# Patient Record
Sex: MALE | Race: WHITE | NOT HISPANIC OR LATINO | URBAN - METROPOLITAN AREA
[De-identification: names, ages, dates, MRNs, and addresses within clinical notes are randomized per-mention and may not be internally consistent; named-entity substitution may affect disease eponyms.]

---

## 2019-09-29 ENCOUNTER — EMERGENCY (EMERGENCY)
Facility: HOSPITAL | Age: 74
LOS: 1 days | Discharge: HOME | End: 2019-09-29
Attending: EMERGENCY MEDICINE | Admitting: EMERGENCY MEDICINE
Payer: MEDICARE

## 2019-09-29 VITALS
OXYGEN SATURATION: 95 % | SYSTOLIC BLOOD PRESSURE: 147 MMHG | DIASTOLIC BLOOD PRESSURE: 76 MMHG | HEIGHT: 65 IN | HEART RATE: 67 BPM | WEIGHT: 175.05 LBS | RESPIRATION RATE: 16 BRPM | TEMPERATURE: 98 F

## 2019-09-29 PROCEDURE — 99284 EMERGENCY DEPT VISIT MOD MDM: CPT

## 2019-09-29 PROCEDURE — 12001 RPR S/N/AX/GEN/TRNK 2.5CM/<: CPT

## 2019-09-29 NOTE — ED ADULT NURSE NOTE - OBJECTIVE STATEMENT
Patient presents to ED stated he fell while walking the dog outside the house complaining of right shoulder pain, left side of head abrasion and left fingers abrasion was seen and evaluated by MD with orders made and carried out.

## 2019-09-29 NOTE — ED PROVIDER NOTE - PATIENT PORTAL LINK FT
You can access the FollowMyHealth Patient Portal offered by [facility] by registering at the following website: [email or web address]. By joining [employer]’s FollowMyHealth portal, you will also be able to view your health information using other applications (apps) compatible with our system.

## 2019-09-29 NOTE — ED ADULT NURSE NOTE - NSIMPLEMENTINTERV_GEN_ALL_ED
Implemented All Universal Safety Interventions:  [place] to call system. Call bell, personal items and telephone within reach. Instruct patient to call for assistance. Room bathroom lighting operational. Non-slip footwear when patient is off stretcher. Physically safe environment: no spills, clutter or unnecessary equipment. Stretcher in lowest position, wheels locked, appropriate side rails in place.

## 2019-09-29 NOTE — ED ADULT TRIAGE NOTE - CHIEF COMPLAINT QUOTE
s/p trip and fall. c/o right shoulder pain, hit head, wound to the left side of the head, abrasions to the left fingers.- patient denies any LOC

## 2019-09-29 NOTE — ED PROVIDER NOTE - OBJECTIVE STATEMENT
74yo male who presents with right shoulder pain and head injury. pt was walking his dogs and they pulled him and he fell broke the fall with his right arm +head injury, no LOC< pt c/o laceration to scalp that continued to bleed with right shoulder pain, +abrasions to hand, no dizziness, no headache, no weakness no other complaints

## 2019-09-30 VITALS
SYSTOLIC BLOOD PRESSURE: 144 MMHG | DIASTOLIC BLOOD PRESSURE: 80 MMHG | HEART RATE: 71 BPM | RESPIRATION RATE: 16 BRPM | OXYGEN SATURATION: 98 % | TEMPERATURE: 97 F

## 2019-09-30 PROCEDURE — 73030 X-RAY EXAM OF SHOULDER: CPT | Mod: 26,RT

## 2019-09-30 PROCEDURE — 73200 CT UPPER EXTREMITY W/O DYE: CPT | Mod: 26,RT

## 2019-09-30 PROCEDURE — 73030 X-RAY EXAM OF SHOULDER: CPT

## 2019-09-30 PROCEDURE — 12001 RPR S/N/AX/GEN/TRNK 2.5CM/<: CPT

## 2019-09-30 PROCEDURE — 99284 EMERGENCY DEPT VISIT MOD MDM: CPT | Mod: 25

## 2019-09-30 PROCEDURE — 73200 CT UPPER EXTREMITY W/O DYE: CPT

## 2019-09-30 PROCEDURE — 70450 CT HEAD/BRAIN W/O DYE: CPT | Mod: 26

## 2019-09-30 PROCEDURE — 70450 CT HEAD/BRAIN W/O DYE: CPT

## 2019-09-30 RX ORDER — ACETAMINOPHEN 500 MG
650 TABLET ORAL ONCE
Refills: 0 | Status: COMPLETED | OUTPATIENT
Start: 2019-09-30 | End: 2019-09-30

## 2019-09-30 RX ORDER — OXYCODONE AND ACETAMINOPHEN 5; 325 MG/1; MG/1
1 TABLET ORAL ONCE
Refills: 0 | Status: DISCONTINUED | OUTPATIENT
Start: 2019-09-30 | End: 2019-09-30

## 2019-09-30 RX ADMIN — Medication 650 MILLIGRAM(S): at 00:56

## 2019-09-30 NOTE — ED PROCEDURE NOTE - CPROC ED POST PROC CARE GUIDE1
Elevate the injured extremity as instructed./Instructed patient/caregiver to follow-up with primary care physician.
Verbal/written post procedure instructions were given to patient/caregiver.

## 2019-09-30 NOTE — ED PROCEDURE NOTE - CPROC ED TIME OUT STATEMENT1
“Patient's name, , procedure and correct site were confirmed during the [place] Timeout.”
“Patient's name, , procedure and correct site were confirmed during the [place] Timeout.”

## 2020-11-03 ENCOUNTER — EMERGENCY (EMERGENCY)
Facility: HOSPITAL | Age: 75
LOS: 1 days | Discharge: ANOTHER HOSPITAL | End: 2020-11-03
Attending: EMERGENCY MEDICINE | Admitting: EMERGENCY MEDICINE
Payer: MEDICARE

## 2020-11-03 VITALS
OXYGEN SATURATION: 96 % | HEART RATE: 109 BPM | TEMPERATURE: 103 F | DIASTOLIC BLOOD PRESSURE: 91 MMHG | RESPIRATION RATE: 30 BRPM | SYSTOLIC BLOOD PRESSURE: 133 MMHG | HEIGHT: 65 IN | WEIGHT: 160.06 LBS

## 2020-11-03 LAB
ACETONE SERPL-MCNC: NEGATIVE — SIGNIFICANT CHANGE UP
ALBUMIN SERPL ELPH-MCNC: 4.1 G/DL — SIGNIFICANT CHANGE UP (ref 3.3–5)
ALP SERPL-CCNC: 94 U/L — SIGNIFICANT CHANGE UP (ref 30–120)
ALT FLD-CCNC: 36 U/L DA — SIGNIFICANT CHANGE UP (ref 10–60)
ANION GAP SERPL CALC-SCNC: 13 MMOL/L — SIGNIFICANT CHANGE UP (ref 5–17)
APTT BLD: 27.2 SEC — LOW (ref 27.5–35.5)
AST SERPL-CCNC: 21 U/L — SIGNIFICANT CHANGE UP (ref 10–40)
BASOPHILS # BLD AUTO: 0.04 K/UL — SIGNIFICANT CHANGE UP (ref 0–0.2)
BASOPHILS NFR BLD AUTO: 0.3 % — SIGNIFICANT CHANGE UP (ref 0–2)
BILIRUB SERPL-MCNC: 0.5 MG/DL — SIGNIFICANT CHANGE UP (ref 0.2–1.2)
BUN SERPL-MCNC: 31 MG/DL — HIGH (ref 7–23)
CALCIUM SERPL-MCNC: 9.5 MG/DL — SIGNIFICANT CHANGE UP (ref 8.4–10.5)
CHLORIDE SERPL-SCNC: 101 MMOL/L — SIGNIFICANT CHANGE UP (ref 96–108)
CO2 SERPL-SCNC: 24 MMOL/L — SIGNIFICANT CHANGE UP (ref 22–31)
CREAT SERPL-MCNC: 2.14 MG/DL — HIGH (ref 0.5–1.3)
EOSINOPHIL # BLD AUTO: 0.01 K/UL — SIGNIFICANT CHANGE UP (ref 0–0.5)
EOSINOPHIL NFR BLD AUTO: 0.1 % — SIGNIFICANT CHANGE UP (ref 0–6)
GLUCOSE SERPL-MCNC: 440 MG/DL — HIGH (ref 70–99)
HCT VFR BLD CALC: 36.2 % — LOW (ref 39–50)
HGB BLD-MCNC: 12.6 G/DL — LOW (ref 13–17)
IMM GRANULOCYTES NFR BLD AUTO: 0.5 % — SIGNIFICANT CHANGE UP (ref 0–1.5)
INR BLD: 1.05 RATIO — SIGNIFICANT CHANGE UP (ref 0.88–1.16)
LYMPHOCYTES # BLD AUTO: 0.76 K/UL — LOW (ref 1–3.3)
LYMPHOCYTES # BLD AUTO: 4.9 % — LOW (ref 13–44)
MCHC RBC-ENTMCNC: 32 PG — SIGNIFICANT CHANGE UP (ref 27–34)
MCHC RBC-ENTMCNC: 34.8 GM/DL — SIGNIFICANT CHANGE UP (ref 32–36)
MCV RBC AUTO: 91.9 FL — SIGNIFICANT CHANGE UP (ref 80–100)
MONOCYTES # BLD AUTO: 0.82 K/UL — SIGNIFICANT CHANGE UP (ref 0–0.9)
MONOCYTES NFR BLD AUTO: 5.3 % — SIGNIFICANT CHANGE UP (ref 2–14)
NEUTROPHILS # BLD AUTO: 13.77 K/UL — HIGH (ref 1.8–7.4)
NEUTROPHILS NFR BLD AUTO: 88.9 % — HIGH (ref 43–77)
NRBC # BLD: 0 /100 WBCS — SIGNIFICANT CHANGE UP (ref 0–0)
PLATELET # BLD AUTO: 218 K/UL — SIGNIFICANT CHANGE UP (ref 150–400)
POTASSIUM SERPL-MCNC: 4.4 MMOL/L — SIGNIFICANT CHANGE UP (ref 3.5–5.3)
POTASSIUM SERPL-SCNC: 4.4 MMOL/L — SIGNIFICANT CHANGE UP (ref 3.5–5.3)
PROT SERPL-MCNC: 7.4 G/DL — SIGNIFICANT CHANGE UP (ref 6–8.3)
PROTHROM AB SERPL-ACNC: 12.7 SEC — SIGNIFICANT CHANGE UP (ref 10.6–13.6)
RBC # BLD: 3.94 M/UL — LOW (ref 4.2–5.8)
RBC # FLD: 12.2 % — SIGNIFICANT CHANGE UP (ref 10.3–14.5)
SODIUM SERPL-SCNC: 138 MMOL/L — SIGNIFICANT CHANGE UP (ref 135–145)
WBC # BLD: 15.47 K/UL — HIGH (ref 3.8–10.5)
WBC # FLD AUTO: 15.47 K/UL — HIGH (ref 3.8–10.5)

## 2020-11-03 PROCEDURE — 70450 CT HEAD/BRAIN W/O DYE: CPT | Mod: 26

## 2020-11-03 RX ORDER — SODIUM CHLORIDE 9 MG/ML
1000 INJECTION INTRAMUSCULAR; INTRAVENOUS; SUBCUTANEOUS ONCE
Refills: 0 | Status: COMPLETED | OUTPATIENT
Start: 2020-11-03 | End: 2020-11-03

## 2020-11-03 RX ORDER — ACETAMINOPHEN 500 MG
650 TABLET ORAL ONCE
Refills: 0 | Status: COMPLETED | OUTPATIENT
Start: 2020-11-03 | End: 2020-11-03

## 2020-11-03 RX ADMIN — Medication 650 MILLIGRAM(S): at 23:30

## 2020-11-03 RX ADMIN — SODIUM CHLORIDE 1000 MILLILITER(S): 9 INJECTION INTRAMUSCULAR; INTRAVENOUS; SUBCUTANEOUS at 23:30

## 2020-11-03 RX ADMIN — SODIUM CHLORIDE 1000 MILLILITER(S): 9 INJECTION INTRAMUSCULAR; INTRAVENOUS; SUBCUTANEOUS at 23:40

## 2020-11-03 NOTE — ED PROVIDER NOTE - OBJECTIVE STATEMENT
75yo male bib ems with confusion and hyperglycemia tonite. son states pt came out of the shower and had urinated on himself, pt was speaking gibberish, no vomiting or diarrhea, pt is confused so hx is limited, pt denies any complaints, FS at home was 424

## 2020-11-03 NOTE — ED PROVIDER NOTE - NOTES
spoke with cardiology fellow at [facility], dr paulson, not convinced ekg shows st elevations, agrees this is primary sepsis, will follow up cardiac enzymes, speak with the attending dr porter, and follow up

## 2020-11-03 NOTE — ED PROVIDER NOTE - CARE PLAN
Principal Discharge DX:	Sepsis due to pneumonia  Secondary Diagnosis:	Hyperglycemia  Secondary Diagnosis:	Acute renal insufficiency

## 2020-11-04 ENCOUNTER — INPATIENT (INPATIENT)
Facility: HOSPITAL | Age: 75
LOS: 9 days | Discharge: HOME | DRG: 853 | End: 2020-11-14
Attending: INTERNAL MEDICINE | Admitting: INTERNAL MEDICINE
Payer: MEDICARE

## 2020-11-04 VITALS — RESPIRATION RATE: 22 BRPM | OXYGEN SATURATION: 94 % | TEMPERATURE: 101 F

## 2020-11-04 VITALS
RESPIRATION RATE: 33 BRPM | OXYGEN SATURATION: 95 % | HEART RATE: 92 BPM | TEMPERATURE: 98 F | DIASTOLIC BLOOD PRESSURE: 54 MMHG | SYSTOLIC BLOOD PRESSURE: 102 MMHG

## 2020-11-04 DIAGNOSIS — I10 ESSENTIAL (PRIMARY) HYPERTENSION: ICD-10-CM

## 2020-11-04 DIAGNOSIS — A41.9 SEPSIS, UNSPECIFIED ORGANISM: ICD-10-CM

## 2020-11-04 DIAGNOSIS — E11.69 TYPE 2 DIABETES MELLITUS WITH OTHER SPECIFIED COMPLICATION: ICD-10-CM

## 2020-11-04 DIAGNOSIS — N17.9 ACUTE KIDNEY FAILURE, UNSPECIFIED: ICD-10-CM

## 2020-11-04 DIAGNOSIS — R41.82 ALTERED MENTAL STATUS, UNSPECIFIED: ICD-10-CM

## 2020-11-04 DIAGNOSIS — R94.31 ABNORMAL ELECTROCARDIOGRAM [ECG] [EKG]: ICD-10-CM

## 2020-11-04 DIAGNOSIS — G93.41 METABOLIC ENCEPHALOPATHY: ICD-10-CM

## 2020-11-04 PROBLEM — Z00.00 ENCOUNTER FOR PREVENTIVE HEALTH EXAMINATION: Status: ACTIVE | Noted: 2020-11-04

## 2020-11-04 LAB
-  STREPTOCOCCUS SP. (NOT GRP A, B OR S PNEUMONIAE): SIGNIFICANT CHANGE UP
ACETONE SERPL-MCNC: NEGATIVE — SIGNIFICANT CHANGE UP
ALBUMIN SERPL ELPH-MCNC: 2.9 G/DL — LOW (ref 3.3–5)
ALP SERPL-CCNC: 73 U/L — SIGNIFICANT CHANGE UP (ref 40–120)
ALT FLD-CCNC: 27 U/L — SIGNIFICANT CHANGE UP (ref 12–78)
ANION GAP SERPL CALC-SCNC: 7 MMOL/L — SIGNIFICANT CHANGE UP (ref 5–17)
APPEARANCE UR: CLEAR — SIGNIFICANT CHANGE UP
AST SERPL-CCNC: 19 U/L — SIGNIFICANT CHANGE UP (ref 15–37)
BASE EXCESS BLDV CALC-SCNC: -2.7 MMOL/L — LOW (ref -2–2)
BASOPHILS # BLD AUTO: 0.03 K/UL — SIGNIFICANT CHANGE UP (ref 0–0.2)
BASOPHILS NFR BLD AUTO: 0.2 % — SIGNIFICANT CHANGE UP (ref 0–2)
BILIRUB DIRECT SERPL-MCNC: 0.1 MG/DL — SIGNIFICANT CHANGE UP (ref 0.05–0.2)
BILIRUB INDIRECT FLD-MCNC: 0.3 MG/DL — SIGNIFICANT CHANGE UP (ref 0.2–1)
BILIRUB SERPL-MCNC: 0.4 MG/DL — SIGNIFICANT CHANGE UP (ref 0.2–1.2)
BILIRUB UR-MCNC: NEGATIVE — SIGNIFICANT CHANGE UP
BLOOD GAS COMMENTS, VENOUS: SIGNIFICANT CHANGE UP
BUN SERPL-MCNC: 29 MG/DL — HIGH (ref 7–23)
CALCIUM SERPL-MCNC: 7.9 MG/DL — LOW (ref 8.5–10.1)
CHLORIDE SERPL-SCNC: 110 MMOL/L — HIGH (ref 96–108)
CHOLEST SERPL-MCNC: 96 MG/DL — SIGNIFICANT CHANGE UP
CK MB BLD-MCNC: 1.3 % — SIGNIFICANT CHANGE UP (ref 0–3.5)
CK MB BLD-MCNC: 3.5 % — SIGNIFICANT CHANGE UP (ref 0–3.5)
CK MB BLD-MCNC: 5 % — HIGH (ref 0–3.5)
CK MB BLD-MCNC: 5.7 % — HIGH (ref 0–3.5)
CK MB CFR SERPL CALC: 1.7 NG/ML — SIGNIFICANT CHANGE UP (ref 0–3.6)
CK MB CFR SERPL CALC: 10.6 NG/ML — HIGH (ref 0–3.6)
CK MB CFR SERPL CALC: 14.4 NG/ML — HIGH (ref 0–3.6)
CK MB CFR SERPL CALC: 8 NG/ML — HIGH (ref 0–3.6)
CK SERPL-CCNC: 134 U/L — SIGNIFICANT CHANGE UP (ref 39–308)
CK SERPL-CCNC: 159 U/L — SIGNIFICANT CHANGE UP (ref 26–308)
CK SERPL-CCNC: 251 U/L — SIGNIFICANT CHANGE UP (ref 26–308)
CK SERPL-CCNC: 305 U/L — SIGNIFICANT CHANGE UP (ref 26–308)
CO2 SERPL-SCNC: 23 MMOL/L — SIGNIFICANT CHANGE UP (ref 22–31)
COLOR SPEC: YELLOW — SIGNIFICANT CHANGE UP
CREAT SERPL-MCNC: 1.9 MG/DL — HIGH (ref 0.5–1.3)
CRP SERPL-MCNC: 1.3 MG/DL — HIGH (ref 0–0.4)
CRP SERPL-MCNC: 6.41 MG/DL — HIGH (ref 0–0.4)
DIFF PNL FLD: ABNORMAL
EOSINOPHIL # BLD AUTO: 0 K/UL — SIGNIFICANT CHANGE UP (ref 0–0.5)
EOSINOPHIL NFR BLD AUTO: 0 % — SIGNIFICANT CHANGE UP (ref 0–6)
FERRITIN SERPL-MCNC: 164 NG/ML — SIGNIFICANT CHANGE UP (ref 30–400)
GLUCOSE BLDC GLUCOMTR-MCNC: 417 MG/DL — HIGH (ref 70–99)
GLUCOSE SERPL-MCNC: 320 MG/DL — HIGH (ref 70–99)
GLUCOSE UR QL: 1000 MG/DL
GRAM STN FLD: SIGNIFICANT CHANGE UP
HCO3 BLDV-SCNC: 22 MMOL/L — SIGNIFICANT CHANGE UP (ref 21–29)
HCT VFR BLD CALC: 30.5 % — LOW (ref 39–50)
HDLC SERPL-MCNC: 32 MG/DL — LOW
HGB BLD-MCNC: 10.6 G/DL — LOW (ref 13–17)
HOROWITZ INDEX BLDV+IHG-RTO: 21 — SIGNIFICANT CHANGE UP
IMM GRANULOCYTES NFR BLD AUTO: 0.9 % — SIGNIFICANT CHANGE UP (ref 0–1.5)
KETONES UR-MCNC: NEGATIVE — SIGNIFICANT CHANGE UP
LACTATE SERPL-SCNC: 2.1 MMOL/L — HIGH (ref 0.7–2)
LACTATE SERPL-SCNC: 3.6 MMOL/L — HIGH (ref 0.7–2)
LACTATE SERPL-SCNC: 3.7 MMOL/L — HIGH (ref 0.7–2)
LEUKOCYTE ESTERASE UR-ACNC: NEGATIVE — SIGNIFICANT CHANGE UP
LIPID PNL WITH DIRECT LDL SERPL: 55 MG/DL — SIGNIFICANT CHANGE UP
LYMPHOCYTES # BLD AUTO: 0.88 K/UL — LOW (ref 1–3.3)
LYMPHOCYTES # BLD AUTO: 4.5 % — LOW (ref 13–44)
MAGNESIUM SERPL-MCNC: 1.8 MG/DL — SIGNIFICANT CHANGE UP (ref 1.6–2.6)
MCHC RBC-ENTMCNC: 31.5 PG — SIGNIFICANT CHANGE UP (ref 27–34)
MCHC RBC-ENTMCNC: 34.8 GM/DL — SIGNIFICANT CHANGE UP (ref 32–36)
MCV RBC AUTO: 90.5 FL — SIGNIFICANT CHANGE UP (ref 80–100)
METHOD TYPE: SIGNIFICANT CHANGE UP
MONOCYTES # BLD AUTO: 0.83 K/UL — SIGNIFICANT CHANGE UP (ref 0–0.9)
MONOCYTES NFR BLD AUTO: 4.3 % — SIGNIFICANT CHANGE UP (ref 2–14)
NEUTROPHILS # BLD AUTO: 17.48 K/UL — HIGH (ref 1.8–7.4)
NEUTROPHILS NFR BLD AUTO: 90.1 % — HIGH (ref 43–77)
NITRITE UR-MCNC: NEGATIVE — SIGNIFICANT CHANGE UP
NON HDL CHOLESTEROL: 64 MG/DL — SIGNIFICANT CHANGE UP
NRBC # BLD: 0 /100 WBCS — SIGNIFICANT CHANGE UP (ref 0–0)
NT-PROBNP SERPL-SCNC: 1500 PG/ML — HIGH (ref 0–450)
PCO2 BLDV: 43 MMHG — SIGNIFICANT CHANGE UP (ref 35–50)
PH BLDV: 7.34 — LOW (ref 7.35–7.45)
PH UR: 5 — SIGNIFICANT CHANGE UP (ref 5–8)
PLATELET # BLD AUTO: 187 K/UL — SIGNIFICANT CHANGE UP (ref 150–400)
PO2 BLDV: 103 MMHG — HIGH (ref 25–45)
POTASSIUM SERPL-MCNC: 4.4 MMOL/L — SIGNIFICANT CHANGE UP (ref 3.5–5.3)
POTASSIUM SERPL-SCNC: 4.4 MMOL/L — SIGNIFICANT CHANGE UP (ref 3.5–5.3)
PROCALCITONIN SERPL-MCNC: 0.16 NG/ML — HIGH (ref 0.02–0.1)
PROCALCITONIN SERPL-MCNC: 5.7 NG/ML — HIGH (ref 0–0.04)
PROT SERPL-MCNC: 5.9 G/DL — LOW (ref 6–8.3)
PROT UR-MCNC: 15 MG/DL
RAPID RVP RESULT: SIGNIFICANT CHANGE UP
RBC # BLD: 3.37 M/UL — LOW (ref 4.2–5.8)
RBC # FLD: 12.4 % — SIGNIFICANT CHANGE UP (ref 10.3–14.5)
SAO2 % BLDV: 98 % — HIGH (ref 67–88)
SARS-COV-2 RNA SPEC QL NAA+PROBE: SIGNIFICANT CHANGE UP
SARS-COV-2 RNA SPEC QL NAA+PROBE: SIGNIFICANT CHANGE UP
SODIUM SERPL-SCNC: 140 MMOL/L — SIGNIFICANT CHANGE UP (ref 135–145)
SP GR SPEC: 1.01 — SIGNIFICANT CHANGE UP (ref 1.01–1.02)
SPECIMEN SOURCE: SIGNIFICANT CHANGE UP
SPECIMEN SOURCE: SIGNIFICANT CHANGE UP
TRIGL SERPL-MCNC: 45 MG/DL — SIGNIFICANT CHANGE UP
TROPONIN I SERPL-MCNC: 0.01 NG/ML — LOW (ref 0.02–0.06)
TROPONIN I SERPL-MCNC: 0.8 NG/ML — HIGH (ref 0.01–0.04)
TROPONIN I SERPL-MCNC: 3.2 NG/ML — HIGH (ref 0.01–0.04)
TROPONIN I SERPL-MCNC: 5.08 NG/ML — HIGH (ref 0.01–0.04)
TSH SERPL-MCNC: 0.64 UIU/ML — SIGNIFICANT CHANGE UP (ref 0.36–3.74)
UROBILINOGEN FLD QL: NEGATIVE MG/DL — SIGNIFICANT CHANGE UP
WBC # BLD: 19.39 K/UL — HIGH (ref 3.8–10.5)
WBC # FLD AUTO: 19.39 K/UL — HIGH (ref 3.8–10.5)

## 2020-11-04 PROCEDURE — 82553 CREATINE MB FRACTION: CPT

## 2020-11-04 PROCEDURE — 76770 US EXAM ABDO BACK WALL COMP: CPT | Mod: 26

## 2020-11-04 PROCEDURE — 99285 EMERGENCY DEPT VISIT HI MDM: CPT

## 2020-11-04 PROCEDURE — 96368 THER/DIAG CONCURRENT INF: CPT

## 2020-11-04 PROCEDURE — 71250 CT THORAX DX C-: CPT

## 2020-11-04 PROCEDURE — 71250 CT THORAX DX C-: CPT | Mod: 26

## 2020-11-04 PROCEDURE — 99285 EMERGENCY DEPT VISIT HI MDM: CPT | Mod: 25

## 2020-11-04 PROCEDURE — 36415 COLL VENOUS BLD VENIPUNCTURE: CPT

## 2020-11-04 PROCEDURE — 85730 THROMBOPLASTIN TIME PARTIAL: CPT

## 2020-11-04 PROCEDURE — 82009 KETONE BODYS QUAL: CPT

## 2020-11-04 PROCEDURE — 71045 X-RAY EXAM CHEST 1 VIEW: CPT | Mod: 26

## 2020-11-04 PROCEDURE — 99283 EMERGENCY DEPT VISIT LOW MDM: CPT

## 2020-11-04 PROCEDURE — 87184 SC STD DISK METHOD PER PLATE: CPT

## 2020-11-04 PROCEDURE — 87150 DNA/RNA AMPLIFIED PROBE: CPT

## 2020-11-04 PROCEDURE — 96367 TX/PROPH/DG ADDL SEQ IV INF: CPT

## 2020-11-04 PROCEDURE — 85610 PROTHROMBIN TIME: CPT

## 2020-11-04 PROCEDURE — 93005 ELECTROCARDIOGRAM TRACING: CPT

## 2020-11-04 PROCEDURE — 87040 BLOOD CULTURE FOR BACTERIA: CPT

## 2020-11-04 PROCEDURE — 82728 ASSAY OF FERRITIN: CPT

## 2020-11-04 PROCEDURE — 84484 ASSAY OF TROPONIN QUANT: CPT

## 2020-11-04 PROCEDURE — 96375 TX/PRO/DX INJ NEW DRUG ADDON: CPT

## 2020-11-04 PROCEDURE — 96361 HYDRATE IV INFUSION ADD-ON: CPT

## 2020-11-04 PROCEDURE — 93010 ELECTROCARDIOGRAM REPORT: CPT

## 2020-11-04 PROCEDURE — 96365 THER/PROPH/DIAG IV INF INIT: CPT

## 2020-11-04 PROCEDURE — 87181 SC STD AGAR DILUTION PER AGT: CPT

## 2020-11-04 PROCEDURE — 87086 URINE CULTURE/COLONY COUNT: CPT

## 2020-11-04 PROCEDURE — 82962 GLUCOSE BLOOD TEST: CPT

## 2020-11-04 PROCEDURE — 83605 ASSAY OF LACTIC ACID: CPT

## 2020-11-04 PROCEDURE — 93010 ELECTROCARDIOGRAM REPORT: CPT | Mod: 77

## 2020-11-04 PROCEDURE — 99223 1ST HOSP IP/OBS HIGH 75: CPT

## 2020-11-04 PROCEDURE — 70450 CT HEAD/BRAIN W/O DYE: CPT

## 2020-11-04 PROCEDURE — 86140 C-REACTIVE PROTEIN: CPT

## 2020-11-04 PROCEDURE — 71045 X-RAY EXAM CHEST 1 VIEW: CPT

## 2020-11-04 PROCEDURE — 80053 COMPREHEN METABOLIC PANEL: CPT

## 2020-11-04 PROCEDURE — 81001 URINALYSIS AUTO W/SCOPE: CPT

## 2020-11-04 PROCEDURE — 82550 ASSAY OF CK (CPK): CPT

## 2020-11-04 PROCEDURE — 85025 COMPLETE CBC W/AUTO DIFF WBC: CPT

## 2020-11-04 PROCEDURE — 93306 TTE W/DOPPLER COMPLETE: CPT | Mod: 26

## 2020-11-04 PROCEDURE — 84145 PROCALCITONIN (PCT): CPT

## 2020-11-04 RX ORDER — INSULIN LISPRO 100/ML
VIAL (ML) SUBCUTANEOUS
Refills: 0 | Status: DISCONTINUED | OUTPATIENT
Start: 2020-11-04 | End: 2020-11-04

## 2020-11-04 RX ORDER — DEXTROSE 50 % IN WATER 50 %
12.5 SYRINGE (ML) INTRAVENOUS ONCE
Refills: 0 | Status: DISCONTINUED | OUTPATIENT
Start: 2020-11-04 | End: 2020-11-14

## 2020-11-04 RX ORDER — HEPARIN SODIUM 5000 [USP'U]/ML
INJECTION INTRAVENOUS; SUBCUTANEOUS
Qty: 25000 | Refills: 0 | Status: DISCONTINUED | OUTPATIENT
Start: 2020-11-04 | End: 2020-11-07

## 2020-11-04 RX ORDER — ACETAMINOPHEN 500 MG
650 TABLET ORAL EVERY 6 HOURS
Refills: 0 | Status: DISCONTINUED | OUTPATIENT
Start: 2020-11-04 | End: 2020-11-14

## 2020-11-04 RX ORDER — INSULIN LISPRO 100/ML
6 VIAL (ML) SUBCUTANEOUS ONCE
Refills: 0 | Status: COMPLETED | OUTPATIENT
Start: 2020-11-04 | End: 2020-11-04

## 2020-11-04 RX ORDER — HEPARIN SODIUM 5000 [USP'U]/ML
4400 INJECTION INTRAVENOUS; SUBCUTANEOUS ONCE
Refills: 0 | Status: COMPLETED | OUTPATIENT
Start: 2020-11-04 | End: 2020-11-04

## 2020-11-04 RX ORDER — SODIUM CHLORIDE 9 MG/ML
1000 INJECTION, SOLUTION INTRAVENOUS
Refills: 0 | Status: DISCONTINUED | OUTPATIENT
Start: 2020-11-04 | End: 2020-11-14

## 2020-11-04 RX ORDER — HEPARIN SODIUM 5000 [USP'U]/ML
4400 INJECTION INTRAVENOUS; SUBCUTANEOUS EVERY 6 HOURS
Refills: 0 | Status: DISCONTINUED | OUTPATIENT
Start: 2020-11-04 | End: 2020-11-07

## 2020-11-04 RX ORDER — CEFTRIAXONE 500 MG/1
2000 INJECTION, POWDER, FOR SOLUTION INTRAMUSCULAR; INTRAVENOUS EVERY 24 HOURS
Refills: 0 | Status: COMPLETED | OUTPATIENT
Start: 2020-11-05 | End: 2020-11-14

## 2020-11-04 RX ORDER — SODIUM CHLORIDE 9 MG/ML
1000 INJECTION INTRAMUSCULAR; INTRAVENOUS; SUBCUTANEOUS ONCE
Refills: 0 | Status: COMPLETED | OUTPATIENT
Start: 2020-11-04 | End: 2020-11-04

## 2020-11-04 RX ORDER — GLUCAGON INJECTION, SOLUTION 0.5 MG/.1ML
1 INJECTION, SOLUTION SUBCUTANEOUS ONCE
Refills: 0 | Status: DISCONTINUED | OUTPATIENT
Start: 2020-11-04 | End: 2020-11-14

## 2020-11-04 RX ORDER — METFORMIN HYDROCHLORIDE 850 MG/1
1 TABLET ORAL
Qty: 0 | Refills: 0 | DISCHARGE

## 2020-11-04 RX ORDER — METOPROLOL TARTRATE 50 MG
12.5 TABLET ORAL EVERY 12 HOURS
Refills: 0 | Status: DISCONTINUED | OUTPATIENT
Start: 2020-11-04 | End: 2020-11-05

## 2020-11-04 RX ORDER — INSULIN LISPRO 100/ML
VIAL (ML) SUBCUTANEOUS
Refills: 0 | Status: DISCONTINUED | OUTPATIENT
Start: 2020-11-04 | End: 2020-11-14

## 2020-11-04 RX ORDER — ASPIRIN/CALCIUM CARB/MAGNESIUM 324 MG
81 TABLET ORAL DAILY
Refills: 0 | Status: DISCONTINUED | OUTPATIENT
Start: 2020-11-04 | End: 2020-11-14

## 2020-11-04 RX ORDER — DEXTROSE 50 % IN WATER 50 %
25 SYRINGE (ML) INTRAVENOUS ONCE
Refills: 0 | Status: DISCONTINUED | OUTPATIENT
Start: 2020-11-04 | End: 2020-11-14

## 2020-11-04 RX ORDER — CEFTRIAXONE 500 MG/1
1000 INJECTION, POWDER, FOR SOLUTION INTRAMUSCULAR; INTRAVENOUS ONCE
Refills: 0 | Status: COMPLETED | OUTPATIENT
Start: 2020-11-04 | End: 2020-11-04

## 2020-11-04 RX ORDER — ATORVASTATIN CALCIUM 80 MG/1
40 TABLET, FILM COATED ORAL AT BEDTIME
Refills: 0 | Status: DISCONTINUED | OUTPATIENT
Start: 2020-11-04 | End: 2020-11-14

## 2020-11-04 RX ORDER — AZITHROMYCIN 500 MG/1
500 TABLET, FILM COATED ORAL DAILY
Refills: 0 | Status: COMPLETED | OUTPATIENT
Start: 2020-11-04 | End: 2020-11-04

## 2020-11-04 RX ORDER — INFLUENZA VIRUS VACCINE 15; 15; 15; 15 UG/.5ML; UG/.5ML; UG/.5ML; UG/.5ML
0.5 SUSPENSION INTRAMUSCULAR ONCE
Refills: 0 | Status: DISCONTINUED | OUTPATIENT
Start: 2020-11-04 | End: 2020-11-14

## 2020-11-04 RX ORDER — ACETAMINOPHEN 500 MG
650 TABLET ORAL ONCE
Refills: 0 | Status: COMPLETED | OUTPATIENT
Start: 2020-11-04 | End: 2020-11-04

## 2020-11-04 RX ORDER — AZITHROMYCIN 500 MG/1
500 TABLET, FILM COATED ORAL ONCE
Refills: 0 | Status: COMPLETED | OUTPATIENT
Start: 2020-11-04 | End: 2020-11-04

## 2020-11-04 RX ORDER — INSULIN LISPRO 100/ML
VIAL (ML) SUBCUTANEOUS AT BEDTIME
Refills: 0 | Status: DISCONTINUED | OUTPATIENT
Start: 2020-11-04 | End: 2020-11-14

## 2020-11-04 RX ORDER — ENOXAPARIN SODIUM 100 MG/ML
30 INJECTION SUBCUTANEOUS DAILY
Refills: 0 | Status: DISCONTINUED | OUTPATIENT
Start: 2020-11-04 | End: 2020-11-04

## 2020-11-04 RX ORDER — CEFTRIAXONE 500 MG/1
1000 INJECTION, POWDER, FOR SOLUTION INTRAMUSCULAR; INTRAVENOUS EVERY 24 HOURS
Refills: 0 | Status: COMPLETED | OUTPATIENT
Start: 2020-11-04 | End: 2020-11-04

## 2020-11-04 RX ORDER — ASPIRIN/CALCIUM CARB/MAGNESIUM 324 MG
325 TABLET ORAL ONCE
Refills: 0 | Status: COMPLETED | OUTPATIENT
Start: 2020-11-04 | End: 2020-11-04

## 2020-11-04 RX ORDER — DEXTROSE 50 % IN WATER 50 %
15 SYRINGE (ML) INTRAVENOUS ONCE
Refills: 0 | Status: DISCONTINUED | OUTPATIENT
Start: 2020-11-04 | End: 2020-11-14

## 2020-11-04 RX ORDER — INSULIN LISPRO 100/ML
VIAL (ML) SUBCUTANEOUS AT BEDTIME
Refills: 0 | Status: DISCONTINUED | OUTPATIENT
Start: 2020-11-04 | End: 2020-11-04

## 2020-11-04 RX ORDER — SODIUM CHLORIDE 9 MG/ML
1000 INJECTION INTRAMUSCULAR; INTRAVENOUS; SUBCUTANEOUS
Refills: 0 | Status: DISCONTINUED | OUTPATIENT
Start: 2020-11-04 | End: 2020-11-04

## 2020-11-04 RX ORDER — ENOXAPARIN SODIUM 100 MG/ML
73 INJECTION SUBCUTANEOUS DAILY
Refills: 0 | Status: COMPLETED | OUTPATIENT
Start: 2020-11-04 | End: 2020-11-06

## 2020-11-04 RX ORDER — INSULIN HUMAN 100 [IU]/ML
6 INJECTION, SOLUTION SUBCUTANEOUS ONCE
Refills: 0 | Status: COMPLETED | OUTPATIENT
Start: 2020-11-04 | End: 2020-11-04

## 2020-11-04 RX ORDER — CLOPIDOGREL BISULFATE 75 MG/1
75 TABLET, FILM COATED ORAL DAILY
Refills: 0 | Status: DISCONTINUED | OUTPATIENT
Start: 2020-11-04 | End: 2020-11-14

## 2020-11-04 RX ADMIN — ENOXAPARIN SODIUM 73 MILLIGRAM(S): 100 INJECTION SUBCUTANEOUS at 17:05

## 2020-11-04 RX ADMIN — Medication 650 MILLIGRAM(S): at 00:01

## 2020-11-04 RX ADMIN — SODIUM CHLORIDE 1000 MILLILITER(S): 9 INJECTION INTRAMUSCULAR; INTRAVENOUS; SUBCUTANEOUS at 00:30

## 2020-11-04 RX ADMIN — CEFTRIAXONE 1000 MILLIGRAM(S): 500 INJECTION, POWDER, FOR SOLUTION INTRAMUSCULAR; INTRAVENOUS at 02:05

## 2020-11-04 RX ADMIN — Medication 650 MILLIGRAM(S): at 16:30

## 2020-11-04 RX ADMIN — ATORVASTATIN CALCIUM 40 MILLIGRAM(S): 80 TABLET, FILM COATED ORAL at 21:14

## 2020-11-04 RX ADMIN — HEPARIN SODIUM 4400 UNIT(S): 5000 INJECTION INTRAVENOUS; SUBCUTANEOUS at 03:24

## 2020-11-04 RX ADMIN — Medication 325 MILLIGRAM(S): at 01:47

## 2020-11-04 RX ADMIN — Medication 650 MILLIGRAM(S): at 05:30

## 2020-11-04 RX ADMIN — SODIUM CHLORIDE 1000 MILLILITER(S): 9 INJECTION INTRAMUSCULAR; INTRAVENOUS; SUBCUTANEOUS at 01:36

## 2020-11-04 RX ADMIN — CEFTRIAXONE 100 MILLIGRAM(S): 500 INJECTION, POWDER, FOR SOLUTION INTRAMUSCULAR; INTRAVENOUS at 01:36

## 2020-11-04 RX ADMIN — CLOPIDOGREL BISULFATE 75 MILLIGRAM(S): 75 TABLET, FILM COATED ORAL at 17:05

## 2020-11-04 RX ADMIN — Medication 81 MILLIGRAM(S): at 13:10

## 2020-11-04 RX ADMIN — INSULIN HUMAN 6 UNIT(S): 100 INJECTION, SOLUTION SUBCUTANEOUS at 03:25

## 2020-11-04 RX ADMIN — AZITHROMYCIN 255 MILLIGRAM(S): 500 TABLET, FILM COATED ORAL at 01:52

## 2020-11-04 RX ADMIN — Medication 12.5 MILLIGRAM(S): at 18:36

## 2020-11-04 RX ADMIN — Medication 650 MILLIGRAM(S): at 04:30

## 2020-11-04 RX ADMIN — Medication 2: at 13:09

## 2020-11-04 RX ADMIN — CEFTRIAXONE 100 MILLIGRAM(S): 500 INJECTION, POWDER, FOR SOLUTION INTRAMUSCULAR; INTRAVENOUS at 08:07

## 2020-11-04 RX ADMIN — Medication 6 UNIT(S): at 09:44

## 2020-11-04 RX ADMIN — AZITHROMYCIN 255 MILLIGRAM(S): 500 TABLET, FILM COATED ORAL at 12:55

## 2020-11-04 RX ADMIN — SODIUM CHLORIDE 1000 MILLILITER(S): 9 INJECTION INTRAMUSCULAR; INTRAVENOUS; SUBCUTANEOUS at 00:36

## 2020-11-04 RX ADMIN — SODIUM CHLORIDE 1000 MILLILITER(S): 9 INJECTION INTRAMUSCULAR; INTRAVENOUS; SUBCUTANEOUS at 00:40

## 2020-11-04 RX ADMIN — HEPARIN SODIUM 900 UNIT(S)/HR: 5000 INJECTION INTRAVENOUS; SUBCUTANEOUS at 03:24

## 2020-11-04 RX ADMIN — AZITHROMYCIN 500 MILLIGRAM(S): 500 TABLET, FILM COATED ORAL at 02:52

## 2020-11-04 NOTE — ED ADULT NURSE NOTE - OBJECTIVE STATEMENT
pt BIBA for confusion and hyperglycemia, upon arrival pt AAOx3, skin warm to touch, placed on CM, sepsis work up initiated, blood sent to lab. Rectal temp 103.

## 2020-11-04 NOTE — CONSULT NOTE ADULT - SUBJECTIVE AND OBJECTIVE BOX
HPI:  This is a 74 M with PMH of HTN DM HLD CAD presented to the hospital with CC of mental status and rigors. In ER patient had a temp of 103. WBC 19K and MARINA. Noted to have Left LE erythema. CT chest showed juliet infiltrates with pulm edema. No n/v/d CP SOB abd pain urinary symptoms.    Infectious Disease consult was called to evaluate pt and for antibiotic management      Past Medical & Surgical Hx:  PAST MEDICAL & SURGICAL HISTORY:  Diabetes mellitus  Hypertension    Social History--  EtOH: denies   Tobacco: denies   Drug Use: denies     FAMILY HISTORY:  Noncontributory    Allergies  No Known Allergies    Intolerances  NONE    Home Medications:  amLODIPine 10 mg oral tablet: 1 tab(s) orally once a day (04 Nov 2020 10:41)  aspirin 81 mg oral tablet: 1 tab(s) orally once a day (04 Nov 2020 10:41)  atorvastatin 40 mg oral tablet: 1 tab(s) orally once a day (04 Nov 2020 10:41)  Centrum Men&#x27;s oral tablet: 1 tab(s) orally once a day (04 Nov 2020 10:41)  Feosol 325 mg (65 mg elemental iron) oral tablet: 1 tab(s) orally once a day (04 Nov 2020 10:41)  hydroCHLOROthiazide 12.5 mg oral capsule: 1 cap(s) orally once a day (04 Nov 2020 10:41)  losartan 100 mg oral tablet: 1 tab(s) orally once a day (04 Nov 2020 10:41)  metFORMIN 500 mg oral tablet: 2 tab(s) orally once a day (at bedtime)   as per patient    *Script written as 1T BID* (04 Nov 2020 10:41)  Vitamin B-12 1000 mcg oral tablet: 1 tab(s) orally once a day (04 Nov 2020 10:41)  Vitamin D3 5000 intl units (125 mcg) oral tablet: 1 tab(s) orally once a day (04 Nov 2020 10:41)      Current Inpatient Medications :    ANTIBIOTICS:   azithromycin  IVPB 500 milliGRAM(s) IV Intermittent daily  cefTRIAXone   IVPB 1000 milliGRAM(s) IV Intermittent every 24 hours      OTHER RELEVANT MEDICATIONS :  acetaminophen   Tablet .. 650 milliGRAM(s) Oral every 6 hours PRN  aspirin enteric coated 81 milliGRAM(s) Oral daily  atorvastatin 40 milliGRAM(s) Oral at bedtime  clopidogrel Tablet 75 milliGRAM(s) Oral daily  dextrose 40% Gel 15 Gram(s) Oral once PRN  dextrose 5%. 1000 milliLiter(s) IV Continuous <Continuous>  dextrose 50% Injectable 12.5 Gram(s) IV Push once  dextrose 50% Injectable 25 Gram(s) IV Push once  dextrose 50% Injectable 25 Gram(s) IV Push once  enoxaparin Injectable 73 milliGRAM(s) SubCutaneous daily  glucagon  Injectable 1 milliGRAM(s) IntraMuscular once PRN  insulin lispro (ADMELOG) corrective regimen sliding scale   SubCutaneous three times a day before meals  insulin lispro (ADMELOG) corrective regimen sliding scale   SubCutaneous at bedtime      ROS:  Unable to obtain due to :     ROS:  CONSTITUTIONAL:  Negative fever or chills, feels well, good appetite  EYES:  Negative  blurry vision or double vision  CARDIOVASCULAR:  Negative for chest pain or palpitations  RESPIRATORY:  Negative for cough, wheezing, or SOB   GASTROINTESTINAL:  Negative for nausea, vomiting, diarrhea, constipation, or abdominal pain  GENITOURINARY:  Negative frequency, urgency , dysuria or hematuria   NEUROLOGIC:  No headache, confusion, dizziness, lightheadedness  All other systems were reviewed and are negative  I&O's Detail    Physical Exam:  Vital Signs Last 24 Hrs  T(C): 36.6 (04 Nov 2020 08:59), Max: 39.4 (03 Nov 2020 23:21)  T(F): 97.9 (04 Nov 2020 08:59), Max: 103 (03 Nov 2020 23:21)  HR: 79 (04 Nov 2020 13:00) (75 - 109)  BP: 126/72 (04 Nov 2020 13:00) (101/53 - 154/69)  RR: 18 (04 Nov 2020 13:00) (18 - 40)  SpO2: 100% (04 Nov 2020 13:00) (93% - 100%)  Height (cm): 165.1 (11-04 @ 04:33), 165.1 (11-03 @ 23:21)  Weight (kg): 72.6 (11-04 @ 04:33), 72.575 (11-03 @ 23:21)  BMI (kg/m2): 26.6 (11-04 @ 04:33), 26.6 (11-03 @ 23:21)  BSA (m2): 1.8 (11-04 @ 04:33), 1.8 (11-03 @ 23:21)    General:  no acute distress  Eyes: sclera anicteric, pupils equal and reactive to light  ENMT: buccal mucosa moist, pharynx not injected  Neck: supple, trachea midline  Lungs: Decreased, no wheeze/rhonchi  Cardiovascular: regular rate and rhythm, S1 S2  Abdomen: soft, nontender, no organomegaly present, bowel sounds normal  Neurological:  alert and oriented x3, Cranial Nerves II-XII grossly intact  Skin: no increased ecchymosis/petechiae/purpura  Extremities: Left LE erythema swelling    Labs:                         10.6   19.39 )-----------( 187      ( 04 Nov 2020 08:25 )             30.5   11-04    140  |  110<H>  |  29<H>  ----------------------------<  320<H>  4.4   |  23  |  1.90<H>    Ca    7.9<L>      04 Nov 2020 08:25  Mg     1.8     11-04    TPro  5.9<L>  /  Alb  2.9<L>  /  TBili  0.4  /  DBili  .10  /  AST  19  /  ALT  27  /  AlkPhos  73  11-04      RECENT CULTURES:  pending      RADIOLOGY & ADDITIONAL STUDIES:    EXAM:  CT CHEST                                  PROCEDURE DATE:  11/04/2020          INTERPRETATION:  CLINICAL INFORMATION: Cough    COMPARISON: There are no prior studies available for comparison.      TECHNIQUE: A volumetric CT acquisition of the chest was obtained from the thoracic inlet to the upper abdomen, without administration of intravenous contrast. MIP images were also obtained.    FINDINGS:    CHEST:    LUNGS AND LARGE AIRWAYS: There is interlobular septal thickening and extensive scattered groundglass opacity in both lungs. Patent central airways.  No pulmonary nodules.  PLEURA: No pleural effusion.  VESSELS: Within normal limits.  HEART: Heart size is mildly enlarged. No pericardial effusion. Coronary artery vascular calcifications.  MEDIASTINUM AND CARRIE: No lymphadenopathy.  CHEST WALL AND LOWER NECK: Within normal limits.  VISUALIZED UPPER ABDOMEN: Within normal limits.  BONES: There is a right humeral prosthesis with streak artifact limiting evaluation. There are degenerative changes of the spine.    IMPRESSION: Moderate pulmonary edema. Please note that superimposed infection cannot be excluded.    Assessment :   74 M with PMH of HTN DM HLD CAD presented to the hospital with CC of mental status and rigors admitted with sepsis syndrome. Fever Tmax 103 WBC 19K   - Suspect Left LE cellulitis is the source Fever Tmax 103 WBC 19K. Possibly bacteremic.  - Doubt pneumonia but cannot rule out  - CHF   - + troponins  - MARINA    Plan :   Cont Rocephin Zithromax  Fu cultures  Trend temps and cbc  Legionella and Pneumococcal antigen      Continue with present regime .  Approptiate use of antibiotics and adverse effects reviewed.      I have discussed the above plan of care with patient and son in detail. They expressed understanding of the treatment plan . Risks, benefits and alternatives discussed in detail. I have asked if they have any questions or concerns and appropriately addressed them to the best of my ability .      > 45 minutes spent in direct patient care reviewing  the notes, lab data/ imaging , discussion with multidisciplinary team. All questions were addressed and answered to the best of my capacity .    Thank you for allowing me to participate in the care of your patient .      Stacey Dorado MD  Infectious Disease  [phone]   HPI:  This is a 74 M with PMH of HTN DM HLD CAD presented to the hospital with CC of mental status and rigors. In ER patient had a temp of 103. WBC 19K and MARINA. Noted to have Left LE erythema. CT chest showed juliet infiltrates with pulm edema. COVID 19 pcr neg. No n/v/d CP SOB abd pain urinary symptoms.    Infectious Disease consult was called to evaluate pt and for antibiotic management      Past Medical & Surgical Hx:  PAST MEDICAL & SURGICAL HISTORY:  Diabetes mellitus  Hypertension    Social History--  EtOH: denies   Tobacco: denies   Drug Use: denies     FAMILY HISTORY:  Noncontributory    Allergies  No Known Allergies    Intolerances  NONE    Home Medications:  amLODIPine 10 mg oral tablet: 1 tab(s) orally once a day (04 Nov 2020 10:41)  aspirin 81 mg oral tablet: 1 tab(s) orally once a day (04 Nov 2020 10:41)  atorvastatin 40 mg oral tablet: 1 tab(s) orally once a day (04 Nov 2020 10:41)  Centrum Men&#x27;s oral tablet: 1 tab(s) orally once a day (04 Nov 2020 10:41)  Feosol 325 mg (65 mg elemental iron) oral tablet: 1 tab(s) orally once a day (04 Nov 2020 10:41)  hydroCHLOROthiazide 12.5 mg oral capsule: 1 cap(s) orally once a day (04 Nov 2020 10:41)  losartan 100 mg oral tablet: 1 tab(s) orally once a day (04 Nov 2020 10:41)  metFORMIN 500 mg oral tablet: 2 tab(s) orally once a day (at bedtime)   as per patient    *Script written as 1T BID* (04 Nov 2020 10:41)  Vitamin B-12 1000 mcg oral tablet: 1 tab(s) orally once a day (04 Nov 2020 10:41)  Vitamin D3 5000 intl units (125 mcg) oral tablet: 1 tab(s) orally once a day (04 Nov 2020 10:41)      Current Inpatient Medications :    ANTIBIOTICS:   azithromycin  IVPB 500 milliGRAM(s) IV Intermittent daily  cefTRIAXone   IVPB 1000 milliGRAM(s) IV Intermittent every 24 hours      OTHER RELEVANT MEDICATIONS :  acetaminophen   Tablet .. 650 milliGRAM(s) Oral every 6 hours PRN  aspirin enteric coated 81 milliGRAM(s) Oral daily  atorvastatin 40 milliGRAM(s) Oral at bedtime  clopidogrel Tablet 75 milliGRAM(s) Oral daily  dextrose 40% Gel 15 Gram(s) Oral once PRN  dextrose 5%. 1000 milliLiter(s) IV Continuous <Continuous>  dextrose 50% Injectable 12.5 Gram(s) IV Push once  dextrose 50% Injectable 25 Gram(s) IV Push once  dextrose 50% Injectable 25 Gram(s) IV Push once  enoxaparin Injectable 73 milliGRAM(s) SubCutaneous daily  glucagon  Injectable 1 milliGRAM(s) IntraMuscular once PRN  insulin lispro (ADMELOG) corrective regimen sliding scale   SubCutaneous three times a day before meals  insulin lispro (ADMELOG) corrective regimen sliding scale   SubCutaneous at bedtime      ROS:  Unable to obtain due to :     ROS:  CONSTITUTIONAL:  Negative fever or chills, feels well, good appetite  EYES:  Negative  blurry vision or double vision  CARDIOVASCULAR:  Negative for chest pain or palpitations  RESPIRATORY:  Negative for cough, wheezing, or SOB   GASTROINTESTINAL:  Negative for nausea, vomiting, diarrhea, constipation, or abdominal pain  GENITOURINARY:  Negative frequency, urgency , dysuria or hematuria   NEUROLOGIC:  No headache, confusion, dizziness, lightheadedness  All other systems were reviewed and are negative  I&O's Detail    Physical Exam:  Vital Signs Last 24 Hrs  T(C): 36.6 (04 Nov 2020 08:59), Max: 39.4 (03 Nov 2020 23:21)  T(F): 97.9 (04 Nov 2020 08:59), Max: 103 (03 Nov 2020 23:21)  HR: 79 (04 Nov 2020 13:00) (75 - 109)  BP: 126/72 (04 Nov 2020 13:00) (101/53 - 154/69)  RR: 18 (04 Nov 2020 13:00) (18 - 40)  SpO2: 100% (04 Nov 2020 13:00) (93% - 100%)  Height (cm): 165.1 (11-04 @ 04:33), 165.1 (11-03 @ 23:21)  Weight (kg): 72.6 (11-04 @ 04:33), 72.575 (11-03 @ 23:21)  BMI (kg/m2): 26.6 (11-04 @ 04:33), 26.6 (11-03 @ 23:21)  BSA (m2): 1.8 (11-04 @ 04:33), 1.8 (11-03 @ 23:21)    General:  no acute distress  Eyes: sclera anicteric, pupils equal and reactive to light  ENMT: buccal mucosa moist, pharynx not injected  Neck: supple, trachea midline  Lungs: Decreased, no wheeze/rhonchi  Cardiovascular: regular rate and rhythm, S1 S2  Abdomen: soft, nontender, no organomegaly present, bowel sounds normal  Neurological:  alert and oriented x3, Cranial Nerves II-XII grossly intact  Skin: no increased ecchymosis/petechiae/purpura  Extremities: Left LE erythema swelling    Labs:                         10.6   19.39 )-----------( 187      ( 04 Nov 2020 08:25 )             30.5   11-04    140  |  110<H>  |  29<H>  ----------------------------<  320<H>  4.4   |  23  |  1.90<H>    Ca    7.9<L>      04 Nov 2020 08:25  Mg     1.8     11-04    TPro  5.9<L>  /  Alb  2.9<L>  /  TBili  0.4  /  DBili  .10  /  AST  19  /  ALT  27  /  AlkPhos  73  11-04      RECENT CULTURES:  pending    Respiratory Viral Panel with COVID-19 by TIFF (11.04.20 @ 05:23)    Rapid RVP Result: [facility]    SARS-CoV-2: [facility]: This Respiratory Panel uses polymerase chain reaction (PCR) to detect for  adenovirus; coronavirus (HKU1, NL63, 229E, OC43); human metapneumovirus  (hMPV); human enterovirus/rhinovirus (Entero/RV); influenza A; influenza  A/H1; influenza A/H3; influenza A/H1-2009; influenza B; parainfluenza  viruses 1, 2, 3, 4; respiratory syncytial virus; Mycoplasma pneumoniae;  Chlamydophila pneumoniae; and SARS-CoV-2.      RADIOLOGY & ADDITIONAL STUDIES:    EXAM:  CT CHEST                                  PROCEDURE DATE:  11/04/2020          INTERPRETATION:  CLINICAL INFORMATION: Cough    COMPARISON: There are no prior studies available for comparison.      TECHNIQUE: A volumetric CT acquisition of the chest was obtained from the thoracic inlet to the upper abdomen, without administration of intravenous contrast. MIP images were also obtained.    FINDINGS:    CHEST:    LUNGS AND LARGE AIRWAYS: There is interlobular septal thickening and extensive scattered groundglass opacity in both lungs. Patent central airways.  No pulmonary nodules.  PLEURA: No pleural effusion.  VESSELS: Within normal limits.  HEART: Heart size is mildly enlarged. No pericardial effusion. Coronary artery vascular calcifications.  MEDIASTINUM AND CARRIE: No lymphadenopathy.  CHEST WALL AND LOWER NECK: Within normal limits.  VISUALIZED UPPER ABDOMEN: Within normal limits.  BONES: There is a right humeral prosthesis with streak artifact limiting evaluation. There are degenerative changes of the spine.    IMPRESSION: Moderate pulmonary edema. Please note that superimposed infection cannot be excluded.    Assessment :   74 M with PMH of HTN DM HLD CAD presented to the hospital with CC of mental status and rigors admitted with sepsis syndrome. Fever Tmax 103 WBC 19K   - Suspect Left LE cellulitis is the source Fever Tmax 103 WBC 19K. Possibly bacteremic.  - Doubt pneumonia but cannot rule out  - CHF   - + troponins  - MARINA    Plan :   Cont Rocephin Zithromax  Fu cultures  Trend temps and cbc  Legionella and Pneumococcal antigen      Continue with present regime .  Approptiate use of antibiotics and adverse effects reviewed.      I have discussed the above plan of care with patient and son in detail. They expressed understanding of the treatment plan . Risks, benefits and alternatives discussed in detail. I have asked if they have any questions or concerns and appropriately addressed them to the best of my ability .      > 45 minutes spent in direct patient care reviewing  the notes, lab data/ imaging , discussion with multidisciplinary team. All questions were addressed and answered to the best of my capacity .    Thank you for allowing me to participate in the care of your patient .      Stacey Dorado MD  Infectious Disease  [phone]

## 2020-11-04 NOTE — ED PROVIDER NOTE - PHYSICAL EXAMINATION
Gen: Alert, NAD  Head/eyes: NC/AT, PERRL  ENT: airway patent  Neck: supple, no tenderness/meningismus/JVD, Trachea midline  Pulm/lung: coarse breath sounds b/l bases, normal resp effort, no wheeze/stridor/retractions  CV/heart: RRR, no M/R/G, +2 dist pulses (radial, pedal DP/PT, popliteal)  GI/Abd: soft, NT/ND, +BS, no guarding/rebound tenderness  Musculoskeletal: no edema/erythema/cyanosis, FROM in all extremities, no C/T/L spine ttp  Skin: no rash, no vesicles, no petechaie, no ecchymosis, no swelling  Neuro: AAOx3, CN 2-12 intact, normal sensation, 5/5 motor strength in all extremities

## 2020-11-04 NOTE — H&P ADULT - PROBLEM SELECTOR PLAN 1
Admit  Possible CAP  Pan-culture  IV antibiotics  ID/pulmonary consults  Trend CBC and lactate  Check procalcitonin  Further work-up/management pending clinical course.

## 2020-11-04 NOTE — CONSULT NOTE ADULT - PROBLEM SELECTOR RECOMMENDATION 9
cont humalog scale coverage alone  cont cons cho diet  goal bg 100-180 in hosp setting  metformin on hold

## 2020-11-04 NOTE — ED ADULT NURSE NOTE - NSIMPLEMENTINTERV_GEN_ALL_ED
Implemented All Fall Risk Interventions:  [place] to call system. Call bell, personal items and telephone within reach. Instruct patient to call for assistance. Room bathroom lighting operational. Non-slip footwear when patient is off stretcher. Physically safe environment: no spills, clutter or unnecessary equipment. Stretcher in lowest position, wheels locked, appropriate side rails in place. Provide visual cue, wrist band, yellow gown, etc. Monitor gait and stability. Monitor for mental status changes and reorient to person, place, and time. Review medications for side effects contributing to fall risk. Reinforce activity limits and safety measures with patient and family.

## 2020-11-04 NOTE — ED PROVIDER NOTE - CLINICAL SUMMARY MEDICAL DECISION MAKING FREE TEXT BOX
covid-19 swab, 75yo male hx of DM, HTN, transferred from [place] for fever suspected covid-19 on CT chest, given 3 L NS, antipyretics, abx. Lactate 3.7 --> 3.6  Dr. CLAU Beauchamp admitting.

## 2020-11-04 NOTE — CONSULT NOTE ADULT - SUBJECTIVE AND OBJECTIVE BOX
Date/Time Patient Seen:  		  Referring MD:   Data Reviewed	       Patient is a 74y old  Male who presents with a chief complaint of     Subjective/HPI  poor historian  seen and examined  [place] ed and [place] ed notes reviewed  lives with son  non smoker  retired    ct shows pulm edema    · Chief Complaint: The patient is a 74y Male complaining of  · HPI Objective Statement: 73yo male hx of DM, HTN, transferred from [place] for fever suspected covid-19 on CT chest, given 3 L NS, antipyretics, abx. Lactate 3.7 --> 3.6  Dr. CLAU Beauchamp admitting.    · Chief Complaint: The patient is a 74y Male complaining of altered mental status.  · Unable to Obtain: Urgent need for Intervention  · Details: pt is confused, hx as per ems  · HPI Objective Statement: 73yo male bib ems with confusion and hyperglycemia tonite. son states pt came out of the shower and had urinated on himself, pt was speaking gibberish, no vomiting or diarrhea, pt is confused so hx is limited, pt denies any complaints, FS at home was 424       PAST MEDICAL & SURGICAL HISTORY:  Diabetes mellitus    Hypertension          Medication list         MEDICATIONS  (STANDING):  aspirin enteric coated 81 milliGRAM(s) Oral daily  atorvastatin 40 milliGRAM(s) Oral at bedtime  azithromycin  IVPB 500 milliGRAM(s) IV Intermittent daily  cefTRIAXone   IVPB 1000 milliGRAM(s) IV Intermittent every 24 hours  dextrose 5%. 1000 milliLiter(s) (50 mL/Hr) IV Continuous <Continuous>  dextrose 50% Injectable 12.5 Gram(s) IV Push once  dextrose 50% Injectable 25 Gram(s) IV Push once  dextrose 50% Injectable 25 Gram(s) IV Push once  enoxaparin Injectable 30 milliGRAM(s) SubCutaneous daily  insulin lispro (ADMELOG) corrective regimen sliding scale   SubCutaneous three times a day before meals  insulin lispro (ADMELOG) corrective regimen sliding scale   SubCutaneous at bedtime    MEDICATIONS  (PRN):  acetaminophen   Tablet .. 650 milliGRAM(s) Oral every 6 hours PRN Temp greater or equal to 38C (100.4F), Mild Pain (1 - 3)  dextrose 40% Gel 15 Gram(s) Oral once PRN Blood Glucose LESS THAN 70 milliGRAM(s)/deciliter  glucagon  Injectable 1 milliGRAM(s) IntraMuscular once PRN Glucose LESS THAN 70 milligrams/deciliter         Vitals log        ICU Vital Signs Last 24 Hrs  T(C): 36.8 (04 Nov 2020 06:00), Max: 39.4 (03 Nov 2020 23:21)  T(F): 98.3 (04 Nov 2020 06:00), Max: 103 (03 Nov 2020 23:21)  HR: 75 (04 Nov 2020 06:00) (75 - 109)  BP: 113/56 (04 Nov 2020 06:00) (101/53 - 154/69)  BP(mean): --  ABP: --  ABP(mean): --  RR: 20 (04 Nov 2020 06:00) (20 - 40)  SpO2: 98% (04 Nov 2020 06:00) (93% - 98%)           Input and Output:  I&O's Detail      Lab Data                        10.6   19.39 )-----------( 187      ( 04 Nov 2020 08:25 )             30.5     11-04    140  |  110<H>  |  29<H>  ----------------------------<  320<H>  4.4   |  23  |  1.90<H>    Ca    7.9<L>      04 Nov 2020 08:25    TPro  7.4  /  Alb  4.1  /  TBili  0.5  /  DBili  x   /  AST  21  /  ALT  36  /  AlkPhos  94  11-03      CARDIAC MARKERS ( 04 Nov 2020 01:07 )  .013 ng/mL / x     / 134 U/L / x     / 1.7 ng/mL        Review of Systems	  confusion  sob    Objective     Physical Examination  head nc  verbal  on room air  heart s1s2  lung dec BS  abd soft        Pertinent Lab findings & Imaging      Contreras:  NO   Adequate UO     I&O's Detail           Discussed with:     Cultures:	        Radiology      EXAM:  CT CHEST                                  PROCEDURE DATE:  11/04/2020          INTERPRETATION:  CLINICAL INFORMATION: Cough    COMPARISON: There are no prior studies available for comparison.      TECHNIQUE: A volumetric CT acquisition of the chest was obtained from the thoracic inlet to the upper abdomen, without administration of intravenous contrast. MIP images were also obtained.    FINDINGS:    CHEST:    LUNGS AND LARGE AIRWAYS: There is interlobular septal thickening and extensive scattered groundglass opacity in both lungs. Patent central airways.  No pulmonary nodules.  PLEURA: No pleural effusion.  VESSELS: Within normal limits.  HEART: Heart size is mildly enlarged. No pericardial effusion. Coronary artery vascular calcifications.  MEDIASTINUM AND CARRIE: No lymphadenopathy.  CHEST WALL AND LOWER NECK: Within normal limits.  VISUALIZED UPPER ABDOMEN: Within normal limits.  BONES: There is a right humeral prosthesis with streak artifact limiting evaluation. There are degenerative changes of the spine.    IMPRESSION: Moderate pulmonary edema. Please note that superimposed infection cannot be excluded.                  DANITA ARZATE MD; Attending Radiologist

## 2020-11-04 NOTE — ED ADULT NURSE REASSESSMENT NOTE - NS ED NURSE REASSESS COMMENT FT1
Pt attempting to get out of bed appearing confused. Rn had to reorient & redirect & get assistance pt to sit back into bed. Dr perrin aware & states pt has been like this since his arrival.

## 2020-11-04 NOTE — PATIENT PROFILE ADULT - NSPROSPHOSPCHAPLAINYN_GEN_A_NUR
no

## 2020-11-04 NOTE — ED ADULT NURSE NOTE - CHPI ED NUR SYMPTOMS NEG
no weakness/no dizziness/no change in level of consciousness/no loss of consciousness/no vomiting/no numbness/no blurred vision/no confusion

## 2020-11-04 NOTE — ED ADULT NURSE REASSESSMENT NOTE - NS ED NURSE REASSESS COMMENT FT1
EKG done, changes noted, evaluated by MD at bedside, pt c/o mild epigastric pain when asked, CE added to blood in the lab.
straight cath done per verbal order from MD, urine sent to lab, 500 ml urine  drained, MD aware of findings. no new orders.
pt denies chest pain at this time. pt started on Heparin infusion at 0324, next ptt @ 0924.  see flow sheet, see MAR.   EMS at bedside, report given, pt transferred to [facility].

## 2020-11-04 NOTE — H&P ADULT - PROBLEM SELECTOR PLAN 2
Serial enzymes/EKG  2DECHO  Continue ASA  Continue statin  Check ECHO  Cardio consult  Further work-up/management pending clinical course.

## 2020-11-04 NOTE — ED PROVIDER NOTE - CARE PLAN
Principal Discharge DX:	Sepsis, due to unspecified organism, unspecified whether acute organ dysfunction present

## 2020-11-04 NOTE — H&P ADULT - HISTORY OF PRESENT ILLNESS
This is a 74 M with PMH of HTN DM HLD CAD who was BIBEMS to [facility] with complaints of change in mental status and rigors. The son found the patient confused and had urinated on himself. In the ER patient had a temp of 103. CT showed ground glass opacities. Patient was transferred to [place] for admission. In [place], patient is more lucid. He reports feeling weak for about a week. He status he was eating junk food and sweets. He denies fevers, chills, n/v/d/cough/CP/SOB/dysuria. He has been helping his brother move into an assisted living over the past 2 weeks.

## 2020-11-04 NOTE — ED ADULT NURSE NOTE - OBJECTIVE STATEMENT
74 yr old male transferred from [place] for AMS. A+O x 3. Febrile. NSR on cardiac monitor. O 2 sat low and tachypneic on room air. O2 supplied to patient via NC. 2 LPM. Rectal temp 100.6. Lung sounds diminished bilaterally. Abdomen soft, non distended, and non tender. Stage I redness noted to juliet gluteals. Dry non productive cough noted. Pt to be admitted. Pt placed and educated on isolation precautions. Pt recently visited his brother in assisted living and both previously tested covid neg. will continue to monitor.

## 2020-11-04 NOTE — ED PROVIDER NOTE - OBJECTIVE STATEMENT
75yo male hx of DM, HTN, transferred from [place] for fever suspected covid-19 on CT chest, given 3 L NS, antipyretics, abx. Lactate 3.7 --> 3.6  Dr. CLAU Beauchamp admitting.

## 2020-11-04 NOTE — H&P ADULT - PROBLEM SELECTOR PLAN 5
Hold losartan, HCTZ and metformin  Caution with hydration due to possible CHF  Avoid nephrotoxic medications  Nephrology consult

## 2020-11-04 NOTE — CONSULT NOTE ADULT - SUBJECTIVE AND OBJECTIVE BOX
Patient is a 74y old  Male who presents with a chief complaint of change in mental status (2020 10:38)    HPI:  This is a 74 M with PMH of HTN DM HLD CAD who was BIBEMS to [facility] with complaints of change in mental status and rigors. The son found the patient confused and had urinated on himself. In the ER patient had a temp of 103. CT showed ground glass opacities. Patient was transferred to [place] for admission. In [place], patient is more lucid. He reports feeling weak for about a week. He status he was eating junk food and sweets. He denies fevers, chills, n/v/d/cough/CP/SOB/dysuria. He has been helping his brother move into an assisted living over the past 2 weeks.  (2020 10:38)    Renal consult called for MARINA. History obtained from chart.       PAST MEDICAL HISTORY:  Diabetes mellitus    Hypertension        PAST SURGICAL HISTORY:      FAMILY HISTORY:      SOCIAL HISTORY:    Allergies    No Known Allergies    Intolerances      Home Medications:  amLODIPine 10 mg oral tablet: 1 tab(s) orally once a day (2020 10:41)  aspirin 81 mg oral tablet: 1 tab(s) orally once a day (2020 10:)  atorvastatin 40 mg oral tablet: 1 tab(s) orally once a day (2020 10:)  Centrum Men&#x27;s oral tablet: 1 tab(s) orally once a day (2020 10:)  Feosol 325 mg (65 mg elemental iron) oral tablet: 1 tab(s) orally once a day (2020 10:41)  hydroCHLOROthiazide 12.5 mg oral capsule: 1 cap(s) orally once a day (2020 10:)  losartan 100 mg oral tablet: 1 tab(s) orally once a day (2020 10:)  metFORMIN 500 mg oral tablet: 2 tab(s) orally once a day (at bedtime)   as per patient    *Script written as 1T BID* (2020 10:)  Vitamin B-12 1000 mcg oral tablet: 1 tab(s) orally once a day (2020 10:)  Vitamin D3 5000 intl units (125 mcg) oral tablet: 1 tab(s) orally once a day (2020 10:41)    MEDICATIONS  (STANDING):  aspirin enteric coated 81 milliGRAM(s) Oral daily  atorvastatin 40 milliGRAM(s) Oral at bedtime  azithromycin  IVPB 500 milliGRAM(s) IV Intermittent daily  cefTRIAXone   IVPB 1000 milliGRAM(s) IV Intermittent every 24 hours  clopidogrel Tablet 75 milliGRAM(s) Oral daily  dextrose 5%. 1000 milliLiter(s) (50 mL/Hr) IV Continuous <Continuous>  dextrose 50% Injectable 12.5 Gram(s) IV Push once  dextrose 50% Injectable 25 Gram(s) IV Push once  dextrose 50% Injectable 25 Gram(s) IV Push once  enoxaparin Injectable 73 milliGRAM(s) SubCutaneous daily  insulin lispro (ADMELOG) corrective regimen sliding scale   SubCutaneous three times a day before meals  insulin lispro (ADMELOG) corrective regimen sliding scale   SubCutaneous at bedtime    MEDICATIONS  (PRN):  acetaminophen   Tablet .. 650 milliGRAM(s) Oral every 6 hours PRN Temp greater or equal to 38C (100.4F), Mild Pain (1 - 3)  dextrose 40% Gel 15 Gram(s) Oral once PRN Blood Glucose LESS THAN 70 milliGRAM(s)/deciliter  glucagon  Injectable 1 milliGRAM(s) IntraMuscular once PRN Glucose LESS THAN 70 milligrams/deciliter      REVIEW OF SYSTEMS:  General: no distress  Respiratory: No cough, SOB  Cardiovascular: No CP or Palpitations	  Gastrointestinal: No nausea, Vomiting. No diarrhea  Genitourinary: No urinary complaints	  Musculoskeletal: No new rash or lesions	  all other systems negative    T(F): 97.9 (20 @ 08:59), Max: 103 (20 @ 23:21)  HR: 79 (20 @ 13:00) (75 - 109)  BP: 126/72 (20 @ 13:00) (101/53 - 154/69)  RR: 18 (20 @ 13:00) (18 - 40)  SpO2: 100% (20 @ 13:00) (93% - 100%)  Wt(kg): --    PHYSICAL EXAM:  General: NAD  Respiratory: b/l air entry  Cardiovascular: S1 S2  Gastrointestinal: soft  Extremities: no edema            140  |  110<H>  |  29<H>  ----------------------------<  320<H>  4.4   |  23  |  1.90<H>    Ca    7.9<L>      2020 08:25  Mg     1.8         TPro  5.9<L>  /  Alb  2.9<L>  /  TBili  0.4  /  DBili  .10  /  AST  19  /  ALT  27  /  AlkPhos  73                            10.6   19.39 )-----------( 187      ( 2020 08:25 )             30.5       Potassium, Serum: 4.4 mmol/L ( @ 08:25)  Blood Urea Nitrogen, Serum: 29 mg/dL ( @ 08:25)  Calcium, Total Serum: 7.9 mg/dL ( @ 08:25)  Hemoglobin: 10.6 g/dL ( @ 08:25)      Creatinine, Serum: 1.90 ( @ 08:25)  Creatinine, Serum: 2.14 ( @ 23:37)      Urinalysis Basic - ( 2020 00:52 )    Color: Yellow / Appearance: Clear / S.015 / pH: x  Gluc: x / Ketone: Negative  / Bili: Negative / Urobili: Negative mg/dL   Blood: x / Protein: 15 mg/dL / Nitrite: Negative   Leuk Esterase: Negative / RBC: 0-2 /HPF / WBC 0-2   Sq Epi: x / Non Sq Epi: Occasional / Bacteria: Occasional      LIVER FUNCTIONS - ( 2020 08:25 )  Alb: 2.9 g/dL / Pro: 5.9 g/dL / ALK PHOS: 73 U/L / ALT: 27 U/L / AST: 19 U/L / GGT: x           CARDIAC MARKERS ( 2020 08:25 )  .804 ng/mL / x     / 159 U/L / x     / 8.0 ng/mL  CARDIAC MARKERS ( 2020 01:07 )  .013 ng/mL / x     / 134 U/L / x     / 1.7 ng/mL      Creatine Kinase, Serum: 159 U/L (20 @ 08:25)  Creatine Kinase, Serum: 134 U/L (20 @ 01:07)      < from: CT Chest No Cont (20 @ 01:33) >    EXAM:  CT CHEST                                  PROCEDURE DATE:  2020          INTERPRETATION:  CLINICAL INFORMATION: Cough    COMPARISON: There are no prior studies available for comparison.      TECHNIQUE: A volumetric CT acquisition of the chest was obtained from the thoracic inlet to the upper abdomen, without administration of intravenous contrast. MIP images were also obtained.    FINDINGS:    CHEST:    LUNGS AND LARGE AIRWAYS: There is interlobular septal thickening and extensive scattered groundglass opacity in both lungs. Patent central airways.  No pulmonary nodules.  PLEURA: No pleural effusion.  VESSELS: Within normal limits.  HEART: Heart size is mildly enlarged. No pericardial effusion. Coronary artery vascular calcifications.  MEDIASTINUM AND CARRIE: No lymphadenopathy.  CHEST WALL AND LOWER NECK: Within normal limits.  VISUALIZED UPPER ABDOMEN: Within normal limits.  BONES: There is a right humeral prosthesis with streak artifact limiting evaluation. There are degenerative changes of the spine.    IMPRESSION: Moderate pulmonary edema. Please note that superimposed infection cannot be excluded.        DANITA ARZATE MD; Attending Radiologist  This document has been electronically signed. 2020  2:37AM    < end of copied text >    < from: Xray Chest 1 View AP/PA (20 @ 00:51) >    EXAM:  XR CHEST AP OR PA 1V                                  PROCEDURE DATE:  2020          INTERPRETATION:  AP chest on November 3, 2020 at 11:58 PM. Patient is short of breath with cough and fever.    COMPARISON: None available.    Reversedright shoulder replacement is noted.    Shallow inspiration crowds the chest.    Heart is magnified by technique.    There are some old left rib fractures.    Lungs are clear.    IMPRESSION: Clear lungs. Incidental bony findings.          JOSE JARRETT M.D., ATTENDING RADIOLOGIST  This document has been electronically signed. 2020 12:16PM    < end of copied text >

## 2020-11-04 NOTE — ED ADULT TRIAGE NOTE - ARRIVAL FROM
[facility]

## 2020-11-04 NOTE — CONSULT NOTE ADULT - SUBJECTIVE AND OBJECTIVE BOX
CHARTING IN PROGRESS     [facility]         Jas Plaza, Jeff, Rachel, Tommy Love Savella        [phone] (office)    CHIEF COMPLAINT: Patient is a 74y old  Male who presents with a chief complaint of sepsis     HPI:  Patient is a 74 year old male with PMH HTN, DM, HLD transferred from [place] for admission for AMS due to underlying sepsis due to possible PNA,       PAST MEDICAL & SURGICAL HISTORY:  Diabetes mellitus    Hypertension        SOCIAL HISTORY: No active tobacco, alcohol or illicit drug use    FAMILY HISTORY:   No pertinent family history of CAD    Outpatient medications:    MEDICATIONS  (STANDING):  aspirin enteric coated 81 milliGRAM(s) Oral daily  atorvastatin 40 milliGRAM(s) Oral at bedtime  azithromycin  IVPB 500 milliGRAM(s) IV Intermittent daily  cefTRIAXone   IVPB 1000 milliGRAM(s) IV Intermittent every 24 hours  dextrose 5%. 1000 milliLiter(s) (50 mL/Hr) IV Continuous <Continuous>  dextrose 50% Injectable 12.5 Gram(s) IV Push once  dextrose 50% Injectable 25 Gram(s) IV Push once  dextrose 50% Injectable 25 Gram(s) IV Push once  enoxaparin Injectable 30 milliGRAM(s) SubCutaneous daily  insulin lispro (ADMELOG) corrective regimen sliding scale   SubCutaneous three times a day before meals  insulin lispro (ADMELOG) corrective regimen sliding scale   SubCutaneous at bedtime    MEDICATIONS  (PRN):  acetaminophen   Tablet .. 650 milliGRAM(s) Oral every 6 hours PRN Temp greater or equal to 38C (100.4F), Mild Pain (1 - 3)  dextrose 40% Gel 15 Gram(s) Oral once PRN Blood Glucose LESS THAN 70 milliGRAM(s)/deciliter  glucagon  Injectable 1 milliGRAM(s) IntraMuscular once PRN Glucose LESS THAN 70 milligrams/deciliter      Allergies    No Known Allergies    Intolerances        REVIEW OF SYSTEMS: Is negative for eye, ENT, GI, , allergic, dermatologic, musculoskeletal and neurologic, except as described above.    VITAL SIGNS:   Vital Signs Last 24 Hrs  T(C): 36.6 (04 Nov 2020 08:59), Max: 39.4 (03 Nov 2020 23:21)  T(F): 97.9 (04 Nov 2020 08:59), Max: 103 (03 Nov 2020 23:21)  HR: 77 (04 Nov 2020 08:59) (75 - 109)  BP: 113/56 (04 Nov 2020 06:00) (101/53 - 154/69)  RR: 20 (04 Nov 2020 08:59) (20 - 40)  SpO2: 99% (04 Nov 2020 08:59) (93% - 99%)    I&O's Summary      PHYSICAL EXAM:    Constitutional: NAD, awake and alert, well-developed  Eyes:  EOMI, no oral cyanosis, conjunctivae clear, anicteric.  Pulmonary: Non-labored, breath sounds are clear bilaterally, no wheezing, rales or rhonchi  Cardiovascular:  regular S1 and S2. No murmur.  No rubs, gallops or clicks  Gastrointestinal: Bowel Sounds present, soft, nontender.   Lymph: No peripheral edema.   Neurological: Alert, strength and sensitivity are grossly intact  Skin: No obvious lesions/rashes.   Psych:  Mood & affect appropriate .    LABS: All Labs Reviewed:                        10.6   19.39 )-----------( 187      ( 04 Nov 2020 08:25 )             30.5                         12.6   15.47 )-----------( 218      ( 03 Nov 2020 23:37 )             36.2     04 Nov 2020 08:25    140    |  110    |  29     ----------------------------<  320    4.4     |  23     |  1.90   03 Nov 2020 23:37    138    |  101    |  31     ----------------------------<  440    4.4     |  24     |  2.14     Ca    7.9        04 Nov 2020 08:25  Ca    9.5        03 Nov 2020 23:37    TPro  7.4    /  Alb  4.1    /  TBili  0.5    /  DBili  x      /  AST  21     /  ALT  36     /  AlkPhos  94     03 Nov 2020 23:37    PT/INR - ( 03 Nov 2020 23:37 )   PT: 12.7 sec;   INR: 1.05 ratio         PTT - ( 03 Nov 2020 23:37 )  PTT:27.2 sec  CARDIAC MARKERS ( 04 Nov 2020 01:07 )  .013 ng/mL / x     / 134 U/L / x     / 1.7 ng/mL      Blood Culture:      CHARTING IN PROGRESS     [facility]         Jas Plaza, Rachel Aguilar, Tommy Love Savella        [phone] (office)    CHIEF COMPLAINT: Patient is a 74y old  Male who presents with a chief complaint of sepsis     HPI:  Patient is a 74 year old male with PMH HTN, DM, CAD s/p angioplasty (1980)  transferred from [place] for admission for AMS due to underlying sepsis due to possible PNA. Patient states he started feeling nonspecific fatigue and weakness over the past week. He states that last night, he felt extremely tired and weak. He had gotten up to go to the bathroom and started to feel lighheaded. He had to lean against the bathroom wall to support himself and called for help. He never lost consciousness. Patient states that [place] [facility], he was found to be febrile, although he denies any fevers or chills at home. He also denies any chest pain, palpitations, SOB, MARCH, or PND. He has needed to sleep with two pillows at night for years. He denies any new LE edema.     Patient used to see a cardiologist (Dr Nieto) 20 years ago. He had an angioplasty in 1980 after he had some chest pain after raking leaves. He has since denied any anginal symptoms. No known diagnosis of CHF. He states he had a stress echo nearly 20 years ago that was normal. He has been told at his annual physicals that his ECGs have been normal.       PAST MEDICAL & SURGICAL HISTORY:  Diabetes mellitus    Hypertension        SOCIAL HISTORY: No active tobacco, alcohol or illicit drug use    FAMILY HISTORY:   No pertinent family history of CAD    Outpatient medications:    MEDICATIONS  (STANDING):  aspirin enteric coated 81 milliGRAM(s) Oral daily  atorvastatin 40 milliGRAM(s) Oral at bedtime  azithromycin  IVPB 500 milliGRAM(s) IV Intermittent daily  cefTRIAXone   IVPB 1000 milliGRAM(s) IV Intermittent every 24 hours  dextrose 5%. 1000 milliLiter(s) (50 mL/Hr) IV Continuous <Continuous>  dextrose 50% Injectable 12.5 Gram(s) IV Push once  dextrose 50% Injectable 25 Gram(s) IV Push once  dextrose 50% Injectable 25 Gram(s) IV Push once  enoxaparin Injectable 30 milliGRAM(s) SubCutaneous daily  insulin lispro (ADMELOG) corrective regimen sliding scale   SubCutaneous three times a day before meals  insulin lispro (ADMELOG) corrective regimen sliding scale   SubCutaneous at bedtime    MEDICATIONS  (PRN):  acetaminophen   Tablet .. 650 milliGRAM(s) Oral every 6 hours PRN Temp greater or equal to 38C (100.4F), Mild Pain (1 - 3)  dextrose 40% Gel 15 Gram(s) Oral once PRN Blood Glucose LESS THAN 70 milliGRAM(s)/deciliter  glucagon  Injectable 1 milliGRAM(s) IntraMuscular once PRN Glucose LESS THAN 70 milligrams/deciliter      Allergies    No Known Allergies    Intolerances        REVIEW OF SYSTEMS: Is negative for eye, ENT, GI, , allergic, dermatologic, musculoskeletal and neurologic, except as described above.    VITAL SIGNS:   Vital Signs Last 24 Hrs  T(C): 36.6 (04 Nov 2020 08:59), Max: 39.4 (03 Nov 2020 23:21)  T(F): 97.9 (04 Nov 2020 08:59), Max: 103 (03 Nov 2020 23:21)  HR: 77 (04 Nov 2020 08:59) (75 - 109)  BP: 113/56 (04 Nov 2020 06:00) (101/53 - 154/69)  RR: 20 (04 Nov 2020 08:59) (20 - 40)  SpO2: 99% (04 Nov 2020 08:59) (93% - 99%)    I&O's Summary      PHYSICAL EXAM:    Constitutional: NAD, awake and alert, well-developed  Eyes:  EOMI, no oral cyanosis, conjunctivae clear, anicteric.  Pulmonary: Non-labored, breath sounds are clear bilaterally, no wheezing, rales or rhonchi  Cardiovascular:  regular S1 and S2. No murmur.  No rubs, gallops or clicks  Gastrointestinal: Bowel Sounds present, soft, nontender.   Lymph: No peripheral edema.   Neurological: Alert, strength and sensitivity are grossly intact  Skin: No obvious lesions/rashes.   Psych:  Mood & affect appropriate .    LABS: All Labs Reviewed:                        10.6   19.39 )-----------( 187      ( 04 Nov 2020 08:25 )             30.5                         12.6   15.47 )-----------( 218      ( 03 Nov 2020 23:37 )             36.2     04 Nov 2020 08:25    140    |  110    |  29     ----------------------------<  320    4.4     |  23     |  1.90   03 Nov 2020 23:37    138    |  101    |  31     ----------------------------<  440    4.4     |  24     |  2.14     Ca    7.9        04 Nov 2020 08:25  Ca    9.5        03 Nov 2020 23:37    TPro  7.4    /  Alb  4.1    /  TBili  0.5    /  DBili  x      /  AST  21     /  ALT  36     /  AlkPhos  94     03 Nov 2020 23:37    PT/INR - ( 03 Nov 2020 23:37 )   PT: 12.7 sec;   INR: 1.05 ratio         PTT - ( 03 Nov 2020 23:37 )  PTT:27.2 sec  CARDIAC MARKERS ( 04 Nov 2020 01:07 )  .013 ng/mL / x     / 134 U/L / x     / 1.7 ng/mL      Blood Culture:      [facility]         Jas Plaza, Jeff, Rachel, Kate, Elissa Sanders        [phone] (office)    CHIEF COMPLAINT: Patient is a 74y old  Male who presents with a chief complaint of sepsis     HPI:  Patient is a 74 year old male with PMH HTN, DM, CAD s/p angioplasty (1980)  transferred from [place] for admission for AMS due to underlying sepsis due to possible PNA. Patient states he started feeling nonspecific fatigue and weakness over the past week. He states that last night, he felt extremely tired and weak. He had gotten up to go to the bathroom and started to feel lighheaded. He had to lean against the bathroom wall to support himself and called for help. He never lost consciousness. Patient states that when he came to [facility] ER, he was found to be febrile, although he denies any fevers or chills at home. He also denies any chest pain, palpitations, SOB, MARCH, or PND. He has needed to sleep with two pillows at night for years. He denies any new LE edema.     Patient used to see a cardiologist (Dr Nieto) 20 years ago. He had an angioplasty in 1980 after he had some chest pain after raking leaves. He has since denied any anginal symptoms. No known diagnosis of CHF. He states he had a stress echo nearly 20 years ago that was normal. He has been told at his annual physicals that his ECGs have been normal.       PAST MEDICAL & SURGICAL HISTORY:  Diabetes mellitus    Hypertension        SOCIAL HISTORY: No active tobacco, alcohol or illicit drug use    FAMILY HISTORY:   No pertinent family history of CAD    Outpatient medications:    MEDICATIONS  (STANDING):  aspirin enteric coated 81 milliGRAM(s) Oral daily  atorvastatin 40 milliGRAM(s) Oral at bedtime  azithromycin  IVPB 500 milliGRAM(s) IV Intermittent daily  cefTRIAXone   IVPB 1000 milliGRAM(s) IV Intermittent every 24 hours  dextrose 5%. 1000 milliLiter(s) (50 mL/Hr) IV Continuous <Continuous>  dextrose 50% Injectable 12.5 Gram(s) IV Push once  dextrose 50% Injectable 25 Gram(s) IV Push once  dextrose 50% Injectable 25 Gram(s) IV Push once  enoxaparin Injectable 30 milliGRAM(s) SubCutaneous daily  insulin lispro (ADMELOG) corrective regimen sliding scale   SubCutaneous three times a day before meals  insulin lispro (ADMELOG) corrective regimen sliding scale   SubCutaneous at bedtime    MEDICATIONS  (PRN):  acetaminophen   Tablet .. 650 milliGRAM(s) Oral every 6 hours PRN Temp greater or equal to 38C (100.4F), Mild Pain (1 - 3)  dextrose 40% Gel 15 Gram(s) Oral once PRN Blood Glucose LESS THAN 70 milliGRAM(s)/deciliter  glucagon  Injectable 1 milliGRAM(s) IntraMuscular once PRN Glucose LESS THAN 70 milligrams/deciliter      Allergies    No Known Allergies    Intolerances        REVIEW OF SYSTEMS: Is negative for eye, ENT, GI, , allergic, dermatologic, musculoskeletal and neurologic, except as described above.    VITAL SIGNS:   Vital Signs Last 24 Hrs  T(C): 36.6 (04 Nov 2020 08:59), Max: 39.4 (03 Nov 2020 23:21)  T(F): 97.9 (04 Nov 2020 08:59), Max: 103 (03 Nov 2020 23:21)  HR: 77 (04 Nov 2020 08:59) (75 - 109)  BP: 113/56 (04 Nov 2020 06:00) (101/53 - 154/69)  RR: 20 (04 Nov 2020 08:59) (20 - 40)  SpO2: 99% (04 Nov 2020 08:59) (93% - 99%)    I&O's Summary      PHYSICAL EXAM:    Constitutional: NAD, awake and alert, well-developed  Eyes:  EOMI, no oral cyanosis, conjunctivae clear, anicteric.  Pulmonary: Non-labored, breath sounds are clear bilaterally, no wheezing, rales or rhonchi  Cardiovascular:  regular S1 and S2. No murmur.  No rubs, gallops or clicks  Gastrointestinal: Bowel Sounds present, soft, nontender.   Lymph: No peripheral edema.   Neurological: Alert, strength and sensitivity are grossly intact  Skin: No obvious lesions/rashes.   Psych:  Mood & affect appropriate .    LABS: All Labs Reviewed:                        10.6   19.39 )-----------( 187      ( 04 Nov 2020 08:25 )             30.5                         12.6   15.47 )-----------( 218      ( 03 Nov 2020 23:37 )             36.2     04 Nov 2020 08:25    140    |  110    |  29     ----------------------------<  320    4.4     |  23     |  1.90   03 Nov 2020 23:37    138    |  101    |  31     ----------------------------<  440    4.4     |  24     |  2.14     Ca    7.9        04 Nov 2020 08:25  Ca    9.5        03 Nov 2020 23:37    TPro  7.4    /  Alb  4.1    /  TBili  0.5    /  DBili  x      /  AST  21     /  ALT  36     /  AlkPhos  94     03 Nov 2020 23:37    PT/INR - ( 03 Nov 2020 23:37 )   PT: 12.7 sec;   INR: 1.05 ratio         PTT - ( 03 Nov 2020 23:37 )  PTT:27.2 sec  CARDIAC MARKERS ( 04 Nov 2020 01:07 )  .013 ng/mL / x     / 134 U/L / x     / 1.7 ng/mL      Blood Culture:

## 2020-11-04 NOTE — CONSULT NOTE ADULT - ASSESSMENT
MARINA: On ARB, ATN  Sepsis  Diabetes  Hypertension  ? Fluid Overload    Hold further IV hydration. Hold losartan and metformin. Check cultures, IV abx. Cardiology and Pulmonary evaluation in progress. Monitor blood sugar levels. Insulin coverage as needed. Dietary restriction.   Avoid nephrotoxic meds as possible. Will follow electrolytes and renal function trend. Monitor fluid status closely. Further recommendations pending clinical course. Thank you for the courtesy of this referral.

## 2020-11-04 NOTE — CONSULT NOTE ADULT - PROBLEM SELECTOR RECOMMENDATION 9
pt with ams  eval HF - ct chest shows Pulm edema -   eval poorly controlled DM  Lactic acid elev - diff dx - hypoperfusion - sepsis - repeat levels - would caution against IVF at present  cont curr ABX regimen - biomarkers - legionella and strep ag - will de - escalate once more clinical and lab data becomes available -   CKD MARINA - eval - I and O - serial renal indices  DM care - hyperglycemia  hgba1c -tsh - vbg - biomarkers -

## 2020-11-04 NOTE — PROVIDER CONTACT NOTE (MEDICATION) - ACTION/TREATMENT ORDERED:
case discussed with Dr. Beauchamp  Admelog 6units SQ now  corrective scale changed to Moderate AC and moderate HS  Goal 100-180 mg/dL

## 2020-11-04 NOTE — CONSULT NOTE ADULT - ASSESSMENT
****INCOMPLETE****      Patient is a 74 year old male with PMH HTN, DM, HLD transferred from [place] for admission for AMS due to underlying sepsis due to possible PNA,     Elevated troponins and EKG with     - No clear evidence of acute ischemia, trops negative x 2. will f/u third set, pt asymptomatic  - No evidence of volume overload  - No acute changes on EKG compared to previous  - Previous ECHO in 02/17 shows normal LV function with EF: 65%, no significant valvular disease noted  - BP well controlled, continue home BP meds, monitor routine hemodynamics  - monitor and replete lytes, keep K>4, Mg>2  - Pt has no history of MI, active CAD, ADHF or severe valvular disease and in the setting of low risk --- procedure, patient is optimized from cardiovascular standpoint to proceed with planned procedure with routine hemodynamic monitoring.   - Other cardiovascular workup will depend on clinical course.  - All other workup per primary team  - Will follow Patient is a 74 year old male with PMH HTN, DM, HLD transferred from [place] for admission for AMS due to underlying sepsis due to possible PNA,     Elevated troponin and EKG with ST depressions in leads I, avL, and V5/V6, consistent with possible lateral ischemia. Patient likely has a secondary NSTEMI due to underlying sepsis due to PNA   -Would recommend to continue trending cardiac enzymes with serial EKGs  -Continue ASA 81 mg daily, Plavix 75 mg daily  -Continue atorvastatin 40 mg daily   -Would hold off on BB given pulm edema and possible underlying CHF   -Evaluate for structural heart disease and CHF with TTE   -Would recommend full dose Lovenox for 48 hours   -As this is likely secondary NSTEMI, will treat medically for now.     Patient noted to have notable pulmonary edema on CT chest. He has received 3 L NS, this is likely due to volume overload. He also has likely underlying aortic stenosis (audible murmur on exam)   -Hold off on fluids for now   -Obtain TTE     BP low normal in ER,hold losartan and HCTZ in setting of MARINA, monitor routine hemodynamics. Resume meds when appropriate     - monitor and replete lytes, keep K>4, Mg>2  - Other cardiovascular workup will depend on clinical course.  - All other workup per primary team  - Will follow

## 2020-11-04 NOTE — ED ADULT NURSE NOTE - CAS EDN DISCHARGE INTERVENTIONS
Patient given Depo Medrol 80 mg IM in right deltoid per verbal order by Dr Chris Reyes. Patient tolerated without incident.   IV intact

## 2020-11-04 NOTE — PROVIDER CONTACT NOTE (CRITICAL VALUE NOTIFICATION) - ACTION/TREATMENT ORDERED:
Awaiting further orders
Awaiting further results
Continue IV abx. Another set of blood cultures ordered for 11/5 AM labs

## 2020-11-05 LAB
ANION GAP SERPL CALC-SCNC: 7 MMOL/L — SIGNIFICANT CHANGE UP (ref 5–17)
BUN SERPL-MCNC: 26 MG/DL — HIGH (ref 7–23)
CALCIUM SERPL-MCNC: 8.7 MG/DL — SIGNIFICANT CHANGE UP (ref 8.5–10.1)
CHLORIDE SERPL-SCNC: 106 MMOL/L — SIGNIFICANT CHANGE UP (ref 96–108)
CK MB CFR SERPL CALC: 4.1 NG/ML — HIGH (ref 0–3.6)
CK SERPL-CCNC: 202 U/L — SIGNIFICANT CHANGE UP (ref 26–308)
CK SERPL-CCNC: 286 U/L — SIGNIFICANT CHANGE UP (ref 26–308)
CO2 SERPL-SCNC: 26 MMOL/L — SIGNIFICANT CHANGE UP (ref 22–31)
CREAT SERPL-MCNC: 1.8 MG/DL — HIGH (ref 0.5–1.3)
CULTURE RESULTS: NO GROWTH — SIGNIFICANT CHANGE UP
GLUCOSE SERPL-MCNC: 178 MG/DL — HIGH (ref 70–99)
GRAM STN FLD: SIGNIFICANT CHANGE UP
HCT VFR BLD CALC: 32.3 % — LOW (ref 39–50)
HGB BLD-MCNC: 11.4 G/DL — LOW (ref 13–17)
LEGIONELLA AG UR QL: NEGATIVE — SIGNIFICANT CHANGE UP
MAGNESIUM SERPL-MCNC: 1.8 MG/DL — SIGNIFICANT CHANGE UP (ref 1.6–2.6)
MCHC RBC-ENTMCNC: 31.9 PG — SIGNIFICANT CHANGE UP (ref 27–34)
MCHC RBC-ENTMCNC: 35.3 GM/DL — SIGNIFICANT CHANGE UP (ref 32–36)
MCV RBC AUTO: 90.5 FL — SIGNIFICANT CHANGE UP (ref 80–100)
NRBC # BLD: 0 /100 WBCS — SIGNIFICANT CHANGE UP (ref 0–0)
NT-PROBNP SERPL-SCNC: 2007 PG/ML — HIGH (ref 0–450)
PHOSPHATE SERPL-MCNC: 2.7 MG/DL — SIGNIFICANT CHANGE UP (ref 2.5–4.5)
PLATELET # BLD AUTO: 202 K/UL — SIGNIFICANT CHANGE UP (ref 150–400)
POTASSIUM SERPL-MCNC: 4 MMOL/L — SIGNIFICANT CHANGE UP (ref 3.5–5.3)
POTASSIUM SERPL-SCNC: 4 MMOL/L — SIGNIFICANT CHANGE UP (ref 3.5–5.3)
RBC # BLD: 3.57 M/UL — LOW (ref 4.2–5.8)
RBC # FLD: 12.4 % — SIGNIFICANT CHANGE UP (ref 10.3–14.5)
SARS-COV-2 RNA SPEC QL NAA+PROBE: SIGNIFICANT CHANGE UP
SODIUM SERPL-SCNC: 139 MMOL/L — SIGNIFICANT CHANGE UP (ref 135–145)
SPECIMEN SOURCE: SIGNIFICANT CHANGE UP
TROPONIN I SERPL-MCNC: 2.61 NG/ML — HIGH (ref 0.01–0.04)
TROPONIN I SERPL-MCNC: 3.2 NG/ML — HIGH (ref 0.01–0.04)
WBC # BLD: 14.51 K/UL — HIGH (ref 3.8–10.5)
WBC # FLD AUTO: 14.51 K/UL — HIGH (ref 3.8–10.5)

## 2020-11-05 PROCEDURE — 93971 EXTREMITY STUDY: CPT | Mod: 26,LT

## 2020-11-05 PROCEDURE — 99233 SBSQ HOSP IP/OBS HIGH 50: CPT

## 2020-11-05 RX ORDER — METOPROLOL TARTRATE 50 MG
25 TABLET ORAL EVERY 12 HOURS
Refills: 0 | Status: DISCONTINUED | OUTPATIENT
Start: 2020-11-05 | End: 2020-11-14

## 2020-11-05 RX ORDER — ACETAMINOPHEN 500 MG
650 TABLET ORAL ONCE
Refills: 0 | Status: COMPLETED | OUTPATIENT
Start: 2020-11-05 | End: 2020-11-05

## 2020-11-05 RX ORDER — INSULIN GLARGINE 100 [IU]/ML
10 INJECTION, SOLUTION SUBCUTANEOUS AT BEDTIME
Refills: 0 | Status: DISCONTINUED | OUTPATIENT
Start: 2020-11-05 | End: 2020-11-14

## 2020-11-05 RX ADMIN — Medication 12.5 MILLIGRAM(S): at 05:14

## 2020-11-05 RX ADMIN — CEFTRIAXONE 100 MILLIGRAM(S): 500 INJECTION, POWDER, FOR SOLUTION INTRAMUSCULAR; INTRAVENOUS at 09:10

## 2020-11-05 RX ADMIN — Medication 25 MILLIGRAM(S): at 18:35

## 2020-11-05 RX ADMIN — ENOXAPARIN SODIUM 73 MILLIGRAM(S): 100 INJECTION SUBCUTANEOUS at 12:27

## 2020-11-05 RX ADMIN — Medication 4: at 09:10

## 2020-11-05 RX ADMIN — Medication 650 MILLIGRAM(S): at 21:29

## 2020-11-05 RX ADMIN — Medication 2: at 17:43

## 2020-11-05 RX ADMIN — Medication 81 MILLIGRAM(S): at 12:27

## 2020-11-05 RX ADMIN — Medication 650 MILLIGRAM(S): at 20:47

## 2020-11-05 RX ADMIN — ATORVASTATIN CALCIUM 40 MILLIGRAM(S): 80 TABLET, FILM COATED ORAL at 21:33

## 2020-11-05 RX ADMIN — INSULIN GLARGINE 10 UNIT(S): 100 INJECTION, SOLUTION SUBCUTANEOUS at 21:29

## 2020-11-05 RX ADMIN — Medication 650 MILLIGRAM(S): at 22:03

## 2020-11-05 RX ADMIN — CLOPIDOGREL BISULFATE 75 MILLIGRAM(S): 75 TABLET, FILM COATED ORAL at 12:27

## 2020-11-05 RX ADMIN — Medication 2: at 12:27

## 2020-11-05 NOTE — PROGRESS NOTE ADULT - SUBJECTIVE AND OBJECTIVE BOX
[facility] -- Jas Plaza Grossman, Wachsman, Tommy Love Savella, Goodger: Office # [phone]    Follow Up:  EKG changes     Subjective/Observations: Patient seen and examined. Patient awake, alert, resting in bed. No complaints of chest pain, dyspnea, palpitations or dizziness. No signs of orthopnea or PND. Lt LLE warm, swollen and red    REVIEW OF SYSTEMS: All review of systems is negative for eye, ENT, GI, , allergic, dermatologic, musculoskeletal and neurologic except as described above    PAST MEDICAL & SURGICAL HISTORY:  Diabetes mellitus  Hypertension    MEDICATIONS  (STANDING):  aspirin enteric coated 81 milliGRAM(s) Oral daily  atorvastatin 40 milliGRAM(s) Oral at bedtime  cefTRIAXone   IVPB 2000 milliGRAM(s) IV Intermittent every 24 hours  clopidogrel Tablet 75 milliGRAM(s) Oral daily  dextrose 5%. 1000 milliLiter(s) (50 mL/Hr) IV Continuous <Continuous>  dextrose 50% Injectable 12.5 Gram(s) IV Push once  dextrose 50% Injectable 25 Gram(s) IV Push once  dextrose 50% Injectable 25 Gram(s) IV Push once  enoxaparin Injectable 73 milliGRAM(s) SubCutaneous daily  influenza   Vaccine 0.5 milliLiter(s) IntraMuscular once  insulin glargine Injectable (LANTUS) 10 Unit(s) SubCutaneous at bedtime  insulin lispro (ADMELOG) corrective regimen sliding scale   SubCutaneous three times a day before meals  insulin lispro (ADMELOG) corrective regimen sliding scale   SubCutaneous at bedtime  metoprolol tartrate 12.5 milliGRAM(s) Oral every 12 hours    MEDICATIONS  (PRN):  acetaminophen   Tablet .. 650 milliGRAM(s) Oral every 6 hours PRN Temp greater or equal to 38C (100.4F), Mild Pain (1 - 3)  dextrose 40% Gel 15 Gram(s) Oral once PRN Blood Glucose LESS THAN 70 milliGRAM(s)/deciliter  glucagon  Injectable 1 milliGRAM(s) IntraMuscular once PRN Glucose LESS THAN 70 milligrams/deciliter    Allergies  No Known Allergies    Vital Signs Last 24 Hrs  T(C): 37.1 (05 Nov 2020 12:14), Max: 38.3 (04 Nov 2020 16:20)  T(F): 98.8 (05 Nov 2020 12:14), Max: 101 (04 Nov 2020 16:20)  HR: 76 (05 Nov 2020 12:14) (66 - 89)  BP: 144/71 (05 Nov 2020 12:14) (111/70 - 147/75)  BP(mean): --  RR: 16 (05 Nov 2020 12:14) (16 - 18)  SpO2: 93% (05 Nov 2020 12:14) (91% - 100%)  I&O's Summary      TELE:   PHYSICAL EXAM:  Appearance: NAD, no distress, alert, Well developed   HEENT: Moist Mucous Membranes, Anicteric  Cardiovascular: Regular rate and rhythm, Normal S1 S2, No JVD, + murmurs, No rubs, gallops or clicks  Respiratory: Non-labored, Clear to auscultation, No rales, No rhonchi, No wheezing.   Gastrointestinal:  Soft, Non-tender, + BS  Neurologic: Non-focal  Skin: Warm and dry, No visible rashes or ulcers, No ecchymosis, No cyanosis  Musculoskeletal: No clubbing, No cyanosis, No joint swelling/tenderness  Psychiatry: Mood & affect appropriate  Lymph: + LLE edema, warm and redness.     LABS: All Labs Reviewed:                        11.4   14.51 )-----------( 202      ( 05 Nov 2020 06:51 )             32.3                         10.6   19.39 )-----------( 187      ( 04 Nov 2020 08:25 )             30.5                         12.6   15.47 )-----------( 218      ( 03 Nov 2020 23:37 )             36.2     05 Nov 2020 06:51    139    |  106    |  26     ----------------------------<  178    4.0     |  26     |  1.80   04 Nov 2020 08:25    140    |  110    |  29     ----------------------------<  320    4.4     |  23     |  1.90   03 Nov 2020 23:37    138    |  101    |  31     ----------------------------<  440    4.4     |  24     |  2.14     Ca    8.7        05 Nov 2020 06:51  Ca    7.9        04 Nov 2020 08:25  Ca    9.5        03 Nov 2020 23:37  Phos  2.7       05 Nov 2020 06:51  Mg     1.8       05 Nov 2020 06:51  Mg     1.8       04 Nov 2020 08:25    TPro  5.9    /  Alb  2.9    /  TBili  0.4    /  DBili  .10    /  AST  19     /  ALT  27     /  AlkPhos  73     04 Nov 2020 08:25  TPro  7.4    /  Alb  4.1    /  TBili  0.5    /  DBili  x      /  AST  21     /  ALT  36     /  AlkPhos  94     03 Nov 2020 23:37  PT/INR - ( 03 Nov 2020 23:37 )   PT: 12.7 sec;   INR: 1.05 ratio    PTT - ( 03 Nov 2020 23:37 )  PTT:27.2 sec  Creatine Kinase, Serum: 286 U/L (11-05-20 @ 06:51)  Troponin I, Serum: 3.200 ng/mL (11-05-20 @ 06:51)  Creatine Kinase, Serum: 305 U/L (11-04-20 @ 21:12)  Cholesterol, Serum: 96 mg/dL (11-04-20 @ 10:43)  HDL Cholesterol, Serum: 32 mg/dL (11-04-20 @ 10:43)  Triglycerides, Serum: 45 mg/dL (11-04-20 @ 10:43)  Lactate, Blood: 2.1 mmol/L (11-04-20 @ 08:25)  Lactate, Blood: 3.6 mmol/L (11-04-20 @ 01:54)  Lactate, Blood: 3.7 mmol/L (11-03-20 @ 23:37)    12 Lead ECG:   Ventricular Rate 77 BPM  Atrial Rate 77 BPM  P-R Interval 154 ms  QRS Duration 88 ms  Q-T Interval 374 ms  QTC Calculation(Bazett) 423 ms  P Axis 9 degrees  R Axis 3 degrees  T Axis 107 degrees  Diagnosis Line Normal sinus rhythm  Minimal voltage criteria for LVH, may be normal variant  Inferior infarct , age undetermined  Abnormal ECG  When compared with ECG of 10-JUL-2010 13:13,  No significant change was found  Confirmed by Roe Ramos MD ([id number]) on 11/5/2020 12:45:13 PM (11-04-20 @ 13:00)    < from: TTE Echo Complete w/o Contrast w/ Doppler (11.04.20 @ 11:23) >  Dimensions:  LA 3.7  Normal Values: 2.0 - 4.0 cm  Ao 3.6        Normal Values: 2.0 - 3.8 cm  SEPTUM 1.3       Normal Values: 0.6 - 1.2 cm  PWT 1.0       Normal Values: 0.6 - 1.1 cm  LVIDd 4.9         Normal Values: 3.0 - 5.6 cm  LVIDs 3.3         Normal Values: 1.8 - 4.0 cm    OBSERVATIONS:  Actually difficult and limited study, unable to obtain subcostal views  Mitral Valve: Thickened leaflets, mild MR. Mitral annular calcification  Aortic Valve/Aorta: Calcified trileaflet aortic valve with decreased opening. Peak transaortic valve gradient is 69 mmHg with a mean transaortic valve gradient of 49 mmHg. The aortic valve area is calculated to be 0.7 sq cm by continuity equation. This is consistent with severe aortic stenosis  Tricuspid Valve: normal with trace TR.  Pulmonic Valve: Not well-visualized  Left Atrium: normal  Right Atrium: Not well-visualized  Left Ventricle: normal LV size and systolic function, estimated LVEF of 55-60%.  Right Ventricle: Grossly normal size and systolic function.  Pericardium/Pleura: normal, no significant pericardial effusion.    Conclusion:  Technically difficult and limited study  Normal left ventricular internal dimensions and systolic function, estimated LVEF of 55-60%.  Grossly normal RV size and systolic function.  Calcified trileaflet aortic valve with severe aortic stenosis  Mild MR  Trace TR.  No significant pericardial effusion.    < end of copied text >    < from: CT Chest No Cont (11.04.20 @ 01:33) >  IMPRESSION: Moderate pulmonary edema. Please note that superimposed infection cannot be excluded.  < end of copied text >

## 2020-11-05 NOTE — PROGRESS NOTE ADULT - PROBLEM SELECTOR PLAN 1
pt with sepsis - billy - ckd - dm - obesity - s/p AMS - NSTEMI  cardio follow up - on DAPT and AC - lovenox - TTE - further w/u and tele monitor  ckd billy - I and O - monitor renal indices - serial labs - I and O  sepsis eval - cx noted - ID eval noted - on emp ABX - source poss LE wound  DM care - serial FS - dietary discretion  obesity - likely at risk for MARCUS  monitor VS and HD and Sat  out of bed as tolerated  on RA  labs and imaging reviewed  ct chest more consistent with CHF - doubt PNA

## 2020-11-05 NOTE — PHYSICAL THERAPY INITIAL EVALUATION ADULT - GENERAL OBSERVATIONS, REHAB EVAL
Patient received walking in room with NA, cooperative with physical therapy evaluation, no apparent distress

## 2020-11-05 NOTE — PHYSICAL THERAPY INITIAL EVALUATION ADULT - PERTINENT HX OF CURRENT PROBLEM, REHAB EVAL
This is a 74 M with PMH of HTN DM HLD CAD who was BIBEMS to [facility] with complaints of change in mental status and rigors. The son found the patient confused and had urinated on himself.

## 2020-11-05 NOTE — PROGRESS NOTE ADULT - PROBLEM SELECTOR PLAN 5
Slowly improving  Hold losartan, HCTZ and metformin  Caution with hydration due to possible CHF  Avoid nephrotoxic medications  Nephrology f/u

## 2020-11-05 NOTE — PROGRESS NOTE ADULT - ASSESSMENT
MARINA: On ARB, ATN  Sepsis  Diabetes  Hypertension  ? Fluid Overload    Improving renal indices. To continue current meds. Hold losartan. On IV abx. Monitor blood sugar levels. Insulin coverage as needed. Dietary restriction.   Avoid nephrotoxic meds as possible. Will follow electrolytes and renal function trend. Cardiology and Pulmonary follow up.

## 2020-11-05 NOTE — PROGRESS NOTE ADULT - PROBLEM SELECTOR PLAN 1
cont mod dose humalog scale coverage qac/qhs  add lantus 10 units qhs  cont cons cho diet  goal bg 100-180 in hosp setting

## 2020-11-05 NOTE — PROGRESS NOTE ADULT - SUBJECTIVE AND OBJECTIVE BOX
Date/Time Patient Seen:  		  Referring MD:   Data Reviewed	       Patient is a 74y old  Male who presents with a chief complaint of change in mental status (04 Nov 2020 15:13)      Subjective/HPI     PAST MEDICAL & SURGICAL HISTORY:  Diabetes mellitus    Hypertension          Medication list         MEDICATIONS  (STANDING):  aspirin enteric coated 81 milliGRAM(s) Oral daily  atorvastatin 40 milliGRAM(s) Oral at bedtime  cefTRIAXone   IVPB 2000 milliGRAM(s) IV Intermittent every 24 hours  clopidogrel Tablet 75 milliGRAM(s) Oral daily  dextrose 5%. 1000 milliLiter(s) (50 mL/Hr) IV Continuous <Continuous>  dextrose 50% Injectable 12.5 Gram(s) IV Push once  dextrose 50% Injectable 25 Gram(s) IV Push once  dextrose 50% Injectable 25 Gram(s) IV Push once  enoxaparin Injectable 73 milliGRAM(s) SubCutaneous daily  influenza   Vaccine 0.5 milliLiter(s) IntraMuscular once  insulin lispro (ADMELOG) corrective regimen sliding scale   SubCutaneous three times a day before meals  insulin lispro (ADMELOG) corrective regimen sliding scale   SubCutaneous at bedtime  metoprolol tartrate 12.5 milliGRAM(s) Oral every 12 hours    MEDICATIONS  (PRN):  acetaminophen   Tablet .. 650 milliGRAM(s) Oral every 6 hours PRN Temp greater or equal to 38C (100.4F), Mild Pain (1 - 3)  dextrose 40% Gel 15 Gram(s) Oral once PRN Blood Glucose LESS THAN 70 milliGRAM(s)/deciliter  glucagon  Injectable 1 milliGRAM(s) IntraMuscular once PRN Glucose LESS THAN 70 milligrams/deciliter         Vitals log        ICU Vital Signs Last 24 Hrs  T(C): 37.7 (05 Nov 2020 04:24), Max: 38.3 (04 Nov 2020 16:20)  T(F): 99.9 (05 Nov 2020 04:24), Max: 101 (04 Nov 2020 16:20)  HR: 86 (05 Nov 2020 04:24) (66 - 89)  BP: 146/72 (05 Nov 2020 04:24) (111/70 - 147/75)  BP(mean): --  ABP: --  ABP(mean): --  RR: 18 (05 Nov 2020 04:24) (16 - 20)  SpO2: 95% (05 Nov 2020 04:24) (92% - 100%)           Input and Output:  I&O's Detail      Lab Data                        11.4   14.51 )-----------( 202      ( 05 Nov 2020 06:51 )             32.3     11-05    139  |  106  |  26<H>  ----------------------------<  178<H>  4.0   |  26  |  1.80<H>    Ca    8.7      05 Nov 2020 06:51  Mg     1.8     11-04    TPro  5.9<L>  /  Alb  2.9<L>  /  TBili  0.4  /  DBili  .10  /  AST  19  /  ALT  27  /  AlkPhos  73  11-04      CARDIAC MARKERS ( 04 Nov 2020 21:12 )  5.080 ng/mL / x     / 305 U/L / x     / 10.6 ng/mL  CARDIAC MARKERS ( 04 Nov 2020 13:38 )  3.200 ng/mL / x     / 251 U/L / x     / 14.4 ng/mL  CARDIAC MARKERS ( 04 Nov 2020 08:25 )  .804 ng/mL / x     / 159 U/L / x     / 8.0 ng/mL  CARDIAC MARKERS ( 04 Nov 2020 01:07 )  .013 ng/mL / x     / 134 U/L / x     / 1.7 ng/mL        Review of Systems	      Objective     Physical Examination    on room air  head nc  head at  heart s1s2  lung dec BS  abd soft      Pertinent Lab findings & Imaging      Contreras:  NO   Adequate UO     I&O's Detail           Discussed with:     Cultures:	  Culture Results:   Growth in aerobic bottle: Gram Positive Cocci in Pairs and Chains (11-04 @ 14:18)  Culture Results:   Growth in aerobic bottle: Gram Positive Cocci in Pairs and Chains  Growth in anaerobic bottle: Gram Positive Cocci in Pairs and Chains  "Due to technical problems, Proteus sp. will Not be reported as part of  the BCID panel until further notice"  ***Blood Panel PCR results on this specimen are available  approximately 3 hours after the Gram stain result.***  Gram stain, PCR, and/or culture results may not always  correspond due to difference in methodologies.  ************************************************************  This PCR assay was performed using [employer].  The following targets are tested for: Enterococcus,  vancomycin resistant enterococci, Listeria monocytogenes,  coagulase negative staphylococci, S. aureus,  methicillin resistant S. aureus, Streptococcus agalactiae  (Group B), S. pneumoniae, S. pyogenes (Group A),  Acinetobacter baumannii, Enterobacter cloacae, E. coli,  Klebsiella oxytoca, K. pneumoniae, Proteus sp.,  Serratia marcescens, Haemophilus influenzae,  Neisseria meningitidis, Pseudomonas aeruginosa, Candida  albicans, C. glabrata, C krusei, C parapsilosis,  C. tropicalis and the KPC resistance gene. (11-04 @ 14:18)        Radiology

## 2020-11-05 NOTE — CHART NOTE - NSCHARTNOTEFT_GEN_A_CORE
Called by RN for pt with T 101.8, other VSS. Per chart review, pt is present w/ sepsis, on Rocephin w/ blood cultures pending. Will give Tylenol at this time and apply cold compresses. RN to call with any changes, will continue to monitor.     Vital Signs Last 24 Hrs  T(C): 38.8 (05 Nov 2020 20:02), Max: 38.8 (05 Nov 2020 20:02)  T(F): 101.8 (05 Nov 2020 20:02), Max: 101.8 (05 Nov 2020 20:02)  HR: 82 (05 Nov 2020 20:02) (76 - 86)  BP: 154/81 (05 Nov 2020 20:02) (129/73 - 154/81)  BP(mean): --  RR: 17 (05 Nov 2020 20:02) (16 - 18)  SpO2: 97% (05 Nov 2020 20:02) (91% - 97%)

## 2020-11-05 NOTE — PROGRESS NOTE ADULT - SUBJECTIVE AND OBJECTIVE BOX
SUSIE NUNES is a 74yMale , patient examined and chart reviewed    INTERVAL HPI/ OVERNIGHT EVENTS:   Awake alert NAD.   Blood cultures with Strep species.  Fevers better, Low grade temps. Tmax 100.    PAST MEDICAL & SURGICAL HISTORY:  Diabetes mellitus  Hypertension    For details regarding the patient's social history, family history, and other miscellaneous elements, please refer the initial infectious diseases consultation and/or the admitting history and physical examination for this admission.    ROS:  CONSTITUTIONAL:  Negative fever or chills, feels well, good appetite  EYES:  Negative  blurry vision or double vision  CARDIOVASCULAR:  Negative for chest pain or palpitations  RESPIRATORY:  Negative for cough, wheezing, or SOB   GASTROINTESTINAL:  Negative for nausea, vomiting, diarrhea, constipation, or abdominal pain  GENITOURINARY:  Negative frequency, urgency or dysuria  NEUROLOGIC:  No headache, confusion, dizziness, lightheadedness  All other systems were reviewed and are negative         Current inpatient medications :    ANTIBIOTICS/RELEVANT:  cefTRIAXone   IVPB 2000 milliGRAM(s) IV Intermittent every 24 hours      acetaminophen   Tablet .. 650 milliGRAM(s) Oral every 6 hours PRN  aspirin enteric coated 81 milliGRAM(s) Oral daily  atorvastatin 40 milliGRAM(s) Oral at bedtime  clopidogrel Tablet 75 milliGRAM(s) Oral daily  dextrose 40% Gel 15 Gram(s) Oral once PRN  dextrose 5%. 1000 milliLiter(s) IV Continuous <Continuous>  dextrose 50% Injectable 12.5 Gram(s) IV Push once  dextrose 50% Injectable 25 Gram(s) IV Push once  dextrose 50% Injectable 25 Gram(s) IV Push once  enoxaparin Injectable 73 milliGRAM(s) SubCutaneous daily  glucagon  Injectable 1 milliGRAM(s) IntraMuscular once PRN  insulin glargine Injectable (LANTUS) 10 Unit(s) SubCutaneous at bedtime  insulin lispro (ADMELOG) corrective regimen sliding scale   SubCutaneous three times a day before meals  insulin lispro (ADMELOG) corrective regimen sliding scale   SubCutaneous at bedtime  metoprolol tartrate 25 milliGRAM(s) Oral every 12 hours      Objective:  Vital Signs Last 24 Hrs  T(C): 36.9 (2020 16:11), Max: 37.8 (2020 08:31)  T(F): 98.5 (2020 16:11), Max: 100 (2020 08:31)  HR: 80 (2020 16:11) (66 - 86)  BP: 129/73 (2020 16:11) (111/70 - 147/75)  RR: 18 (2020 16:11) (16 - 18)  SpO2: 96% (2020 16:11) (91% - 97%)    Physical Exam:  General: no acute distress  Eyes: sclera anicteric, pupils equal and reactive to light  ENMT: buccal mucosa moist, pharynx not injected  Neck: supple, trachea midline  Lungs: Decreased, no wheeze/rhonchi  Cardiovascular: regular rate and rhythm, S1 S2  Abdomen: soft, nontender, no organomegaly present, bowel sounds normal  Neurological: alert and oriented x3, Cranial Nerves II-XII grossly intact  Skin: no increased ecchymosis/petechiae/purpura  Lymph Nodes: no palpable cervical/supraclavicular lymph nodes enlargements  Extremities: Left LE erythema warm to touch + edema mild tenderness      LABS:                          11.4   14.51 )-----------( 202      ( 2020 06:51 )             32.3       11-05    139  |  106  |  26<H>  ----------------------------<  178<H>  4.0   |  26  |  1.80<H>    Ca    8.7      2020 06:51  Phos  2.7     11-05  Mg     1.8     11-05    TPro  5.9<L>  /  Alb  2.9<L>  /  TBili  0.4  /  DBili  .10  /  AST  19  /  ALT  27  /  AlkPhos  73  11-04      PT/INR - ( 2020 23:37 )   PT: 12.7 sec;   INR: 1.05 ratio         PTT - ( 2020 23:37 )  PTT:27.2 sec  Urinalysis Basic - ( 2020 00:52 )    Color: Yellow / Appearance: Clear / S.015 / pH: x  Gluc: x / Ketone: Negative  / Bili: Negative / Urobili: Negative mg/dL   Blood: x / Protein: 15 mg/dL / Nitrite: Negative   Leuk Esterase: Negative / RBC: 0-2 /HPF / WBC 0-2   Sq Epi: x / Non Sq Epi: Occasional / Bacteria: Occasional      MICROBIOLOGY:  Culture - Urine (collected 2020 14:18)  Source: .Urine Clean Catch (Midstream)  Final Report (2020 09:53):    No growth    Culture - Blood (collected 2020 14:18)  Source: .Blood Blood-Peripheral  Gram Stain (2020 22:13):    Growth in aerobic bottle: Gram Positive Cocci in Pairs and Chains    Growth in anaerobic bottle: Gram Positive Cocci in Pairs and Chains  Preliminary Report (2020 22:13):    Growth in aerobic bottle: Gram Positive Cocci in Pairs and Chains    Growth in anaerobic bottle: Gram Positive Cocci in Pairs and Chains  Organism: Blood Culture PCR (2020 21:52)      -  Streptococcus sp. (Not Grp A, B or S pneumoniae): Detec      Method Type: PCR    Culture - Blood (collected 2020 14:18)  Source: .Blood Blood-Peripheral  Gram Stain (2020 11:04):    Growth in aerobic bottle: Gram Positive Cocci in Pairs and Chains    Growth in anaerobic bottle: Gram Positive Cocci in Pairs and Chains  Preliminary Report (2020 11:04):    Growth in aerobic bottle: Gram Positive Cocci in Pairs and Chains    Growth in anaerobic bottle: Gram Positive Cocci in Pairs and Chains      RADIOLOGY & ADDITIONAL STUDIES:  EXAM:  CT CHEST                                  PROCEDURE DATE:  2020          INTERPRETATION:  CLINICAL INFORMATION: Cough    COMPARISON: There are no prior studies available for comparison.      TECHNIQUE: A volumetric CT acquisition of the chest was obtained from the thoracic inlet to the upper abdomen, without administration of intravenous contrast. MIP images were also obtained.    FINDINGS:    CHEST:    LUNGS AND LARGE AIRWAYS: There is interlobular septal thickening and extensive scattered groundglass opacity in both lungs. Patent central airways.  No pulmonary nodules.  PLEURA: No pleural effusion.  VESSELS: Within normal limits.  HEART: Heart size is mildly enlarged. No pericardial effusion. Coronary artery vascular calcifications.  MEDIASTINUM AND CARRIE: No lymphadenopathy.  CHEST WALL AND LOWER NECK: Within normal limits.  VISUALIZED UPPER ABDOMEN: Within normal limits.  BONES: There is a right humeral prosthesis with streak artifact limiting evaluation. There are degenerative changes of the spine.    IMPRESSION: Moderate pulmonary edema. Please note that superimposed infection cannot be excluded.    Assessment :   74 M with PMH of HTN DM HLD CAD presented to the hospital with CC of mental status and rigors admitted with sepsis syndrome. Fever Tmax 103 WBC 19K   - Left LE cellulitis is the source Fever Blood cultures with Strep species.  - Doubt pneumonia   - CHF   - + troponins  - MARINA    Plan :   Cont Rocephin 2 grams IV daily  Fu cultures  Repeat blood cultures  Trend temps and cbc  Elevate leg    D/w Dr Beauchamp    Continue with present regiment.  Appropriate use of antibiotics and adverse effects reviewed.      I have discussed the above plan of care with patient in detail. He expressed understanding of the  treatment plan . Risks, benefits and alternatives discussed in detail. I have asked if he has any questions or concerns and appropriately addressed them to the best of my ability .    > 35 minutes were spent in direct patient care reviewing notes, medications ,labs data/ imaging , discussion with multidisciplinary team.    Thank you for allowing me to participate in care of your patient .    Stacey Dorado MD  Infectious Disease  [phone]

## 2020-11-05 NOTE — PHYSICAL THERAPY INITIAL EVALUATION ADULT - GAIT TRAINING, PT EVAL
Patient will ambulate x 150 feet independently with appropriate device in 2 weeks in order to increase safety at home

## 2020-11-05 NOTE — PROVIDER CONTACT NOTE (CHANGE IN STATUS NOTIFICATION) - BACKGROUND
pt admitted for fever and AMS. Pending COVID results. Pt has positive blood cx and is on ceftriaxone.

## 2020-11-05 NOTE — PROGRESS NOTE ADULT - SUBJECTIVE AND OBJECTIVE BOX
Patient is a 74y old  Male who presents with a chief complaint of change in mental status (2020 14:15)    Patient seen in follow up for       PAST MEDICAL HISTORY:  Diabetes mellitus    Hypertension      MEDICATIONS  (STANDING):  aspirin enteric coated 81 milliGRAM(s) Oral daily  atorvastatin 40 milliGRAM(s) Oral at bedtime  cefTRIAXone   IVPB 2000 milliGRAM(s) IV Intermittent every 24 hours  clopidogrel Tablet 75 milliGRAM(s) Oral daily  dextrose 5%. 1000 milliLiter(s) (50 mL/Hr) IV Continuous <Continuous>  dextrose 50% Injectable 12.5 Gram(s) IV Push once  dextrose 50% Injectable 25 Gram(s) IV Push once  dextrose 50% Injectable 25 Gram(s) IV Push once  enoxaparin Injectable 73 milliGRAM(s) SubCutaneous daily  influenza   Vaccine 0.5 milliLiter(s) IntraMuscular once  insulin glargine Injectable (LANTUS) 10 Unit(s) SubCutaneous at bedtime  insulin lispro (ADMELOG) corrective regimen sliding scale   SubCutaneous three times a day before meals  insulin lispro (ADMELOG) corrective regimen sliding scale   SubCutaneous at bedtime  metoprolol tartrate 25 milliGRAM(s) Oral every 12 hours    MEDICATIONS  (PRN):  acetaminophen   Tablet .. 650 milliGRAM(s) Oral every 6 hours PRN Temp greater or equal to 38C (100.4F), Mild Pain (1 - 3)  dextrose 40% Gel 15 Gram(s) Oral once PRN Blood Glucose LESS THAN 70 milliGRAM(s)/deciliter  glucagon  Injectable 1 milliGRAM(s) IntraMuscular once PRN Glucose LESS THAN 70 milligrams/deciliter    T(C): 37.1 (20 @ 12:14), Max: 38.3 (20 @ 16:20)  HR: 76 (20 @ 12:14) (66 - 92)  BP: 144/71 (20 @ 12:14) (101/53 - 147/75)  RR: 16 (20 @ 12:14) (16 - 33)  SpO2: 93% (20 @ 12:14) (91% - 100%)  Wt(kg): --  I&O's Detail      PHYSICAL EXAM:  General: No distress  Respiratory: b/l air entry  Cardiovascular: S1 S2  Gastrointestinal: soft  Extremities:  edema                              11.4   14.51 )-----------( 202      ( 2020 06:51 )             32.3     11    139  |  106  |  26<H>  ----------------------------<  178<H>  4.0   |  26  |  1.80<H>    Ca    8.7      2020 06:51  Phos  2.7       Mg     1.8         TPro  5.9<L>  /  Alb  2.9<L>  /  TBili  0.4  /  DBili  .10  /  AST  19  /  ALT  27  /  AlkPhos  73  1104    CARDIAC MARKERS ( 2020 06:51 )  3.200 ng/mL / x     / 286 U/L / x     / 4.1 ng/mL  CARDIAC MARKERS ( 2020 21:12 )  5.080 ng/mL / x     / 305 U/L / x     / 10.6 ng/mL  CARDIAC MARKERS ( 2020 13:38 )  3.200 ng/mL / x     / 251 U/L / x     / 14.4 ng/mL  CARDIAC MARKERS ( 2020 08:25 )  .804 ng/mL / x     / 159 U/L / x     / 8.0 ng/mL  CARDIAC MARKERS ( 2020 01:07 )  .013 ng/mL / x     / 134 U/L / x     / 1.7 ng/mL      LIVER FUNCTIONS - ( 2020 08:25 )  Alb: 2.9 g/dL / Pro: 5.9 g/dL / ALK PHOS: 73 U/L / ALT: 27 U/L / AST: 19 U/L / GGT: x           Urinalysis Basic - ( 2020 00:52 )    Color: Yellow / Appearance: Clear / S.015 / pH: x  Gluc: x / Ketone: Negative  / Bili: Negative / Urobili: Negative mg/dL   Blood: x / Protein: 15 mg/dL / Nitrite: Negative   Leuk Esterase: Negative / RBC: 0-2 /HPF / WBC 0-2   Sq Epi: x / Non Sq Epi: Occasional / Bacteria: Occasional        Sodium, Serum: 139 ( @ 06:51)  Sodium, Serum: 140 ( @ 08:25)  Sodium, Serum: 138 ( @ 23:37)    Creatinine, Serum: 1.80 ( @ 06:51)  Creatinine, Serum: 1.90 ( 08:25)  Creatinine, Serum: 2.14 (:37)    Potassium, Serum: 4.0 ( 06:51)  Potassium, Serum: 4.4 ( 08:25)  Potassium, Serum: 4.4 ( 23:37)    Hemoglobin: 11.4 ( 06:51)  Hemoglobin: 10.6 ( @ 08:25)  Hemoglobin: 12.6 ( 23:37)

## 2020-11-05 NOTE — PROGRESS NOTE ADULT - SUBJECTIVE AND OBJECTIVE BOX
neuro cons dict  seen for AMS related to metabolic encephalopathy  mental status improving  doubt cva/cns infection  SEPSIS  antibiotic as per ID  would follow up

## 2020-11-05 NOTE — PROGRESS NOTE ADULT - SUBJECTIVE AND OBJECTIVE BOX
Patient is a 74y old  Male who presents with a chief complaint of change in mental status (2020 13:45)      INTERVAL HPI/OVERNIGHT EVENTS: Patient seen and examined. NAD. No complaints. Confused at times.    Vital Signs Last 24 Hrs  T(C): 37.1 (2020 12:14), Max: 38.3 (2020 16:20)  T(F): 98.8 (2020 12:14), Max: 101 (2020 16:20)  HR: 76 (2020 12:14) (66 - 89)  BP: 144/71 (2020 12:14) (111/70 - 147/75)  BP(mean): --  RR: 16 (2020 12:14) (16 - 18)  SpO2: 93% (2020 12:14) (91% - 100%)        139  |  106  |  26<H>  ----------------------------<  178<H>  4.0   |  26  |  1.80<H>    Ca    8.7      2020 06:51  Phos  2.7     11-05  Mg     1.8     11-05    TPro  5.9<L>  /  Alb  2.9<L>  /  TBili  0.4  /  DBili  .10  /  AST  19  /  ALT  27  /  AlkPhos  73  11-04                          11.4   14.51 )-----------( 202      ( 2020 06:51 )             32.3     PT/INR - ( 2020 23:37 )   PT: 12.7 sec;   INR: 1.05 ratio         PTT - ( 2020 23:37 )  PTT:27.2 sec  CAPILLARY BLOOD GLUCOSE      POCT Blood Glucose.: 197 mg/dL (2020 12:07)  POCT Blood Glucose.: 238 mg/dL (2020 08:56)  POCT Blood Glucose.: 193 mg/dL (2020 07:33)  POCT Blood Glucose.: 132 mg/dL (2020 22:00)  POCT Blood Glucose.: 150 mg/dL (2020 17:27)    Urinalysis Basic - ( 2020 00:52 )    Color: Yellow / Appearance: Clear / S.015 / pH: x  Gluc: x / Ketone: Negative  / Bili: Negative / Urobili: Negative mg/dL   Blood: x / Protein: 15 mg/dL / Nitrite: Negative   Leuk Esterase: Negative / RBC: 0-2 /HPF / WBC 0-2   Sq Epi: x / Non Sq Epi: Occasional / Bacteria: Occasional              acetaminophen   Tablet .. 650 milliGRAM(s) Oral every 6 hours PRN  aspirin enteric coated 81 milliGRAM(s) Oral daily  atorvastatin 40 milliGRAM(s) Oral at bedtime  cefTRIAXone   IVPB 2000 milliGRAM(s) IV Intermittent every 24 hours  clopidogrel Tablet 75 milliGRAM(s) Oral daily  dextrose 40% Gel 15 Gram(s) Oral once PRN  dextrose 5%. 1000 milliLiter(s) IV Continuous <Continuous>  dextrose 50% Injectable 12.5 Gram(s) IV Push once  dextrose 50% Injectable 25 Gram(s) IV Push once  dextrose 50% Injectable 25 Gram(s) IV Push once  enoxaparin Injectable 73 milliGRAM(s) SubCutaneous daily  glucagon  Injectable 1 milliGRAM(s) IntraMuscular once PRN  influenza   Vaccine 0.5 milliLiter(s) IntraMuscular once  insulin glargine Injectable (LANTUS) 10 Unit(s) SubCutaneous at bedtime  insulin lispro (ADMELOG) corrective regimen sliding scale   SubCutaneous three times a day before meals  insulin lispro (ADMELOG) corrective regimen sliding scale   SubCutaneous at bedtime  metoprolol tartrate 12.5 milliGRAM(s) Oral every 12 hours              REVIEW OF SYSTEMS:  CONSTITUTIONAL: No fever, no weight loss, or no fatigue  NECK: No pain, no stiffness  RESPIRATORY: No cough, no wheezing, no chills, no hemoptysis, No shortness of breath  CARDIOVASCULAR: No chest pain, no palpitations, no dizziness, no leg swelling  GASTROINTESTINAL: No abdominal pain. No nausea, no vomiting, no hematemesis; No diarrhea, no constipation. No melena, no hematochezia.  GENITOURINARY: No dysuria, no frequency, no hematuria, no incontinence  NEUROLOGICAL: No headaches, no loss of strength, no numbness, no tremors  SKIN: No itching, no burning  MUSCULOSKELETAL: No joint pain, no swelling; No muscle, no back, no extremity pain  PSYCHIATRIC: No depression, no mood swings,   HEME/LYMPH: No easy bruising, no bleeding gums  ALLERY AND IMMUNOLOGIC: No hives       Consultant(s) Notes Reviewed:  [X] YES  [ ] NO    PHYSICAL EXAM:  GENERAL: NAD  HEAD:  Atraumatic, Normocephalic  EYES: EOMI, PERRLA, conjunctiva and sclera clear  ENMT: No tonsillar erythema, exudates, or enlargement; Moist mucous membranes  NECK: Supple, No JVD  NERVOUS SYSTEM:  Awake & alert  CHEST/LUNG: Clear to auscultation bilaterally; No rales, rhonchi, wheezing,  HEART: Regular rate and rhythm  ABDOMEN: Soft, Nontender, Nondistended; Bowel sounds present  EXTREMITIES:  No clubbing, cyanosis, or edema  LYMPH: No lymphadenopathy noted  SKIN: No rashes      Advanced care planning discussed with patient/family [X] YES   [ ] NO    Advanced care planning discussed with patient/family. Patient's health status was discussed. All appropriate changes have been made regarding patient's end-of-life care. Advanced care planning forms reviewed/discussed/completed.  20 minutes spent.

## 2020-11-05 NOTE — PROGRESS NOTE ADULT - ASSESSMENT
74 year old male with PMH HTN, DM, HLD transferred from [place] for admission for AMS due to underlying sepsis due to possible PNA,     ACS  - Patient found to have elevated Troponin.   - Troponin I trend:  <--3.200<--5.080<--3.200<--.804<--.013. Now down trending. No need to trend further  - EKG with ST depressions in leads I, avL, and V5/V6, consistent with possible lateral ischemia.   - Patient likely has a secondary NSTEMI due to underlying sepsis due to PNA. Would treat medically for now   - Continue full dose Lovenox for 48 hours   - Continue ASA 81 mg daily, Plavix 75 mg daily  - Continue atorvastatin 40 mg daily   - Technically difficult ECHO showed normal LV & RV size and function EF 55-60%, severe AS  - Continue BB    Severe AS  - Recent ECHO with severe AS  - CT chest notable pulmonary edema on CT chest. s/p 3 L NS, this is likely due to volume overload.   - Patient would need outpatient evaluation for severe AS  - Caution with IVF  - HR: 76 (11-05 @ 12:14) (66 - 89)  - Continue BB     Hypertension  - BP: 144/71 (11-05-20 @ 12:14) (111/70 - 147/75)  - Continue BB  - Can restart     Hyperlipidemia  - Continue lipitor 40 mg HS   - Low fat diet    Acute Kidney injury  - Creatinine trend:  <--1.80,  <--1.90,  <--2.14  - Continue to monitor renal indices  - Avoid nephrotoxins   - Holding losartan and HCTZ, resume when MARINA resolves     LLE edema  - Patient has LLE edema with redness and warmth.   - Consider LLE Doppler to r/o DVT     - Monitor and replete lytes, keep K>4, Mg>2.  - All other medical needs as per primary team.  - Other cardiovascular workup will depend on clinical course.  - Will continue to follow.    Margie Steele, MS FNP, [facility]  Nurse Practitioner- Cardiology   Spectra #[phone]

## 2020-11-05 NOTE — PROGRESS NOTE ADULT - SUBJECTIVE AND OBJECTIVE BOX
CAPILLARY BLOOD GLUCOSE      POCT Blood Glucose.: 238 mg/dL (05 Nov 2020 08:56)  POCT Blood Glucose.: 193 mg/dL (05 Nov 2020 07:33)  POCT Blood Glucose.: 132 mg/dL (04 Nov 2020 22:00)  POCT Blood Glucose.: 150 mg/dL (04 Nov 2020 17:27)  POCT Blood Glucose.: 196 mg/dL (04 Nov 2020 12:58)      Vital Signs Last 24 Hrs  T(C): 37.8 (05 Nov 2020 08:31), Max: 38.3 (04 Nov 2020 16:20)  T(F): 100 (05 Nov 2020 08:31), Max: 101 (04 Nov 2020 16:20)  HR: 78 (05 Nov 2020 08:31) (66 - 89)  BP: 135/67 (05 Nov 2020 08:31) (111/70 - 147/75)  BP(mean): --  RR: 17 (05 Nov 2020 08:31) (16 - 18)  SpO2: 91% (05 Nov 2020 08:31) (91% - 100%)    General: WN/WD NAD  Respiratory: CTA B/L  CV: RRR, S1S2, no murmurs, rubs or gallops  Abdominal: Soft, NT, ND +BS, Last BM  Extremities: No edema, + peripheral pulses     11-05    139  |  106  |  26<H>  ----------------------------<  178<H>  4.0   |  26  |  1.80<H>    Ca    8.7      05 Nov 2020 06:51  Phos  2.7     11-05  Mg     1.8     11-05    TPro  5.9<L>  /  Alb  2.9<L>  /  TBili  0.4  /  DBili  .10  /  AST  19  /  ALT  27  /  AlkPhos  73  11-04      atorvastatin 40 milliGRAM(s) Oral at bedtime  dextrose 40% Gel 15 Gram(s) Oral once PRN  dextrose 50% Injectable 12.5 Gram(s) IV Push once  dextrose 50% Injectable 25 Gram(s) IV Push once  dextrose 50% Injectable 25 Gram(s) IV Push once  glucagon  Injectable 1 milliGRAM(s) IntraMuscular once PRN  insulin lispro (ADMELOG) corrective regimen sliding scale   SubCutaneous three times a day before meals  insulin lispro (ADMELOG) corrective regimen sliding scale   SubCutaneous at bedtime

## 2020-11-05 NOTE — ADVANCED PRACTICE NURSE CONSULT - ASSESSMENT
Patient is a 74y old  Male who presents with a chief complaint of change in mental status (05 Nov 2020 13:45)     Type 2 DM  with elevated glucose on admission A1c 7.8  on Metformin 1000mg po BID as per son and  stated that father does not regularly monitor and  does not recall  seeing a meter.  Son is ready willing and able to assist  father  with diabetes management after discharge  not receptive to home care.  Being followed by Dr Perlman started on Lantus 10 units sq today.      Son a stated that  patient poorly  cared for self and  now agreed to make changes with son's assistance    HPI:  This is a male who was BIBEMS to [facility] with complaints of change in mental status and rigors. The son found the patient confused and had urinated on himself. In the ER patient had a temp of 103. CT showed ground glass opacities. Patient was transferred to [place] for admission. I    PAST MEDICAL & SURGICAL HISTORY:  Diabetes mellitus type 2  Hypertension    MEDICATIONS  (STANDING):  aspirin enteric coated 81 milliGRAM(s) Oral daily  atorvastatin 40 milliGRAM(s) Oral at bedtime  cefTRIAXone   IVPB 2000 milliGRAM(s) IV Intermittent every 24 hours  clopidogrel Tablet 75 milliGRAM(s) Oral daily  dextrose 5%. 1000 milliLiter(s) (50 mL/Hr) IV Continuous <Continuous>  dextrose 50% Injectable 12.5 Gram(s) IV Push once  dextrose 50% Injectable 25 Gram(s) IV Push once  dextrose 50% Injectable 25 Gram(s) IV Push once  enoxaparin Injectable 73 milliGRAM(s) SubCutaneous daily  influenza   Vaccine 0.5 milliLiter(s) IntraMuscular once  insulin glargine Injectable (LANTUS) 10 Unit(s) SubCutaneous at bedtime  insulin lispro (ADMELOG) corrective regimen sliding scale   SubCutaneous three times a day before meals  insulin lispro (ADMELOG) corrective regimen sliding scale   SubCutaneous at bedtime  metoprolol tartrate 12.5 milliGRAM(s) Oral every 12 hours    Allergies    No Known Allergies    Vital Signs Last 24 Hrs  T(C): 37.1 (05 Nov 2020 12:14),   T(F): 98.8 (05 Nov 2020 12:14),   HR: 76 (05 Nov 2020 12:14)   BP: 144/71 (05 Nov 2020 12:14)   BP(mean): --  RR: 16 (05 Nov 2020 12:14)   SpO2: 93% (05 Nov 2020 12:14)    11-05    139  |  106  |  26<H>  ----------------------------<  178<H>  4.0   |  26  |  1.80<H>    Ca    8.7      05 Nov 2020 06:51  Phos  2.7     11-05  Mg     1.8     11-      Anion Gap, Serum: 7 mmol/L (11-05-20 @ 06:51)  eGFR if Non African American: 36 mL/min/1.73M2 <L> (11-05-20 @ 06:51)      POCT Blood Glucose.: 197 mg/dL (05 Nov 2020 12:07)  POCT Blood Glucose.: 238 mg/dL (05 Nov 2020 08:56)  POCT Blood Glucose.: 193 mg/dL (05 Nov 2020 07:33)  POCT Blood Glucose.: 132 mg/dL (04 Nov 2020 22:00)  POCT Blood Glucose.: 150 mg/dL (04 Nov 2020 17:27)      DIET: cc                    l

## 2020-11-05 NOTE — PHYSICAL THERAPY INITIAL EVALUATION ADULT - TRANSFER TRAINING, PT EVAL
Patient will transfer from all surfaces in 2 weeks independently from all surfaces in order to increase mobility at home

## 2020-11-05 NOTE — PROGRESS NOTE ADULT - PROBLEM SELECTOR PLAN 2
NSTEMI  Continue ASA and BB  2DECHO noted -- normal EF, severe AS (needs outpatient f/u)  Continue ASA, plavix and full dose lovenox for now  Continue statin  Cardio consult f/u  Further work-up/management pending clinical course.

## 2020-11-05 NOTE — ADVANCED PRACTICE NURSE CONSULT - RECOMMEDATIONS
Monitor Glucose trends  |discontinue Metformin  consider Januvia renal doses  Home glucose monitoring  Involve  son  Meds to bed  follow prn

## 2020-11-05 NOTE — PHYSICAL THERAPY INITIAL EVALUATION ADULT - ADDITIONAL COMMENTS
Patient lives with son in a split level home, + [place] and then stairs throughout home to bedroom and bathroom. Patient was independent in all ADLS and ambulated independently without a device.  Patient has a RW.

## 2020-11-06 DIAGNOSIS — L84 CORNS AND CALLOSITIES: ICD-10-CM

## 2020-11-06 LAB
-  CEFTRIAXONE: SIGNIFICANT CHANGE UP
-  CLINDAMYCIN: SIGNIFICANT CHANGE UP
-  LEVOFLOXACIN: SIGNIFICANT CHANGE UP
-  PENICILLIN: SIGNIFICANT CHANGE UP
-  VANCOMYCIN: SIGNIFICANT CHANGE UP
ALBUMIN SERPL ELPH-MCNC: 2.7 G/DL — LOW (ref 3.3–5)
ALP SERPL-CCNC: 73 U/L — SIGNIFICANT CHANGE UP (ref 40–120)
ALT FLD-CCNC: 28 U/L — SIGNIFICANT CHANGE UP (ref 12–78)
ANION GAP SERPL CALC-SCNC: 7 MMOL/L — SIGNIFICANT CHANGE UP (ref 5–17)
AST SERPL-CCNC: 28 U/L — SIGNIFICANT CHANGE UP (ref 15–37)
BILIRUB DIRECT SERPL-MCNC: 0.2 MG/DL — SIGNIFICANT CHANGE UP (ref 0.05–0.2)
BILIRUB INDIRECT FLD-MCNC: 0.4 MG/DL — SIGNIFICANT CHANGE UP (ref 0.2–1)
BILIRUB SERPL-MCNC: 0.6 MG/DL — SIGNIFICANT CHANGE UP (ref 0.2–1.2)
BUN SERPL-MCNC: 24 MG/DL — HIGH (ref 7–23)
CALCIUM SERPL-MCNC: 8.3 MG/DL — LOW (ref 8.5–10.1)
CHLORIDE SERPL-SCNC: 109 MMOL/L — HIGH (ref 96–108)
CK SERPL-CCNC: 138 U/L — SIGNIFICANT CHANGE UP (ref 26–308)
CO2 SERPL-SCNC: 26 MMOL/L — SIGNIFICANT CHANGE UP (ref 22–31)
CREAT SERPL-MCNC: 1.6 MG/DL — HIGH (ref 0.5–1.3)
CULTURE RESULTS: SIGNIFICANT CHANGE UP
GLUCOSE SERPL-MCNC: 132 MG/DL — HIGH (ref 70–99)
HCT VFR BLD CALC: 30 % — LOW (ref 39–50)
HGB BLD-MCNC: 10.6 G/DL — LOW (ref 13–17)
MAGNESIUM SERPL-MCNC: 2.1 MG/DL — SIGNIFICANT CHANGE UP (ref 1.6–2.6)
MCHC RBC-ENTMCNC: 31.6 PG — SIGNIFICANT CHANGE UP (ref 27–34)
MCHC RBC-ENTMCNC: 35.3 GM/DL — SIGNIFICANT CHANGE UP (ref 32–36)
MCV RBC AUTO: 89.6 FL — SIGNIFICANT CHANGE UP (ref 80–100)
METHOD TYPE: SIGNIFICANT CHANGE UP
METHOD TYPE: SIGNIFICANT CHANGE UP
NRBC # BLD: 0 /100 WBCS — SIGNIFICANT CHANGE UP (ref 0–0)
PLATELET # BLD AUTO: 188 K/UL — SIGNIFICANT CHANGE UP (ref 150–400)
POTASSIUM SERPL-MCNC: 3.7 MMOL/L — SIGNIFICANT CHANGE UP (ref 3.5–5.3)
POTASSIUM SERPL-SCNC: 3.7 MMOL/L — SIGNIFICANT CHANGE UP (ref 3.5–5.3)
PROT SERPL-MCNC: 6.4 G/DL — SIGNIFICANT CHANGE UP (ref 6–8.3)
RBC # BLD: 3.35 M/UL — LOW (ref 4.2–5.8)
RBC # FLD: 12.3 % — SIGNIFICANT CHANGE UP (ref 10.3–14.5)
SODIUM SERPL-SCNC: 142 MMOL/L — SIGNIFICANT CHANGE UP (ref 135–145)
SPECIMEN SOURCE: SIGNIFICANT CHANGE UP
TROPONIN I SERPL-MCNC: 1.78 NG/ML — HIGH (ref 0.01–0.04)
WBC # BLD: 10.68 K/UL — HIGH (ref 3.8–10.5)
WBC # FLD AUTO: 10.68 K/UL — HIGH (ref 3.8–10.5)

## 2020-11-06 PROCEDURE — 99233 SBSQ HOSP IP/OBS HIGH 50: CPT

## 2020-11-06 PROCEDURE — 99222 1ST HOSP IP/OBS MODERATE 55: CPT

## 2020-11-06 PROCEDURE — 73630 X-RAY EXAM OF FOOT: CPT | Mod: 26,LT

## 2020-11-06 RX ORDER — ENOXAPARIN SODIUM 100 MG/ML
40 INJECTION SUBCUTANEOUS DAILY
Refills: 0 | Status: DISCONTINUED | OUTPATIENT
Start: 2020-11-06 | End: 2020-11-14

## 2020-11-06 RX ADMIN — CEFTRIAXONE 100 MILLIGRAM(S): 500 INJECTION, POWDER, FOR SOLUTION INTRAMUSCULAR; INTRAVENOUS at 08:51

## 2020-11-06 RX ADMIN — ATORVASTATIN CALCIUM 40 MILLIGRAM(S): 80 TABLET, FILM COATED ORAL at 22:18

## 2020-11-06 RX ADMIN — Medication 81 MILLIGRAM(S): at 12:05

## 2020-11-06 RX ADMIN — ENOXAPARIN SODIUM 73 MILLIGRAM(S): 100 INJECTION SUBCUTANEOUS at 12:05

## 2020-11-06 RX ADMIN — Medication 4: at 12:13

## 2020-11-06 RX ADMIN — INSULIN GLARGINE 10 UNIT(S): 100 INJECTION, SOLUTION SUBCUTANEOUS at 22:18

## 2020-11-06 RX ADMIN — Medication 25 MILLIGRAM(S): at 17:16

## 2020-11-06 RX ADMIN — Medication 25 MILLIGRAM(S): at 06:12

## 2020-11-06 RX ADMIN — CLOPIDOGREL BISULFATE 75 MILLIGRAM(S): 75 TABLET, FILM COATED ORAL at 12:05

## 2020-11-06 NOTE — PROGRESS NOTE ADULT - SUBJECTIVE AND OBJECTIVE BOX
[facility] -- Jas Plaza, Rachel Aguilar, Tommy Love Savella, [place]: Office # [phone]    Follow Up:  EKG changes     Subjective/Observations: Patient seen and examined. Patient awake, alert, resting in bed. No complaints of chest pain, dyspnea, palpitations or dizziness. No signs of orthopnea or PND. Lt LLE warm, swollen and red    REVIEW OF SYSTEMS: All review of systems is negative for eye, ENT, GI, , allergic, dermatologic, musculoskeletal and neurologic except as described above    PAST MEDICAL & SURGICAL HISTORY:  Diabetes mellitus  Hypertension    MEDICATIONS  (STANDING):  aspirin enteric coated 81 milliGRAM(s) Oral daily  atorvastatin 40 milliGRAM(s) Oral at bedtime  cefTRIAXone   IVPB 2000 milliGRAM(s) IV Intermittent every 24 hours  clopidogrel Tablet 75 milliGRAM(s) Oral daily  dextrose 5%. 1000 milliLiter(s) (50 mL/Hr) IV Continuous <Continuous>  dextrose 50% Injectable 12.5 Gram(s) IV Push once  dextrose 50% Injectable 25 Gram(s) IV Push once  dextrose 50% Injectable 25 Gram(s) IV Push once  enoxaparin Injectable 73 milliGRAM(s) SubCutaneous daily  influenza   Vaccine 0.5 milliLiter(s) IntraMuscular once  insulin glargine Injectable (LANTUS) 10 Unit(s) SubCutaneous at bedtime  insulin lispro (ADMELOG) corrective regimen sliding scale   SubCutaneous three times a day before meals  insulin lispro (ADMELOG) corrective regimen sliding scale   SubCutaneous at bedtime  metoprolol tartrate 25 milliGRAM(s) Oral every 12 hours    MEDICATIONS  (PRN):  acetaminophen   Tablet .. 650 milliGRAM(s) Oral every 6 hours PRN Temp greater or equal to 38C (100.4F), Mild Pain (1 - 3)  dextrose 40% Gel 15 Gram(s) Oral once PRN Blood Glucose LESS THAN 70 milliGRAM(s)/deciliter  glucagon  Injectable 1 milliGRAM(s) IntraMuscular once PRN Glucose LESS THAN 70 milligrams/deciliter    Allergies  No Known Allergies    Vital Signs Last 24 Hrs  T(C): 37 (06 Nov 2020 08:54), Max: 38.8 (05 Nov 2020 20:02)  T(F): 98.6 (06 Nov 2020 08:54), Max: 101.8 (05 Nov 2020 20:02)  HR: 71 (06 Nov 2020 08:54) (71 - 88)  BP: 159/71 (06 Nov 2020 08:54) (129/73 - 162/74)  BP(mean): --  RR: 17 (06 Nov 2020 08:54) (16 - 18)  SpO2: 94% (06 Nov 2020 08:54) (93% - 97%)  I&O's Summary    05 Nov 2020 07:01  -  06 Nov 2020 07:00  --------------------------------------------------------  IN: 0 mL / OUT: 800 mL / NET: -800 mL    TELE:  60-70s   PHYSICAL EXAM:  Appearance: NAD, no distress, alert, Well developed   HEENT: Moist Mucous Membranes, Anicteric  Cardiovascular: Regular rate and rhythm, Normal S1 S2, No JVD, + murmurs, No rubs, gallops or clicks  Respiratory: Non-labored, Clear to auscultation, No rales, No rhonchi, No wheezing.   Gastrointestinal:  Soft, Non-tender, + BS  Neurologic: Non-focal  Skin: Warm and dry, No visible rashes or ulcers, No ecchymosis, No cyanosis  Musculoskeletal: No clubbing, No cyanosis, No joint swelling/tenderness  Psychiatry: Mood & affect appropriate  Lymph: + LLE edema, warm and redness.     LABS: All Labs Reviewed:                        10.6   10.68 )-----------( 188      ( 06 Nov 2020 06:37 )             30.0                         11.4   14.51 )-----------( 202      ( 05 Nov 2020 06:51 )             32.3                         10.6   19.39 )-----------( 187      ( 04 Nov 2020 08:25 )             30.5     06 Nov 2020 06:37    142    |  109    |  24     ----------------------------<  132    3.7     |  26     |  1.60   05 Nov 2020 06:51    139    |  106    |  26     ----------------------------<  178    4.0     |  26     |  1.80   04 Nov 2020 08:25    140    |  110    |  29     ----------------------------<  320    4.4     |  23     |  1.90     Ca    8.3        06 Nov 2020 06:37  Ca    8.7        05 Nov 2020 06:51  Ca    7.9        04 Nov 2020 08:25  Phos  2.7       05 Nov 2020 06:51  Mg     2.1       06 Nov 2020 06:37  Mg     1.8       05 Nov 2020 06:51  Mg     1.8       04 Nov 2020 08:25    TPro  6.4    /  Alb  2.7    /  TBili  0.6    /  DBili  .20    /  AST  28     /  ALT  28     /  AlkPhos  73     06 Nov 2020 06:37  TPro  5.9    /  Alb  2.9    /  TBili  0.4    /  DBili  .10    /  AST  19     /  ALT  27     /  AlkPhos  73     04 Nov 2020 08:25  TPro  7.4    /  Alb  4.1    /  TBili  0.5    /  DBili  x      /  AST  21     /  ALT  36     /  AlkPhos  94     03 Nov 2020 23:37    Creatine Kinase, Serum: 138 U/L (11-06-20 @ 06:37)  Troponin I, Serum: 1.780 ng/mL (11-06-20 @ 06:37)  Creatine Kinase, Serum: 202 U/L (11-05-20 @ 14:05)  Cholesterol, Serum: 96 mg/dL (11-04-20 @ 10:43)  HDL Cholesterol, Serum: 32 mg/dL (11-04-20 @ 10:43)  Triglycerides, Serum: 45 mg/dL (11-04-20 @ 10:43)  Lactate, Blood: 2.1 mmol/L (11-04-20 @ 08:25)  Lactate, Blood: 3.6 mmol/L (11-04-20 @ 01:54)  Lactate, Blood: 3.7 mmol/L (11-03-20 @ 23:37)    12 Lead ECG:   Ventricular Rate 77 BPM  Atrial Rate 77 BPM  P-R Interval 154 ms  QRS Duration 88 ms  Q-T Interval 374 ms  QTC Calculation(Bazett) 423 ms  P Axis 9 degrees  R Axis 3 degrees  T Axis 107 degrees  Diagnosis Line Normal sinus rhythm  Minimal voltage criteria for LVH, may be normal variant  Inferior infarct , age undetermined  Abnormal ECG  When compared with ECG of 10-JUL-2010 13:13,  No significant change was found  Confirmed by Roe Ramos MD ([id number]) on 11/5/2020 12:45:13 PM (11-04-20 @ 13:00)    < from: TTE Echo Complete w/o Contrast w/ Doppler (11.04.20 @ 11:23) >  Dimensions:  LA 3.7  Normal Values: 2.0 - 4.0 cm  Ao 3.6        Normal Values: 2.0 - 3.8 cm  SEPTUM 1.3       Normal Values: 0.6 - 1.2 cm  PWT 1.0       Normal Values: 0.6 - 1.1 cm  LVIDd 4.9         Normal Values: 3.0 - 5.6 cm  LVIDs 3.3         Normal Values: 1.8 - 4.0 cm    OBSERVATIONS:  Actually difficult and limited study, unable to obtain subcostal views  Mitral Valve: Thickened leaflets, mild MR. Mitral annular calcification  Aortic Valve/Aorta: Calcified trileaflet aortic valve with decreased opening. Peak transaortic valve gradient is 69 mmHg with a mean transaortic valve gradient of 49 mmHg. The aortic valve area is calculated to be 0.7 sq cm by continuity equation. This is consistent with severe aortic stenosis  Tricuspid Valve: normal with trace TR.  Pulmonic Valve: Not well-visualized  Left Atrium: normal  Right Atrium: Not well-visualized  Left Ventricle: normal LV size and systolic function, estimated LVEF of 55-60%.  Right Ventricle: Grossly normal size and systolic function.  Pericardium/Pleura: normal, no significant pericardial effusion.    Conclusion:  Technically difficult and limited study  Normal left ventricular internal dimensions and systolic function, estimated LVEF of 55-60%.  Grossly normal RV size and systolic function.  Calcified trileaflet aortic valve with severe aortic stenosis  Mild MR  Trace TR.  No significant pericardial effusion.    < end of copied text >    < from: CT Chest No Cont (11.04.20 @ 01:33) >  IMPRESSION: Moderate pulmonary edema. Please note that superimposed infection cannot be excluded.  < end of copied text >
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
## 2020-11-06 NOTE — PROGRESS NOTE ADULT - SUBJECTIVE AND OBJECTIVE BOX
COVERING FOR DR. KILGORE  [facility], Division of Infectious Diseases  EAMON Gonzalez Y. Patel, S. Shah  [phone]    Name: SUSIE NUNES  Age: 74y  Gender: Male  MRN: [id number]  Note Date: 11-06-20    Interval History:  Patient seen and examined at bedside.  Febrile yesterday PM to 101.8F  Eating lunch, feeling good  Notes reviewed    Antibiotics:  cefTRIAXone   IVPB 2000 milliGRAM(s) IV Intermittent every 24 hours      Medications:  acetaminophen   Tablet .. 650 milliGRAM(s) Oral every 6 hours PRN  aspirin enteric coated 81 milliGRAM(s) Oral daily  atorvastatin 40 milliGRAM(s) Oral at bedtime  cefTRIAXone   IVPB 2000 milliGRAM(s) IV Intermittent every 24 hours  clopidogrel Tablet 75 milliGRAM(s) Oral daily  dextrose 40% Gel 15 Gram(s) Oral once PRN  dextrose 5%. 1000 milliLiter(s) IV Continuous <Continuous>  dextrose 50% Injectable 12.5 Gram(s) IV Push once  dextrose 50% Injectable 25 Gram(s) IV Push once  dextrose 50% Injectable 25 Gram(s) IV Push once  enoxaparin Injectable 40 milliGRAM(s) SubCutaneous daily  glucagon  Injectable 1 milliGRAM(s) IntraMuscular once PRN  influenza   Vaccine 0.5 milliLiter(s) IntraMuscular once  insulin glargine Injectable (LANTUS) 10 Unit(s) SubCutaneous at bedtime  insulin lispro (ADMELOG) corrective regimen sliding scale   SubCutaneous three times a day before meals  insulin lispro (ADMELOG) corrective regimen sliding scale   SubCutaneous at bedtime  metoprolol tartrate 25 milliGRAM(s) Oral every 12 hours      Review of Systems:  A 10-point review of systems was obtained.     Pertinent positives and negatives--  Constitutional: No fevers. No Chills. No Rigors.   Cardiovascular: No chest pain. No palpitations.  Respiratory: No shortness of breath. No cough.  Gastrointestinal: No nausea or vomiting. No diarrhea or constipation.   Psychiatric: Pleasant. Appropriate affect.    Review of systems otherwise negative except as previously noted.    Allergies: No Known Allergies    For details regarding the patient's past medical history, social history, family history, and other miscellaneous elements, please refer the initial infectious diseases consultation and/or the admitting history and physical examination for this admission.    Objective:  Vitals:   T(C): 37 (11-06-20 @ 12:48), Max: 38.8 (11-05-20 @ 20:02)  HR: 70 (11-06-20 @ 12:48) (70 - 88)  BP: 151/78 (11-06-20 @ 12:48) (129/73 - 162/74)  RR: 18 (11-06-20 @ 12:48) (16 - 18)  SpO2: 93% (11-06-20 @ 12:48) (93% - 97%)    Physical Examination:  General: no acute distress  HEENT: NC/AT, EOMI, anicteric, no oral lesions  Neck: supple, no palpable LAD  Cardio: S1, S2 heard, RRR, no murmurs  Resp: breath sounds heard bilaterally, no rales, wheezes or rhonchi  Abd: soft, NT, ND, + bowel sounds  Neuro: AAOx3, no obvious focal deficits  Ext: no edema or cyanosis. LLE w/ erythema extending just below the shin, per pt it has been improving.  Skin: warm, dry, no visible rash    Laboratory Studies:  CBC:                       10.6   10.68 )-----------( 188      ( 06 Nov 2020 06:37 )             30.0     CMP: 11-06    142  |  109<H>  |  24<H>  ----------------------------<  132<H>  3.7   |  26  |  1.60<H>    Ca    8.3<L>      06 Nov 2020 06:37  Phos  2.7     11-05  Mg     2.1     11-06    TPro  6.4  /  Alb  2.7<L>  /  TBili  0.6  /  DBili  .20  /  AST  28  /  ALT  28  /  AlkPhos  73  11-06    LIVER FUNCTIONS - ( 06 Nov 2020 06:37 )  Alb: 2.7 g/dL / Pro: 6.4 g/dL / ALK PHOS: 73 U/L / ALT: 28 U/L / AST: 28 U/L / GGT: x             Microbiology:    Culture - Blood (collected 11-05-20 @ 09:26)  Source: .Blood Blood-Venous  Preliminary Report (11-06-20 @ 10:00):    No growth to date.    Culture - Blood (collected 11-05-20 @ 09:26)  Source: .Blood Blood  Preliminary Report (11-06-20 @ 10:00):    No growth to date.    Culture - Urine (collected 11-04-20 @ 14:18)  Source: .Urine Clean Catch (Midstream)  Final Report (11-05-20 @ 09:53):    No growth    Culture - Blood (collected 11-04-20 @ 14:18)  Source: .Blood Blood-Peripheral  Gram Stain (11-04-20 @ 22:13):    Growth in aerobic bottle: Gram Positive Cocci in Pairs and Chains    Growth in anaerobic bottle: Gram Positive Cocci in Pairs and Chains  Preliminary Report (11-05-20 @ 17:52):    Growth in aerobic and anaerobic bottles: Streptococcus dysgalactiae    (Group C/G)    "Due to technical problems, Proteus sp. will Not be reported as part of    the BCID panel until further notice"    ***Blood Panel PCR results on this specimen are available    approximately 3 hours after the Gram stain result.***    Gram stain, PCR, and/or culture results may not always    correspond due to difference in methodologies.    ************************************************************    This PCR assay was performed using [employer].    The following targets are tested for: Enterococcus,    vancomycin resistant enterococci, Listeria monocytogenes,    coagulase negative staphylococci, S. aureus,    methicillin resistant S. aureus, Streptococcus agalactiae    (Group B), S. pneumoniae, S. pyogenes (Group A),    Acinetobacter baumannii, Enterobacter cloacae, E. coli,    Klebsiella oxytoca, K. pneumoniae, Proteus sp.,    Serratia marcescens, Haemophilus influenzae,    Neisseria meningitidis, Pseudomonas aeruginosa, Candida    albicans, C. glabrata, C krusei, C parapsilosis,    C. tropicalis and the KPC resistance gene.  Organism: Blood Culture PCR (11-04-20 @ 21:52)  Organism: Blood Culture PCR (11-04-20 @ 21:52)      -  Streptococcus sp. (Not Grp A, B or S pneumoniae): Detec      Method Type: PCR    Culture - Blood (collected 11-04-20 @ 14:18)  Source: .Blood Blood-Peripheral  Gram Stain (11-05-20 @ 11:04):    Growth in aerobic bottle: Gram Positive Cocci in Pairs and Chains    Growth in anaerobic bottle: Gram Positive Cocci in Pairs and Chains  Preliminary Report (11-06-20 @ 12:36):    Growth in aerobic and anaerobic bottles: Streptococcus dysgalactiae    (Group C/G)    See previous culture [id number]      Radiology:  reviewed

## 2020-11-06 NOTE — PROGRESS NOTE ADULT - ASSESSMENT
Pt is a 74M w/ PMHx of CAD, HTN, HLD, DM p/w AMS, rigors and chills, found to have fevers 103F and leukocytosis to 19K in the setting of LLE cellulitis    Sepsis 2/2 LLE cellulitis  Streptococcal bacteremia  -11/4 BCx reviewed  -repeat 11/5 BCx pending  -LLE cellulitis exam improving  -c/w ceftriaxone 2gm IV q24h

## 2020-11-06 NOTE — PROGRESS NOTE ADULT - SUBJECTIVE AND OBJECTIVE BOX
Patient is a 74y old  Male who presents with a chief complaint of change in mental status (06 Nov 2020 14:17)      INTERVAL HPI/OVERNIGHT EVENTS: Patient seen and examined. NAD. No complaints.    Vital Signs Last 24 Hrs  T(C): 37 (06 Nov 2020 12:48), Max: 38.8 (05 Nov 2020 20:02)  T(F): 98.6 (06 Nov 2020 12:48), Max: 101.8 (05 Nov 2020 20:02)  HR: 70 (06 Nov 2020 12:48) (70 - 88)  BP: 151/78 (06 Nov 2020 12:48) (129/73 - 162/74)  BP(mean): --  RR: 18 (06 Nov 2020 12:48) (16 - 18)  SpO2: 93% (06 Nov 2020 12:48) (93% - 97%)    11-06    142  |  109<H>  |  24<H>  ----------------------------<  132<H>  3.7   |  26  |  1.60<H>    Ca    8.3<L>      06 Nov 2020 06:37  Phos  2.7     11-05  Mg     2.1     11-06    TPro  6.4  /  Alb  2.7<L>  /  TBili  0.6  /  DBili  .20  /  AST  28  /  ALT  28  /  AlkPhos  73  11-06                          10.6   10.68 )-----------( 188      ( 06 Nov 2020 06:37 )             30.0       CAPILLARY BLOOD GLUCOSE      POCT Blood Glucose.: 234 mg/dL (06 Nov 2020 12:09)  POCT Blood Glucose.: 149 mg/dL (06 Nov 2020 08:46)  POCT Blood Glucose.: 163 mg/dL (05 Nov 2020 21:11)  POCT Blood Glucose.: 170 mg/dL (05 Nov 2020 16:54)              acetaminophen   Tablet .. 650 milliGRAM(s) Oral every 6 hours PRN  aspirin enteric coated 81 milliGRAM(s) Oral daily  atorvastatin 40 milliGRAM(s) Oral at bedtime  cefTRIAXone   IVPB 2000 milliGRAM(s) IV Intermittent every 24 hours  clopidogrel Tablet 75 milliGRAM(s) Oral daily  dextrose 40% Gel 15 Gram(s) Oral once PRN  dextrose 5%. 1000 milliLiter(s) IV Continuous <Continuous>  dextrose 50% Injectable 12.5 Gram(s) IV Push once  dextrose 50% Injectable 25 Gram(s) IV Push once  dextrose 50% Injectable 25 Gram(s) IV Push once  glucagon  Injectable 1 milliGRAM(s) IntraMuscular once PRN  influenza   Vaccine 0.5 milliLiter(s) IntraMuscular once  insulin glargine Injectable (LANTUS) 10 Unit(s) SubCutaneous at bedtime  insulin lispro (ADMELOG) corrective regimen sliding scale   SubCutaneous three times a day before meals  insulin lispro (ADMELOG) corrective regimen sliding scale   SubCutaneous at bedtime  metoprolol tartrate 25 milliGRAM(s) Oral every 12 hours              REVIEW OF SYSTEMS:  CONSTITUTIONAL: No fever, no weight loss, or no fatigue  NECK: No pain, no stiffness  RESPIRATORY: No cough, no wheezing, no chills, no hemoptysis, No shortness of breath  CARDIOVASCULAR: No chest pain, no palpitations, no dizziness, no leg swelling  GASTROINTESTINAL: No abdominal pain. No nausea, no vomiting, no hematemesis; No diarrhea, no constipation. No melena, no hematochezia.  GENITOURINARY: No dysuria, no frequency, no hematuria, no incontinence  NEUROLOGICAL: No headaches, no loss of strength, no numbness, no tremors  SKIN: No itching, no burning  MUSCULOSKELETAL: No joint pain, no swelling; No muscle, no back, no extremity pain  PSYCHIATRIC: No depression, no mood swings,   HEME/LYMPH: No easy bruising, no bleeding gums  ALLERY AND IMMUNOLOGIC: No hives       Consultant(s) Notes Reviewed:  [X] YES  [ ] NO    PHYSICAL EXAM:  GENERAL: NAD  HEAD:  Atraumatic, Normocephalic  EYES: EOMI, PERRLA, conjunctiva and sclera clear  ENMT: No tonsillar erythema, exudates, or enlargement; Moist mucous membranes  NECK: Supple, No JVD  NERVOUS SYSTEM:  Awake & alert  CHEST/LUNG: Clear to auscultation bilaterally; No rales, rhonchi, wheezing,  HEART: Regular rate and rhythm  ABDOMEN: Soft, Nontender, Nondistended; Bowel sounds present  EXTREMITIES:  No clubbing, cyanosis, or edema; healed wound L foot between 1st and 2nd toes with ecchymosis  LYMPH: No lymphadenopathy noted  SKIN: No rashes      Advanced care planning discussed with patient/family [X] YES   [ ] NO    Advanced care planning discussed with patient/family. Patient's health status was discussed. All appropriate changes have been made regarding patient's end-of-life care. Advanced care planning forms reviewed/discussed/completed.  20 minutes spent.

## 2020-11-06 NOTE — PROGRESS NOTE ADULT - ASSESSMENT
74 year old male with PMH HTN, DM, HLD transferred from [place] for admission for AMS due to underlying sepsis due to possible PNA,     ACS  - Patient found to have elevated Troponin.   - Troponin I trend:  <--3.200<--5.080<--3.200<--.804<--.013. Now down trending. No need to trend further  - Technically difficult ECHO showed normal LV & RV size and function EF 55-60%, severe AS  - Severe AS with evolving troponin in the setting of infection with bacteremia  - EKG with ST depressions in leads I, avL, and V5/V6, consistent with possible lateral ischemia.   - Would treat medically for now, will need to be determined whether in pt or out pt assessment of cad/AS will be appropriate, depending upon his clinical course  - Continue full dose Lovenox for 48 hours, D2   - Continue ASA 81 mg daily, Plavix 75 mg daily  - Continue atorvastatin 40 mg daily   - Continue BB    Severe AS  - Recent ECHO with severe AS  - CT chest notable pulmonary edema on CT chest. s/p 3 L NS, this is likely due to volume overload.   - Patient would need outpatient evaluation for severe AS  - Caution with IVF  - Continue BB     Hypertension  - BP: 159/71 (11-06-20 @ 08:54) (129/73 - 162/74)  - Continue BB    Hyperlipidemia  - Continue Lipitor 40 mg HS   - Low fat diet    Acute Kidney injury  - Creatinine trend:  <--1.60,  <--1.80,  <--1.90,  <--2.14  - Continue to monitor renal indices  - Avoid nephrotoxins   - Holding losartan and HCTZ, resume when MARINA resolves     - Monitor and replete lytes, keep K>4, Mg>2.  - All other medical needs as per primary team.  - Other cardiovascular workup will depend on clinical course.  - Will continue to follow.    Margie Steele, MS FNP, [facility]  Nurse Practitioner- Cardiology   Spectra #[phone]

## 2020-11-06 NOTE — PROGRESS NOTE ADULT - PROBLEM SELECTOR PLAN 1
cont lantus 10 units qhs  cont humalog scale coverage  cont cons cho diet  goal bg 100-180 in hosp setting

## 2020-11-06 NOTE — PROGRESS NOTE ADULT - SUBJECTIVE AND OBJECTIVE BOX
Date/Time Patient Seen:  		  Referring MD:   Data Reviewed	       Patient is a 74y old  Male who presents with a chief complaint of change in mental status (05 Nov 2020 17:21)      Subjective/HPI     PAST MEDICAL & SURGICAL HISTORY:  Diabetes mellitus    Hypertension          Medication list         MEDICATIONS  (STANDING):  aspirin enteric coated 81 milliGRAM(s) Oral daily  atorvastatin 40 milliGRAM(s) Oral at bedtime  cefTRIAXone   IVPB 2000 milliGRAM(s) IV Intermittent every 24 hours  clopidogrel Tablet 75 milliGRAM(s) Oral daily  dextrose 5%. 1000 milliLiter(s) (50 mL/Hr) IV Continuous <Continuous>  dextrose 50% Injectable 12.5 Gram(s) IV Push once  dextrose 50% Injectable 25 Gram(s) IV Push once  dextrose 50% Injectable 25 Gram(s) IV Push once  enoxaparin Injectable 73 milliGRAM(s) SubCutaneous daily  influenza   Vaccine 0.5 milliLiter(s) IntraMuscular once  insulin glargine Injectable (LANTUS) 10 Unit(s) SubCutaneous at bedtime  insulin lispro (ADMELOG) corrective regimen sliding scale   SubCutaneous three times a day before meals  insulin lispro (ADMELOG) corrective regimen sliding scale   SubCutaneous at bedtime  metoprolol tartrate 25 milliGRAM(s) Oral every 12 hours    MEDICATIONS  (PRN):  acetaminophen   Tablet .. 650 milliGRAM(s) Oral every 6 hours PRN Temp greater or equal to 38C (100.4F), Mild Pain (1 - 3)  dextrose 40% Gel 15 Gram(s) Oral once PRN Blood Glucose LESS THAN 70 milliGRAM(s)/deciliter  glucagon  Injectable 1 milliGRAM(s) IntraMuscular once PRN Glucose LESS THAN 70 milligrams/deciliter         Vitals log        ICU Vital Signs Last 24 Hrs  T(C): 36.9 (06 Nov 2020 04:52), Max: 38.8 (05 Nov 2020 20:02)  T(F): 98.4 (06 Nov 2020 04:52), Max: 101.8 (05 Nov 2020 20:02)  HR: 73 (06 Nov 2020 04:52) (71 - 82)  BP: 162/74 (06 Nov 2020 04:52) (129/73 - 162/74)  BP(mean): --  ABP: --  ABP(mean): --  RR: 18 (06 Nov 2020 04:52) (16 - 18)  SpO2: 96% (06 Nov 2020 04:52) (91% - 97%)           Input and Output:  I&O's Detail    05 Nov 2020 07:01  -  06 Nov 2020 07:00  --------------------------------------------------------  IN:  Total IN: 0 mL    OUT:    Voided (mL): 800 mL  Total OUT: 800 mL    Total NET: -800 mL          Lab Data                        10.6   10.68 )-----------( 188      ( 06 Nov 2020 06:37 )             30.0     11-06    x   |  x   |  24<H>  ----------------------------<  132<H>  x    |  26  |  1.60<H>    Ca    8.3<L>      06 Nov 2020 06:37  Phos  2.7     11-05  Mg     2.1     11-06    TPro  5.9<L>  /  Alb  2.9<L>  /  TBili  0.4  /  DBili  .10  /  AST  19  /  ALT  27  /  AlkPhos  73  11-04      CARDIAC MARKERS ( 06 Nov 2020 06:37 )  x     / x     / 138 U/L / x     / x      CARDIAC MARKERS ( 05 Nov 2020 14:05 )  2.610 ng/mL / x     / 202 U/L / x     / x      CARDIAC MARKERS ( 05 Nov 2020 06:51 )  3.200 ng/mL / x     / 286 U/L / x     / 4.1 ng/mL  CARDIAC MARKERS ( 04 Nov 2020 21:12 )  5.080 ng/mL / x     / 305 U/L / x     / 10.6 ng/mL  CARDIAC MARKERS ( 04 Nov 2020 13:38 )  3.200 ng/mL / x     / 251 U/L / x     / 14.4 ng/mL  CARDIAC MARKERS ( 04 Nov 2020 08:25 )  .804 ng/mL / x     / 159 U/L / x     / 8.0 ng/mL        Review of Systems	      Objective     Physical Examination    heart s1s2  lung dc BS  abd soft      Pertinent Lab findings & Imaging      Diane:  NO   Adequate UO     I&O's Detail    05 Nov 2020 07:01  -  06 Nov 2020 07:00  --------------------------------------------------------  IN:  Total IN: 0 mL    OUT:    Voided (mL): 800 mL  Total OUT: 800 mL    Total NET: -800 mL               Discussed with:     Cultures:	        Radiology

## 2020-11-06 NOTE — PROGRESS NOTE ADULT - SUBJECTIVE AND OBJECTIVE BOX
Patient is a 74y old  Male who presents with a chief complaint of change in mental status (05 Nov 2020 14:15)  Patient seen in follow up for MARINA.     Improving renal indices.        PAST MEDICAL HISTORY:  Diabetes mellitus  Hypertension      MEDICATIONS  (STANDING):  aspirin enteric coated 81 milliGRAM(s) Oral daily  atorvastatin 40 milliGRAM(s) Oral at bedtime  cefTRIAXone   IVPB 2000 milliGRAM(s) IV Intermittent every 24 hours  clopidogrel Tablet 75 milliGRAM(s) Oral daily  dextrose 5%. 1000 milliLiter(s) (50 mL/Hr) IV Continuous <Continuous>  dextrose 50% Injectable 12.5 Gram(s) IV Push once  dextrose 50% Injectable 25 Gram(s) IV Push once  dextrose 50% Injectable 25 Gram(s) IV Push once  enoxaparin Injectable 40 milliGRAM(s) SubCutaneous daily  influenza   Vaccine 0.5 milliLiter(s) IntraMuscular once  insulin glargine Injectable (LANTUS) 10 Unit(s) SubCutaneous at bedtime  insulin lispro (ADMELOG) corrective regimen sliding scale   SubCutaneous three times a day before meals  insulin lispro (ADMELOG) corrective regimen sliding scale   SubCutaneous at bedtime  metoprolol tartrate 25 milliGRAM(s) Oral every 12 hours    MEDICATIONS  (PRN):  acetaminophen   Tablet .. 650 milliGRAM(s) Oral every 6 hours PRN Temp greater or equal to 38C (100.4F), Mild Pain (1 - 3)  dextrose 40% Gel 15 Gram(s) Oral once PRN Blood Glucose LESS THAN 70 milliGRAM(s)/deciliter  glucagon  Injectable 1 milliGRAM(s) IntraMuscular once PRN Glucose LESS THAN 70 milligrams/deciliter    T(C): 37 (11-06-20 @ 12:48), Max: 38.8 (11-05-20 @ 20:02)  HR: 70 (11-06-20 @ 12:48) (66 - 89)  BP: 151/78 (11-06-20 @ 12:48) (111/70 - 162/74)  RR: 18 (11-06-20 @ 12:48)  SpO2: 93% (11-06-20 @ 12:48)  Wt(kg): --  I&O's Detail    05 Nov 2020 07:01  -  06 Nov 2020 07:00  --------------------------------------------------------  IN:  Total IN: 0 mL    OUT:    Voided (mL): 800 mL  Total OUT: 800 mL    Total NET: -800 mL          PHYSICAL EXAM:  General: No distress  Respiratory: b/l air entry  Cardiovascular: S1 S2  Gastrointestinal: soft  Extremities:  edema                             LABORATORY:                        10.6   10.68 )-----------( 188      ( 06 Nov 2020 06:37 )             30.0     11-06    142  |  109<H>  |  24<H>  ----------------------------<  132<H>  3.7   |  26  |  1.60<H>    Ca    8.3<L>      06 Nov 2020 06:37  Phos  2.7     11-05  Mg     2.1     11-06    TPro  6.4  /  Alb  2.7<L>  /  TBili  0.6  /  DBili  .20  /  AST  28  /  ALT  28  /  AlkPhos  73  11-06    Sodium, Serum: 142 mmol/L (11-06 @ 06:37)  Sodium, Serum: 139 mmol/L (11-05 @ 06:51)    Potassium, Serum: 3.7 mmol/L (11-06 @ 06:37)  Potassium, Serum: 4.0 mmol/L (11-05 @ 06:51)    Hemoglobin: 10.6 g/dL (11-06 @ 06:37)  Hemoglobin: 11.4 g/dL (11-05 @ 06:51)  Hemoglobin: 10.6 g/dL (11-04 @ 08:25)  Hemoglobin: 12.6 g/dL (11-03 @ 23:37)    Creatinine, Serum 1.60 (11-06 @ 06:37)  Creatinine, Serum 1.80 (11-05 @ 06:51)  Creatinine, Serum 1.90 (11-04 @ 08:25)  Creatinine, Serum 2.14 (11-03 @ 23:37)    CARDIAC MARKERS ( 06 Nov 2020 06:37 )  1.780 ng/mL / x     / 138 U/L / x     / x      CARDIAC MARKERS ( 05 Nov 2020 14:05 )  2.610 ng/mL / x     / 202 U/L / x     / x      CARDIAC MARKERS ( 05 Nov 2020 06:51 )  3.200 ng/mL / x     / 286 U/L / x     / 4.1 ng/mL  CARDIAC MARKERS ( 04 Nov 2020 21:12 )  5.080 ng/mL / x     / 305 U/L / x     / 10.6 ng/mL      LIVER FUNCTIONS - ( 06 Nov 2020 06:37 )  Alb: 2.7 g/dL / Pro: 6.4 g/dL / ALK PHOS: 73 U/L / ALT: 28 U/L / AST: 28 U/L / GGT: x

## 2020-11-06 NOTE — PROGRESS NOTE ADULT - PROBLEM SELECTOR PLAN 1
Strep bacteremia  Possibly from LLE cellulitis/wound -- patient had wound last week, now healed but   Podiatry consult  Continue iv abx  F/U culture data -- repeat cultures negative  ID f/u  Trend CBC

## 2020-11-06 NOTE — PROGRESS NOTE ADULT - PROBLEM SELECTOR PLAN 1
pt with sepsis - billy - ckd - dm - obesity - s/p AMS - NSTEMI  cardio follow up - on DAPT and AC - lovenox - TTE - severe AS - further w/u and tele monitor  ckd billy - I and O - monitor renal indices - serial labs - I and O  sepsis eval - cx noted - ID eval noted - on emp ABX - source poss LE wound - fever 101.8 on 11 5 2020 - repeat covid pcr neg  DM care - serial FS - dietary discretion  obesity - likely at risk for MARCUS  monitor VS and HD and Sat  out of bed as tolerated  on RA  labs and imaging reviewed  ct chest more consistent with CHF - doubt PNA.

## 2020-11-06 NOTE — PROGRESS NOTE ADULT - PROBLEM SELECTOR PLAN 2
NSTEMI  Continue ASA and BB  2DECHO noted -- normal EF, severe AS (needs outpatient f/u)  Continue ASA, plavix   Continue statin  Cardio consult f/u  Will need outpatient stress test  Further work-up/management pending clinical course.

## 2020-11-06 NOTE — PROGRESS NOTE ADULT - SUBJECTIVE AND OBJECTIVE BOX
Neurology Follow up note    SUSIE [id number]    HPI:  This is a 74 M with PMH of HTN DM HLD CAD who was BIBEMS to [facility] with complaints of change in mental status and rigors. The son found the patient confused and had urinated on himself. In the ER patient had a temp of 103. CT showed ground glass opacities. Patient was transferred to [place] for admission. In [place], patient is more lucid. He reports feeling weak for about a week. He status he was eating junk food and sweets. He denies fevers, chills, n/v/d/cough/CP/SOB/dysuria. He has been helping his brother move into an assisted living over the past 2 weeks.  (04 Nov 2020 10:38)      Interval History -no new events    Patient is seen, chart was reviewed and case was discussed with the treatment team.  Pt is not in any distress.   Lying on bed comfortably.       Vital Signs Last 24 Hrs  T(C): 37 (06 Nov 2020 08:54), Max: 38.8 (05 Nov 2020 20:02)  T(F): 98.6 (06 Nov 2020 08:54), Max: 101.8 (05 Nov 2020 20:02)  HR: 71 (06 Nov 2020 08:54) (71 - 88)  BP: 159/71 (06 Nov 2020 08:54) (129/73 - 162/74)  BP(mean): --  RR: 17 (06 Nov 2020 08:54) (16 - 18)  SpO2: 94% (06 Nov 2020 08:54) (93% - 97%)        REVIEW OF SYSTEMS:    Eyes: No eye pain, visual disturbances, o  ENT:  No difficulty hearing, tinnitus, vertigo; No sinus or throat pain  Neck: No pain or stiffness  Respiratory: No  wheezing, chills or hemoptysis  Cardiovascular: No chest pain, palpitations, shortness of breath  Gastrointestinal: No abdominal or epigastric pain. No nausea, vomiting or hematemesis; No diarrhea or constipation  Genitourinary: No dysuria, , hematuria or incontinence  Neurological: No headaches,   Psychiatric: No anxiety, mood swings or difficulty sleeping  Musculoskeletal: No joint pain or swelling;   Skin: No itching, burning, rashes or lesions   Lymph Nodes: No enlarged glands  Endocrine: No heat or cold intolerance; No hair loss,   Allergy and Immunologic: No hives or eczema    On Neurological Examination:    Mental Status - Pt is alert, awake, oriented X3. . Follows commands well and able to answer questions appropriately  .Mood and affect  normal    Speech -  Normal.    Cranial Nerves - Pupils 3 mm equal and reactive to light, extraocular eye movements intact.   Pt has no visual field deficit.  Pt has no facial asymmetry. Facial sensation is intact.  Tongue - is in midline.    Muscle tone - is normal all over.    Motor Exam - 5/5 of UE AND RLE  LLE 4/5  No drift. No shaking or tremors.    Sensory Exam -. Pt withdraws all extremities equally on stimulation. No asymmetry seen.      coordination:    Finger to nose: normal  .    Deep tendon Reflexes - 2 plus all over.      Neck Supple -  Yes.     MEDICATIONS    acetaminophen   Tablet .. 650 milliGRAM(s) Oral every 6 hours PRN  aspirin enteric coated 81 milliGRAM(s) Oral daily  atorvastatin 40 milliGRAM(s) Oral at bedtime  cefTRIAXone   IVPB 2000 milliGRAM(s) IV Intermittent every 24 hours  clopidogrel Tablet 75 milliGRAM(s) Oral daily  dextrose 40% Gel 15 Gram(s) Oral once PRN  dextrose 5%. 1000 milliLiter(s) IV Continuous <Continuous>  dextrose 50% Injectable 12.5 Gram(s) IV Push once  dextrose 50% Injectable 25 Gram(s) IV Push once  dextrose 50% Injectable 25 Gram(s) IV Push once  enoxaparin Injectable 73 milliGRAM(s) SubCutaneous daily  glucagon  Injectable 1 milliGRAM(s) IntraMuscular once PRN  influenza   Vaccine 0.5 milliLiter(s) IntraMuscular once  insulin glargine Injectable (LANTUS) 10 Unit(s) SubCutaneous at bedtime  insulin lispro (ADMELOG) corrective regimen sliding scale   SubCutaneous three times a day before meals  insulin lispro (ADMELOG) corrective regimen sliding scale   SubCutaneous at bedtime  metoprolol tartrate 25 milliGRAM(s) Oral every 12 hours      Allergies    No Known Allergies    Intolerances        LABS:  CBC Full  -  ( 06 Nov 2020 06:37 )  WBC Count : 10.68 K/uL  RBC Count : 3.35 M/uL  Hemoglobin : 10.6 g/dL  Hematocrit : 30.0 %  Platelet Count - Automated : 188 K/uL  Mean Cell Volume : 89.6 fl  Mean Cell Hemoglobin : 31.6 pg  Mean Cell Hemoglobin Concentration : 35.3 gm/dL  Auto Neutrophil # : x  Auto Lymphocyte # : x        11-06    142  |  109<H>  |  24<H>  ----------------------------<  132<H>  3.7   |  26  |  1.60<H>    Ca    8.3<L>      06 Nov 2020 06:37  Phos  2.7     11-05  Mg     2.1     11-06    TPro  6.4  /  Alb  2.7<L>  /  TBili  0.6  /  DBili  .20  /  AST  28  /  ALT  28  /  AlkPhos  73  11-06    Hemoglobin A1C:     Vitamin B12     RADIOLOGY    ASSESSMENT AND PLAN:      seen for ams; improved  consistent metabolic encephalopathy  streptococcal bacteremia     antibiotic as per ID  dw son on phone  Physical therapy evaluation.  OOB to chair/ambulation with assistance only.  Pain is accessed and addressed.  Would continue to follow.

## 2020-11-06 NOTE — PROGRESS NOTE ADULT - SUBJECTIVE AND OBJECTIVE BOX
CAPILLARY BLOOD GLUCOSE      POCT Blood Glucose.: 149 mg/dL (06 Nov 2020 08:46)  POCT Blood Glucose.: 163 mg/dL (05 Nov 2020 21:11)  POCT Blood Glucose.: 170 mg/dL (05 Nov 2020 16:54)  POCT Blood Glucose.: 197 mg/dL (05 Nov 2020 12:07)      Vital Signs Last 24 Hrs  T(C): 37 (06 Nov 2020 08:54), Max: 38.8 (05 Nov 2020 20:02)  T(F): 98.6 (06 Nov 2020 08:54), Max: 101.8 (05 Nov 2020 20:02)  HR: 71 (06 Nov 2020 08:54) (71 - 88)  BP: 159/71 (06 Nov 2020 08:54) (129/73 - 162/74)  BP(mean): --  RR: 17 (06 Nov 2020 08:54) (16 - 18)  SpO2: 94% (06 Nov 2020 08:54) (93% - 97%)      Respiratory: CTA B/L  CV: RRR, S1S2, no murmurs, rubs or gallops  Abdominal: Soft, NT, ND +BS, Last BM  Extremities: No edema, + peripheral pulses     11-06    142  |  109<H>  |  24<H>  ----------------------------<  132<H>  3.7   |  26  |  1.60<H>    Ca    8.3<L>      06 Nov 2020 06:37  Phos  2.7     11-05  Mg     2.1     11-06    TPro  6.4  /  Alb  2.7<L>  /  TBili  0.6  /  DBili  .20  /  AST  28  /  ALT  28  /  AlkPhos  73  11-06      atorvastatin 40 milliGRAM(s) Oral at bedtime  dextrose 40% Gel 15 Gram(s) Oral once PRN  dextrose 50% Injectable 12.5 Gram(s) IV Push once  dextrose 50% Injectable 25 Gram(s) IV Push once  dextrose 50% Injectable 25 Gram(s) IV Push once  glucagon  Injectable 1 milliGRAM(s) IntraMuscular once PRN  insulin glargine Injectable (LANTUS) 10 Unit(s) SubCutaneous at bedtime  insulin lispro (ADMELOG) corrective regimen sliding scale   SubCutaneous three times a day before meals  insulin lispro (ADMELOG) corrective regimen sliding scale   SubCutaneous at bedtime

## 2020-11-06 NOTE — CONSULT NOTE ADULT - ASSESSMENT
Rule out Possible Foreign body along the left 1st interspace and sub distal lateral 1st MTPJ secondary to patient history and story. Ordered Left forefoot MRI and Xrays. Will continue to follow up.

## 2020-11-07 LAB
ANION GAP SERPL CALC-SCNC: 5 MMOL/L — SIGNIFICANT CHANGE UP (ref 5–17)
BUN SERPL-MCNC: 25 MG/DL — HIGH (ref 7–23)
CALCIUM SERPL-MCNC: 8.7 MG/DL — SIGNIFICANT CHANGE UP (ref 8.5–10.1)
CHLORIDE SERPL-SCNC: 108 MMOL/L — SIGNIFICANT CHANGE UP (ref 96–108)
CO2 SERPL-SCNC: 29 MMOL/L — SIGNIFICANT CHANGE UP (ref 22–31)
CREAT SERPL-MCNC: 1.7 MG/DL — HIGH (ref 0.5–1.3)
GLUCOSE SERPL-MCNC: 139 MG/DL — HIGH (ref 70–99)
HCT VFR BLD CALC: 27.2 % — LOW (ref 39–50)
HGB BLD-MCNC: 9.9 G/DL — LOW (ref 13–17)
MAGNESIUM SERPL-MCNC: 2.2 MG/DL — SIGNIFICANT CHANGE UP (ref 1.6–2.6)
MCHC RBC-ENTMCNC: 32.2 PG — SIGNIFICANT CHANGE UP (ref 27–34)
MCHC RBC-ENTMCNC: 36.4 GM/DL — HIGH (ref 32–36)
MCV RBC AUTO: 88.6 FL — SIGNIFICANT CHANGE UP (ref 80–100)
NRBC # BLD: 0 /100 WBCS — SIGNIFICANT CHANGE UP (ref 0–0)
PLATELET # BLD AUTO: 197 K/UL — SIGNIFICANT CHANGE UP (ref 150–400)
POTASSIUM SERPL-MCNC: 3.9 MMOL/L — SIGNIFICANT CHANGE UP (ref 3.5–5.3)
POTASSIUM SERPL-SCNC: 3.9 MMOL/L — SIGNIFICANT CHANGE UP (ref 3.5–5.3)
RBC # BLD: 3.07 M/UL — LOW (ref 4.2–5.8)
RBC # FLD: 12.4 % — SIGNIFICANT CHANGE UP (ref 10.3–14.5)
SODIUM SERPL-SCNC: 142 MMOL/L — SIGNIFICANT CHANGE UP (ref 135–145)
WBC # BLD: 6.87 K/UL — SIGNIFICANT CHANGE UP (ref 3.8–10.5)
WBC # FLD AUTO: 6.87 K/UL — SIGNIFICANT CHANGE UP (ref 3.8–10.5)

## 2020-11-07 PROCEDURE — 99232 SBSQ HOSP IP/OBS MODERATE 35: CPT

## 2020-11-07 PROCEDURE — 99233 SBSQ HOSP IP/OBS HIGH 50: CPT

## 2020-11-07 RX ADMIN — ENOXAPARIN SODIUM 40 MILLIGRAM(S): 100 INJECTION SUBCUTANEOUS at 12:07

## 2020-11-07 RX ADMIN — ATORVASTATIN CALCIUM 40 MILLIGRAM(S): 80 TABLET, FILM COATED ORAL at 21:33

## 2020-11-07 RX ADMIN — Medication 4: at 12:07

## 2020-11-07 RX ADMIN — Medication 25 MILLIGRAM(S): at 18:07

## 2020-11-07 RX ADMIN — Medication 2: at 08:33

## 2020-11-07 RX ADMIN — CEFTRIAXONE 100 MILLIGRAM(S): 500 INJECTION, POWDER, FOR SOLUTION INTRAMUSCULAR; INTRAVENOUS at 09:56

## 2020-11-07 RX ADMIN — Medication 81 MILLIGRAM(S): at 12:08

## 2020-11-07 RX ADMIN — CLOPIDOGREL BISULFATE 75 MILLIGRAM(S): 75 TABLET, FILM COATED ORAL at 12:08

## 2020-11-07 RX ADMIN — Medication 25 MILLIGRAM(S): at 05:09

## 2020-11-07 RX ADMIN — INSULIN GLARGINE 10 UNIT(S): 100 INJECTION, SOLUTION SUBCUTANEOUS at 21:33

## 2020-11-07 NOTE — PROGRESS NOTE ADULT - SUBJECTIVE AND OBJECTIVE BOX
Date/Time Patient Seen:  		  Referring MD:   Data Reviewed	       Patient is a 74y old  Male who presents with a chief complaint of change in mental status (06 Nov 2020 15:23)      Subjective/HPI     PAST MEDICAL & SURGICAL HISTORY:  Diabetes mellitus    Hypertension          Medication list         MEDICATIONS  (STANDING):  aspirin enteric coated 81 milliGRAM(s) Oral daily  atorvastatin 40 milliGRAM(s) Oral at bedtime  cefTRIAXone   IVPB 2000 milliGRAM(s) IV Intermittent every 24 hours  clopidogrel Tablet 75 milliGRAM(s) Oral daily  dextrose 5%. 1000 milliLiter(s) (50 mL/Hr) IV Continuous <Continuous>  dextrose 50% Injectable 12.5 Gram(s) IV Push once  dextrose 50% Injectable 25 Gram(s) IV Push once  dextrose 50% Injectable 25 Gram(s) IV Push once  enoxaparin Injectable 40 milliGRAM(s) SubCutaneous daily  influenza   Vaccine 0.5 milliLiter(s) IntraMuscular once  insulin glargine Injectable (LANTUS) 10 Unit(s) SubCutaneous at bedtime  insulin lispro (ADMELOG) corrective regimen sliding scale   SubCutaneous three times a day before meals  insulin lispro (ADMELOG) corrective regimen sliding scale   SubCutaneous at bedtime  metoprolol tartrate 25 milliGRAM(s) Oral every 12 hours    MEDICATIONS  (PRN):  acetaminophen   Tablet .. 650 milliGRAM(s) Oral every 6 hours PRN Temp greater or equal to 38C (100.4F), Mild Pain (1 - 3)  dextrose 40% Gel 15 Gram(s) Oral once PRN Blood Glucose LESS THAN 70 milliGRAM(s)/deciliter  glucagon  Injectable 1 milliGRAM(s) IntraMuscular once PRN Glucose LESS THAN 70 milligrams/deciliter         Vitals log        ICU Vital Signs Last 24 Hrs  T(C): 37.1 (07 Nov 2020 04:55), Max: 37.2 (06 Nov 2020 15:45)  T(F): 98.7 (07 Nov 2020 04:55), Max: 98.9 (06 Nov 2020 15:45)  HR: 64 (07 Nov 2020 04:55) (64 - 88)  BP: 132/67 (07 Nov 2020 04:55) (129/83 - 159/71)  BP(mean): --  ABP: --  ABP(mean): --  RR: 18 (07 Nov 2020 04:55) (16 - 18)  SpO2: 99% (07 Nov 2020 04:55) (93% - 99%)           Input and Output:  I&O's Detail      Lab Data                        9.9    6.87  )-----------( 197      ( 07 Nov 2020 06:22 )             27.2     11-07    142  |  108  |  25<H>  ----------------------------<  139<H>  3.9   |  29  |  1.70<H>    Ca    8.7      07 Nov 2020 06:22  Mg     2.2     11-07    TPro  6.4  /  Alb  2.7<L>  /  TBili  0.6  /  DBili  .20  /  AST  28  /  ALT  28  /  AlkPhos  73  11-06      CARDIAC MARKERS ( 06 Nov 2020 06:37 )  1.780 ng/mL / x     / 138 U/L / x     / x      CARDIAC MARKERS ( 05 Nov 2020 14:05 )  2.610 ng/mL / x     / 202 U/L / x     / x            Review of Systems	      Objective     Physical Examination    heart s1s2  lung dec BS  abd soft      Pertinent Lab findings & Imaging      Diane:  NO   Adequate UO     I&O's Detail           Discussed with:     Cultures:	        Radiology

## 2020-11-07 NOTE — PROGRESS NOTE ADULT - PROBLEM SELECTOR PLAN 1
pt with sepsis - billy - ckd - dm - obesity - s/p AMS - NSTEMI  cardio follow up - on DAPT and AC - lovenox - TTE - severe AS - further w/u and tele monitor  ckd billy - I and O - monitor renal indices - serial labs - I and O  Podiatry cx noted - LE wound eval  sepsis eval - cx noted - ID eval noted - on emp ABX - source poss LE wound - fever 101.8 on 11 5 2020 - repeat covid pcr neg  DM care - serial FS - dietary discretion  obesity - likely at risk for MARCUS  monitor VS and HD and Sat  out of bed as tolerated  on RA  labs and imaging reviewed  ct chest more consistent with CHF - doubt PNA.

## 2020-11-07 NOTE — PROGRESS NOTE ADULT - ASSESSMENT
No foreign body appreciated on the Xrays. Still need to follow up on the Left forefoot MRI to Rule out Possible Foreign body along the left 1st interspace and sub distal lateral 1st MTPJ secondary to patient history and story. F/u  Left forefoot MRI

## 2020-11-07 NOTE — PROGRESS NOTE ADULT - PROBLEM SELECTOR PLAN 1
Strep bacteremia  Possibly from LLE cellulitis/wound -- patient had wound last week, now healed but   Podiatry consult noted  For MRI to r/o deep infection  Continue iv abx  F/U culture data -- repeat cultures negative  ID f/u
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
## 2020-11-07 NOTE — PROGRESS NOTE ADULT - ASSESSMENT
Pt is a 74M w/ PMHx of CAD, HTN, HLD, DM p/w AMS, rigors and chills, found to have fevers 103F and leukocytosis to 19K in the setting of LLE cellulitis    Sepsis 2/2 LLE cellulitis  Streptococcal bacteremia  -11/4 strep dysgalactae-- sensitivities noted   -repeat 11/5 BCx neg to date  -LLE cellulitis exam improving  -c/w ceftriaxone 2gm IV q24h  - for lle mri to r/o deep infection

## 2020-11-07 NOTE — PROGRESS NOTE ADULT - SUBJECTIVE AND OBJECTIVE BOX
[facility] -- Jas Plaza, Jeff, Rachel, Tommy Love Savella  Office # [phone]      Follow Up:    ECG chanegs  Subjective/Observations:   No events overnight resting comfortably flat in bed.  No complaints of chest pain, dyspnea, or palpitations reported. No signs of orthopnea or PND.     REVIEW OF SYSTEMS: All other review of systems is negative unless indicated above    PAST MEDICAL & SURGICAL HISTORY:  Diabetes mellitus    Hypertension        MEDICATIONS  (STANDING):  aspirin enteric coated 81 milliGRAM(s) Oral daily  atorvastatin 40 milliGRAM(s) Oral at bedtime  cefTRIAXone   IVPB 2000 milliGRAM(s) IV Intermittent every 24 hours  clopidogrel Tablet 75 milliGRAM(s) Oral daily  dextrose 5%. 1000 milliLiter(s) (50 mL/Hr) IV Continuous <Continuous>  dextrose 50% Injectable 12.5 Gram(s) IV Push once  dextrose 50% Injectable 25 Gram(s) IV Push once  dextrose 50% Injectable 25 Gram(s) IV Push once  enoxaparin Injectable 40 milliGRAM(s) SubCutaneous daily  influenza   Vaccine 0.5 milliLiter(s) IntraMuscular once  insulin glargine Injectable (LANTUS) 10 Unit(s) SubCutaneous at bedtime  insulin lispro (ADMELOG) corrective regimen sliding scale   SubCutaneous three times a day before meals  insulin lispro (ADMELOG) corrective regimen sliding scale   SubCutaneous at bedtime  metoprolol tartrate 25 milliGRAM(s) Oral every 12 hours    MEDICATIONS  (PRN):  acetaminophen   Tablet .. 650 milliGRAM(s) Oral every 6 hours PRN Temp greater or equal to 38C (100.4F), Mild Pain (1 - 3)  dextrose 40% Gel 15 Gram(s) Oral once PRN Blood Glucose LESS THAN 70 milliGRAM(s)/deciliter  glucagon  Injectable 1 milliGRAM(s) IntraMuscular once PRN Glucose LESS THAN 70 milligrams/deciliter      Allergies    No Known Allergies    Intolerances        Vital Signs Last 24 Hrs  T(C): 36.8 (07 Nov 2020 08:44), Max: 37.2 (06 Nov 2020 15:45)  T(F): 98.2 (07 Nov 2020 08:44), Max: 98.9 (06 Nov 2020 15:45)  HR: 73 (07 Nov 2020 08:44) (64 - 75)  BP: 152/79 (07 Nov 2020 08:44) (132/67 - 156/76)  BP(mean): --  RR: 24 (07 Nov 2020 08:44) (18 - 24)  SpO2: 94% (07 Nov 2020 08:44) (93% - 99%)    I&O's Summary        PHYSICAL EXAM:  TELE: SR/SB  Constitutional: NAD, awake and alert, well-developed  HEENT: Moist Mucous Membranes, Anicteric  Pulmonary: Non-labored, breath sounds are clear bilaterally, No wheezing, crackles or rhonchi  Cardiovascular: Regular, S1 and S2 nl, + murmur No rubs, gallops or clicks   Gastrointestinal: Bowel Sounds present, soft, nontender.   Lymph: No lymphadenopathy. No peripheral edema.  Skin: No visible rashes or ulcers.  Psych:  Mood & affect appropriate    LABS: All Labs Reviewed:                        9.9    6.87  )-----------( 197      ( 07 Nov 2020 06:22 )             27.2                         10.6   10.68 )-----------( 188      ( 06 Nov 2020 06:37 )             30.0                         11.4   14.51 )-----------( 202      ( 05 Nov 2020 06:51 )             32.3     07 Nov 2020 06:22    142    |  108    |  25     ----------------------------<  139    3.9     |  29     |  1.70   06 Nov 2020 06:37    142    |  109    |  24     ----------------------------<  132    3.7     |  26     |  1.60   05 Nov 2020 06:51    139    |  106    |  26     ----------------------------<  178    4.0     |  26     |  1.80     Ca    8.7        07 Nov 2020 06:22  Ca    8.3        06 Nov 2020 06:37  Ca    8.7        05 Nov 2020 06:51  Phos  2.7       05 Nov 2020 06:51  Mg     2.2       07 Nov 2020 06:22  Mg     2.1       06 Nov 2020 06:37  Mg     1.8       05 Nov 2020 06:51    TPro  6.4    /  Alb  2.7    /  TBili  0.6    /  DBili  .20    /  AST  28     /  ALT  28     /  AlkPhos  73     06 Nov 2020 06:37      CARDIAC MARKERS ( 06 Nov 2020 06:37 )  1.780 ng/mL / x     / 138 U/L / x     / x      CARDIAC MARKERS ( 05 Nov 2020 14:05 )  2.610 ng/mL / x     / 202 U/L / x     / x           12 Lead ECG:   Ventricular Rate 77 BPM  Atrial Rate 77 BPM  P-R Interval 154 ms  QRS Duration 88 ms  Q-T Interval 374 ms  QTC Calculation(Bazett) 423 ms  P Axis 9 degrees  R Axis 3 degrees  T Axis 107 degrees  Diagnosis Line Normal sinus rhythm  Minimal voltage criteria for LVH, may be normal variant  Inferior infarct , age undetermined  Abnormal ECG  When compared with ECG of 10-JUL-2010 13:13,  No significant change was found  Confirmed by Roe Ramos MD ([id number]) on 11/5/2020 12:45:13 PM (11-04-20 @ 13:00)    < from: TTE Echo Complete w/o Contrast w/ Doppler (11.04.20 @ 11:23) >  Dimensions:  LA 3.7  Normal Values: 2.0 - 4.0 cm  Ao 3.6        Normal Values: 2.0 - 3.8 cm  SEPTUM 1.3       Normal Values: 0.6 - 1.2 cm  PWT 1.0       Normal Values: 0.6 - 1.1 cm  LVIDd 4.9         Normal Values: 3.0 - 5.6 cm  LVIDs 3.3         Normal Values: 1.8 - 4.0 cm    OBSERVATIONS:  Actually difficult and limited study, unable to obtain subcostal views  Mitral Valve: Thickened leaflets, mild MR. Mitral annular calcification  Aortic Valve/Aorta: Calcified trileaflet aortic valve with decreased opening. Peak transaortic valve gradient is 69 mmHg with a mean transaortic valve gradient of 49 mmHg. The aortic valve area is calculated to be 0.7 sq cm by continuity equation. This is consistent with severe aortic stenosis  Tricuspid Valve: normal with trace TR.  Pulmonic Valve: Not well-visualized  Left Atrium: normal  Right Atrium: Not well-visualized  Left Ventricle: normal LV size and systolic function, estimated LVEF of 55-60%.  Right Ventricle: Grossly normal size and systolic function.  Pericardium/Pleura: normal, no significant pericardial effusion.    Conclusion:  Technically difficult and limited study  Normal left ventricular internal dimensions and systolic function, estimated LVEF of 55-60%.  Grossly normal RV size and systolic function.  Calcified trileaflet aortic valve with severe aortic stenosis  Mild MR  Trace TR.  No significant pericardial effusion.    < end of copied text >    < from: CT Chest No Cont (11.04.20 @ 01:33) >  IMPRESSION: Moderate pulmonary edema. Please note that superimposed infection cannot be excluded.  < end of copied text >

## 2020-11-07 NOTE — PROGRESS NOTE ADULT - SUBJECTIVE AND OBJECTIVE BOX
Patient is a 74y old  Male who presents with a chief complaint of change in mental status (05 Nov 2020 14:15)  Patient seen in follow up for MARINA.     Improving renal indices.        PAST MEDICAL HISTORY:  Diabetes mellitus  Hypertension      MEDICATIONS  (STANDING):  aspirin enteric coated 81 milliGRAM(s) Oral daily  atorvastatin 40 milliGRAM(s) Oral at bedtime  cefTRIAXone   IVPB 2000 milliGRAM(s) IV Intermittent every 24 hours  clopidogrel Tablet 75 milliGRAM(s) Oral daily  dextrose 5%. 1000 milliLiter(s) (50 mL/Hr) IV Continuous <Continuous>  dextrose 50% Injectable 12.5 Gram(s) IV Push once  dextrose 50% Injectable 25 Gram(s) IV Push once  dextrose 50% Injectable 25 Gram(s) IV Push once  enoxaparin Injectable 40 milliGRAM(s) SubCutaneous daily  influenza   Vaccine 0.5 milliLiter(s) IntraMuscular once  insulin glargine Injectable (LANTUS) 10 Unit(s) SubCutaneous at bedtime  insulin lispro (ADMELOG) corrective regimen sliding scale   SubCutaneous three times a day before meals  insulin lispro (ADMELOG) corrective regimen sliding scale   SubCutaneous at bedtime  metoprolol tartrate 25 milliGRAM(s) Oral every 12 hours    MEDICATIONS  (PRN):  acetaminophen   Tablet .. 650 milliGRAM(s) Oral every 6 hours PRN Temp greater or equal to 38C (100.4F), Mild Pain (1 - 3)  dextrose 40% Gel 15 Gram(s) Oral once PRN Blood Glucose LESS THAN 70 milliGRAM(s)/deciliter  glucagon  Injectable 1 milliGRAM(s) IntraMuscular once PRN Glucose LESS THAN 70 milligrams/deciliter    T(C): 36.7 (11-07-20 @ 11:11), Max: 38.8 (11-05-20 @ 20:02)  HR: 72 (11-07-20 @ 11:11) (64 - 88)  BP: 145/80 (11-07-20 @ 11:11) (129/73 - 162/74)  RR: 20 (11-07-20 @ 11:11)  SpO2: 95% (11-07-20 @ 11:11)  Wt(kg): --  I&O's Detail          PHYSICAL EXAM:  General: No distress  Respiratory: b/l air entry  Cardiovascular: S1 S2  Gastrointestinal: soft  Extremities:  edema                               LABORATORY:                        9.9    6.87  )-----------( 197      ( 07 Nov 2020 06:22 )             27.2     11-07    142  |  108  |  25<H>  ----------------------------<  139<H>  3.9   |  29  |  1.70<H>    Ca    8.7      07 Nov 2020 06:22  Mg     2.2     11-07    TPro  6.4  /  Alb  2.7<L>  /  TBili  0.6  /  DBili  .20  /  AST  28  /  ALT  28  /  AlkPhos  73  11-06    Sodium, Serum: 142 mmol/L (11-07 @ 06:22)  Sodium, Serum: 142 mmol/L (11-06 @ 06:37)    Potassium, Serum: 3.9 mmol/L (11-07 @ 06:22)  Potassium, Serum: 3.7 mmol/L (11-06 @ 06:37)    Hemoglobin: 9.9 g/dL (11-07 @ 06:22)  Hemoglobin: 10.6 g/dL (11-06 @ 06:37)  Hemoglobin: 11.4 g/dL (11-05 @ 06:51)    Creatinine, Serum 1.70 (11-07 @ 06:22)  Creatinine, Serum 1.60 (11-06 @ 06:37)  Creatinine, Serum 1.80 (11-05 @ 06:51)    CARDIAC MARKERS ( 06 Nov 2020 06:37 )  1.780 ng/mL / x     / 138 U/L / x     / x          LIVER FUNCTIONS - ( 06 Nov 2020 06:37 )  Alb: 2.7 g/dL / Pro: 6.4 g/dL / ALK PHOS: 73 U/L / ALT: 28 U/L / AST: 28 U/L / GGT: x

## 2020-11-07 NOTE — PROGRESS NOTE ADULT - SUBJECTIVE AND OBJECTIVE BOX
Neurology Follow up note    SUSIE [id number]    HPI:  This is a 74 M with PMH of HTN DM HLD CAD who was BIBEMS to [facility] with complaints of change in mental status and rigors. The son found the patient confused and had urinated on himself. In the ER patient had a temp of 103. CT showed ground glass opacities. Patient was transferred to [place] for admission. In [place], patient is more lucid. He reports feeling weak for about a week. He status he was eating junk food and sweets. He denies fevers, chills, n/v/d/cough/CP/SOB/dysuria. He has been helping his brother move into an assisted living over the past 2 weeks.  (04 Nov 2020 10:38)      Interval History -no new events    Patient is seen, chart was reviewed and case was discussed with the treatment team.  Pt is not in any distress.   Lying on bed comfortably.     Vital Signs Last 24 Hrs  T(C): 36.7 (07 Nov 2020 11:11), Max: 37.2 (06 Nov 2020 15:45)  T(F): 98 (07 Nov 2020 11:11), Max: 98.9 (06 Nov 2020 15:45)  HR: 72 (07 Nov 2020 11:11) (64 - 75)  BP: 145/80 (07 Nov 2020 11:11) (132/67 - 156/76)  BP(mean): --  RR: 20 (07 Nov 2020 11:11) (18 - 24)  SpO2: 95% (07 Nov 2020 11:11) (94% - 99%)        REVIEW OF SYSTEMS:    Eyes: No eye pain, visual disturbances, o  ENT:  No difficulty hearing, tinnitus, vertigo; No sinus or throat pain  Neck: No pain or stiffness  Respiratory: No  wheezing, chills or hemoptysis  Cardiovascular: No chest pain, palpitations, shortness of breath  Gastrointestinal: No abdominal or epigastric pain. No nausea, vomiting or hematemesis; No diarrhea or constipation  Genitourinary: No dysuria, , hematuria or incontinence  Neurological: No headaches,   Psychiatric: No anxiety, mood swings or difficulty sleeping  Musculoskeletal: No joint pain or swelling;   Skin: No itching, burning, rashes or lesions   Lymph Nodes: No enlarged glands  Endocrine: No heat or cold intolerance; No hair loss,   Allergy and Immunologic: No hives or eczema    On Neurological Examination:    Mental Status - Pt is alert, awake, oriented X3. . Follows commands well and able to answer questions appropriately  .Mood and affect  normal    Speech -  Normal.    Cranial Nerves - Pupils 3 mm equal and reactive to light, extraocular eye movements intact.   Pt has no visual field deficit.  Pt has no facial asymmetry. Facial sensation is intact.  Tongue - is in midline.    Muscle tone - is normal all over.    Motor Exam - 5/5 of UE AND RLE  LLE 4/5  No drift. No shaking or tremors.    Sensory Exam -. Pt withdraws all extremities equally on stimulation. No asymmetry seen.      coordination:    Finger to nose: normal  .    Deep tendon Reflexes - 2 plus all over.      Neck Supple -  Yes.     MEDICATIONS    acetaminophen   Tablet .. 650 milliGRAM(s) Oral every 6 hours PRN  aspirin enteric coated 81 milliGRAM(s) Oral daily  atorvastatin 40 milliGRAM(s) Oral at bedtime  cefTRIAXone   IVPB 2000 milliGRAM(s) IV Intermittent every 24 hours  clopidogrel Tablet 75 milliGRAM(s) Oral daily  dextrose 40% Gel 15 Gram(s) Oral once PRN  dextrose 5%. 1000 milliLiter(s) IV Continuous <Continuous>  dextrose 50% Injectable 12.5 Gram(s) IV Push once  dextrose 50% Injectable 25 Gram(s) IV Push once  dextrose 50% Injectable 25 Gram(s) IV Push once  enoxaparin Injectable 73 milliGRAM(s) SubCutaneous daily  glucagon  Injectable 1 milliGRAM(s) IntraMuscular once PRN  influenza   Vaccine 0.5 milliLiter(s) IntraMuscular once  insulin glargine Injectable (LANTUS) 10 Unit(s) SubCutaneous at bedtime  insulin lispro (ADMELOG) corrective regimen sliding scale   SubCutaneous three times a day before meals  insulin lispro (ADMELOG) corrective regimen sliding scale   SubCutaneous at bedtime  metoprolol tartrate 25 milliGRAM(s) Oral every 12 hours      Allergies    No Known Allergies    Intolerances    11-07    142  |  108  |  25<H>  ----------------------------<  139<H>  3.9   |  29  |  1.70<H>    Ca    8.7      07 Nov 2020 06:22  Mg     2.2     11-07    TPro  6.4  /  Alb  2.7<L>  /  TBili  0.6  /  DBili  .20  /  AST  28  /  ALT  28  /  AlkPhos  73  11-06      Hemoglobin A1C:     Vitamin B12     RADIOLOGY    ASSESSMENT AND PLAN:      seen for ams; improved  consistent metabolic encephalopathy  streptococcal bacteremia     antibiotic as per ID  dw son on phone  Physical therapy evaluation.  OOB to chair/ambulation with assistance only.  Pain is accessed and addressed.  Would continue to follow.

## 2020-11-07 NOTE — PROGRESS NOTE ADULT - SUBJECTIVE AND OBJECTIVE BOX
[facility], Division of Infectious Diseases  EAMON Gonzalez Y. Patel, S. Shah  [phone]  after hours and weekends [phone]  coverage for Dr Cage    Name: SUSIE NUNES  Age: 74y  Gender: Male  MRN: [id number]    Interval History--  Notes reviewed  states lle has less redness, still swollen   but ambulating on it.      Allergies    No Known Allergies    Intolerances        Medications--  Antibiotics:  cefTRIAXone   IVPB 2000 milliGRAM(s) IV Intermittent every 24 hours    Immunologic:  influenza   Vaccine 0.5 milliLiter(s) IntraMuscular once    Other:  acetaminophen   Tablet .. PRN  aspirin enteric coated  atorvastatin  clopidogrel Tablet  dextrose 40% Gel PRN  dextrose 5%.  dextrose 50% Injectable  dextrose 50% Injectable  dextrose 50% Injectable  enoxaparin Injectable  glucagon  Injectable PRN  insulin glargine Injectable (LANTUS)  insulin lispro (ADMELOG) corrective regimen sliding scale  insulin lispro (ADMELOG) corrective regimen sliding scale  metoprolol tartrate      Review of Systems--  A 10-point review of systems was obtained.     Pertinent positives and negatives--  Constitutional: No fevers. No Chills. No Rigors.   Cardiovascular: No chest pain. No palpitations.  Respiratory: No shortness of breath. No cough.  Gastrointestinal: No nausea or vomiting. No diarrhea or constipation.   Psychiatric: no anxiety   Review of systems otherwise negative except as previously noted.    Physical Examination--  Vital Signs: T(F): 98 (11-07-20 @ 11:11), Max: 98.9 (11-06-20 @ 15:45)  HR: 72 (11-07-20 @ 11:11)  BP: 145/80 (11-07-20 @ 11:11)  RR: 20 (11-07-20 @ 11:11)  SpO2: 95% (11-07-20 @ 11:11)  Wt(kg): --  General: Nontoxic-appearing Male in no acute distress.  HEENT: AT/NC.  Anicteric. Conjunctiva pink and moist.   Neck: Not rigid. No sense of mass.  Nodes: None palpable.  Lungs: Clear bilaterally without rales, wheezing or rhonchi  Heart: Regular rate and rhythm. No Murmur.   Abdomen: Bowel sounds present and normoactive. Soft. Nondistended. Nontender.   Extremities: No cyanosis or clubbing. LLE + edema.  not tender, not hot, + eythema  plantar eschar   Skin: Warm. Dry. Good turgor. No rash. No vasculitic stigmata.  Psychiatric: Appropriate affect and mood for situation.         Laboratory Studies--  CBC                        9.9    6.87  )-----------( 197      ( 07 Nov 2020 06:22 )             27.2       Chemistries  11-07    142  |  108  |  25<H>  ----------------------------<  139<H>  3.9   |  29  |  1.70<H>    Ca    8.7      07 Nov 2020 06:22  Mg     2.2     11-07    TPro  6.4  /  Alb  2.7<L>  /  TBili  0.6  /  DBili  .20  /  AST  28  /  ALT  28  /  AlkPhos  73  11-06      Culture Data    Culture - Blood (collected 05 Nov 2020 09:26)  Source: .Blood Blood-Venous  Preliminary Report (06 Nov 2020 10:00):    No growth to date.    Culture - Blood (collected 05 Nov 2020 09:26)  Source: .Blood Blood  Preliminary Report (06 Nov 2020 10:00):    No growth to date.    Culture - Urine (collected 04 Nov 2020 14:18)  Source: .Urine Clean Catch (Midstream)  Final Report (05 Nov 2020 09:53):    No growth    Culture - Blood (collected 04 Nov 2020 14:18)  Source: .Blood Blood-Peripheral  Gram Stain (04 Nov 2020 22:13):    Growth in aerobic bottle: Gram Positive Cocci in Pairs and Chains    Growth in anaerobic bottle: Gram Positive Cocci in Pairs and Chains  Preliminary Report (05 Nov 2020 17:52):    Growth in aerobic and anaerobic bottles: Streptococcus dysgalactiae    (Group C/G)    "Due to technical problems, Proteus sp. will Not be reported as part of    the BCID panel until further notice"    ***Blood Panel PCR results on this specimen are available    approximately 3 hours after the Gram stain result.***    Gram stain, PCR, and/or culture results may not always    correspond due to difference in methodologies.    ************************************************************    This PCR assay was performed using [employer].    The following targets are tested for: Enterococcus,    vancomycin resistant enterococci, Listeria monocytogenes,    coagulase negative staphylococci, S. aureus,    methicillin resistant S. aureus, Streptococcus agalactiae    (Group B), S. pneumoniae, S. pyogenes (Group A),    Acinetobacter baumannii, Enterobacter cloacae, E. coli,    Klebsiella oxytoca, K. pneumoniae, Proteus sp.,    Serratia marcescens, Haemophilus influenzae,    Neisseria meningitidis, Pseudomonas aeruginosa, Candida    albicans, C. glabrata, C krusei, C parapsilosis,    C. tropicalis and the KPC resistance gene.  Organism: Streptococcus dysgalactiae (06 Nov 2020 15:46)  Organism: Blood Culture PCR  Streptococcus dysgalactiae (06 Nov 2020 15:45)  Organism: Streptococcus dysgalactiae (06 Nov 2020 15:45)  Organism: Blood Culture PCR (04 Nov 2020 21:52)    Culture - Blood (collected 04 Nov 2020 14:18)  Source: .Blood Blood-Peripheral  Gram Stain (05 Nov 2020 11:04):    Growth in aerobic bottle: Gram Positive Cocci in Pairs and Chains    Growth in anaerobic bottle: Gram Positive Cocci in Pairs and Chains  Final Report (06 Nov 2020 15:48):    Growth in aerobic and anaerobic bottles: Streptococcus dysgalactiae    (Group C/G)    See previous culture [id number]

## 2020-11-07 NOTE — PROGRESS NOTE ADULT - SUBJECTIVE AND OBJECTIVE BOX
Patient is a 74y old  Male who presents with a chief complaint of change in mental status (07 Nov 2020 12:38)      INTERVAL HPI/OVERNIGHT EVENTS: Patient seen and examined. NAD. No complaints.    Vital Signs Last 24 Hrs  T(C): 36.7 (07 Nov 2020 11:11), Max: 37.2 (06 Nov 2020 15:45)  T(F): 98 (07 Nov 2020 11:11), Max: 98.9 (06 Nov 2020 15:45)  HR: 72 (07 Nov 2020 11:11) (64 - 75)  BP: 145/80 (07 Nov 2020 11:11) (132/67 - 156/76)  BP(mean): --  RR: 20 (07 Nov 2020 11:11) (18 - 24)  SpO2: 95% (07 Nov 2020 11:11) (94% - 99%)    11-07    142  |  108  |  25<H>  ----------------------------<  139<H>  3.9   |  29  |  1.70<H>    Ca    8.7      07 Nov 2020 06:22  Mg     2.2     11-07    TPro  6.4  /  Alb  2.7<L>  /  TBili  0.6  /  DBili  .20  /  AST  28  /  ALT  28  /  AlkPhos  73  11-06                          9.9    6.87  )-----------( 197      ( 07 Nov 2020 06:22 )             27.2       CAPILLARY BLOOD GLUCOSE      POCT Blood Glucose.: 228 mg/dL (07 Nov 2020 11:29)  POCT Blood Glucose.: 156 mg/dL (07 Nov 2020 08:20)  POCT Blood Glucose.: 181 mg/dL (06 Nov 2020 21:54)  POCT Blood Glucose.: 134 mg/dL (06 Nov 2020 17:11)              acetaminophen   Tablet .. 650 milliGRAM(s) Oral every 6 hours PRN  aspirin enteric coated 81 milliGRAM(s) Oral daily  atorvastatin 40 milliGRAM(s) Oral at bedtime  cefTRIAXone   IVPB 2000 milliGRAM(s) IV Intermittent every 24 hours  clopidogrel Tablet 75 milliGRAM(s) Oral daily  dextrose 40% Gel 15 Gram(s) Oral once PRN  dextrose 5%. 1000 milliLiter(s) IV Continuous <Continuous>  dextrose 50% Injectable 12.5 Gram(s) IV Push once  dextrose 50% Injectable 25 Gram(s) IV Push once  dextrose 50% Injectable 25 Gram(s) IV Push once  enoxaparin Injectable 40 milliGRAM(s) SubCutaneous daily  glucagon  Injectable 1 milliGRAM(s) IntraMuscular once PRN  influenza   Vaccine 0.5 milliLiter(s) IntraMuscular once  insulin glargine Injectable (LANTUS) 10 Unit(s) SubCutaneous at bedtime  insulin lispro (ADMELOG) corrective regimen sliding scale   SubCutaneous three times a day before meals  insulin lispro (ADMELOG) corrective regimen sliding scale   SubCutaneous at bedtime  metoprolol tartrate 25 milliGRAM(s) Oral every 12 hours              REVIEW OF SYSTEMS:  CONSTITUTIONAL: No fever, no weight loss, or no fatigue  NECK: No pain, no stiffness  RESPIRATORY: No cough, no wheezing, no chills, no hemoptysis, No shortness of breath  CARDIOVASCULAR: No chest pain, no palpitations, no dizziness, no leg swelling  GASTROINTESTINAL: No abdominal pain. No nausea, no vomiting, no hematemesis; No diarrhea, no constipation. No melena, no hematochezia.  GENITOURINARY: No dysuria, no frequency, no hematuria, no incontinence  NEUROLOGICAL: No headaches, no loss of strength, no numbness, no tremors  SKIN: No itching, no burning  MUSCULOSKELETAL: No joint pain, no swelling; No muscle, no back, no extremity pain  PSYCHIATRIC: No depression, no mood swings,   HEME/LYMPH: No easy bruising, no bleeding gums  ALLERY AND IMMUNOLOGIC: No hives       Consultant(s) Notes Reviewed:  [X] YES  [ ] NO    PHYSICAL EXAM:  GENERAL: NAD  HEAD:  Atraumatic, Normocephalic  EYES: EOMI, PERRLA, conjunctiva and sclera clear  ENMT: No tonsillar erythema, exudates, or enlargement; Moist mucous membranes  NECK: Supple, No JVD  NERVOUS SYSTEM:  Awake & alert  CHEST/LUNG: Clear to auscultation bilaterally; No rales, rhonchi, wheezing,  HEART: Regular rate and rhythm  ABDOMEN: Soft, Nontender, Nondistended; Bowel sounds present  EXTREMITIES:  No clubbing, cyanosis, or edema  LYMPH: No lymphadenopathy noted  SKIN: No rashes      Advanced care planning discussed with patient/family [X] YES   [ ] NO    Advanced care planning discussed with patient/family. Patient's health status was discussed. All appropriate changes have been made regarding patient's end-of-life care. Advanced care planning forms reviewed/discussed/completed.  20 minutes spent.

## 2020-11-07 NOTE — PROGRESS NOTE ADULT - SUBJECTIVE AND OBJECTIVE BOX
CAPILLARY BLOOD GLUCOSE      POCT Blood Glucose.: 156 mg/dL (07 Nov 2020 08:20)  POCT Blood Glucose.: 181 mg/dL (06 Nov 2020 21:54)  POCT Blood Glucose.: 134 mg/dL (06 Nov 2020 17:11)  POCT Blood Glucose.: 234 mg/dL (06 Nov 2020 12:09)      Vital Signs Last 24 Hrs  T(C): 36.8 (07 Nov 2020 08:44), Max: 37.2 (06 Nov 2020 15:45)  T(F): 98.2 (07 Nov 2020 08:44), Max: 98.9 (06 Nov 2020 15:45)  HR: 73 (07 Nov 2020 08:44) (64 - 75)  BP: 152/79 (07 Nov 2020 08:44) (132/67 - 156/76)  BP(mean): --  RR: 24 (07 Nov 2020 08:44) (18 - 24)  SpO2: 94% (07 Nov 2020 08:44) (93% - 99%)    General: WN/WD NAD  Respiratory: CTA B/L  CV: RRR, S1S2, no murmurs, rubs or gallops  Abdominal: Soft, NT, ND +BS, Last BM  Extremities: No edema, + peripheral pulses     11-07    142  |  108  |  25<H>  ----------------------------<  139<H>  3.9   |  29  |  1.70<H>    Ca    8.7      07 Nov 2020 06:22  Mg     2.2     11-07    TPro  6.4  /  Alb  2.7<L>  /  TBili  0.6  /  DBili  .20  /  AST  28  /  ALT  28  /  AlkPhos  73  11-06      atorvastatin 40 milliGRAM(s) Oral at bedtime  dextrose 40% Gel 15 Gram(s) Oral once PRN  dextrose 50% Injectable 12.5 Gram(s) IV Push once  dextrose 50% Injectable 25 Gram(s) IV Push once  dextrose 50% Injectable 25 Gram(s) IV Push once  glucagon  Injectable 1 milliGRAM(s) IntraMuscular once PRN  insulin glargine Injectable (LANTUS) 10 Unit(s) SubCutaneous at bedtime  insulin lispro (ADMELOG) corrective regimen sliding scale   SubCutaneous three times a day before meals  insulin lispro (ADMELOG) corrective regimen sliding scale   SubCutaneous at bedtime

## 2020-11-07 NOTE — PROGRESS NOTE ADULT - PROBLEM SELECTOR PLAN 1
- follow up on MRI to r/o foreign body   - Podiatry team will continue to follow patient while in house

## 2020-11-07 NOTE — PROGRESS NOTE ADULT - ASSESSMENT
74 year old male with PMH HTN, DM, HLD transferred from [facility] for admission for AMS due to underlying sepsis due to possible PNA,     ACS  - Patient found to have elevated Troponin.   - Troponin I  Now down trending. No need to trend further  - Technically difficult ECHO showed normal LV & RV size and function EF 55-60%, severe AS  - Severe AS with evolving troponin in the setting of infection with bacteremia  - EKG with ST depressions in leads I, avL, and V5/V6, consistent with possible lateral ischemia.   - Would treat medically for now, will need to be determined whether in pt or out pt assessment of cad/AS will be appropriate, depending upon his clinical course  - S/P full dose Lovenox for 48 hours   - Continue ASA 81 mg daily, Plavix 75 mg daily  - Continue atorvastatin 40 mg daily   - Continue BB    Severe AS  - Recent ECHO with severe AS  - CT chest notable pulmonary edema on CT chest. s/p 3 L NS, this is likely due to volume overload.   - Patient would need outpatient evaluation for severe AS  - Caution with IVF  - Continue BB     Hypertension  - BP: 152/79 (11-07-20 @ 08:44) (132/67 - 156/76)  - Continue BB    Hyperlipidemia  - Continue Lipitor 40 mg HS   - Low fat diet    Acute Kidney injury  - Creatinine trend: 1.7  <--1.60,  <--1.80,  <--1.90,  <--2.14  - Continue to monitor renal indices  - Avoid nephrotoxins   - Holding losartan and HCTZ, resume when MARINA resolves     - Monitor and replete lytes, keep K>4, Mg>2.  - All other medical needs as per primary team.  - Other cardiovascular workup will depend on clinical course.  - Will continue to follow.    Rashel Nelson DNP, ANP-c  Cardiology   Spectra #[id number]  [phone]

## 2020-11-07 NOTE — PROGRESS NOTE ADULT - ASSESSMENT
MARINA: On ARB, ATN  Sepsis, Strep bacteremia, LLE cellulitis  Diabetes  Hypertension    Stable renal indices. To continue current meds. On IV abx. Monitor blood sugar levels. Insulin coverage as needed. Dietary restriction. Avoid nephrotoxic meds as possible.   Will follow electrolytes and renal function trend.

## 2020-11-07 NOTE — PROGRESS NOTE ADULT - PROBLEM SELECTOR PLAN 1
cont lantus 10 units qhs  cont humalog scale coverage  cont cons cho diet  goal bg 100-180 in hosp setting.

## 2020-11-08 LAB
ANION GAP SERPL CALC-SCNC: 4 MMOL/L — LOW (ref 5–17)
BUN SERPL-MCNC: 28 MG/DL — HIGH (ref 7–23)
CALCIUM SERPL-MCNC: 8.8 MG/DL — SIGNIFICANT CHANGE UP (ref 8.5–10.1)
CHLORIDE SERPL-SCNC: 109 MMOL/L — HIGH (ref 96–108)
CO2 SERPL-SCNC: 29 MMOL/L — SIGNIFICANT CHANGE UP (ref 22–31)
CREAT SERPL-MCNC: 1.6 MG/DL — HIGH (ref 0.5–1.3)
CRP SERPL-MCNC: 7.2 MG/DL — HIGH (ref 0–0.4)
ERYTHROCYTE [SEDIMENTATION RATE] IN BLOOD: 101 MM/HR — HIGH (ref 0–20)
GLUCOSE SERPL-MCNC: 122 MG/DL — HIGH (ref 70–99)
HCT VFR BLD CALC: 29.7 % — LOW (ref 39–50)
HGB BLD-MCNC: 10.6 G/DL — LOW (ref 13–17)
MAGNESIUM SERPL-MCNC: 2.4 MG/DL — SIGNIFICANT CHANGE UP (ref 1.6–2.6)
MCHC RBC-ENTMCNC: 31.8 PG — SIGNIFICANT CHANGE UP (ref 27–34)
MCHC RBC-ENTMCNC: 35.7 GM/DL — SIGNIFICANT CHANGE UP (ref 32–36)
MCV RBC AUTO: 89.2 FL — SIGNIFICANT CHANGE UP (ref 80–100)
NRBC # BLD: 0 /100 WBCS — SIGNIFICANT CHANGE UP (ref 0–0)
PLATELET # BLD AUTO: 226 K/UL — SIGNIFICANT CHANGE UP (ref 150–400)
POTASSIUM SERPL-MCNC: 4 MMOL/L — SIGNIFICANT CHANGE UP (ref 3.5–5.3)
POTASSIUM SERPL-SCNC: 4 MMOL/L — SIGNIFICANT CHANGE UP (ref 3.5–5.3)
PROCALCITONIN SERPL-MCNC: 1.56 NG/ML — HIGH (ref 0–0.04)
RBC # BLD: 3.33 M/UL — LOW (ref 4.2–5.8)
RBC # FLD: 12.3 % — SIGNIFICANT CHANGE UP (ref 10.3–14.5)
S PNEUM AG UR QL: NEGATIVE — SIGNIFICANT CHANGE UP
SODIUM SERPL-SCNC: 142 MMOL/L — SIGNIFICANT CHANGE UP (ref 135–145)
WBC # BLD: 5.73 K/UL — SIGNIFICANT CHANGE UP (ref 3.8–10.5)
WBC # FLD AUTO: 5.73 K/UL — SIGNIFICANT CHANGE UP (ref 3.8–10.5)

## 2020-11-08 PROCEDURE — 99232 SBSQ HOSP IP/OBS MODERATE 35: CPT

## 2020-11-08 RX ADMIN — Medication 81 MILLIGRAM(S): at 12:26

## 2020-11-08 RX ADMIN — Medication 25 MILLIGRAM(S): at 17:08

## 2020-11-08 RX ADMIN — ATORVASTATIN CALCIUM 40 MILLIGRAM(S): 80 TABLET, FILM COATED ORAL at 21:42

## 2020-11-08 RX ADMIN — INSULIN GLARGINE 10 UNIT(S): 100 INJECTION, SOLUTION SUBCUTANEOUS at 21:42

## 2020-11-08 RX ADMIN — Medication 25 MILLIGRAM(S): at 06:33

## 2020-11-08 RX ADMIN — Medication 2: at 17:07

## 2020-11-08 RX ADMIN — CLOPIDOGREL BISULFATE 75 MILLIGRAM(S): 75 TABLET, FILM COATED ORAL at 12:26

## 2020-11-08 RX ADMIN — ENOXAPARIN SODIUM 40 MILLIGRAM(S): 100 INJECTION SUBCUTANEOUS at 12:26

## 2020-11-08 RX ADMIN — Medication 4: at 12:21

## 2020-11-08 RX ADMIN — CEFTRIAXONE 100 MILLIGRAM(S): 500 INJECTION, POWDER, FOR SOLUTION INTRAMUSCULAR; INTRAVENOUS at 12:25

## 2020-11-08 NOTE — PROGRESS NOTE ADULT - SUBJECTIVE AND OBJECTIVE BOX
Date/Time Patient Seen:  		  Referring MD:   Data Reviewed	       Patient is a 74y old  Male who presents with a chief complaint of change in mental status (07 Nov 2020 15:41)      Subjective/HPI     PAST MEDICAL & SURGICAL HISTORY:  Diabetes mellitus    Hypertension          Medication list         MEDICATIONS  (STANDING):  aspirin enteric coated 81 milliGRAM(s) Oral daily  atorvastatin 40 milliGRAM(s) Oral at bedtime  cefTRIAXone   IVPB 2000 milliGRAM(s) IV Intermittent every 24 hours  clopidogrel Tablet 75 milliGRAM(s) Oral daily  dextrose 5%. 1000 milliLiter(s) (50 mL/Hr) IV Continuous <Continuous>  dextrose 50% Injectable 12.5 Gram(s) IV Push once  dextrose 50% Injectable 25 Gram(s) IV Push once  dextrose 50% Injectable 25 Gram(s) IV Push once  enoxaparin Injectable 40 milliGRAM(s) SubCutaneous daily  influenza   Vaccine 0.5 milliLiter(s) IntraMuscular once  insulin glargine Injectable (LANTUS) 10 Unit(s) SubCutaneous at bedtime  insulin lispro (ADMELOG) corrective regimen sliding scale   SubCutaneous three times a day before meals  insulin lispro (ADMELOG) corrective regimen sliding scale   SubCutaneous at bedtime  metoprolol tartrate 25 milliGRAM(s) Oral every 12 hours    MEDICATIONS  (PRN):  acetaminophen   Tablet .. 650 milliGRAM(s) Oral every 6 hours PRN Temp greater or equal to 38C (100.4F), Mild Pain (1 - 3)  dextrose 40% Gel 15 Gram(s) Oral once PRN Blood Glucose LESS THAN 70 milliGRAM(s)/deciliter  glucagon  Injectable 1 milliGRAM(s) IntraMuscular once PRN Glucose LESS THAN 70 milligrams/deciliter         Vitals log        ICU Vital Signs Last 24 Hrs  T(C): 37.1 (08 Nov 2020 04:45), Max: 37.1 (07 Nov 2020 23:38)  T(F): 98.8 (08 Nov 2020 04:45), Max: 98.8 (07 Nov 2020 23:38)  HR: 70 (08 Nov 2020 04:45) (68 - 73)  BP: 128/71 (08 Nov 2020 04:45) (128/71 - 152/82)  BP(mean): --  ABP: --  ABP(mean): --  RR: 19 (08 Nov 2020 04:45) (18 - 24)  SpO2: 95% (08 Nov 2020 04:45) (94% - 96%)           Input and Output:  I&O's Detail      Lab Data                        9.9    6.87  )-----------( 197      ( 07 Nov 2020 06:22 )             27.2     11-07    142  |  108  |  25<H>  ----------------------------<  139<H>  3.9   |  29  |  1.70<H>    Ca    8.7      07 Nov 2020 06:22  Mg     2.2     11-07              Review of Systems	      Objective     Physical Examination    heart s1s2  lung dec BS  abd soft      Pertinent Lab findings & Imaging      Diane:  NO   Adequate UO     I&O's Detail           Discussed with:     Cultures:	        Radiology

## 2020-11-08 NOTE — PROGRESS NOTE ADULT - ASSESSMENT
74 year old male with PMH HTN, DM, HLD transferred from [place] for admission for AMS due to underlying sepsis due to possible PNA,     ACS  - Patient found to have elevated Troponin.   - Troponin I  Now down trending. No need to trend further  - Technically difficult ECHO showed normal LV & RV size and function EF 55-60%, severe AS  - Severe AS with evolving troponin in the setting of infection with bacteremia  - EKG with ST depressions in leads I, avL, and V5/V6, consistent with possible lateral ischemia.   - Would treat medically for now, will need to be determined whether in pt or out pt assessment of cad/AS will be appropriate, depending upon his clinical course  - S/P full dose Lovenox for 48 hours   - Continue ASA 81 mg daily, Plavix 75 mg daily  - Continue atorvastatin 40 mg daily   - Continue BB    Severe AS  - Recent ECHO with severe AS  - CT chest notable pulmonary edema on CT chest. s/p 3 L NS, this is likely due to volume overload.   - Patient would need outpatient evaluation for severe AS  - Caution with IVF  - Continue BB     Hypertension  - BP: 145/76 (11-08-20 @ 08:15) (128/71 - 152/82)  - Continue BB    Hyperlipidemia  - Continue Lipitor 40 mg HS   - Low fat diet    Acute Kidney injury  - Creatinine trend stable  - Continue to monitor renal indices  - Avoid nephrotoxins   - Holding losartan and HCTZ, resume when MARINA resolves     - Monitor and replete lytes, keep K>4, Mg>2.  - All other medical needs as per primary team.  - Other cardiovascular workup will depend on clinical course.  - Will continue to follow.    Rashel Nelson DNP, ANP-c  Cardiology   Spectra #[phone]  [phone]

## 2020-11-08 NOTE — PROGRESS NOTE ADULT - SUBJECTIVE AND OBJECTIVE BOX
[facility] -- Jas Plaza, Jeff, Rachel, Tommy Love Savella  Office # [phone]      Follow Up:    EKG changes  Subjective/Observations:   No events overnight resting comfortably in bed.  No complaints of chest pain, dyspnea, or palpitations reported. No signs of orthopnea or PND.     REVIEW OF SYSTEMS: All other review of systems is negative unless indicated above    PAST MEDICAL & SURGICAL HISTORY:  Diabetes mellitus    Hypertension        MEDICATIONS  (STANDING):  aspirin enteric coated 81 milliGRAM(s) Oral daily  atorvastatin 40 milliGRAM(s) Oral at bedtime  cefTRIAXone   IVPB 2000 milliGRAM(s) IV Intermittent every 24 hours  clopidogrel Tablet 75 milliGRAM(s) Oral daily  dextrose 5%. 1000 milliLiter(s) (50 mL/Hr) IV Continuous <Continuous>  dextrose 50% Injectable 12.5 Gram(s) IV Push once  dextrose 50% Injectable 25 Gram(s) IV Push once  dextrose 50% Injectable 25 Gram(s) IV Push once  enoxaparin Injectable 40 milliGRAM(s) SubCutaneous daily  influenza   Vaccine 0.5 milliLiter(s) IntraMuscular once  insulin glargine Injectable (LANTUS) 10 Unit(s) SubCutaneous at bedtime  insulin lispro (ADMELOG) corrective regimen sliding scale   SubCutaneous three times a day before meals  insulin lispro (ADMELOG) corrective regimen sliding scale   SubCutaneous at bedtime  metoprolol tartrate 25 milliGRAM(s) Oral every 12 hours    MEDICATIONS  (PRN):  acetaminophen   Tablet .. 650 milliGRAM(s) Oral every 6 hours PRN Temp greater or equal to 38C (100.4F), Mild Pain (1 - 3)  dextrose 40% Gel 15 Gram(s) Oral once PRN Blood Glucose LESS THAN 70 milliGRAM(s)/deciliter  glucagon  Injectable 1 milliGRAM(s) IntraMuscular once PRN Glucose LESS THAN 70 milligrams/deciliter      Allergies    No Known Allergies    Intolerances        Vital Signs Last 24 Hrs  T(C): 36.9 (08 Nov 2020 08:15), Max: 37.1 (07 Nov 2020 23:38)  T(F): 98.4 (08 Nov 2020 08:15), Max: 98.8 (07 Nov 2020 23:38)  HR: 73 (08 Nov 2020 08:15) (68 - 73)  BP: 145/76 (08 Nov 2020 08:15) (128/71 - 152/82)  BP(mean): --  RR: 17 (08 Nov 2020 08:15) (17 - 20)  SpO2: 91% (08 Nov 2020 08:15) (91% - 96%)    I&O's Summary        PHYSICAL EXAM:  TELE: SR  Constitutional: NAD, awake and alert, well-developed  HEENT: Moist Mucous Membranes, Anicteric  Pulmonary: Non-labored, breath sounds are clear bilaterally, No wheezing, crackles or rhonchi  Cardiovascular: Regular, S1 and S2 nl, + murmur No rubs, gallops or clicks   Gastrointestinal: Bowel Sounds present, soft, nontender.   Lymph: No lymphadenopathy. No peripheral edema.  Skin: No visible rashes or ulcers.  Psych:  Mood & affect appropriate    LABS: All Labs Reviewed:                        10.6 5.73  )-----------( 226      ( 08 Nov 2020 07:53 )             29.7                         9.9    6.87  )-----------( 197      ( 07 Nov 2020 06:22 )             27.2                         10.6   10.68 )-----------( 188      ( 06 Nov 2020 06:37 )             30.0     08 Nov 2020 07:53    142    |  109    |  28     ----------------------------<  122    4.0     |  29     |  1.60   07 Nov 2020 06:22    142    |  108    |  25     ----------------------------<  139    3.9     |  29     |  1.70   06 Nov 2020 06:37    142    |  109    |  24     ----------------------------<  132    3.7     |  26     |  1.60     Ca    8.8        08 Nov 2020 07:53  Ca    8.7        07 Nov 2020 06:22  Ca    8.3        06 Nov 2020 06:37  Mg     2.4       08 Nov 2020 07:53  Mg     2.2       07 Nov 2020 06:22  Mg     2.1       06 Nov 2020 06:37    TPro  6.4    /  Alb  2.7    /  TBili  0.6    /  DBili  .20    /  AST  28     /  ALT  28     /  AlkPhos  73     06 Nov 2020 06:37     12 Lead ECG:   Ventricular Rate 77 BPM  Atrial Rate 77 BPM  P-R Interval 154 ms  QRS Duration 88 ms  Q-T Interval 374 ms  QTC Calculation(Bazett) 423 ms  P Axis 9 degrees  R Axis 3 degrees  T Axis 107 degrees  Diagnosis Line Normal sinus rhythm  Minimal voltage criteria for LVH, may be normal variant  Inferior infarct , age undetermined  Abnormal ECG  When compared with ECG of 10-JUL-2010 13:13,  No significant change was found  Confirmed by Roe Ramos MD ([id number]) on 11/5/2020 12:45:13 PM (11-04-20 @ 13:00)    < from: TTE Echo Complete w/o Contrast w/ Doppler (11.04.20 @ 11:23) >  Dimensions:  LA 3.7  Normal Values: 2.0 - 4.0 cm  Ao 3.6        Normal Values: 2.0 - 3.8 cm  SEPTUM 1.3       Normal Values: 0.6 - 1.2 cm  PWT 1.0       Normal Values: 0.6 - 1.1 cm  LVIDd 4.9         Normal Values: 3.0 - 5.6 cm  LVIDs 3.3         Normal Values: 1.8 - 4.0 cm    OBSERVATIONS:  Actually difficult and limited study, unable to obtain subcostal views  Mitral Valve: Thickened leaflets, mild MR. Mitral annular calcification  Aortic Valve/Aorta: Calcified trileaflet aortic valve with decreased opening. Peak transaortic valve gradient is 69 mmHg with a mean transaortic valve gradient of 49 mmHg. The aortic valve area is calculated to be 0.7 sq cm by continuity equation. This is consistent with severe aortic stenosis  Tricuspid Valve: normal with trace TR.  Pulmonic Valve: Not well-visualized  Left Atrium: normal  Right Atrium: Not well-visualized  Left Ventricle: normal LV size and systolic function, estimated LVEF of 55-60%.  Right Ventricle: Grossly normal size and systolic function.  Pericardium/Pleura: normal, no significant pericardial effusion.    Conclusion:  Technically difficult and limited study  Normal left ventricular internal dimensions and systolic function, estimated LVEF of 55-60%.  Grossly normal RV size and systolic function.  Calcified trileaflet aortic valve with severe aortic stenosis  Mild MR  Trace TR.  No significant pericardial effusion.    < end of copied text >    < from: CT Chest No Cont (11.04.20 @ 01:33) >  IMPRESSION: Moderate pulmonary edema. Please note that superimposed infection cannot be excluded.  < end of copied text >

## 2020-11-08 NOTE — PROGRESS NOTE ADULT - ASSESSMENT
Pt is a 74M w/ PMHx of CAD, HTN, HLD, DM p/w AMS, rigors and chills, found to have fevers 103F and leukocytosis to 19K in the setting of LLE cellulitis    Sepsis 2/2 LLE cellulitis  Streptococcal bacteremia  -11/4 strep dysgalactae-- sensitivities noted   -repeat 11/5 BCx neg to date  -LLE cellulitis exam improving  -c/w ceftriaxone 2gm IV q24h-- will need prolonged but final duration 2 - 6 weeks pending further imaging  - for lle mri to r/o deep infection    ok to place PICC

## 2020-11-08 NOTE — PROGRESS NOTE ADULT - SUBJECTIVE AND OBJECTIVE BOX
Patient is a 74y old  Male who presents with a chief complaint of change in mental status (08 Nov 2020 11:06)      INTERVAL HPI/OVERNIGHT EVENTS: Patient seen and examined. NAD. No complaints.    Vital Signs Last 24 Hrs  T(C): 36.9 (08 Nov 2020 08:15), Max: 37.1 (07 Nov 2020 23:38)  T(F): 98.4 (08 Nov 2020 08:15), Max: 98.8 (07 Nov 2020 23:38)  HR: 73 (08 Nov 2020 08:15) (68 - 73)  BP: 145/76 (08 Nov 2020 08:15) (128/71 - 152/82)  BP(mean): --  RR: 17 (08 Nov 2020 08:15) (17 - 19)  SpO2: 91% (08 Nov 2020 08:15) (91% - 96%)    11-08    142  |  109<H>  |  28<H>  ----------------------------<  122<H>  4.0   |  29  |  1.60<H>    Ca    8.8      08 Nov 2020 07:53  Mg     2.4     11-08                            10.6   5.73  )-----------( 226      ( 08 Nov 2020 07:53 )             29.7       CAPILLARY BLOOD GLUCOSE      POCT Blood Glucose.: 239 mg/dL (08 Nov 2020 11:52)  POCT Blood Glucose.: 145 mg/dL (08 Nov 2020 07:55)  POCT Blood Glucose.: 184 mg/dL (07 Nov 2020 21:23)  POCT Blood Glucose.: 127 mg/dL (07 Nov 2020 16:53)              acetaminophen   Tablet .. 650 milliGRAM(s) Oral every 6 hours PRN  aspirin enteric coated 81 milliGRAM(s) Oral daily  atorvastatin 40 milliGRAM(s) Oral at bedtime  cefTRIAXone   IVPB 2000 milliGRAM(s) IV Intermittent every 24 hours  clopidogrel Tablet 75 milliGRAM(s) Oral daily  dextrose 40% Gel 15 Gram(s) Oral once PRN  dextrose 5%. 1000 milliLiter(s) IV Continuous <Continuous>  dextrose 50% Injectable 12.5 Gram(s) IV Push once  dextrose 50% Injectable 25 Gram(s) IV Push once  dextrose 50% Injectable 25 Gram(s) IV Push once  enoxaparin Injectable 40 milliGRAM(s) SubCutaneous daily  glucagon  Injectable 1 milliGRAM(s) IntraMuscular once PRN  influenza   Vaccine 0.5 milliLiter(s) IntraMuscular once  insulin glargine Injectable (LANTUS) 10 Unit(s) SubCutaneous at bedtime  insulin lispro (ADMELOG) corrective regimen sliding scale   SubCutaneous three times a day before meals  insulin lispro (ADMELOG) corrective regimen sliding scale   SubCutaneous at bedtime  metoprolol tartrate 25 milliGRAM(s) Oral every 12 hours              REVIEW OF SYSTEMS:  CONSTITUTIONAL: No fever, no weight loss, or no fatigue  NECK: No pain, no stiffness  RESPIRATORY: No cough, no wheezing, no chills, no hemoptysis, No shortness of breath  CARDIOVASCULAR: No chest pain, no palpitations, no dizziness, no leg swelling  GASTROINTESTINAL: No abdominal pain. No nausea, no vomiting, no hematemesis; No diarrhea, no constipation. No melena, no hematochezia.  GENITOURINARY: No dysuria, no frequency, no hematuria, no incontinence  NEUROLOGICAL: No headaches, no loss of strength, no numbness, no tremors  SKIN: No itching, no burning  MUSCULOSKELETAL: No joint pain, no swelling; No muscle, no back, no extremity pain  PSYCHIATRIC: No depression, no mood swings,   HEME/LYMPH: No easy bruising, no bleeding gums  ALLERY AND IMMUNOLOGIC: No hives       Consultant(s) Notes Reviewed:  [X] YES  [ ] NO    PHYSICAL EXAM:  GENERAL: NAD  HEAD:  Atraumatic, Normocephalic  EYES: EOMI, PERRLA, conjunctiva and sclera clear  ENMT: No tonsillar erythema, exudates, or enlargement; Moist mucous membranes  NECK: Supple, No JVD  NERVOUS SYSTEM:  Awake & alert  CHEST/LUNG: Clear to auscultation bilaterally; No rales, rhonchi, wheezing,  HEART: Regular rate and rhythm  ABDOMEN: Soft, Nontender, Nondistended; Bowel sounds present  EXTREMITIES:  No clubbing, cyanosis, or edema; + LLE erythema  LYMPH: No lymphadenopathy noted  SKIN: No rashes      Advanced care planning discussed with patient/family [X] YES   [ ] NO    Advanced care planning discussed with patient/family. Patient's health status was discussed. All appropriate changes have been made regarding patient's end-of-life care. Advanced care planning forms reviewed/discussed/completed.  20 minutes spent.

## 2020-11-08 NOTE — PROGRESS NOTE ADULT - PROBLEM SELECTOR PLAN 1
Strep bacteremia  Possibly from LLE cellulitis/wound -- patient had wound last week, now healed but   Podiatry consult noted  For MRI to r/o deep infection  Continue iv abx  F/U culture data -- repeat cultures negative  ID f/u  Will need at least 2 weeks total of iv abx -- will order PICC line

## 2020-11-08 NOTE — PROGRESS NOTE ADULT - SUBJECTIVE AND OBJECTIVE BOX
[facility], Division of Infectious Diseases  EAMON Gonzalez, CHRIS Gamble  [phone]  after hours and weekends [phone]  coverage for Dr Dorado    Name: SUSIE NUNES  Age: 74y  Gender: Male  MRN: [id number]    Interval History--  Notes reviewed  ambulating around room  no new complaints       Allergies    No Known Allergies    Intolerances        Medications--  Antibiotics:  cefTRIAXone   IVPB 2000 milliGRAM(s) IV Intermittent every 24 hours    Immunologic:  influenza   Vaccine 0.5 milliLiter(s) IntraMuscular once    Other:  acetaminophen   Tablet .. PRN  aspirin enteric coated  atorvastatin  clopidogrel Tablet  dextrose 40% Gel PRN  dextrose 5%.  dextrose 50% Injectable  dextrose 50% Injectable  dextrose 50% Injectable  enoxaparin Injectable  glucagon  Injectable PRN  insulin glargine Injectable (LANTUS)  insulin lispro (ADMELOG) corrective regimen sliding scale  insulin lispro (ADMELOG) corrective regimen sliding scale  metoprolol tartrate      Review of Systems--  A 10-point review of systems was obtained.     Pertinent positives and negatives--  Constitutional: No fevers. No Chills. No Rigors.   Cardiovascular: No chest pain. No palpitations.  Respiratory: No shortness of breath. No cough.  Gastrointestinal: No nausea or vomiting. No diarrhea or constipation.   Psychiatric: no anxiety   Review of systems otherwise negative except as previously noted.    Physical Examination--  Vital Signs: T(F): 98.7 (11-08-20 @ 12:00), Max: 98.8 (11-07-20 @ 23:38)  HR: 69 (11-08-20 @ 12:00)  BP: 134/81 (11-08-20 @ 12:00)  RR: 18 (11-08-20 @ 12:00)  SpO2: 95% (11-08-20 @ 12:00)  Wt(kg): --  General: Nontoxic-appearing Male in no acute distress.  HEENT: AT/NC. Anicteric. C  Nodes: None palpable.  Lungs: Clear bilaterally without rales, wheezing or rhonchi  Heart: Regular rate and rhythm.=  Abdomen: Bowel sounds present and normoactive. Soft. Nondistended. Nontender.  Back: No spinal tenderness. No costovertebral angle tenderness.   Extremities: No cyanosis or clubbing. ++ edema. lle erythema, not hot not tender  Skin: Warm. Dry. Good turgor. No rash. No vasculitic stigmata.  Psychiatric: Appropriate affect and mood for situation.         Laboratory Studies--  CBC                        10.6   5.73  )-----------( 226      ( 08 Nov 2020 07:53 )             29.7       Chemistries  11-08    142  |  109<H>  |  28<H>  ----------------------------<  122<H>  4.0   |  29  |  1.60<H>    Ca    8.8      08 Nov 2020 07:53  Mg     2.4     11-08        Culture Data    Culture - Blood (collected 05 Nov 2020 09:26)  Source: .Blood Blood-Venous  Preliminary Report (06 Nov 2020 10:00):    No growth to date.    Culture - Blood (collected 05 Nov 2020 09:26)  Source: .Blood Blood  Preliminary Report (06 Nov 2020 10:00):    No growth to date.    Culture - Urine (collected 04 Nov 2020 14:18)  Source: .Urine Clean Catch (Midstream)  Final Report (05 Nov 2020 09:53):    No growth    Culture - Blood (collected 04 Nov 2020 14:18)  Source: .Blood Blood-Peripheral  Gram Stain (04 Nov 2020 22:13):    Growth in aerobic bottle: Gram Positive Cocci in Pairs and Chains    Growth in anaerobic bottle: Gram Positive Cocci in Pairs and Chains  Preliminary Report (05 Nov 2020 17:52):    Growth in aerobic and anaerobic bottles: Streptococcus dysgalactiae    (Group C/G)    "Due to technical problems, Proteus sp. will Not be reported as part of    the BCID panel until further notice"    ***Blood Panel PCR results on this specimen are available    approximately 3 hours after the Gram stain result.***    Gram stain, PCR, and/or culture results may not always    correspond due to difference in methodologies.    ************************************************************    This PCR assay was performed using [employer].    The following targets are tested for: Enterococcus,    vancomycin resistant enterococci, Listeria monocytogenes,    coagulase negative staphylococci, S. aureus,    methicillin resistant S. aureus, Streptococcus agalactiae    (Group B), S. pneumoniae, S. pyogenes (Group A),    Acinetobacter baumannii, Enterobacter cloacae, E. coli,    Klebsiella oxytoca, K. pneumoniae, Proteus sp.,    Serratia marcescens, Haemophilus influenzae,    Neisseria meningitidis, Pseudomonas aeruginosa, Candida    albicans, C. glabrata, C krusei, C parapsilosis,    C. tropicalis and the KPC resistance gene.  Organism: Streptococcus dysgalactiae (06 Nov 2020 15:46)  Organism: Blood Culture PCR  Streptococcus dysgalactiae (06 Nov 2020 15:45)  Organism: Streptococcus dysgalactiae (06 Nov 2020 15:45)  Organism: Blood Culture PCR (04 Nov 2020 21:52)    Culture - Blood (collected 04 Nov 2020 14:18)  Source: .Blood Blood-Peripheral  Gram Stain (05 Nov 2020 11:04):    Growth in aerobic bottle: Gram Positive Cocci in Pairs and Chains    Growth in anaerobic bottle: Gram Positive Cocci in Pairs and Chains  Final Report (06 Nov 2020 15:48):    Growth in aerobic and anaerobic bottles: Streptococcus dysgalactiae    (Group C/G)    See previous culture [id number]

## 2020-11-08 NOTE — PROGRESS NOTE ADULT - SUBJECTIVE AND OBJECTIVE BOX
Neurology Follow up note    SUSIE [id number]    HPI:  This is a 74 M with PMH of HTN DM HLD CAD who was BIBEMS to [facility] with complaints of change in mental status and rigors. The son found the patient confused and had urinated on himself. In the ER patient had a temp of 103. CT showed ground glass opacities. Patient was transferred to [place] for admission. In [place], patient is more lucid. He reports feeling weak for about a week. He status he was eating junk food and sweets. He denies fevers, chills, n/v/d/cough/CP/SOB/dysuria. He has been helping his brother move into an assisted living over the past 2 weeks.  (04 Nov 2020 10:38)      Interval History -doing well    Patient is seen, chart was reviewed and case was discussed with the treatment team.  Pt is not in any distress.   Lying on bed comfortably.     Vital Signs Last 24 Hrs  T(C): 37.1 (08 Nov 2020 12:00), Max: 37.1 (07 Nov 2020 23:38)  T(F): 98.7 (08 Nov 2020 12:00), Max: 98.8 (07 Nov 2020 23:38)  HR: 69 (08 Nov 2020 12:00) (68 - 73)  BP: 134/81 (08 Nov 2020 12:00) (128/71 - 152/82)  BP(mean): --  RR: 18 (08 Nov 2020 12:00) (17 - 19)  SpO2: 95% (08 Nov 2020 12:00) (91% - 96%)        REVIEW OF SYSTEMS:    Eyes: No eye pain, visual disturbances, o  ENT:  No difficulty hearing, tinnitus, vertigo; No sinus or throat pain  Neck: No pain or stiffness  Respiratory: No  wheezing, chills or hemoptysis  Cardiovascular: No chest pain, palpitations, shortness of breath  Gastrointestinal: No abdominal or epigastric pain. No nausea, vomiting or hematemesis; No diarrhea or constipation  Genitourinary: No dysuria, , hematuria or incontinence  Neurological: No headaches,   Psychiatric: No anxiety, mood swings or difficulty sleeping  Musculoskeletal: No joint pain or swelling;   Skin: No itching, burning, rashes or lesions   Lymph Nodes: No enlarged glands  Endocrine: No heat or cold intolerance; No hair loss,   Allergy and Immunologic: No hives or eczema    On Neurological Examination:    Mental Status - Pt is alert, awake, oriented X3. . Follows commands well and able to answer questions appropriately  Mood and affect  normal    Speech -  Normal.    Cranial Nerves - Pupils 3 mm equal and reactive to light, extraocular eye movements intact.   Pt has no visual field deficit.  Pt has no facial asymmetry. Facial sensation is intact.  Tongue - is in midline.    Muscle tone - is normal all over.    Motor Exam - 5/5 of UE AND RLE  LLE 4/5  No drift. No shaking or tremors.    Sensory Exam -. Pt withdraws all extremities equally on stimulation. No asymmetry seen.      coordination:    Finger to nose: normal  .    Deep tendon Reflexes - 2 plus all over.      Neck Supple -  Yes.     MEDICATIONS    acetaminophen   Tablet .. 650 milliGRAM(s) Oral every 6 hours PRN  aspirin enteric coated 81 milliGRAM(s) Oral daily  atorvastatin 40 milliGRAM(s) Oral at bedtime  cefTRIAXone   IVPB 2000 milliGRAM(s) IV Intermittent every 24 hours  clopidogrel Tablet 75 milliGRAM(s) Oral daily  dextrose 40% Gel 15 Gram(s) Oral once PRN  dextrose 5%. 1000 milliLiter(s) IV Continuous <Continuous>  dextrose 50% Injectable 12.5 Gram(s) IV Push once  dextrose 50% Injectable 25 Gram(s) IV Push once  dextrose 50% Injectable 25 Gram(s) IV Push once  enoxaparin Injectable 73 milliGRAM(s) SubCutaneous daily  glucagon  Injectable 1 milliGRAM(s) IntraMuscular once PRN  influenza   Vaccine 0.5 milliLiter(s) IntraMuscular once  insulin glargine Injectable (LANTUS) 10 Unit(s) SubCutaneous at bedtime  insulin lispro (ADMELOG) corrective regimen sliding scale   SubCutaneous three times a day before meals  insulin lispro (ADMELOG) corrective regimen sliding scale   SubCutaneous at bedtime  metoprolol tartrate 25 milliGRAM(s) Oral every 12 hours      Allergies    No Known Allergies    Intolerances    11-08    142  |  109<H>  |  28<H>  ----------------------------<  122<H>  4.0   |  29  |  1.60<H>    Ca    8.8      08 Nov 2020 07:53  Mg     2.4     11-08        Hemoglobin A1C:     Vitamin B12     RADIOLOGY    ASSESSMENT AND PLAN:      seen for ams; improved  consistent metabolic encephalopathy  streptococcal bacteremia     increase ambulation  antibiotic as per ID  dw son on phone  Physical therapy evaluation.  Pain is accessed and addressed.  Would continue to follow.

## 2020-11-09 ENCOUNTER — RESULT REVIEW (OUTPATIENT)
Age: 75
End: 2020-11-09

## 2020-11-09 DIAGNOSIS — M85.672 OTHER CYST OF BONE, LEFT ANKLE AND FOOT: ICD-10-CM

## 2020-11-09 PROCEDURE — 73718 MRI LOWER EXTREMITY W/O DYE: CPT | Mod: 26,LT

## 2020-11-09 PROCEDURE — 20220 BONE BIOPSY TROCAR/NDL SUPFC: CPT | Mod: TA

## 2020-11-09 PROCEDURE — 99232 SBSQ HOSP IP/OBS MODERATE 35: CPT

## 2020-11-09 PROCEDURE — 88307 TISSUE EXAM BY PATHOLOGIST: CPT | Mod: 26

## 2020-11-09 PROCEDURE — 88311 DECALCIFY TISSUE: CPT | Mod: 26

## 2020-11-09 RX ADMIN — CLOPIDOGREL BISULFATE 75 MILLIGRAM(S): 75 TABLET, FILM COATED ORAL at 11:30

## 2020-11-09 RX ADMIN — Medication 2: at 12:15

## 2020-11-09 RX ADMIN — INSULIN GLARGINE 10 UNIT(S): 100 INJECTION, SOLUTION SUBCUTANEOUS at 21:11

## 2020-11-09 RX ADMIN — Medication 2: at 17:01

## 2020-11-09 RX ADMIN — Medication 81 MILLIGRAM(S): at 11:30

## 2020-11-09 RX ADMIN — Medication 25 MILLIGRAM(S): at 05:44

## 2020-11-09 RX ADMIN — ATORVASTATIN CALCIUM 40 MILLIGRAM(S): 80 TABLET, FILM COATED ORAL at 21:11

## 2020-11-09 RX ADMIN — ENOXAPARIN SODIUM 40 MILLIGRAM(S): 100 INJECTION SUBCUTANEOUS at 11:30

## 2020-11-09 RX ADMIN — CEFTRIAXONE 100 MILLIGRAM(S): 500 INJECTION, POWDER, FOR SOLUTION INTRAMUSCULAR; INTRAVENOUS at 08:42

## 2020-11-09 RX ADMIN — Medication 25 MILLIGRAM(S): at 17:01

## 2020-11-09 NOTE — PROGRESS NOTE ADULT - ASSESSMENT
74 year old male with PMH HTN, DM, HLD transferred from [place] for admission for AMS due to underlying sepsis due to possible PNA,     ACS  - Patient found to have elevated Troponin.   - Troponin I  Now down trending. No need to trend further  - Technically difficult ECHO showed normal LV & RV size and function EF 55-60%, severe AS  - Severe AS with evolving troponin in the setting of infection with bacteremia  - EKG with ST depressions in leads I, avL, and V5/V6, consistent with possible lateral ischemia.   - Would treat medically for now, will need to be determined whether in pt or out pt assessment of cad/AS will be appropriate, depending upon his clinical course  - S/P full dose Lovenox for 48 hours   - Continue ASA 81 mg daily, Plavix 75 mg daily  - Continue atorvastatin 40 mg daily   - Continue BB    Severe AS  - Recent ECHO with severe AS  - CT chest notable pulmonary edema on CT chest. s/p 3 L NS, this is likely due to volume overload. Would hold off on diuresis. Patient appears euvolemic on examination   - Caution with IVF  - Patient would need outpatient evaluation for severe AS  - Continue BB     Hypertension  - BP: 121/74 (11-09-20 @ 08:49) (121/74 - 148/63)  - Continue BB    Hyperlipidemia  - Continue Lipitor 40 mg HS   - Low fat diet    Acute Kidney injury  - Creatinine trend stable 1.60,  <--1.70,  <--1.60,  <--1.80,  <--1.90,  <--2.14  - Continue to monitor renal indices  - Avoid nephrotoxins   - Holding losartan and HCTZ, resume when MARINA resolves     Sepsis  - Strep bacteremia ? LLE cellulitis   - Continue antibiotics  - Management per primary team     - Monitor and replete lytes, keep K>4, Mg>2.  - All other medical needs as per primary team.  - Other cardiovascular workup will depend on clinical course.  - Will continue to follow.    Margie Steele, MS FNP, [facility]  Nurse Practitioner- Cardiology   Spectra #[phone]

## 2020-11-09 NOTE — PROGRESS NOTE ADULT - SUBJECTIVE AND OBJECTIVE BOX
SUSIE NUNES is a 74yMale , patient examined and chart reviewed    INTERVAL HPI/ OVERNIGHT EVENTS:   Awake alert NAD.   Sp foot bone biopsy at bedside today by podiatry.    PAST MEDICAL & SURGICAL HISTORY:  Diabetes mellitus  Hypertension    For details regarding the patient's social history, family history, and other miscellaneous elements, please refer the initial infectious diseases consultation and/or the admitting history and physical examination for this admission.    ROS:  CONSTITUTIONAL:  Negative fever or chills  EYES:  Negative  blurry vision or double vision  CARDIOVASCULAR:  Negative for chest pain or palpitations  RESPIRATORY:  Negative for cough, wheezing, or SOB   GASTROINTESTINAL:  Negative for nausea, vomiting, diarrhea, constipation, or abdominal pain  GENITOURINARY:  Negative frequency, urgency or dysuria  NEUROLOGIC:  No headache, confusion, dizziness, lightheadedness  All other systems were reviewed and are negative       Current inpatient medications :    ANTIBIOTICS/RELEVANT:  cefTRIAXone   IVPB 2000 milliGRAM(s) IV Intermittent every 24 hours    MEDICATIONS  (STANDING):  aspirin enteric coated 81 milliGRAM(s) Oral daily  atorvastatin 40 milliGRAM(s) Oral at bedtime  clopidogrel Tablet 75 milliGRAM(s) Oral daily  dextrose 5%. 1000 milliLiter(s) (50 mL/Hr) IV Continuous <Continuous>  dextrose 50% Injectable 12.5 Gram(s) IV Push once  dextrose 50% Injectable 25 Gram(s) IV Push once  dextrose 50% Injectable 25 Gram(s) IV Push once  enoxaparin Injectable 40 milliGRAM(s) SubCutaneous daily  influenza   Vaccine 0.5 milliLiter(s) IntraMuscular once  insulin glargine Injectable (LANTUS) 10 Unit(s) SubCutaneous at bedtime  insulin lispro (ADMELOG) corrective regimen sliding scale   SubCutaneous three times a day before meals  insulin lispro (ADMELOG) corrective regimen sliding scale   SubCutaneous at bedtime  metoprolol tartrate 25 milliGRAM(s) Oral every 12 hours    MEDICATIONS  (PRN):  acetaminophen   Tablet .. 650 milliGRAM(s) Oral every 6 hours PRN Temp greater or equal to 38C (100.4F), Mild Pain (1 - 3)  dextrose 40% Gel 15 Gram(s) Oral once PRN Blood Glucose LESS THAN 70 milliGRAM(s)/deciliter  glucagon  Injectable 1 milliGRAM(s) IntraMuscular once PRN Glucose LESS THAN 70 milligrams/deciliter      Objective:  Vital Signs Last 24 Hrs  T(C): 36.8 (2020 16:36), Max: 37.4 (2020 23:57)  T(F): 98.2 (2020 16:36), Max: 99.3 (2020 23:57)  HR: 64 (2020 16:36) (64 - 67)  BP: 145/72 (2020 16:36) (121/74 - 149/70)  RR: 18 (2020 16:36) (16 - 18)  SpO2: 98% (2020 16:36) (96% - 99%)    Physical Exam:  General: no acute distress  Eyes: sclera anicteric, pupils equal and reactive to light  ENMT: buccal mucosa moist, pharynx not injected  Neck: supple, trachea midline  Lungs: Decreased, no wheeze/rhonchi  Cardiovascular: regular rate and rhythm, S1 S2  Abdomen: soft, nontender, no organomegaly present, bowel sounds normal  Neurological: alert and oriented x3, Cranial Nerves II-XII grossly intact  Skin: no increased ecchymosis/petechiae/purpura  Lymph Nodes: no palpable cervical/supraclavicular lymph nodes enlargements  Extremities: Left LE erythema slowly improving + edema mild tenderness      LABS:                        10.6   5.73  )-----------( 226      ( 2020 07:53 )             29.7   11-08    142  |  109<H>  |  28<H>  ----------------------------<  122<H>  4.0   |  29  |  1.60<H>    Ca    8.8      2020 07:53  Mg     2.4     11-08      PTT - ( 2020 23:37 )  PTT:27.2 sec  Urinalysis Basic - ( 2020 00:52 )    Color: Yellow / Appearance: Clear / S.015 / pH: x  Gluc: x / Ketone: Negative  / Bili: Negative / Urobili: Negative mg/dL   Blood: x / Protein: 15 mg/dL / Nitrite: Negative   Leuk Esterase: Negative / RBC: 0-2 /HPF / WBC 0-2   Sq Epi: x / Non Sq Epi: Occasional / Bacteria: Occasional      MICROBIOLOGY:  Culture - Urine (collected 2020 14:18)  Source: .Urine Clean Catch (Midstream)  Final Report (2020 09:53):    No growth    Culture - Blood in AM (20 @ 09:26)    Specimen Source: .Blood Blood-Venous    Culture Results:   No growth to date.    Culture - Blood (20 @ 14:18)    -  Streptococcus sp. (Not Grp A, B or S pneumoniae): Detec    Gram Stain:   Growth in aerobic bottle: Gram Positive Cocci in Pairs and Chains  Growth in anaerobic bottle: Gram Positive Cocci in Pairs and Chains    -  Ceftriaxone: S 0.047    -  Clindamycin: S    -  Levofloxacin: S    -  Penicillin: S 0.016 Predicts results for ampicillin, amoxicillin, amoxicillin/clavulanate, ampicillin/sulbactam, 1st, 2nd and 3rd generation cephalosporins and carbapenems.    -  Vancomycin: S    Specimen Source: .Blood Blood-Peripheral    Organism: Blood Culture PCR    Organism: Streptococcus dysgalactiae    Organism: Streptococcus dysgalactiae    Culture Results:   Growth in aerobic and anaerobic bottles: Streptococcus dysgalactiae  (Group C/G)    RADIOLOGY & ADDITIONAL STUDIES:    EXAM:  MR FOOT LT                            PROCEDURE DATE:  2020          INTERPRETATION:  CLINICAL INDICATION: Left foot pain, evaluate for foreign body near the first metatarsophalangeal joint and first interspace region.    Multiplanar Multisequence MR of the left foot. Images obtained of the midfoot through forefoot. No IV contrast.    Prior Studies: Left foot radiographs 2020.    FINDINGS:  T2 hyperintense cystic changes and edema in the distal aspect of the great toe metatarsal and in the great toe proximal phalanx. No definite for body is visualized in the subcutaneous tissues. There is diffuse subcutaneous edema at the dorsum of the forefoot. No abnormal muscle edema. Visualized flexor and extensor tendons are grossly intact. Likely second hammertoe. No abnormal joint effusion. The Lisfranc ligament is grossly intact.    IMPRESSION:  No definite foreign body is seen in the subcutaneous tissues. Please note that MRI may not be sensitive for small foreign bodies or all types of foreign bodies. If there is persistent clinical concern for foreign body, correlation with contrast enhanced CT or ultrasound can be performed.    Diffuse subcutaneous edema. Correlate clinically to exclude cellulitis.    T2 hyperintense cystic changes and marrow edema about the great toe metatarsophalangeal joint, likely degenerative.    Likely second hammertoe, evaluation limited on nonweightbearing exam.      Assessment :   74 M with PMH of HTN DM HLD CAD presented to the hospital with CC of mental status and rigors admitted with sepsis syndrome. Fever Tmax 103 WBC 19K   - Strep sepsis sec Left LE cellulitis with lymphangitis. Doubt osteomyelitis.   - Doubt pneumonia   - CHF   - + troponins  - MARINA stable    Plan :   Cont Rocephin 2 grams IV daily day 5 today  Fu bone biopsy   Repeat blood cultures  Trend temps and cbc  Elevate leg    D/w Dr Beauchamp    Continue with present regiment.  Appropriate use of antibiotics and adverse effects reviewed.      I have discussed the above plan of care with patient in detail. He expressed understanding of the  treatment plan . Risks, benefits and alternatives discussed in detail. I have asked if he has any questions or concerns and appropriately addressed them to the best of my ability .    > 35 minutes were spent in direct patient care reviewing notes, medications ,labs data/ imaging , discussion with multidisciplinary team.    Thank you for allowing me to participate in care of your patient .    Stacey Dorado MD  Infectious Disease  [phone]

## 2020-11-09 NOTE — PROGRESS NOTE ADULT - SUBJECTIVE AND OBJECTIVE BOX
Date/Time Patient Seen:  		  Referring MD:   Data Reviewed	       Patient is a 74y old  Male who presents with a chief complaint of change in mental status (08 Nov 2020 13:48)      Subjective/HPI     PAST MEDICAL & SURGICAL HISTORY:  Diabetes mellitus    Hypertension          Medication list         MEDICATIONS  (STANDING):  aspirin enteric coated 81 milliGRAM(s) Oral daily  atorvastatin 40 milliGRAM(s) Oral at bedtime  cefTRIAXone   IVPB 2000 milliGRAM(s) IV Intermittent every 24 hours  clopidogrel Tablet 75 milliGRAM(s) Oral daily  dextrose 5%. 1000 milliLiter(s) (50 mL/Hr) IV Continuous <Continuous>  dextrose 50% Injectable 12.5 Gram(s) IV Push once  dextrose 50% Injectable 25 Gram(s) IV Push once  dextrose 50% Injectable 25 Gram(s) IV Push once  enoxaparin Injectable 40 milliGRAM(s) SubCutaneous daily  influenza   Vaccine 0.5 milliLiter(s) IntraMuscular once  insulin glargine Injectable (LANTUS) 10 Unit(s) SubCutaneous at bedtime  insulin lispro (ADMELOG) corrective regimen sliding scale   SubCutaneous three times a day before meals  insulin lispro (ADMELOG) corrective regimen sliding scale   SubCutaneous at bedtime  metoprolol tartrate 25 milliGRAM(s) Oral every 12 hours    MEDICATIONS  (PRN):  acetaminophen   Tablet .. 650 milliGRAM(s) Oral every 6 hours PRN Temp greater or equal to 38C (100.4F), Mild Pain (1 - 3)  dextrose 40% Gel 15 Gram(s) Oral once PRN Blood Glucose LESS THAN 70 milliGRAM(s)/deciliter  glucagon  Injectable 1 milliGRAM(s) IntraMuscular once PRN Glucose LESS THAN 70 milligrams/deciliter         Vitals log        ICU Vital Signs Last 24 Hrs  T(C): 37.1 (09 Nov 2020 05:14), Max: 37.4 (08 Nov 2020 23:57)  T(F): 98.8 (09 Nov 2020 05:14), Max: 99.3 (08 Nov 2020 23:57)  HR: 66 (09 Nov 2020 05:14) (66 - 73)  BP: 148/63 (09 Nov 2020 05:14) (126/76 - 148/63)  BP(mean): --  ABP: --  ABP(mean): --  RR: 17 (09 Nov 2020 05:14) (17 - 18)  SpO2: 98% (09 Nov 2020 05:14) (91% - 98%)           Input and Output:  I&O's Detail    08 Nov 2020 07:01  -  09 Nov 2020 06:59  --------------------------------------------------------  IN:    Oral Fluid: 460 mL  Total IN: 460 mL    OUT:    Voided (mL): 500 mL  Total OUT: 500 mL    Total NET: -40 mL          Lab Data                        10.6   5.73  )-----------( 226      ( 08 Nov 2020 07:53 )             29.7     11-08    142  |  109<H>  |  28<H>  ----------------------------<  122<H>  4.0   |  29  |  1.60<H>    Ca    8.8      08 Nov 2020 07:53  Mg     2.4     11-08              Review of Systems	      Objective     Physical Examination    heart s1s2  lung dec BS  abd soft      Pertinent Lab findings & Imaging      Diane:  NO   Adequate UO     I&O's Detail    08 Nov 2020 07:01  -  09 Nov 2020 06:59  --------------------------------------------------------  IN:    Oral Fluid: 460 mL  Total IN: 460 mL    OUT:    Voided (mL): 500 mL  Total OUT: 500 mL    Total NET: -40 mL               Discussed with:     Cultures:	        Radiology

## 2020-11-09 NOTE — PROGRESS NOTE ADULT - PROBLEM SELECTOR PLAN 1
- Patient seen and evaluated by bedside w/ Dr. Sebastian Paiz  - Patient left 1st metatarsal and proximal phalanx   - - Patient seen and evaluated by bedside w/ Dr. Sebastian Paiz  - Patient left 1st metatarsal and proximal phalanx   - Patient consent signed, witness and placed in chart for Left Hallux Bone Biopsy  - Left Hallux Bone Biopsy performed- 1. Left 1st met head bone pathology 2. 1 left 1st proximal phalanx bone pathology 2. left 1st met head bone culture  -Area irrigated with copious amounts of normal saline  - Area pat dry  - 1st interspace dressed with betadine soaked gauze and incision sites dressed with Aquacel Ag+ and DSD  - Follow up on pathologies and culture  - Podiatry team will continue to follow patient while in house

## 2020-11-09 NOTE — PROGRESS NOTE ADULT - SUBJECTIVE AND OBJECTIVE BOX
Patient is a 74y old  Male who presents with a chief complaint of change in mental status (05 Nov 2020 14:15)  Patient seen in follow up for MARINA.     Improving renal indices.        PAST MEDICAL HISTORY:  Diabetes mellitus  Hypertension      MEDICATIONS  (STANDING):  aspirin enteric coated 81 milliGRAM(s) Oral daily  atorvastatin 40 milliGRAM(s) Oral at bedtime  cefTRIAXone   IVPB 2000 milliGRAM(s) IV Intermittent every 24 hours  clopidogrel Tablet 75 milliGRAM(s) Oral daily  dextrose 5%. 1000 milliLiter(s) (50 mL/Hr) IV Continuous <Continuous>  dextrose 50% Injectable 12.5 Gram(s) IV Push once  dextrose 50% Injectable 25 Gram(s) IV Push once  dextrose 50% Injectable 25 Gram(s) IV Push once  enoxaparin Injectable 40 milliGRAM(s) SubCutaneous daily  influenza   Vaccine 0.5 milliLiter(s) IntraMuscular once  insulin glargine Injectable (LANTUS) 10 Unit(s) SubCutaneous at bedtime  insulin lispro (ADMELOG) corrective regimen sliding scale   SubCutaneous three times a day before meals  insulin lispro (ADMELOG) corrective regimen sliding scale   SubCutaneous at bedtime  metoprolol tartrate 25 milliGRAM(s) Oral every 12 hours    MEDICATIONS  (PRN):  acetaminophen   Tablet .. 650 milliGRAM(s) Oral every 6 hours PRN Temp greater or equal to 38C (100.4F), Mild Pain (1 - 3)  dextrose 40% Gel 15 Gram(s) Oral once PRN Blood Glucose LESS THAN 70 milliGRAM(s)/deciliter  glucagon  Injectable 1 milliGRAM(s) IntraMuscular once PRN Glucose LESS THAN 70 milligrams/deciliter    T(C): 37.1 (11-09-20 @ 12:00), Max: 37.4 (11-08-20 @ 23:57)  HR: 65 (11-09-20 @ 12:00) (65 - 73)  BP: 149/70 (11-09-20 @ 12:00) (121/74 - 152/82)  RR: 18 (11-09-20 @ 12:00)  SpO2: 96% (11-09-20 @ 12:00)  Wt(kg): --  I&O's Detail    08 Nov 2020 07:01  -  09 Nov 2020 07:00  --------------------------------------------------------  IN:    Oral Fluid: 460 mL  Total IN: 460 mL    OUT:    Voided (mL): 500 mL  Total OUT: 500 mL    Total NET: -40 mL              PHYSICAL EXAM:  General: No distress  Respiratory: b/l air entry  Cardiovascular: S1 S2  Gastrointestinal: soft  Extremities:  edema                               LABORATORY:                        10.6   5.73  )-----------( 226      ( 08 Nov 2020 07:53 )             29.7     11-08    142  |  109<H>  |  28<H>  ----------------------------<  122<H>  4.0   |  29  |  1.60<H>    Ca    8.8      08 Nov 2020 07:53  Mg     2.4     11-08      Sodium, Serum: 142 mmol/L (11-08 @ 07:53)    Potassium, Serum: 4.0 mmol/L (11-08 @ 07:53)    Hemoglobin: 10.6 g/dL (11-08 @ 07:53)  Hemoglobin: 9.9 g/dL (11-07 @ 06:22)    Creatinine, Serum 1.60 (11-08 @ 07:53)  Creatinine, Serum 1.70 (11-07 @ 06:22)

## 2020-11-09 NOTE — PROGRESS NOTE ADULT - SUBJECTIVE AND OBJECTIVE BOX
Neurology Follow up note    SUSIE [id number]    HPI:  This is a 74 M with PMH of HTN DM HLD CAD who was BIBEMS to [facility] with complaints of change in mental status and rigors. The son found the patient confused and had urinated on himself. In the ER patient had a temp of 103. CT showed ground glass opacities. Patient was transferred to [place] for admission. In [place], patient is more lucid. He reports feeling weak for about a week. He status he was eating junk food and sweets. He denies fevers, chills, n/v/d/cough/CP/SOB/dysuria. He has been helping his brother move into an assisted living over the past 2 weeks.  (04 Nov 2020 10:38)      Interval History -no new events    Patient is seen, chart was reviewed and case was discussed with the treatment team.  Pt is not in any distress.   Lying on bed comfortably.     Vital Signs Last 24 Hrs  T(C): 36.7 (09 Nov 2020 08:49), Max: 37.4 (08 Nov 2020 23:57)  T(F): 98.1 (09 Nov 2020 08:49), Max: 99.3 (08 Nov 2020 23:57)  HR: 66 (09 Nov 2020 08:49) (66 - 69)  BP: 121/74 (09 Nov 2020 08:49) (121/74 - 148/63)  BP(mean): --  RR: 16 (09 Nov 2020 08:49) (16 - 18)  SpO2: 99% (09 Nov 2020 08:49) (95% - 99%)        REVIEW OF SYSTEMS:    Eyes: No eye pain, visual disturbances, o  ENT:  No difficulty hearing, tinnitus, vertigo; No sinus or throat pain  Neck: No pain or stiffness  Respiratory: No  wheezing, chills or hemoptysis  Cardiovascular: No chest pain, palpitations, shortness of breath  Gastrointestinal: No abdominal or epigastric pain. No nausea, vomiting or hematemesis; No diarrhea or constipation  Genitourinary: No dysuria, , hematuria or incontinence  Neurological: No headaches,   Psychiatric: No anxiety, mood swings or difficulty sleeping  Musculoskeletal: No joint pain or swelling;   Skin: No itching, burning, rashes or lesions   Lymph Nodes: No enlarged glands  Endocrine: No heat or cold intolerance; No hair loss,   Allergy and Immunologic: No hives or eczema    On Neurological Examination:    Mental Status - Pt is alert, awake, oriented X3. . Follows commands well and able to answer questions appropriately  Mood and affect  normal    Speech -  Normal.    Cranial Nerves - Pupils 3 mm equal and reactive to light, extraocular eye movements intact.   Pt has no visual field deficit.  Pt has no facial asymmetry. Facial sensation is intact.  Tongue - is in midline.    Muscle tone - is normal all over.    Motor Exam - 5/5 of UE AND RLE  LLE 4/5  No drift. No shaking or tremors.    Sensory Exam -. Pt withdraws all extremities equally on stimulation. No asymmetry seen.      coordination:    Finger to nose: normal  .    Deep tendon Reflexes - 2 plus all over.      Neck Supple -  Yes.     MEDICATIONS  (STANDING):  aspirin enteric coated 81 milliGRAM(s) Oral daily  atorvastatin 40 milliGRAM(s) Oral at bedtime  cefTRIAXone   IVPB 2000 milliGRAM(s) IV Intermittent every 24 hours  clopidogrel Tablet 75 milliGRAM(s) Oral daily  dextrose 5%. 1000 milliLiter(s) (50 mL/Hr) IV Continuous <Continuous>  dextrose 50% Injectable 12.5 Gram(s) IV Push once  dextrose 50% Injectable 25 Gram(s) IV Push once  dextrose 50% Injectable 25 Gram(s) IV Push once  enoxaparin Injectable 40 milliGRAM(s) SubCutaneous daily  influenza   Vaccine 0.5 milliLiter(s) IntraMuscular once  insulin glargine Injectable (LANTUS) 10 Unit(s) SubCutaneous at bedtime  insulin lispro (ADMELOG) corrective regimen sliding scale   SubCutaneous three times a day before meals  insulin lispro (ADMELOG) corrective regimen sliding scale   SubCutaneous at bedtime  metoprolol tartrate 25 milliGRAM(s) Oral every 12 hours    MEDICATIONS  (PRN):  acetaminophen   Tablet .. 650 milliGRAM(s) Oral every 6 hours PRN Temp greater or equal to 38C (100.4F), Mild Pain (1 - 3)  dextrose 40% Gel 15 Gram(s) Oral once PRN Blood Glucose LESS THAN 70 milliGRAM(s)/deciliter  glucagon  Injectable 1 milliGRAM(s) IntraMuscular once PRN Glucose LESS THAN 70 milligrams/deciliter        Allergies    No Known Allergies    Intolerances                          10.6   5.73  )-----------( 226      ( 08 Nov 2020 07:53 )             29.7   11-08    142  |  109<H>  |  28<H>  ----------------------------<  122<H>  4.0   |  29  |  1.60<H>    Ca    8.8      08 Nov 2020 07:53  Mg     2.4     11-08          Hemoglobin A1C:     Vitamin B12     RADIOLOGY    ASSESSMENT AND PLAN:      seen for ams; improved  consistent metabolic encephalopathy  streptococcal bacteremia     increase ambulation  antibiotic as per ID  dw son on phone  Physical therapy evaluation.  Pain is accessed and addressed.  Would continue to follow.

## 2020-11-09 NOTE — PROGRESS NOTE ADULT - PROBLEM SELECTOR PLAN 1
cont lantus 10 units qhs  cont humalog scale coverage qac/qhs  goal bg 100-180 in hosp setting  cont cons cho diet

## 2020-11-09 NOTE — PROGRESS NOTE ADULT - PROBLEM SELECTOR PLAN 1
Strep bacteremia  Possibly from LLE cellulitis/wound -- patient had wound last week, now healed but   Podiatry consult noted  MRI -- no definitive osteo -- podiatry to do bedside bx to r/o osteo  Continue iv abx  F/U culture data -- repeat cultures negative  ID f/u  Will need at least 2 weeks total of iv abx

## 2020-11-09 NOTE — PROGRESS NOTE ADULT - SUBJECTIVE AND OBJECTIVE BOX
CAPILLARY BLOOD GLUCOSE      POCT Blood Glucose.: 175 mg/dL (08 Nov 2020 21:44)  POCT Blood Glucose.: 159 mg/dL (08 Nov 2020 16:54)  POCT Blood Glucose.: 239 mg/dL (08 Nov 2020 11:52)      Vital Signs Last 24 Hrs  T(C): 37.1 (09 Nov 2020 05:14), Max: 37.4 (08 Nov 2020 23:57)  T(F): 98.8 (09 Nov 2020 05:14), Max: 99.3 (08 Nov 2020 23:57)  HR: 66 (09 Nov 2020 05:14) (66 - 73)  BP: 148/63 (09 Nov 2020 05:14) (126/76 - 148/63)  BP(mean): --  RR: 17 (09 Nov 2020 05:14) (17 - 18)  SpO2: 98% (09 Nov 2020 05:14) (91% - 98%)    General: WN/WD NAD  Respiratory: CTA B/L  CV: RRR, S1S2, no murmurs, rubs or gallops  Abdominal: Soft, NT, ND +BS, Last BM  Extremities: No edema, + peripheral pulses     11-08    142  |  109<H>  |  28<H>  ----------------------------<  122<H>  4.0   |  29  |  1.60<H>    Ca    8.8      08 Nov 2020 07:53  Mg     2.4     11-08        atorvastatin 40 milliGRAM(s) Oral at bedtime  dextrose 40% Gel 15 Gram(s) Oral once PRN  dextrose 50% Injectable 12.5 Gram(s) IV Push once  dextrose 50% Injectable 25 Gram(s) IV Push once  dextrose 50% Injectable 25 Gram(s) IV Push once  glucagon  Injectable 1 milliGRAM(s) IntraMuscular once PRN  insulin glargine Injectable (LANTUS) 10 Unit(s) SubCutaneous at bedtime  insulin lispro (ADMELOG) corrective regimen sliding scale   SubCutaneous three times a day before meals  insulin lispro (ADMELOG) corrective regimen sliding scale   SubCutaneous at bedtime

## 2020-11-09 NOTE — PROGRESS NOTE ADULT - PROBLEM SELECTOR PLAN 1
pt with sepsis - billy - ckd - dm - obesity - s/p AMS - NSTEMI  cardio follow up - on DAPT and AC - lovenox - TTE - severe AS - further w/u and tele monitor  ckd billy - I and O - monitor renal indices - serial labs - I and O  Podiatry cx noted - LE wound eval  sepsis eval - cx noted - ID eval noted - on emp ABX - source LE wound - fever 101.8 on 11 5 2020 - repeat covid pcr neg  DM care - serial FS - dietary discretion  obesity - likely at risk for MARCUS  monitor VS and HD and Sat  out of bed as tolerated  on RA  labs and imaging reviewed  ct chest more consistent with CHF - doubt PNA.

## 2020-11-09 NOTE — PROGRESS NOTE ADULT - ASSESSMENT
75 y/o male assessed at bedside w/ Dr. Sebastian Paiz- per the MRI results- Recommend perform Bone Biopsy of the left 1st metatarsal head and base of the proximal phalanx to rule out 73 y/o male assessed at bedside w/ Dr. Sebastian Paiz- per the MRI results- Recommend perform Bone Biopsy of the left 1st metatarsal head and base of the proximal phalanx to rule out Osteomyelitis vs Bone Cyst vs Atypical Bone Cyst. Secondary to patient history and presentation, his suspicion of osteomyelitis 73 y/o male assessed at bedside w/ Dr. Sebastian Paiz- per the MRI results- Recommend perform Bone Biopsy of the left 1st metatarsal head and base of the proximal phalanx to rule out Osteomyelitis vs Bone Cyst vs Atypical Bone Cyst. Secondary to patient history and presentation, his suspicion of osteomyelitis.  Left 1st metatarsal and proximal phalanx prepped and drapped in the usual aseptic manner, bone biopsies  performed along the dorsal aspect of the 1st metatarsal head and base of the proximal phalanx. Follow up with bone pathologies and bone culture. Incision sites dressed with Aquacel Ag+ and DSD. Podiatry team will continue 75 y/o male assessed at bedside w/ Dr. Sebastian Paiz- per the MRI results- Recommend perform Bone Biopsy of the left 1st metatarsal head and base of the proximal phalanx to rule out Osteomyelitis vs Bone Cyst vs Atypical Bone Cyst. Secondary to patient history and presentation, his suspicion of osteomyelitis.  Left 1st metatarsal and proximal phalanx prepped and drapped in the usual aseptic manner, bone biopsies  performed along the dorsal aspect of the 1st metatarsal head and base of the proximal phalanx. Follow up with bone pathologies and bone culture. Incision sites dressed with Aquacel Ag+ and DSD. Podiatry team will continue     Ordered Uric Acid to r/o Gout

## 2020-11-09 NOTE — PROGRESS NOTE ADULT - SUBJECTIVE AND OBJECTIVE BOX
[facility] -- Jas Plaza Grossman, [place], Tommy Love, Seema Bowers: Office # [phone]    Follow Up: NSTEMI, AS    Subjective/Observations: Patient seen and examined. Patient awake, alert, OOB to chair. No complaints of chest pain, dyspnea, palpitations or dizziness. No SOB, No signs of orthopnea or PND. LLE continues to be red and swollen. inflammatory markers and pro calcitonin elevated.      REVIEW OF SYSTEMS: All review of systems is negative for eye, ENT, GI, , allergic, dermatologic, musculoskeletal and neurologic except as described above    PAST MEDICAL & SURGICAL HISTORY:  Diabetes mellitus  Hypertension    MEDICATIONS  (STANDING):  aspirin enteric coated 81 milliGRAM(s) Oral daily  atorvastatin 40 milliGRAM(s) Oral at bedtime  cefTRIAXone   IVPB 2000 milliGRAM(s) IV Intermittent every 24 hours  clopidogrel Tablet 75 milliGRAM(s) Oral daily  dextrose 5%. 1000 milliLiter(s) (50 mL/Hr) IV Continuous <Continuous>  dextrose 50% Injectable 12.5 Gram(s) IV Push once  dextrose 50% Injectable 25 Gram(s) IV Push once  dextrose 50% Injectable 25 Gram(s) IV Push once  enoxaparin Injectable 40 milliGRAM(s) SubCutaneous daily  influenza   Vaccine 0.5 milliLiter(s) IntraMuscular once  insulin glargine Injectable (LANTUS) 10 Unit(s) SubCutaneous at bedtime  insulin lispro (ADMELOG) corrective regimen sliding scale   SubCutaneous three times a day before meals  insulin lispro (ADMELOG) corrective regimen sliding scale   SubCutaneous at bedtime  metoprolol tartrate 25 milliGRAM(s) Oral every 12 hours    MEDICATIONS  (PRN):  acetaminophen   Tablet .. 650 milliGRAM(s) Oral every 6 hours PRN Temp greater or equal to 38C (100.4F), Mild Pain (1 - 3)  dextrose 40% Gel 15 Gram(s) Oral once PRN Blood Glucose LESS THAN 70 milliGRAM(s)/deciliter  glucagon  Injectable 1 milliGRAM(s) IntraMuscular once PRN Glucose LESS THAN 70 milligrams/deciliter    Allergies  No Known Allergies    Vital Signs Last 24 Hrs  T(C): 36.7 (09 Nov 2020 08:49), Max: 37.4 (08 Nov 2020 23:57)  T(F): 98.1 (09 Nov 2020 08:49), Max: 99.3 (08 Nov 2020 23:57)  HR: 66 (09 Nov 2020 08:49) (66 - 69)  BP: 121/74 (09 Nov 2020 08:49) (121/74 - 148/63)  BP(mean): --  RR: 16 (09 Nov 2020 08:49) (16 - 18)  SpO2: 99% (09 Nov 2020 08:49) (95% - 99%)  I&O's Summary    08 Nov 2020 07:01  -  09 Nov 2020 07:00  --------------------------------------------------------  IN: 460 mL / OUT: 500 mL / NET: -40 mL    TELE: SR 60s   PHYSICAL EXAM:  Appearance: NAD, no distress, alert, Well developed   HEENT: Moist Mucous Membranes, Anicteric  Cardiovascular: Regular rate and rhythm, Normal S1 S2, No JVD, + murmurs, No rubs, gallops or clicks  Respiratory: Non-labored, Clear to auscultation, No rales, No rhonchi, No wheezing.   Gastrointestinal:  Soft, Non-tender, + BS  Neurologic: Non-focal  Skin: Warm and dry, + LLE redness and swelling,  No visible rashes or ulcers, No ecchymosis, No cyanosis  Musculoskeletal: No clubbing, No cyanosis, No joint swelling/tenderness  Psychiatry: Mood & affect appropriate  Lymph: No peripheral edema.     LABS: All Labs Reviewed:                        10.6   5.73  )-----------( 226      ( 08 Nov 2020 07:53 )             29.7                         9.9    6.87  )-----------( 197      ( 07 Nov 2020 06:22 )             27.2     08 Nov 2020 07:53    142    |  109    |  28     ----------------------------<  122    4.0     |  29     |  1.60   07 Nov 2020 06:22    142    |  108    |  25     ----------------------------<  139    3.9     |  29     |  1.70     Ca    8.8        08 Nov 2020 07:53  Ca    8.7        07 Nov 2020 06:22  Mg     2.4       08 Nov 2020 07:53  Mg     2.2       07 Nov 2020 06:22    Creatine Kinase, Serum: 138 U/L (11-06-20 @ 06:37)  Troponin I, Serum: 1.780 ng/mL (11-06-20 @ 06:37)  Creatine Kinase, Serum: 202 U/L (11-05-20 @ 14:05)  Cholesterol, Serum: 96 mg/dL (11-04-20 @ 10:43)  HDL Cholesterol, Serum: 32 mg/dL (11-04-20 @ 10:43)  Triglycerides, Serum: 45 mg/dL (11-04-20 @ 10:43)    12 Lead ECG:   Ventricular Rate 77 BPM  Atrial Rate 77 BPM  P-R Interval 154 ms  QRS Duration 88 ms  Q-T Interval 374 ms  QTC Calculation(Bazett) 423 ms  P Axis 9 degrees  R Axis 3 degrees  T Axis 107 degrees  Diagnosis Line Normal sinus rhythm  Minimal voltage criteria for LVH, may be normal variant  Inferior infarct , age undetermined  Abnormal ECG  When compared with ECG of 10-JUL-2010 13:13,  No significant change was found  Confirmed by Roe Ramos MD ([id number]) on 11/5/2020 12:45:13 PM (11-04-20 @ 13:00)    < from: TTE Echo Complete w/o Contrast w/ Doppler (11.04.20 @ 11:23) >  Dimensions:  LA 3.7  Normal Values: 2.0 - 4.0 cm  Ao 3.6        Normal Values: 2.0 - 3.8 cm  SEPTUM 1.3       Normal Values: 0.6 - 1.2 cm  PWT 1.0       Normal Values: 0.6 - 1.1 cm  LVIDd 4.9         Normal Values: 3.0 - 5.6 cm  LVIDs 3.3         Normal Values: 1.8 - 4.0 cm    OBSERVATIONS:  Actually difficult and limited study, unable to obtain subcostal views  Mitral Valve: Thickened leaflets, mild MR. Mitral annular calcification  Aortic Valve/Aorta: Calcified trileaflet aortic valve with decreased opening. Peak transaortic valve gradient is 69 mmHg with a mean transaortic valve gradient of 49 mmHg. The aortic valve area is calculated to be 0.7 sq cm by continuity equation. This is consistent with severe aortic stenosis  Tricuspid Valve: normal with trace TR.  Pulmonic Valve: Not well-visualized  Left Atrium: normal  Right Atrium: Not well-visualized  Left Ventricle: normal LV size and systolic function, estimated LVEF of 55-60%.  Right Ventricle: Grossly normal size and systolic function.  Pericardium/Pleura: normal, no significant pericardial effusion.    Conclusion:  Technically difficult and limited study  Normal left ventricular internal dimensions and systolic function, estimated LVEF of 55-60%.  Grossly normal RV size and systolic function.  Calcified trileaflet aortic valve with severe aortic stenosis  Mild MR  Trace TR.  No significant pericardial effusion.    < end of copied text >    < from: CT Chest No Cont (11.04.20 @ 01:33) >  IMPRESSION: Moderate pulmonary edema. Please note that superimposed infection cannot be excluded.  < end of copied text >
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
## 2020-11-09 NOTE — PROGRESS NOTE ADULT - SUBJECTIVE AND OBJECTIVE BOX
Patient is a 74y old  Male who presents with a chief complaint of change in mental status (09 Nov 2020 11:04)      INTERVAL HPI/OVERNIGHT EVENTS: Patient seen and examined. NAD. No complaints.    Vital Signs Last 24 Hrs  T(C): 37.1 (09 Nov 2020 12:00), Max: 37.4 (08 Nov 2020 23:57)  T(F): 98.8 (09 Nov 2020 12:00), Max: 99.3 (08 Nov 2020 23:57)  HR: 65 (09 Nov 2020 12:00) (65 - 67)  BP: 149/70 (09 Nov 2020 12:00) (121/74 - 149/70)  BP(mean): --  RR: 18 (09 Nov 2020 12:00) (16 - 18)  SpO2: 96% (09 Nov 2020 12:00) (96% - 99%)    11-08    142  |  109<H>  |  28<H>  ----------------------------<  122<H>  4.0   |  29  |  1.60<H>    Ca    8.8      08 Nov 2020 07:53  Mg     2.4     11-08                            10.6   5.73  )-----------( 226      ( 08 Nov 2020 07:53 )             29.7       CAPILLARY BLOOD GLUCOSE      POCT Blood Glucose.: 190 mg/dL (09 Nov 2020 12:09)  POCT Blood Glucose.: 141 mg/dL (09 Nov 2020 08:38)  POCT Blood Glucose.: 175 mg/dL (08 Nov 2020 21:44)  POCT Blood Glucose.: 159 mg/dL (08 Nov 2020 16:54)              acetaminophen   Tablet .. 650 milliGRAM(s) Oral every 6 hours PRN  aspirin enteric coated 81 milliGRAM(s) Oral daily  atorvastatin 40 milliGRAM(s) Oral at bedtime  cefTRIAXone   IVPB 2000 milliGRAM(s) IV Intermittent every 24 hours  clopidogrel Tablet 75 milliGRAM(s) Oral daily  dextrose 40% Gel 15 Gram(s) Oral once PRN  dextrose 5%. 1000 milliLiter(s) IV Continuous <Continuous>  dextrose 50% Injectable 12.5 Gram(s) IV Push once  dextrose 50% Injectable 25 Gram(s) IV Push once  dextrose 50% Injectable 25 Gram(s) IV Push once  enoxaparin Injectable 40 milliGRAM(s) SubCutaneous daily  glucagon  Injectable 1 milliGRAM(s) IntraMuscular once PRN  influenza   Vaccine 0.5 milliLiter(s) IntraMuscular once  insulin glargine Injectable (LANTUS) 10 Unit(s) SubCutaneous at bedtime  insulin lispro (ADMELOG) corrective regimen sliding scale   SubCutaneous three times a day before meals  insulin lispro (ADMELOG) corrective regimen sliding scale   SubCutaneous at bedtime  metoprolol tartrate 25 milliGRAM(s) Oral every 12 hours    d< from: MR Foot No Cont, Left (11.09.20 @ 08:10) >    EXAM:  MR FOOT LT                            PROCEDURE DATE:  11/09/2020          INTERPRETATION:  CLINICAL INDICATION: Left foot pain, evaluate for foreign body near the first metatarsophalangeal joint and first interspace region.    Multiplanar Multisequence MR of the left foot. Images obtained of the midfoot through forefoot. No IV contrast.    Prior Studies: Left foot radiographs 11/6/2020.    FINDINGS:  T2 hyperintense cystic changes and edema in the distal aspect of the great toe metatarsal and in the great toe proximal phalanx. No definite for body is visualized in the subcutaneous tissues. There is diffuse subcutaneous edema at the dorsum of the forefoot. No abnormal muscle edema. Visualized flexor and extensor tendons are grossly intact. Likely second hammertoe. No abnormal joint effusion. The Lisfranc ligament is grossly intact.    IMPRESSION:  No definite foreign body is seen in the subcutaneous tissues. Please note that MRI may not be sensitive for small foreign bodies or all types of foreign bodies. If there is persistent clinical concern for foreign body, correlation with contrast enhanced CT or ultrasound can be performed.    Diffuse subcutaneous edema. Correlate clinically to exclude cellulitis.    T2 hyperintense cystic changes and marrow edema about the great toe metatarsophalangeal joint, likely degenerative.    Likely second hammertoe, evaluation limited on nonweightbearing exam.            SARTHAK KRISHNAMURTHY MD; Attending Radiologist  This document has been electronically signed. Nov 9 2020 11:04AM    < end of copied text >            REVIEW OF SYSTEMS:  CONSTITUTIONAL: No fever, no weight loss, or no fatigue  NECK: No pain, no stiffness  RESPIRATORY: No cough, no wheezing, no chills, no hemoptysis, No shortness of breath  CARDIOVASCULAR: No chest pain, no palpitations, no dizziness, no leg swelling  GASTROINTESTINAL: No abdominal pain. No nausea, no vomiting, no hematemesis; No diarrhea, no constipation. No melena, no hematochezia.  GENITOURINARY: No dysuria, no frequency, no hematuria, no incontinence  NEUROLOGICAL: No headaches, no loss of strength, no numbness, no tremors  SKIN: No itching, no burning  MUSCULOSKELETAL: No joint pain, no swelling; No muscle, no back, no extremity pain  PSYCHIATRIC: No depression, no mood swings,   HEME/LYMPH: No easy bruising, no bleeding gums  ALLERY AND IMMUNOLOGIC: No hives       Consultant(s) Notes Reviewed:  [X] YES  [ ] NO    PHYSICAL EXAM:  GENERAL: NAD  HEAD:  Atraumatic, Normocephalic  EYES: EOMI, PERRLA, conjunctiva and sclera clear  ENMT: No tonsillar erythema, exudates, or enlargement; Moist mucous membranes  NECK: Supple, No JVD  NERVOUS SYSTEM:  Awake & alert  CHEST/LUNG: Clear to auscultation bilaterally; No rales, rhonchi, wheezing,  HEART: Regular rate and rhythm  ABDOMEN: Soft, Nontender, Nondistended; Bowel sounds present  EXTREMITIES:  + LLE erythema  LYMPH: No lymphadenopathy noted  SKIN: No rashes      Advanced care planning discussed with patient/family [X] YES   [ ] NO    Advanced care planning discussed with patient/family. Patient's health status was discussed. All appropriate changes have been made regarding patient's end-of-life care. Advanced care planning forms reviewed/discussed/completed.  20 minutes spent.

## 2020-11-10 DIAGNOSIS — M86.60 OTHER CHRONIC OSTEOMYELITIS, UNSPECIFIED SITE: ICD-10-CM

## 2020-11-10 LAB
ANION GAP SERPL CALC-SCNC: 7 MMOL/L — SIGNIFICANT CHANGE UP (ref 5–17)
BUN SERPL-MCNC: 23 MG/DL — SIGNIFICANT CHANGE UP (ref 7–23)
CALCIUM SERPL-MCNC: 8.7 MG/DL — SIGNIFICANT CHANGE UP (ref 8.5–10.1)
CHLORIDE SERPL-SCNC: 110 MMOL/L — HIGH (ref 96–108)
CO2 SERPL-SCNC: 25 MMOL/L — SIGNIFICANT CHANGE UP (ref 22–31)
CREAT SERPL-MCNC: 1.4 MG/DL — HIGH (ref 0.5–1.3)
CULTURE RESULTS: SIGNIFICANT CHANGE UP
GLUCOSE SERPL-MCNC: 128 MG/DL — HIGH (ref 70–99)
HCT VFR BLD CALC: 28.2 % — LOW (ref 39–50)
HGB BLD-MCNC: 9.6 G/DL — LOW (ref 13–17)
MAGNESIUM SERPL-MCNC: 2.3 MG/DL — SIGNIFICANT CHANGE UP (ref 1.6–2.6)
MCHC RBC-ENTMCNC: 31 PG — SIGNIFICANT CHANGE UP (ref 27–34)
MCHC RBC-ENTMCNC: 34 GM/DL — SIGNIFICANT CHANGE UP (ref 32–36)
MCV RBC AUTO: 91 FL — SIGNIFICANT CHANGE UP (ref 80–100)
NRBC # BLD: 0 /100 WBCS — SIGNIFICANT CHANGE UP (ref 0–0)
ORGANISM # SPEC MICROSCOPIC CNT: SIGNIFICANT CHANGE UP
PLATELET # BLD AUTO: 283 K/UL — SIGNIFICANT CHANGE UP (ref 150–400)
POTASSIUM SERPL-MCNC: 4 MMOL/L — SIGNIFICANT CHANGE UP (ref 3.5–5.3)
POTASSIUM SERPL-SCNC: 4 MMOL/L — SIGNIFICANT CHANGE UP (ref 3.5–5.3)
RBC # BLD: 3.1 M/UL — LOW (ref 4.2–5.8)
RBC # FLD: 12.2 % — SIGNIFICANT CHANGE UP (ref 10.3–14.5)
SODIUM SERPL-SCNC: 142 MMOL/L — SIGNIFICANT CHANGE UP (ref 135–145)
SPECIMEN SOURCE: SIGNIFICANT CHANGE UP
WBC # BLD: 7.35 K/UL — SIGNIFICANT CHANGE UP (ref 3.8–10.5)
WBC # FLD AUTO: 7.35 K/UL — SIGNIFICANT CHANGE UP (ref 3.8–10.5)

## 2020-11-10 PROCEDURE — 93971 EXTREMITY STUDY: CPT | Mod: 26,LT

## 2020-11-10 PROCEDURE — 99232 SBSQ HOSP IP/OBS MODERATE 35: CPT

## 2020-11-10 RX ADMIN — Medication 2: at 12:35

## 2020-11-10 RX ADMIN — Medication 81 MILLIGRAM(S): at 12:34

## 2020-11-10 RX ADMIN — Medication 25 MILLIGRAM(S): at 05:18

## 2020-11-10 RX ADMIN — INSULIN GLARGINE 10 UNIT(S): 100 INJECTION, SOLUTION SUBCUTANEOUS at 21:28

## 2020-11-10 RX ADMIN — Medication 25 MILLIGRAM(S): at 17:01

## 2020-11-10 RX ADMIN — CEFTRIAXONE 100 MILLIGRAM(S): 500 INJECTION, POWDER, FOR SOLUTION INTRAMUSCULAR; INTRAVENOUS at 08:58

## 2020-11-10 RX ADMIN — CLOPIDOGREL BISULFATE 75 MILLIGRAM(S): 75 TABLET, FILM COATED ORAL at 12:34

## 2020-11-10 RX ADMIN — ENOXAPARIN SODIUM 40 MILLIGRAM(S): 100 INJECTION SUBCUTANEOUS at 12:34

## 2020-11-10 RX ADMIN — Medication 2: at 17:01

## 2020-11-10 RX ADMIN — ATORVASTATIN CALCIUM 40 MILLIGRAM(S): 80 TABLET, FILM COATED ORAL at 21:28

## 2020-11-10 NOTE — PROGRESS NOTE ADULT - PROBLEM SELECTOR PLAN 1
Strep bacteremia  Possibly from LLE cellulitis/wound -- patient had wound last week, now healed but   Podiatry consult noted  MRI -- no definitive osteo -- s/p bone bx, f/u cytopath  Continue iv abx until 11.14.2020  Repeat cultures negative  ID f/u

## 2020-11-10 NOTE — PROGRESS NOTE ADULT - PROBLEM SELECTOR PLAN 1
cont lantus 10 units qhs  cont humalog mod dose scale coverage qac/qhs  cont cons cho diet  goal bg 100-180 in hosp setting

## 2020-11-10 NOTE — PROGRESS NOTE ADULT - SUBJECTIVE AND OBJECTIVE BOX
CAPILLARY BLOOD GLUCOSE      POCT Blood Glucose.: 138 mg/dL (10 Nov 2020 07:47)  POCT Blood Glucose.: 181 mg/dL (09 Nov 2020 20:59)  POCT Blood Glucose.: 151 mg/dL (09 Nov 2020 16:42)  POCT Blood Glucose.: 190 mg/dL (09 Nov 2020 12:09)  POCT Blood Glucose.: 141 mg/dL (09 Nov 2020 08:38)      Vital Signs Last 24 Hrs  T(C): 37 (10 Nov 2020 04:31), Max: 37.1 (09 Nov 2020 12:00)  T(F): 98.6 (10 Nov 2020 04:31), Max: 98.8 (09 Nov 2020 12:00)  HR: 67 (10 Nov 2020 04:31) (64 - 67)  BP: 164/71 (10 Nov 2020 04:31) (121/74 - 164/71)  BP(mean): --  RR: 18 (10 Nov 2020 04:31) (16 - 18)  SpO2: 96% (10 Nov 2020 04:31) (94% - 99%)    General: WN/WD NAD  Respiratory: CTA B/L  CV: RRR, S1S2, no murmurs, rubs or gallops  Abdominal: Soft, NT, ND +BS, Last BM  Extremities: No edema, + peripheral pulses     11-10    142  |  110<H>  |  23  ----------------------------<  128<H>  4.0   |  25  |  1.40<H>    Ca    8.7      10 Nov 2020 07:09  Mg     2.3     11-10        atorvastatin 40 milliGRAM(s) Oral at bedtime  dextrose 40% Gel 15 Gram(s) Oral once PRN  dextrose 50% Injectable 12.5 Gram(s) IV Push once  dextrose 50% Injectable 25 Gram(s) IV Push once  dextrose 50% Injectable 25 Gram(s) IV Push once  glucagon  Injectable 1 milliGRAM(s) IntraMuscular once PRN  insulin glargine Injectable (LANTUS) 10 Unit(s) SubCutaneous at bedtime  insulin lispro (ADMELOG) corrective regimen sliding scale   SubCutaneous three times a day before meals  insulin lispro (ADMELOG) corrective regimen sliding scale   SubCutaneous at bedtime

## 2020-11-10 NOTE — PROGRESS NOTE ADULT - SUBJECTIVE AND OBJECTIVE BOX
Date/Time Patient Seen:  		  Referring MD:   Data Reviewed	       Patient is a 74y old  Male who presents with a chief complaint of change in mental status (09 Nov 2020 15:26)      Subjective/HPI     PAST MEDICAL & SURGICAL HISTORY:  Diabetes mellitus    Hypertension          Medication list         MEDICATIONS  (STANDING):  aspirin enteric coated 81 milliGRAM(s) Oral daily  atorvastatin 40 milliGRAM(s) Oral at bedtime  cefTRIAXone   IVPB 2000 milliGRAM(s) IV Intermittent every 24 hours  clopidogrel Tablet 75 milliGRAM(s) Oral daily  dextrose 5%. 1000 milliLiter(s) (50 mL/Hr) IV Continuous <Continuous>  dextrose 50% Injectable 12.5 Gram(s) IV Push once  dextrose 50% Injectable 25 Gram(s) IV Push once  dextrose 50% Injectable 25 Gram(s) IV Push once  enoxaparin Injectable 40 milliGRAM(s) SubCutaneous daily  influenza   Vaccine 0.5 milliLiter(s) IntraMuscular once  insulin glargine Injectable (LANTUS) 10 Unit(s) SubCutaneous at bedtime  insulin lispro (ADMELOG) corrective regimen sliding scale   SubCutaneous three times a day before meals  insulin lispro (ADMELOG) corrective regimen sliding scale   SubCutaneous at bedtime  metoprolol tartrate 25 milliGRAM(s) Oral every 12 hours    MEDICATIONS  (PRN):  acetaminophen   Tablet .. 650 milliGRAM(s) Oral every 6 hours PRN Temp greater or equal to 38C (100.4F), Mild Pain (1 - 3)  dextrose 40% Gel 15 Gram(s) Oral once PRN Blood Glucose LESS THAN 70 milliGRAM(s)/deciliter  glucagon  Injectable 1 milliGRAM(s) IntraMuscular once PRN Glucose LESS THAN 70 milligrams/deciliter         Vitals log        ICU Vital Signs Last 24 Hrs  T(C): 37 (10 Nov 2020 04:31), Max: 37.1 (09 Nov 2020 12:00)  T(F): 98.6 (10 Nov 2020 04:31), Max: 98.8 (09 Nov 2020 12:00)  HR: 67 (10 Nov 2020 04:31) (64 - 67)  BP: 164/71 (10 Nov 2020 04:31) (121/74 - 164/71)  BP(mean): --  ABP: --  ABP(mean): --  RR: 18 (10 Nov 2020 04:31) (16 - 18)  SpO2: 96% (10 Nov 2020 04:31) (94% - 99%)           Input and Output:  I&O's Detail    09 Nov 2020 07:01  -  10 Nov 2020 07:00  --------------------------------------------------------  IN:  Total IN: 0 mL    OUT:    Voided (mL): 200 mL  Total OUT: 200 mL    Total NET: -200 mL          Lab Data                        9.6    7.35  )-----------( 283      ( 10 Nov 2020 07:09 )             28.2     11-08    142  |  109<H>  |  28<H>  ----------------------------<  122<H>  4.0   |  29  |  1.60<H>    Ca    8.8      08 Nov 2020 07:53  Mg     2.4     11-08              Review of Systems	      Objective     Physical Examination    heart s1s2  lung dec BS  abd soft      Pertinent Lab findings & Imaging      Diane:  NO   Adequate UO     I&O's Detail    09 Nov 2020 07:01  -  10 Nov 2020 07:00  --------------------------------------------------------  IN:  Total IN: 0 mL    OUT:    Voided (mL): 200 mL  Total OUT: 200 mL    Total NET: -200 mL               Discussed with:     Cultures:	        Radiology

## 2020-11-10 NOTE — PROGRESS NOTE ADULT - SUBJECTIVE AND OBJECTIVE BOX
Neurology Follow up note    SUSIE [id number]    HPI:  This is a 74 M with PMH of HTN DM HLD CAD who was BIBEMS to [facility] with complaints of change in mental status and rigors. The son found the patient confused and had urinated on himself. In the ER patient had a temp of 103. CT showed ground glass opacities. Patient was transferred to [place] for admission. In [place], patient is more lucid. He reports feeling weak for about a week. He status he was eating junk food and sweets. He denies fevers, chills, n/v/d/cough/CP/SOB/dysuria. He has been helping his brother move into an assisted living over the past 2 weeks.  (04 Nov 2020 10:38)      Interval History -reported leg pain    Patient is seen, chart was reviewed and case was discussed with the treatment team.  Pt is not in any distress.   Lying on bed comfortably.     Vital Signs Last 24 Hrs  T(C): 36.8 (10 Nov 2020 08:00), Max: 37.1 (09 Nov 2020 12:00)  T(F): 98.3 (10 Nov 2020 08:00), Max: 98.8 (09 Nov 2020 12:00)  HR: 62 (10 Nov 2020 08:00) (62 - 67)  BP: 168/73 (10 Nov 2020 08:00) (145/72 - 168/73)  BP(mean): --  RR: 17 (10 Nov 2020 08:00) (17 - 18)  SpO2: 94% (10 Nov 2020 08:00) (94% - 98%)        REVIEW OF SYSTEMS:    Eyes: No eye pain, visual disturbances, o  ENT:  No difficulty hearing, tinnitus, vertigo; No sinus or throat pain  Neck: No pain or stiffness  Respiratory: No  wheezing, chills or hemoptysis  Cardiovascular: No chest pain, palpitations, shortness of breath  Gastrointestinal: No abdominal or epigastric pain. No nausea, vomiting or hematemesis; No diarrhea or constipation  Genitourinary: No dysuria, , hematuria or incontinence  Neurological: No headaches,   Psychiatric: No anxiety, mood swings or difficulty sleeping  Musculoskeletal: No joint pain or swelling;   Skin: No itching, burning, rashes or lesions   Lymph Nodes: No enlarged glands  Endocrine: No heat or cold intolerance; No hair loss,   Allergy and Immunologic: No hives or eczema    On Neurological Examination:    Mental Status - Pt is alert, awake, oriented X3. . Follows commands well and able to answer questions appropriately  Mood and affect  normal    Speech -  Normal.    Cranial Nerves - Pupils 3 mm equal and reactive to light, extraocular eye movements intact.   Pt has no visual field deficit.  Pt has no facial asymmetry. Facial sensation is intact.  Tongue - is in midline.    Muscle tone - is normal all over.    Motor Exam - 5/5 of UE AND RLE  LLE 4/5  No drift. No shaking or tremors.    Sensory Exam -. Pt withdraws all extremities equally on stimulation. No asymmetry seen.      coordination:    Finger to nose: normal  .    Deep tendon Reflexes - 2 plus all over.      Neck Supple -  Yes.     MEDICATIONS  (STANDING):  aspirin enteric coated 81 milliGRAM(s) Oral daily  atorvastatin 40 milliGRAM(s) Oral at bedtime  cefTRIAXone   IVPB 2000 milliGRAM(s) IV Intermittent every 24 hours  clopidogrel Tablet 75 milliGRAM(s) Oral daily  dextrose 5%. 1000 milliLiter(s) (50 mL/Hr) IV Continuous <Continuous>  dextrose 50% Injectable 12.5 Gram(s) IV Push once  dextrose 50% Injectable 25 Gram(s) IV Push once  dextrose 50% Injectable 25 Gram(s) IV Push once  enoxaparin Injectable 40 milliGRAM(s) SubCutaneous daily  influenza   Vaccine 0.5 milliLiter(s) IntraMuscular once  insulin glargine Injectable (LANTUS) 10 Unit(s) SubCutaneous at bedtime  insulin lispro (ADMELOG) corrective regimen sliding scale   SubCutaneous three times a day before meals  insulin lispro (ADMELOG) corrective regimen sliding scale   SubCutaneous at bedtime  metoprolol tartrate 25 milliGRAM(s) Oral every 12 hours    MEDICATIONS  (PRN):  acetaminophen   Tablet .. 650 milliGRAM(s) Oral every 6 hours PRN Temp greater or equal to 38C (100.4F), Mild Pain (1 - 3)  dextrose 40% Gel 15 Gram(s) Oral once PRN Blood Glucose LESS THAN 70 milliGRAM(s)/deciliter  glucagon  Injectable 1 milliGRAM(s) IntraMuscular once PRN Glucose LESS THAN 70 milligrams/deciliter        Allergies    No Known Allergies    Intolerances                                     9.6    7.35  )-----------( 283      ( 10 Nov 2020 07:09 )             28.2           Hemoglobin A1C:     Vitamin B12     RADIOLOGY    ASSESSMENT AND PLAN:      seen for ams; improved  consistent metabolic encephalopathy  streptococcal bacteremia     no further neuro work up  increase ambulation  antibiotic as per ID  Physical therapy evaluation.  Pain is accessed and addressed.  Would continue to follow.

## 2020-11-10 NOTE — PROGRESS NOTE ADULT - ASSESSMENT
S/p Bone Biopsy of the Left 1st Metatarsal head and 1st proximal phalanx base. Per pathology results- chronic osteomyelitis.   Patient had discussion at bedside with Dr. Sebastian Paiz about treatment options, it was recommended due to the patient having chronic osteomyelitis, surgical intervention via 1st left metatarsal head resection and proximal phalanx base resection. Patient states that he will discuss with infectious disease and think about it over night and will have a decision. All questions and concerns were answered to the fullest extent with the patient. Discussed risks, benefits, and alternatives with risks including but not limited to pain, swelling, infection, seroma, hematoma, loss of limb and loss of life.  Will continue to follow up on patient definite decision for recommended surgical intervention for chronic osteomyelitis of the left 1st metatarsal head and proximal phalanx base via Left 1st metatarsal head resection and proximal phalanx base resection     Patient also had a repeat Venous Duplex along the LLE to r/o DVT secondary to patient consistent compliant of posterior left lower extremity pain along the calf- with increased edema and erythema

## 2020-11-10 NOTE — PROGRESS NOTE ADULT - SUBJECTIVE AND OBJECTIVE BOX
Patient is a 74y old  Male who presents with a chief complaint of change in mental status (09 Nov 2020 11:04)      INTERVAL HPI/OVERNIGHT EVENTS: Patient seen and examined. NAD. No complaints.    Vital Signs Last 24 Hrs  T(C): 36.8 (10 Nov 2020 08:00), Max: 37 (10 Nov 2020 04:31)  T(F): 98.3 (10 Nov 2020 08:00), Max: 98.6 (10 Nov 2020 04:31)  HR: 62 (10 Nov 2020 08:00) (62 - 67)  BP: 168/73 (10 Nov 2020 08:00) (145/72 - 168/73)  BP(mean): --  RR: 17 (10 Nov 2020 08:00) (17 - 18)  SpO2: 94% (10 Nov 2020 08:00) (94% - 98%)    11-10    142  |  110<H>  |  23  ----------------------------<  128<H>  4.0   |  25  |  1.40<H>    Ca    8.7      10 Nov 2020 07:09  Mg     2.3     11-10                            9.6    7.35  )-----------( 283      ( 10 Nov 2020 07:09 )             28.2       CAPILLARY BLOOD GLUCOSE      POCT Blood Glucose.: 138 mg/dL (10 Nov 2020 07:47)  POCT Blood Glucose.: 181 mg/dL (09 Nov 2020 20:59)  POCT Blood Glucose.: 151 mg/dL (09 Nov 2020 16:42)              acetaminophen   Tablet .. 650 milliGRAM(s) Oral every 6 hours PRN  aspirin enteric coated 81 milliGRAM(s) Oral daily  atorvastatin 40 milliGRAM(s) Oral at bedtime  cefTRIAXone   IVPB 2000 milliGRAM(s) IV Intermittent every 24 hours  clopidogrel Tablet 75 milliGRAM(s) Oral daily  dextrose 40% Gel 15 Gram(s) Oral once PRN  dextrose 5%. 1000 milliLiter(s) IV Continuous <Continuous>  dextrose 50% Injectable 12.5 Gram(s) IV Push once  dextrose 50% Injectable 25 Gram(s) IV Push once  dextrose 50% Injectable 25 Gram(s) IV Push once  enoxaparin Injectable 40 milliGRAM(s) SubCutaneous daily  glucagon  Injectable 1 milliGRAM(s) IntraMuscular once PRN  influenza   Vaccine 0.5 milliLiter(s) IntraMuscular once  insulin glargine Injectable (LANTUS) 10 Unit(s) SubCutaneous at bedtime  insulin lispro (ADMELOG) corrective regimen sliding scale   SubCutaneous three times a day before meals  insulin lispro (ADMELOG) corrective regimen sliding scale   SubCutaneous at bedtime  metoprolol tartrate 25 milliGRAM(s) Oral every 12 hours          d< from: MR Foot No Cont, Left (11.09.20 @ 08:10) >    EXAM:  MR FOOT LT                            PROCEDURE DATE:  11/09/2020          INTERPRETATION:  CLINICAL INDICATION: Left foot pain, evaluate for foreign body near the first metatarsophalangeal joint and first interspace region.    Multiplanar Multisequence MR of the left foot. Images obtained of the midfoot through forefoot. No IV contrast.    Prior Studies: Left foot radiographs 11/6/2020.    FINDINGS:  T2 hyperintense cystic changes and edema in the distal aspect of the great toe metatarsal and in the great toe proximal phalanx. No definite for body is visualized in the subcutaneous tissues. There is diffuse subcutaneous edema at the dorsum of the forefoot. No abnormal muscle edema. Visualized flexor and extensor tendons are grossly intact. Likely second hammertoe. No abnormal joint effusion. The Lisfranc ligament is grossly intact.    IMPRESSION:  No definite foreign body is seen in the subcutaneous tissues. Please note that MRI may not be sensitive for small foreign bodies or all types of foreign bodies. If there is persistent clinical concern for foreign body, correlation with contrast enhanced CT or ultrasound can be performed.    Diffuse subcutaneous edema. Correlate clinically to exclude cellulitis.    T2 hyperintense cystic changes and marrow edema about the great toe metatarsophalangeal joint, likely degenerative.    Likely second hammertoe, evaluation limited on nonweightbearing exam.            SARTHAK KRISHNAMURTHY MD; Attending Radiologist  This document has been electronically signed. Nov 9 2020 11:04AM    < end of copied text >            REVIEW OF SYSTEMS:  CONSTITUTIONAL: No fever, no weight loss, or no fatigue  NECK: No pain, no stiffness  RESPIRATORY: No cough, no wheezing, no chills, no hemoptysis, No shortness of breath  CARDIOVASCULAR: No chest pain, no palpitations, no dizziness, no leg swelling  GASTROINTESTINAL: No abdominal pain. No nausea, no vomiting, no hematemesis; No diarrhea, no constipation. No melena, no hematochezia.  GENITOURINARY: No dysuria, no frequency, no hematuria, no incontinence  NEUROLOGICAL: No headaches, no loss of strength, no numbness, no tremors  SKIN: No itching, no burning  MUSCULOSKELETAL: No joint pain, no swelling; No muscle, no back, no extremity pain  PSYCHIATRIC: No depression, no mood swings,   HEME/LYMPH: No easy bruising, no bleeding gums  ALLERY AND IMMUNOLOGIC: No hives       Consultant(s) Notes Reviewed:  [X] YES  [ ] NO    PHYSICAL EXAM:  GENERAL: NAD  HEAD:  Atraumatic, Normocephalic  EYES: EOMI, PERRLA, conjunctiva and sclera clear  ENMT: No tonsillar erythema, exudates, or enlargement; Moist mucous membranes  NECK: Supple, No JVD  NERVOUS SYSTEM:  Awake & alert  CHEST/LUNG: Clear to auscultation bilaterally; No rales, rhonchi, wheezing,  HEART: Regular rate and rhythm  ABDOMEN: Soft, Nontender, Nondistended; Bowel sounds present  EXTREMITIES:  + LLE erythema  LYMPH: No lymphadenopathy noted  SKIN: No rashes      Advanced care planning discussed with patient/family [X] YES   [ ] NO    Advanced care planning discussed with patient/family. Patient's health status was discussed. All appropriate changes have been made regarding patient's end-of-life care. Advanced care planning forms reviewed/discussed/completed.  20 minutes spent.

## 2020-11-10 NOTE — PROGRESS NOTE ADULT - SUBJECTIVE AND OBJECTIVE BOX
74y year old Male seen at [facility] [address] is s/p Left 1st metatarsal head and proximal phalanx bone biopsy with Dr. Sebastian Paiz, DOS 11/9/20. The patient states that he is experiencing a lot of pain along the posterior calf. The patient states that elevating is not helping him at all. Denies any fever, chills, nausea, vomiting, chest pain, shortness of breath, or calf pain at this time.      Allergies    No Known Allergies    Intolerances    REVIEW OF SYSTEMS    PAST MEDICAL & SURGICAL HISTORY:  Diabetes mellitus    Hypertension          MEDICATIONS  (STANDING):  aspirin enteric coated 81 milliGRAM(s) Oral daily  atorvastatin 40 milliGRAM(s) Oral at bedtime  cefTRIAXone   IVPB 2000 milliGRAM(s) IV Intermittent every 24 hours  clopidogrel Tablet 75 milliGRAM(s) Oral daily  dextrose 5%. 1000 milliLiter(s) (50 mL/Hr) IV Continuous <Continuous>  dextrose 50% Injectable 12.5 Gram(s) IV Push once  dextrose 50% Injectable 25 Gram(s) IV Push once  dextrose 50% Injectable 25 Gram(s) IV Push once  enoxaparin Injectable 40 milliGRAM(s) SubCutaneous daily  influenza   Vaccine 0.5 milliLiter(s) IntraMuscular once  insulin glargine Injectable (LANTUS) 10 Unit(s) SubCutaneous at bedtime  insulin lispro (ADMELOG) corrective regimen sliding scale   SubCutaneous three times a day before meals  insulin lispro (ADMELOG) corrective regimen sliding scale   SubCutaneous at bedtime  metoprolol tartrate 25 milliGRAM(s) Oral every 12 hours    MEDICATIONS  (PRN):  acetaminophen   Tablet .. 650 milliGRAM(s) Oral every 6 hours PRN Temp greater or equal to 38C (100.4F), Mild Pain (1 - 3)  dextrose 40% Gel 15 Gram(s) Oral once PRN Blood Glucose LESS THAN 70 milliGRAM(s)/deciliter  glucagon  Injectable 1 milliGRAM(s) IntraMuscular once PRN Glucose LESS THAN 70 milligrams/deciliter    Vital Signs Last 24 Hrs  T(C): 36.7 (10 Nov 2020 15:13), Max: 37 (10 Nov 2020 04:31)  T(F): 98.1 (10 Nov 2020 15:13), Max: 98.6 (10 Nov 2020 04:31)  HR: 65 (10 Nov 2020 15:13) (62 - 67)  BP: 145/75 (10 Nov 2020 15:13) (145/75 - 168/73)  BP(mean): --  RR: 18 (10 Nov 2020 15:13) (17 - 18)  SpO2: 97% (10 Nov 2020 15:13) (94% - 97%)      PHYSICAL EXAM:  Vascular: DP & PT faintly palpable bilaterally, Capillary refill 3 seconds, Digital hair present bilaterally, mild edema and erythema along the plantar 1st and 1st interspace of the left foot, LLE anterior cellulitis, skin warm to the touch to the LLE, Non pitting edema diffusely along the LLE   Neurological: Light touch sensation intact bilaterally  Musculoskeletal: 5/5 strength in all quadrants bilaterally, Limited ROM of the left Ankle Joint, no pain upon palpation sub 1st plantar hyperkeratotic lesion and 1st interspace of the left foot   Dermatological:   1. Hyperkeratotic lesion sub distal lateral 1st MTPJ size 0.3cmx0.2cm with edema and erythema extending to the 1st interspace, no active drainage, does not probe to bone   2. Subdermal dried hemorrhage along the left 1st interspace   3. s/p bone biopsy of the 1st left metatarsal head-incision site 0.2cm- epithelization, no active drainage   4. s/p bone biopsy of the 1st left proximal phalanx base- incision site 0.2cm- epithelization present, no active drainage             ----------CHEM PANEL----------  11-10    142  |  110<H>  |  23  ----------------------------<  128<H>  4.0   |  25  |  1.40<H>    Ca    8.7      10 Nov 2020 07:09  Mg     2.3     11-10            Imaging:   XAM:  FOOT LEFT (MINIMUM 3 VIEWS)                            PROCEDURE DATE:  11/06/2020          INTERPRETATION:  CLINICAL INDICATION:  "Rule out foreign body subcutaneous first interspace"    COMPARISON:  None.    TECHNIQUE:  AP, oblique and lateral views.    FINDINGS:  2 surgical screws traverse the distal aspect of the left tibia. Chronic appearing deformity of the distal left tibia identified. The tibiotalar articulation appears markedly narrowed with associated degenerative osteophyte formation. There is also loss of height of the posterior aspect of the talus as well as narrowing of the talar/calcaneal articulation posteriorly. There is no evidence for acute fracture or dislocation. No additional radiodense foreign bodies are identified excepting the previously described surgical hardware. No significant soft tissue swelling is evident.    IMPRESSION:  As above.      EXAM:  MR FOOT LT                            PROCEDURE DATE:  11/09/2020          INTERPRETATION:  CLINICAL INDICATION: Left foot pain, evaluate for foreign body near the first metatarsophalangeal joint and first interspace region.    Multiplanar Multisequence MR of the left foot. Images obtained of the midfoot through forefoot. No IV contrast.    Prior Studies: Left foot radiographs 11/6/2020.    FINDINGS:  T2 hyperintense cystic changes and edema in the distal aspect of the great toe metatarsal and in the great toe proximal phalanx. No definite for body is visualized in the subcutaneous tissues. There is diffuse subcutaneous edema at the dorsum of the forefoot. No abnormal muscle edema. Visualized flexor and extensor tendons are grossly intact. Likely second hammertoe. No abnormal joint effusion. The Lisfranc ligament is grossly intact.    IMPRESSION:  No definite foreign body is seen in the subcutaneous tissues. Please note that MRI may not be sensitive for small foreign bodies or all types of foreign bodies. If there is persistent clinical concern for foreign body, correlation with contrast enhanced CT or ultrasound can be performed.    Diffuse subcutaneous edema. Correlate clinically to exclude cellulitis.    T2 hyperintense cystic changes and marrow edema about the great toe metatarsophalangeal joint, likely degenerative.    Likely second hammertoe, evaluation limited on nonweightbearing exam.            SARTHAK KRISHNAMURTHY MD; Attending Radiologist    ACCESSION No:  [id number]    SUSIE NUNES                      2        Surgical Final Report          Final Diagnosis    1. Bone, left proximal phalanx, 1st digit:  Chronic osteomyelitis.    2. Bone, left first metatarsal head:  Chronic osteomyelitis.    Note: No evidence of a bone cyst or an atypical cyst is noted.  Please correlate clinically and radiographically.    Verified by: Aleksandar Dawn MD  (Electronic Signature)  Reported on: 11/10/20 11:25 [facility], [facility], [address] [place] [address]  Phone: [phone]   Fax: [phone]  _________________________________________________________________    Clinical History  Rule out osteomyelitis vs bone cyst vs atypical bone cyst    Specimen(s) Submitted  1-Left proximal phalanx 1st bone  2-Left 1st metatarsal head bone    Gross Description  1.   The specimen is received in formalin and the specimen  container is labeled: Left proximal phalanx 1st.  It consists of  one hard tan-pink fragment of bone measuring 0.3 x 0.1 x 0.1 cm.  The specimen is decalcified.  Entirely submitted.  One cassette.    2.   The specimen is received in formalin and the specimen  container is labeled: Left first met head.  It consists of one  hard tan-pink cylindrical fragment of bone measuring 0.7 x 0.1 x  0.1 cm.  The specimen is decalcified.  Entirely submitted.  One  cassette.    In addition to other data that may appear on the specimen  containers, all labels have been inspected to confirm the  presence of the patient's name and date of birth.  Bri ALCANTARA 11/09/2020 14:33    Disclaimer  Histological Processing Performed at [facility],  Department of Pathology, [address] [place] [address].                SUSIE NUNES 2        Surgical Consult Report          Disclaimer  Histological Processing Performed at [facility],  Department of Pathology, [address] [place] [address].

## 2020-11-10 NOTE — PROGRESS NOTE ADULT - SUBJECTIVE AND OBJECTIVE BOX
[facility] -- Jas Plaza, Rachel Aguilar, Tommy Love, Seema Bowers: Office # [phone]    Follow Up:  NSTEMI, AS    Subjective/Observations: Patient seen and examined. Patient awake, alert, OOB to chair. No complaints of chest pain, dyspnea, palpitations or dizziness. No SOB, No signs of orthopnea or PND. LLE continues to be red and swollen.    REVIEW OF SYSTEMS: All review of systems is negative for eye, ENT, GI, , allergic, dermatologic, musculoskeletal and neurologic except as described above    PAST MEDICAL & SURGICAL HISTORY:  Diabetes mellitus  Hypertension    MEDICATIONS  (STANDING):  aspirin enteric coated 81 milliGRAM(s) Oral daily  atorvastatin 40 milliGRAM(s) Oral at bedtime  cefTRIAXone   IVPB 2000 milliGRAM(s) IV Intermittent every 24 hours  clopidogrel Tablet 75 milliGRAM(s) Oral daily  dextrose 5%. 1000 milliLiter(s) (50 mL/Hr) IV Continuous <Continuous>  dextrose 50% Injectable 12.5 Gram(s) IV Push once  dextrose 50% Injectable 25 Gram(s) IV Push once  dextrose 50% Injectable 25 Gram(s) IV Push once  enoxaparin Injectable 40 milliGRAM(s) SubCutaneous daily  influenza   Vaccine 0.5 milliLiter(s) IntraMuscular once  insulin glargine Injectable (LANTUS) 10 Unit(s) SubCutaneous at bedtime  insulin lispro (ADMELOG) corrective regimen sliding scale   SubCutaneous three times a day before meals  insulin lispro (ADMELOG) corrective regimen sliding scale   SubCutaneous at bedtime  metoprolol tartrate 25 milliGRAM(s) Oral every 12 hours    MEDICATIONS  (PRN):  acetaminophen   Tablet .. 650 milliGRAM(s) Oral every 6 hours PRN Temp greater or equal to 38C (100.4F), Mild Pain (1 - 3)  dextrose 40% Gel 15 Gram(s) Oral once PRN Blood Glucose LESS THAN 70 milliGRAM(s)/deciliter  glucagon  Injectable 1 milliGRAM(s) IntraMuscular once PRN Glucose LESS THAN 70 milligrams/deciliter    Allergies  No Known Allergies    Vital Signs Last 24 Hrs  T(C): 36.8 (10 Nov 2020 08:00), Max: 37 (10 Nov 2020 04:31)  T(F): 98.3 (10 Nov 2020 08:00), Max: 98.6 (10 Nov 2020 04:31)  HR: 62 (10 Nov 2020 08:00) (62 - 67)  BP: 168/73 (10 Nov 2020 08:00) (145/72 - 168/73)  BP(mean): --  RR: 17 (10 Nov 2020 08:00) (17 - 18)  SpO2: 94% (10 Nov 2020 08:00) (94% - 98%)  I&O's Summary    09 Nov 2020 07:01  -  10 Nov 2020 07:00  --------------------------------------------------------  IN: 0 mL / OUT: 200 mL / NET: -200 mL    TELE:  60-80s   PHYSICAL EXAM:  Appearance: NAD, no distress, alert, Well developed   HEENT: Moist Mucous Membranes, Anicteric  Cardiovascular: Regular rate and rhythm, Normal S1 S2, No JVD, + murmurs, No rubs, gallops or clicks  Respiratory: Non-labored, Clear to auscultation, No rales, No rhonchi, No wheezing.   Gastrointestinal:  Soft, Non-tender, + BS  Neurologic: Non-focal  Skin: Warm and dry, + LLE redness and swelling, No ecchymosis, No cyanosis  Musculoskeletal: No clubbing, No cyanosis, No joint swelling/tenderness  Psychiatry: Mood & affect appropriate  Lymph: No peripheral edema.     LABS: All Labs Reviewed:                        9.6    7.35  )-----------( 283      ( 10 Nov 2020 07:09 )             28.2                         10.6   5.73  )-----------( 226      ( 08 Nov 2020 07:53 )             29.7     10 Nov 2020 07:09    142    |  110    |  23     ----------------------------<  128    4.0     |  25     |  1.40   08 Nov 2020 07:53    142    |  109    |  28     ----------------------------<  122    4.0     |  29     |  1.60     Ca    8.7        10 Nov 2020 07:09  Ca    8.8        08 Nov 2020 07:53  Mg     2.3       10 Nov 2020 07:09  Mg     2.4       08 Nov 2020 07:53  Creatine Kinase, Serum: 138 U/L (11-06-20 @ 06:37)  Troponin I, Serum: 1.780 ng/mL (11-06-20 @ 06:37)  Creatine Kinase, Serum: 202 U/L (11-05-20 @ 14:05)  Cholesterol, Serum: 96 mg/dL (11-04-20 @ 10:43)  HDL Cholesterol, Serum: 32 mg/dL (11-04-20 @ 10:43)  Triglycerides, Serum: 45 mg/dL (11-04-20 @ 10:43)    12 Lead ECG:   Ventricular Rate 77 BPM  Atrial Rate 77 BPM  P-R Interval 154 ms  QRS Duration 88 ms  Q-T Interval 374 ms  QTC Calculation(Bazett) 423 ms  P Axis 9 degrees  R Axis 3 degrees  T Axis 107 degrees  Diagnosis Line Normal sinus rhythm  Minimal voltage criteria for LVH, may be normal variant  Inferior infarct , age undetermined  Abnormal ECG  When compared with ECG of 10-JUL-2010 13:13,  No significant change was found  Confirmed by Roe Ramos MD ([id number]) on 11/5/2020 12:45:13 PM (11-04-20 @ 13:00)    < from: TTE Echo Complete w/o Contrast w/ Doppler (11.04.20 @ 11:23) >  Dimensions:  LA 3.7  Normal Values: 2.0 - 4.0 cm  Ao 3.6        Normal Values: 2.0 - 3.8 cm  SEPTUM 1.3       Normal Values: 0.6 - 1.2 cm  PWT 1.0       Normal Values: 0.6 - 1.1 cm  LVIDd 4.9         Normal Values: 3.0 - 5.6 cm  LVIDs 3.3         Normal Values: 1.8 - 4.0 cm    OBSERVATIONS:  Actually difficult and limited study, unable to obtain subcostal views  Mitral Valve: Thickened leaflets, mild MR. Mitral annular calcification  Aortic Valve/Aorta: Calcified trileaflet aortic valve with decreased opening. Peak transaortic valve gradient is 69 mmHg with a mean transaortic valve gradient of 49 mmHg. The aortic valve area is calculated to be 0.7 sq cm by continuity equation. This is consistent with severe aortic stenosis  Tricuspid Valve: normal with trace TR.  Pulmonic Valve: Not well-visualized  Left Atrium: normal  Right Atrium: Not well-visualized  Left Ventricle: normal LV size and systolic function, estimated LVEF of 55-60%.  Right Ventricle: Grossly normal size and systolic function.  Pericardium/Pleura: normal, no significant pericardial effusion.    Conclusion:  Technically difficult and limited study  Normal left ventricular internal dimensions and systolic function, estimated LVEF of 55-60%.  Grossly normal RV size and systolic function.  Calcified trileaflet aortic valve with severe aortic stenosis  Mild MR  Trace TR.  No significant pericardial effusion.    < end of copied text >    < from: CT Chest No Cont (11.04.20 @ 01:33) >  IMPRESSION: Moderate pulmonary edema. Please note that superimposed infection cannot be excluded.  < end of copied text >

## 2020-11-10 NOTE — PROGRESS NOTE ADULT - SUBJECTIVE AND OBJECTIVE BOX
SUSIE NUNES is a 74yMale , patient examined and chart reviewed    INTERVAL HPI/ OVERNIGHT EVENTS:   Awake alert NAD.   C/o left posterior calf pain.     PAST MEDICAL & SURGICAL HISTORY:  Diabetes mellitus  Hypertension    For details regarding the patient's social history, family history, and other miscellaneous elements, please refer the initial infectious diseases consultation and/or the admitting history and physical examination for this admission.    ROS:  CONSTITUTIONAL:  Negative fever or chills  EYES:  Negative  blurry vision or double vision  CARDIOVASCULAR:  Negative for chest pain or palpitations  RESPIRATORY:  Negative for cough, wheezing, or SOB   GASTROINTESTINAL:  Negative for nausea, vomiting, diarrhea, constipation, or abdominal pain  GENITOURINARY:  Negative frequency, urgency or dysuria  NEUROLOGIC:  No headache, confusion, dizziness, lightheadedness  All other systems were reviewed and are negative       Current inpatient medications :    ANTIBIOTICS/RELEVANT:  cefTRIAXone   IVPB 2000 milliGRAM(s) IV Intermittent every 24 hours    MEDICATIONS  (STANDING):  aspirin enteric coated 81 milliGRAM(s) Oral daily  atorvastatin 40 milliGRAM(s) Oral at bedtime  clopidogrel Tablet 75 milliGRAM(s) Oral daily  dextrose 5%. 1000 milliLiter(s) (50 mL/Hr) IV Continuous <Continuous>  dextrose 50% Injectable 12.5 Gram(s) IV Push once  dextrose 50% Injectable 25 Gram(s) IV Push once  dextrose 50% Injectable 25 Gram(s) IV Push once  enoxaparin Injectable 40 milliGRAM(s) SubCutaneous daily  influenza   Vaccine 0.5 milliLiter(s) IntraMuscular once  insulin glargine Injectable (LANTUS) 10 Unit(s) SubCutaneous at bedtime  insulin lispro (ADMELOG) corrective regimen sliding scale   SubCutaneous three times a day before meals  insulin lispro (ADMELOG) corrective regimen sliding scale   SubCutaneous at bedtime  metoprolol tartrate 25 milliGRAM(s) Oral every 12 hours    MEDICATIONS  (PRN):  acetaminophen   Tablet .. 650 milliGRAM(s) Oral every 6 hours PRN Temp greater or equal to 38C (100.4F), Mild Pain (1 - 3)  dextrose 40% Gel 15 Gram(s) Oral once PRN Blood Glucose LESS THAN 70 milliGRAM(s)/deciliter  glucagon  Injectable 1 milliGRAM(s) IntraMuscular once PRN Glucose LESS THAN 70 milligrams/deciliter    Objective:  Vital Signs Last 24 Hrs  T(C): 36.8 (2020 16:36), Max: 37.4 (2020 23:57)  T(F): 98.2 (2020 16:36), Max: 99.3 (2020 23:57)  HR: 64 (2020 16:36) (64 - 67)  BP: 145/72 (2020 16:36) (121/74 - 149/70)  RR: 18 (2020 16:36) (16 - 18)  SpO2: 98% (2020 16:36) (96% - 99%)    Physical Exam:  General: no acute distress  Eyes: sclera anicteric, pupils equal and reactive to light  ENMT: buccal mucosa moist, pharynx not injected  Neck: supple, trachea midline  Lungs: Decreased, no wheeze/rhonchi  Cardiovascular: regular rate and rhythm, S1 S2  Abdomen: soft, nontender, no organomegaly present, bowel sounds normal  Neurological: alert and oriented x3, Cranial Nerves II-XII grossly intact  Skin: no increased ecchymosis/petechiae/purpura  Lymph Nodes: no palpable cervical/supraclavicular lymph nodes enlargements  Extremities: Left LE erythema slowly improving + edema mild tenderness      LABS:                         9.6    7.35  )-----------( 283      ( 10 Nov 2020 07:09 )             28.2   11-10    142  |  110<H>  |  23  ----------------------------<  128<H>  4.0   |  25  |  1.40<H>    Ca    8.7      10 Nov 2020 07:09  Mg     2.3     11-10      PTT - ( 2020 23:37 )  PTT:27.2 sec  Urinalysis Basic - ( 2020 00:52 )    Color: Yellow / Appearance: Clear / S.015 / pH: x  Gluc: x / Ketone: Negative  / Bili: Negative / Urobili: Negative mg/dL   Blood: x / Protein: 15 mg/dL / Nitrite: Negative   Leuk Esterase: Negative / RBC: 0-2 /HPF / WBC 0-2   Sq Epi: x / Non Sq Epi: Occasional / Bacteria: Occasional    Surgical Pathology Report (20 @ 14:00)  Surgical Final Report    Final Diagnosis    1. Bone, left proximal phalanx, 1st digit:  Chronic osteomyelitis.    2. Bone, left first metatarsal head:  Chronic osteomyelitis.      MICROBIOLOGY:    Culture - Tissue with Gram Stain (collected 2020 21:52)  Source: .Tissue left 1st met head bone culture  Gram Stain (10 Nov 2020 02:00):    No polymorphonuclear cells seen per low power field    No organisms seen per oil power field    Culture - Urine (collected 2020 14:18)  Source: .Urine Clean Catch (Midstream)  Final Report (2020 09:53):    No growth    Culture - Blood in AM (20 @ 09:26)    Specimen Source: .Blood Blood-Venous    Culture Results:   No growth to date.    Culture - Blood (20 @ 14:18)    -  Streptococcus sp. (Not Grp A, B or S pneumoniae): Detec    Gram Stain:   Growth in aerobic bottle: Gram Positive Cocci in Pairs and Chains  Growth in anaerobic bottle: Gram Positive Cocci in Pairs and Chains    -  Ceftriaxone: S 0.047    -  Clindamycin: S    -  Levofloxacin: S    -  Penicillin: S 0.016 Predicts results for ampicillin, amoxicillin, amoxicillin/clavulanate, ampicillin/sulbactam, 1st, 2nd and 3rd generation cephalosporins and carbapenems.    -  Vancomycin: S    Specimen Source: .Blood Blood-Peripheral    Organism: Blood Culture PCR    Organism: Streptococcus dysgalactiae    Organism: Streptococcus dysgalactiae    Culture Results:   Growth in aerobic and anaerobic bottles: Streptococcus dysgalactiae  (Group C/G)    RADIOLOGY & ADDITIONAL STUDIES:    EXAM:  MR FOOT LT                            PROCEDURE DATE:  2020          INTERPRETATION:  CLINICAL INDICATION: Left foot pain, evaluate for foreign body near the first metatarsophalangeal joint and first interspace region.    Multiplanar Multisequence MR of the left foot. Images obtained of the midfoot through forefoot. No IV contrast.    Prior Studies: Left foot radiographs 2020.    FINDINGS:  T2 hyperintense cystic changes and edema in the distal aspect of the great toe metatarsal and in the great toe proximal phalanx. No definite for body is visualized in the subcutaneous tissues. There is diffuse subcutaneous edema at the dorsum of the forefoot. No abnormal muscle edema. Visualized flexor and extensor tendons are grossly intact. Likely second hammertoe. No abnormal joint effusion. The Lisfranc ligament is grossly intact.    IMPRESSION:  No definite foreign body is seen in the subcutaneous tissues. Please note that MRI may not be sensitive for small foreign bodies or all types of foreign bodies. If there is persistent clinical concern for foreign body, correlation with contrast enhanced CT or ultrasound can be performed.    Diffuse subcutaneous edema. Correlate clinically to exclude cellulitis.    T2 hyperintense cystic changes and marrow edema about the great toe metatarsophalangeal joint, likely degenerative.    Likely second hammertoe, evaluation limited on nonweightbearing exam.      Assessment :   74 M with PMH of HTN DM HLD CAD presented to the hospital with CC of mental status and rigors admitted with sepsis syndrome. Sp fever.  - Strep sepsis sec Left LE cellulitis with lymphangitis.  Classic Strep cellulitis and will take time to resolved.   - Leukocytosis resolved  - CHF   - + troponins  - MARINA stable  - Sp left foot bone biopsy - Chronic osteomyelitis     Plan :   Cont Rocephin 2 grams IV daily day 5 today  No surgical intervention recommended  Repeat blood cultures NGTD  Trend temps and cbc  Elevate leg    D/w Dr Beauchamp    Continue with present regiment.  Appropriate use of antibiotics and adverse effects reviewed.      I have discussed the above plan of care with patient in detail. He expressed understanding of the  treatment plan . Risks, benefits and alternatives discussed in detail. I have asked if he has any questions or concerns and appropriately addressed them to the best of my ability .    > 35 minutes were spent in direct patient care reviewing notes, medications ,labs data/ imaging , discussion with multidisciplinary team.    Thank you for allowing me to participate in care of your patient .    Stacey Dorado MD  Infectious Disease  [phone]

## 2020-11-10 NOTE — PROGRESS NOTE ADULT - PROBLEM SELECTOR PLAN 1
- Patient seen and evaluated at bedside with Dr. Sebastian Paiz  - Incision sited dressed with Aquacel AG and DSD  - Discussion of treatment options mentioned above   - Podiatry team will continue to follow patient while in house

## 2020-11-10 NOTE — PROGRESS NOTE ADULT - SUBJECTIVE AND OBJECTIVE BOX
Patient is a 74y old  Male who presents with a chief complaint of change in mental status (05 Nov 2020 14:15)  Patient seen in follow up for MARINA.     Stable renal indices.        PAST MEDICAL HISTORY:  Diabetes mellitus  Hypertension      MEDICATIONS  (STANDING):  aspirin enteric coated 81 milliGRAM(s) Oral daily  atorvastatin 40 milliGRAM(s) Oral at bedtime  cefTRIAXone   IVPB 2000 milliGRAM(s) IV Intermittent every 24 hours  clopidogrel Tablet 75 milliGRAM(s) Oral daily  dextrose 5%. 1000 milliLiter(s) (50 mL/Hr) IV Continuous <Continuous>  dextrose 50% Injectable 12.5 Gram(s) IV Push once  dextrose 50% Injectable 25 Gram(s) IV Push once  dextrose 50% Injectable 25 Gram(s) IV Push once  enoxaparin Injectable 40 milliGRAM(s) SubCutaneous daily  influenza   Vaccine 0.5 milliLiter(s) IntraMuscular once  insulin glargine Injectable (LANTUS) 10 Unit(s) SubCutaneous at bedtime  insulin lispro (ADMELOG) corrective regimen sliding scale   SubCutaneous three times a day before meals  insulin lispro (ADMELOG) corrective regimen sliding scale   SubCutaneous at bedtime  metoprolol tartrate 25 milliGRAM(s) Oral every 12 hours    MEDICATIONS  (PRN):  acetaminophen   Tablet .. 650 milliGRAM(s) Oral every 6 hours PRN Temp greater or equal to 38C (100.4F), Mild Pain (1 - 3)  dextrose 40% Gel 15 Gram(s) Oral once PRN Blood Glucose LESS THAN 70 milliGRAM(s)/deciliter  glucagon  Injectable 1 milliGRAM(s) IntraMuscular once PRN Glucose LESS THAN 70 milligrams/deciliter    T(C): 36.4 (11-10-20 @ 12:34), Max: 37.4 (11-08-20 @ 23:57)  HR: 62 (11-10-20 @ 12:34) (62 - 67)  BP: 148/80 (11-10-20 @ 12:34) (121/74 - 168/73)  RR: 17 (11-10-20 @ 12:34)  SpO2: 97% (11-10-20 @ 12:34)  Wt(kg): --  I&O's Detail    09 Nov 2020 07:01  -  10 Nov 2020 07:00  --------------------------------------------------------  IN:  Total IN: 0 mL    OUT:    Voided (mL): 200 mL  Total OUT: 200 mL    Total NET: -200 mL      10 Nov 2020 07:01  -  10 Nov 2020 15:36  --------------------------------------------------------  IN:    Oral Fluid: 500 mL  Total IN: 500 mL    OUT:  Total OUT: 0 mL    Total NET: 500 mL          PHYSICAL EXAM:  General: No distress  Respiratory: b/l air entry  Cardiovascular: S1 S2  Gastrointestinal: soft  Extremities:  edema                             LABORATORY:                        9.6    7.35  )-----------( 283      ( 10 Nov 2020 07:09 )             28.2     11-10    142  |  110<H>  |  23  ----------------------------<  128<H>  4.0   |  25  |  1.40<H>    Ca    8.7      10 Nov 2020 07:09  Mg     2.3     11-10      Sodium, Serum: 142 mmol/L (11-10 @ 07:09)    Potassium, Serum: 4.0 mmol/L (11-10 @ 07:09)    Hemoglobin: 9.6 g/dL (11-10 @ 07:09)  Hemoglobin: 10.6 g/dL (11-08 @ 07:53)    Creatinine, Serum 1.40 (11-10 @ 07:09)  Creatinine, Serum 1.60 (11-08 @ 07:53)

## 2020-11-11 LAB
ANION GAP SERPL CALC-SCNC: 6 MMOL/L — SIGNIFICANT CHANGE UP (ref 5–17)
BUN SERPL-MCNC: 24 MG/DL — HIGH (ref 7–23)
CALCIUM SERPL-MCNC: 8.7 MG/DL — SIGNIFICANT CHANGE UP (ref 8.5–10.1)
CHLORIDE SERPL-SCNC: 108 MMOL/L — SIGNIFICANT CHANGE UP (ref 96–108)
CO2 SERPL-SCNC: 26 MMOL/L — SIGNIFICANT CHANGE UP (ref 22–31)
CREAT SERPL-MCNC: 1.5 MG/DL — HIGH (ref 0.5–1.3)
GLUCOSE SERPL-MCNC: 137 MG/DL — HIGH (ref 70–99)
HCT VFR BLD CALC: 28.5 % — LOW (ref 39–50)
HGB BLD-MCNC: 9.8 G/DL — LOW (ref 13–17)
MAGNESIUM SERPL-MCNC: 2.5 MG/DL — SIGNIFICANT CHANGE UP (ref 1.6–2.6)
MCHC RBC-ENTMCNC: 31.1 PG — SIGNIFICANT CHANGE UP (ref 27–34)
MCHC RBC-ENTMCNC: 34.4 GM/DL — SIGNIFICANT CHANGE UP (ref 32–36)
MCV RBC AUTO: 90.5 FL — SIGNIFICANT CHANGE UP (ref 80–100)
NRBC # BLD: 0 /100 WBCS — SIGNIFICANT CHANGE UP (ref 0–0)
PLATELET # BLD AUTO: 299 K/UL — SIGNIFICANT CHANGE UP (ref 150–400)
POTASSIUM SERPL-MCNC: 4.2 MMOL/L — SIGNIFICANT CHANGE UP (ref 3.5–5.3)
POTASSIUM SERPL-SCNC: 4.2 MMOL/L — SIGNIFICANT CHANGE UP (ref 3.5–5.3)
RBC # BLD: 3.15 M/UL — LOW (ref 4.2–5.8)
RBC # FLD: 12.2 % — SIGNIFICANT CHANGE UP (ref 10.3–14.5)
SODIUM SERPL-SCNC: 140 MMOL/L — SIGNIFICANT CHANGE UP (ref 135–145)
WBC # BLD: 6.82 K/UL — SIGNIFICANT CHANGE UP (ref 3.8–10.5)
WBC # FLD AUTO: 6.82 K/UL — SIGNIFICANT CHANGE UP (ref 3.8–10.5)

## 2020-11-11 PROCEDURE — 99232 SBSQ HOSP IP/OBS MODERATE 35: CPT

## 2020-11-11 RX ORDER — TRAMADOL HYDROCHLORIDE 50 MG/1
50 TABLET ORAL EVERY 6 HOURS
Refills: 0 | Status: DISCONTINUED | OUTPATIENT
Start: 2020-11-11 | End: 2020-11-14

## 2020-11-11 RX ORDER — TRAMADOL HYDROCHLORIDE 50 MG/1
25 TABLET ORAL EVERY 6 HOURS
Refills: 0 | Status: DISCONTINUED | OUTPATIENT
Start: 2020-11-11 | End: 2020-11-14

## 2020-11-11 RX ADMIN — ENOXAPARIN SODIUM 40 MILLIGRAM(S): 100 INJECTION SUBCUTANEOUS at 12:02

## 2020-11-11 RX ADMIN — ATORVASTATIN CALCIUM 40 MILLIGRAM(S): 80 TABLET, FILM COATED ORAL at 22:11

## 2020-11-11 RX ADMIN — CEFTRIAXONE 100 MILLIGRAM(S): 500 INJECTION, POWDER, FOR SOLUTION INTRAMUSCULAR; INTRAVENOUS at 08:22

## 2020-11-11 RX ADMIN — INSULIN GLARGINE 10 UNIT(S): 100 INJECTION, SOLUTION SUBCUTANEOUS at 22:41

## 2020-11-11 RX ADMIN — CLOPIDOGREL BISULFATE 75 MILLIGRAM(S): 75 TABLET, FILM COATED ORAL at 12:02

## 2020-11-11 RX ADMIN — Medication 2: at 12:44

## 2020-11-11 RX ADMIN — Medication 25 MILLIGRAM(S): at 05:47

## 2020-11-11 RX ADMIN — Medication 81 MILLIGRAM(S): at 12:02

## 2020-11-11 RX ADMIN — Medication 25 MILLIGRAM(S): at 17:34

## 2020-11-11 RX ADMIN — Medication 2: at 08:21

## 2020-11-11 NOTE — PROGRESS NOTE ADULT - SUBJECTIVE AND OBJECTIVE BOX
Patient is a 74y old  Male who presents with a chief complaint of change in mental status (05 Nov 2020 14:15)  Patient seen in follow up for MARINA.     Stable renal indices.        PAST MEDICAL HISTORY:  Diabetes mellitus  Hypertension    MEDICATIONS  (STANDING):  aspirin enteric coated 81 milliGRAM(s) Oral daily  atorvastatin 40 milliGRAM(s) Oral at bedtime  cefTRIAXone   IVPB 2000 milliGRAM(s) IV Intermittent every 24 hours  clopidogrel Tablet 75 milliGRAM(s) Oral daily  dextrose 5%. 1000 milliLiter(s) (50 mL/Hr) IV Continuous <Continuous>  dextrose 50% Injectable 12.5 Gram(s) IV Push once  dextrose 50% Injectable 25 Gram(s) IV Push once  dextrose 50% Injectable 25 Gram(s) IV Push once  enoxaparin Injectable 40 milliGRAM(s) SubCutaneous daily  influenza   Vaccine 0.5 milliLiter(s) IntraMuscular once  insulin glargine Injectable (LANTUS) 10 Unit(s) SubCutaneous at bedtime  insulin lispro (ADMELOG) corrective regimen sliding scale   SubCutaneous three times a day before meals  insulin lispro (ADMELOG) corrective regimen sliding scale   SubCutaneous at bedtime  metoprolol tartrate 25 milliGRAM(s) Oral every 12 hours    MEDICATIONS  (PRN):  acetaminophen   Tablet .. 650 milliGRAM(s) Oral every 6 hours PRN Temp greater or equal to 38C (100.4F), Mild Pain (1 - 3)  dextrose 40% Gel 15 Gram(s) Oral once PRN Blood Glucose LESS THAN 70 milliGRAM(s)/deciliter  glucagon  Injectable 1 milliGRAM(s) IntraMuscular once PRN Glucose LESS THAN 70 milligrams/deciliter    T(C): 37.3 (11-11-20 @ 13:30), Max: 37.3 (11-11-20 @ 13:30)  HR: 66 (11-11-20 @ 13:30) (57 - 67)  BP: 145/75 (11-11-20 @ 13:30) (139/69 - 168/73)  RR: 18 (11-11-20 @ 13:30)  SpO2: 96% (11-11-20 @ 13:30)  Wt(kg): --  I&O's Detail    10 Nov 2020 07:01  -  11 Nov 2020 07:00  --------------------------------------------------------  IN:    Oral Fluid: 500 mL  Total IN: 500 mL    OUT:  Total OUT: 0 mL    Total NET: 500 mL      PHYSICAL EXAM:  General: No distress  Respiratory: b/l air entry  Cardiovascular: S1 S2  Gastrointestinal: soft  Extremities:  edema                      LABORATORY:                        9.8    6.82  )-----------( 299      ( 11 Nov 2020 06:29 )             28.5     11-11    140  |  108  |  24<H>  ----------------------------<  137<H>  4.2   |  26  |  1.50<H>    Ca    8.7      11 Nov 2020 06:29  Mg     2.5     11-11      Sodium, Serum: 140 mmol/L (11-11 @ 06:29)  Sodium, Serum: 142 mmol/L (11-10 @ 07:09)    Potassium, Serum: 4.2 mmol/L (11-11 @ 06:29)  Potassium, Serum: 4.0 mmol/L (11-10 @ 07:09)    Hemoglobin: 9.8 g/dL (11-11 @ 06:29)  Hemoglobin: 9.6 g/dL (11-10 @ 07:09)    Creatinine, Serum 1.50 (11-11 @ 06:29)  Creatinine, Serum 1.40 (11-10 @ 07:09)

## 2020-11-11 NOTE — DIETITIAN INITIAL EVALUATION ADULT. - ADD RECOMMEND
1) Continue with current DASH/TLC, Consistent CHO diet, 2) Encourage to continue adequate PO intake, 3) Monitor pt's PO intake, weight, skin, edema, GI distress

## 2020-11-11 NOTE — DIETITIAN INITIAL EVALUATION ADULT. - PROBLEM SELECTOR PROBLEM 1
What Type Of Note Output Would You Prefer (Optional)?: Bullet Format
Sepsis, due to unspecified organism, unspecified whether acute organ dysfunction present
What Is The Reason For Today's Visit?: Full Body Skin Examination
What Is The Reason For Today's Visit? (Being Monitored For X): concerning skin lesions on an annual basis

## 2020-11-11 NOTE — PROGRESS NOTE ADULT - ASSESSMENT
74 year old male with PMH HTN, DM, HLD transferred from [place] for admission for AMS due to underlying sepsis due to possible PNA,     ACS  - Patient found to have elevated Troponin. Now down trending. No need to trend further  - Technically difficult ECHO showed normal LV & RV size and function EF 55-60%, severe AS  - Severe AS with evolving troponin in the setting of infection with bacteremia  - EKG with ST depressions in leads I, avL, and V5/V6, consistent with possible lateral ischemia.   - Would treat medically for now, will need to be determined whether in pt or out pt assessment of cad/AS will be appropriate, depending upon his clinical course  - S/P full dose Lovenox for 48 hours   - Continue ASA 81 mg daily, Plavix 75 mg daily  - Continue atorvastatin 40 mg daily   - Continue BB    Severe AS  - Recent ECHO with severe AS  - CT chest notable pulmonary edema on CT chest. s/p 3 L NS, this is likely due to volume overload. Would hold off on diuresis. Patient appears euvolemic on examination   - Caution with IVF  - Patient would need outpatient evaluation for severe AS  - Continue BB     Hypertension  - BP: 168/73 (11-10-20 @ 08:00) (145/72 - 168/73)  - Continue BB    Hyperlipidemia  - Continue Lipitor 40 mg HS   - Low fat diet    Acute Kidney injury  - Creatinine trend stable   - Continue to monitor renal indices  - Avoid nephrotoxins   - Holding losartan and HCTZ, resume when MARINA resolves     Sepsis  - Strep bacteremia ? LLE cellulitis   - Continue antibiotics  - Management per primary team     - Monitor and replete lytes, keep K>4, Mg>2.  - All other medical needs as per primary team.  - Other cardiovascular workup will depend on clinical course.  - Will continue to follow.    Margie Steele, MS FNP, AGAP  Nurse Practitioner- Cardiology   Spectra #[phone]

## 2020-11-11 NOTE — PROGRESS NOTE ADULT - SUBJECTIVE AND OBJECTIVE BOX
SUSIE NUNES is a 74yMale , patient examined and chart reviewed    INTERVAL HPI/ OVERNIGHT EVENTS:   Awake alert NAD.   C/o left posterior calf tenderness.  Afebrile.      PAST MEDICAL & SURGICAL HISTORY:  Diabetes mellitus  Hypertension    For details regarding the patient's social history, family history, and other miscellaneous elements, please refer the initial infectious diseases consultation and/or the admitting history and physical examination for this admission.    ROS:  CONSTITUTIONAL:  Negative fever or chills  EYES:  Negative  blurry vision or double vision  CARDIOVASCULAR:  Negative for chest pain or palpitations  RESPIRATORY:  Negative for cough, wheezing, or SOB   GASTROINTESTINAL:  Negative for nausea, vomiting, diarrhea, constipation, or abdominal pain  GENITOURINARY:  Negative frequency, urgency or dysuria  NEUROLOGIC:  No headache, confusion, dizziness, lightheadedness  All other systems were reviewed and are negative       Current inpatient medications :    ANTIBIOTICS/RELEVANT:  cefTRIAXone   IVPB 2000 milliGRAM(s) IV Intermittent every 24 hours    MEDICATIONS  (STANDING):  aspirin enteric coated 81 milliGRAM(s) Oral daily  atorvastatin 40 milliGRAM(s) Oral at bedtime  clopidogrel Tablet 75 milliGRAM(s) Oral daily  dextrose 5%. 1000 milliLiter(s) (50 mL/Hr) IV Continuous <Continuous>  dextrose 50% Injectable 12.5 Gram(s) IV Push once  dextrose 50% Injectable 25 Gram(s) IV Push once  dextrose 50% Injectable 25 Gram(s) IV Push once  enoxaparin Injectable 40 milliGRAM(s) SubCutaneous daily  influenza   Vaccine 0.5 milliLiter(s) IntraMuscular once  insulin glargine Injectable (LANTUS) 10 Unit(s) SubCutaneous at bedtime  insulin lispro (ADMELOG) corrective regimen sliding scale   SubCutaneous three times a day before meals  insulin lispro (ADMELOG) corrective regimen sliding scale   SubCutaneous at bedtime  metoprolol tartrate 25 milliGRAM(s) Oral every 12 hours    MEDICATIONS  (PRN):  acetaminophen   Tablet .. 650 milliGRAM(s) Oral every 6 hours PRN Temp greater or equal to 38C (100.4F), Mild Pain (1 - 3)  dextrose 40% Gel 15 Gram(s) Oral once PRN Blood Glucose LESS THAN 70 milliGRAM(s)/deciliter  glucagon  Injectable 1 milliGRAM(s) IntraMuscular once PRN Glucose LESS THAN 70 milligrams/deciliter  traMADol 25 milliGRAM(s) Oral every 6 hours PRN Moderate Pain (4 - 6)  traMADol 50 milliGRAM(s) Oral every 6 hours PRN Severe Pain (7 - 10)    Objective:  Vital Signs Last 24 Hrs  T(C): 37.1 (2020 16:00), Max: 37.3 (2020 13:30)  T(F): 98.7 (2020 16:00), Max: 99.2 (2020 13:30)  HR: 66 (:) (57 - 66)  BP: 156/71 (:) (139/69 - 164/78)  RR: 18 (:) (18 - 18)  SpO2: 98% (:) (91% - 98%)    Physical Exam:  General: no acute distress  Eyes: sclera anicteric, pupils equal and reactive to light  ENMT: buccal mucosa moist, pharynx not injected  Neck: supple, trachea midline  Lungs: Decreased, no wheeze/rhonchi  Cardiovascular: regular rate and rhythm, S1 S2  Abdomen: soft, nontender, no organomegaly present, bowel sounds normal  Neurological: alert and oriented x3, Cranial Nerves II-XII grossly intact  Skin: no increased ecchymosis/petechiae/purpura  Lymph Nodes: no palpable cervical/supraclavicular lymph nodes enlargements  Extremities: Left LE erythema slowly improving + edema mild tenderness  Left foot drsg c/d/i no ulcer no erythema small blister sub met 1st metatarsal    LABS:                        9.8    6.82  )-----------( 299      ( 2020 06:29 )             28.5       140  |  108  |  24<H>  ----------------------------<  137<H>  4.2   |  26  |  1.50<H>    Ca    8.7      2020 06:29  Mg     2.5           PTT - ( 2020 23:37 )  PTT:27.2 sec  Urinalysis Basic - ( 2020 00:52 )    Color: Yellow / Appearance: Clear / S.015 / pH: x  Gluc: x / Ketone: Negative  / Bili: Negative / Urobili: Negative mg/dL   Blood: x / Protein: 15 mg/dL / Nitrite: Negative   Leuk Esterase: Negative / RBC: 0-2 /HPF / WBC 0-2   Sq Epi: x / Non Sq Epi: Occasional / Bacteria: Occasional    Surgical Pathology Report (20 @ 14:00)  Surgical Final Report    Final Diagnosis    1. Bone, left proximal phalanx, 1st digit:  Chronic osteomyelitis.    2. Bone, left first metatarsal head:  Chronic osteomyelitis.      MICROBIOLOGY:    Culture - Tissue with Gram Stain (collected 2020 21:52)  Source: .Tissue left 1st met head bone culture  Gram Stain (10 Nov 2020 02:00):    No polymorphonuclear cells seen per low power field    No organisms seen per oil power field    Culture - Urine (collected 2020 14:18)  Source: .Urine Clean Catch (Midstream)  Final Report (2020 09:53):    No growth    Culture - Blood in AM (20 @ 09:26)    Specimen Source: .Blood Blood-Venous    Culture Results:   No growth to date.    Culture - Blood (20 @ 14:18)    -  Streptococcus sp. (Not Grp A, B or S pneumoniae): Detec    Gram Stain:   Growth in aerobic bottle: Gram Positive Cocci in Pairs and Chains  Growth in anaerobic bottle: Gram Positive Cocci in Pairs and Chains    -  Ceftriaxone: S 0.047    -  Clindamycin: S    -  Levofloxacin: S    -  Penicillin: S 0.016 Predicts results for ampicillin, amoxicillin, amoxicillin/clavulanate, ampicillin/sulbactam, 1st, 2nd and 3rd generation cephalosporins and carbapenems.    -  Vancomycin: S    Specimen Source: .Blood Blood-Peripheral    Organism: Blood Culture PCR    Organism: Streptococcus dysgalactiae    Organism: Streptococcus dysgalactiae    Culture Results:   Growth in aerobic and anaerobic bottles: Streptococcus dysgalactiae  (Group C/G)    RADIOLOGY & ADDITIONAL STUDIES:    EXAM:  MR FOOT LT                            PROCEDURE DATE:  2020          INTERPRETATION:  CLINICAL INDICATION: Left foot pain, evaluate for foreign body near the first metatarsophalangeal joint and first interspace region.    Multiplanar Multisequence MR of the left foot. Images obtained of the midfoot through forefoot. No IV contrast.    Prior Studies: Left foot radiographs 2020.    FINDINGS:  T2 hyperintense cystic changes and edema in the distal aspect of the great toe metatarsal and in the great toe proximal phalanx. No definite for body is visualized in the subcutaneous tissues. There is diffuse subcutaneous edema at the dorsum of the forefoot. No abnormal muscle edema. Visualized flexor and extensor tendons are grossly intact. Likely second hammertoe. No abnormal joint effusion. The Lisfranc ligament is grossly intact.    IMPRESSION:  No definite foreign body is seen in the subcutaneous tissues. Please note that MRI may not be sensitive for small foreign bodies or all types of foreign bodies. If there is persistent clinical concern for foreign body, correlation with contrast enhanced CT or ultrasound can be performed.    Diffuse subcutaneous edema. Correlate clinically to exclude cellulitis.    T2 hyperintense cystic changes and marrow edema about the great toe metatarsophalangeal joint, likely degenerative.    Likely second hammertoe, evaluation limited on nonweightbearing exam.      Assessment :   74 M with PMH of HTN DM HLD CAD presented to the hospital with CC of mental status and rigors admitted with sepsis syndrome. Sp fever.  - Strep sepsis sec Left LE cellulitis with lymphangitis.  Classic Strep cellulitis and will take time to resolved.   - Leukocytosis resolved  - CHF   - + troponins  - MARINA stable  - Sp left foot bone biopsy - Chronic osteomyelitis. Not source of cellulitis    Plan :   Cont Rocephin 2 grams IV daily day 5 today  No surgical intervention recommended  Repeat blood cultures NGTD  Trend temps and cbc  Elevate leg    D/w Dr Beauchamp. Dr Paiz    Continue with present regiment.  Appropriate use of antibiotics and adverse effects reviewed.      I have discussed the above plan of care with patient in detail. He expressed understanding of the  treatment plan . Risks, benefits and alternatives discussed in detail. I have asked if he has any questions or concerns and appropriately addressed them to the best of my ability .    > 35 minutes were spent in direct patient care reviewing notes, medications ,labs data/ imaging , discussion with multidisciplinary team.    Thank you for allowing me to participate in care of your patient .    Stacey Dorado MD  Infectious Disease  [phone]

## 2020-11-11 NOTE — PROGRESS NOTE ADULT - PROBLEM SELECTOR PLAN 1
- Patient seen and evaluated at bedside   - Incision sited dressed with Aquacel AG and DSD  - Posterior calf dressed with Adaptic Touch and DSD w/ ACE bandage for mild compression   - Discussion of treatment options mentioned above   - Patient refusing surgical intervention at this time of left 1st metatarsal head resection and proximal phalanx base resection secondary to chronic osteomyelitis   - Podiatry team will continue to follow patient while in house

## 2020-11-11 NOTE — PROGRESS NOTE ADULT - SUBJECTIVE AND OBJECTIVE BOX
74y year old Male seen at [facility] [address] is s/p Left 1st metatarsal head and proximal phalanx bone biopsy with Dr. Sebastian Paiz, DOS 11/9/20. The patient states that he is experiencing a lot of pain along the posterior calf. The patient states he is still experiencing the pain in the back of his left calf.  Denies any fever, chills, nausea, vomiting, chest pain, shortness of breath, or calf pain at this time.      Allergies    No Known Allergies    Intolerances    REVIEW OF SYSTEMS    PAST MEDICAL & SURGICAL HISTORY:  Diabetes mellitus    Hypertension          MEDICATIONS  (STANDING):  aspirin enteric coated 81 milliGRAM(s) Oral daily  atorvastatin 40 milliGRAM(s) Oral at bedtime  cefTRIAXone   IVPB 2000 milliGRAM(s) IV Intermittent every 24 hours  clopidogrel Tablet 75 milliGRAM(s) Oral daily  dextrose 5%. 1000 milliLiter(s) (50 mL/Hr) IV Continuous <Continuous>  dextrose 50% Injectable 12.5 Gram(s) IV Push once  dextrose 50% Injectable 25 Gram(s) IV Push once  dextrose 50% Injectable 25 Gram(s) IV Push once  enoxaparin Injectable 40 milliGRAM(s) SubCutaneous daily  influenza   Vaccine 0.5 milliLiter(s) IntraMuscular once  insulin glargine Injectable (LANTUS) 10 Unit(s) SubCutaneous at bedtime  insulin lispro (ADMELOG) corrective regimen sliding scale   SubCutaneous three times a day before meals  insulin lispro (ADMELOG) corrective regimen sliding scale   SubCutaneous at bedtime  metoprolol tartrate 25 milliGRAM(s) Oral every 12 hours    MEDICATIONS  (PRN):  acetaminophen   Tablet .. 650 milliGRAM(s) Oral every 6 hours PRN Temp greater or equal to 38C (100.4F), Mild Pain (1 - 3)  dextrose 40% Gel 15 Gram(s) Oral once PRN Blood Glucose LESS THAN 70 milliGRAM(s)/deciliter  glucagon  Injectable 1 milliGRAM(s) IntraMuscular once PRN Glucose LESS THAN 70 milligrams/deciliter    Vital Signs Last 24 Hrs  T(C): 37.3 (11 Nov 2020 13:30), Max: 37.3 (11 Nov 2020 13:30)  T(F): 99.2 (11 Nov 2020 13:30), Max: 99.2 (11 Nov 2020 13:30)  HR: 66 (11 Nov 2020 13:30) (57 - 66)  BP: 145/75 (11 Nov 2020 13:30) (139/69 - 164/78)  BP(mean): --  RR: 18 (11 Nov 2020 13:30) (18 - 18)  SpO2: 96% (11 Nov 2020 13:30) (91% - 96%)    PHYSICAL EXAM:  Vascular: DP & PT faintly palpable bilaterally, Capillary refill 3 seconds, Digital hair present bilaterally, mild edema and erythema along the plantar 1st and 1st interspace of the left foot, LLE anterior cellulitis, skin warm to the touch to the LLE, Non pitting edema diffusely along the LLE, minimal ecchymosis along the dorsum of the left 1st proximal phalanx   Neurological: Light touch sensation intact bilaterally  Musculoskeletal: 5/5 strength in all quadrants bilaterally, Limited ROM of the left Ankle Joint, no pain upon palpation sub 1st plantar hyperkeratotic lesion and 1st interspace of the left foot   Dermatological:   1. Hyperkeratotic lesion sub distal lateral 1st MTPJ size 0.3cmx0.2cm with edema and erythema extending to the 1st interspace, no active drainage, does not probe to bone   2. Subdermal dried hemorrhage along the left 1st interspace   3. s/p bone biopsy of the 1st left metatarsal head-incision site 0.1cm- epithelization, no active drainage   4. s/p bone biopsy of the 1st left proximal phalanx base- incision site 0.1cm- epithelization present, no active drainage  5. Pre-Ulcerative lesion along the posterior left calf- size 6.0cmx4.5cm- weeping serous drainage              ----------CHEM PANEL----------    11-11    140  |  108  |  24<H>  ----------------------------<  137<H>  4.2   |  26  |  1.50<H>    Ca    8.7      11 Nov 2020 06:29  Mg     2.5     11-11            Imaging:   XAM:  FOOT LEFT (MINIMUM 3 VIEWS)                            PROCEDURE DATE:  11/06/2020          INTERPRETATION:  CLINICAL INDICATION:  "Rule out foreign body subcutaneous first interspace"    COMPARISON:  None.    TECHNIQUE:  AP, oblique and lateral views.    FINDINGS:  2 surgical screws traverse the distal aspect of the left tibia. Chronic appearing deformity of the distal left tibia identified. The tibiotalar articulation appears markedly narrowed with associated degenerative osteophyte formation. There is also loss of height of the posterior aspect of the talus as well as narrowing of the talar/calcaneal articulation posteriorly. There is no evidence for acute fracture or dislocation. No additional radiodense foreign bodies are identified excepting the previously described surgical hardware. No significant soft tissue swelling is evident.    IMPRESSION:  As above.      EXAM:  MR FOOT LT                            PROCEDURE DATE:  11/09/2020          INTERPRETATION:  CLINICAL INDICATION: Left foot pain, evaluate for foreign body near the first metatarsophalangeal joint and first interspace region.    Multiplanar Multisequence MR of the left foot. Images obtained of the midfoot through forefoot. No IV contrast.    Prior Studies: Left foot radiographs 11/6/2020.    FINDINGS:  T2 hyperintense cystic changes and edema in the distal aspect of the great toe metatarsal and in the great toe proximal phalanx. No definite for body is visualized in the subcutaneous tissues. There is diffuse subcutaneous edema at the dorsum of the forefoot. No abnormal muscle edema. Visualized flexor and extensor tendons are grossly intact. Likely second hammertoe. No abnormal joint effusion. The Lisfranc ligament is grossly intact.    IMPRESSION:  No definite foreign body is seen in the subcutaneous tissues. Please note that MRI may not be sensitive for small foreign bodies or all types of foreign bodies. If there is persistent clinical concern for foreign body, correlation with contrast enhanced CT or ultrasound can be performed.    Diffuse subcutaneous edema. Correlate clinically to exclude cellulitis.    T2 hyperintense cystic changes and marrow edema about the great toe metatarsophalangeal joint, likely degenerative.    Likely second hammertoe, evaluation limited on nonweightbearing exam.            SARTHAK KRISHNAMURTHY MD; Attending Radiologist    ACCESSION No:  [id number]    SUSIE NUNES                      2        Surgical Final Report          Final Diagnosis    1. Bone, left proximal phalanx, 1st digit:  Chronic osteomyelitis.    2. Bone, left first metatarsal head:  Chronic osteomyelitis.    Note: No evidence of a bone cyst or an atypical cyst is noted.  Please correlate clinically and radiographically.    Verified by: Aleksandar Dawn MD  (Electronic Signature)  Reported on: 11/10/20 11:25 [facility], [facility], [address] [place] [address], [place] [address]  Phone: [phone]   Fax: [phone]  _________________________________________________________________    Clinical History  Rule out osteomyelitis vs bone cyst vs atypical bone cyst    Specimen(s) Submitted  1-Left proximal phalanx 1st bone  2-Left 1st metatarsal head bone    Gross Description  1.   The specimen is received in formalin and the specimen  container is labeled: Left proximal phalanx 1st.  It consists of  one hard tan-pink fragment of bone measuring 0.3 x 0.1 x 0.1 cm.  The specimen is decalcified.  Entirely submitted.  One cassette.    2.   The specimen is received in formalin and the specimen  container is labeled: Left first met head.  It consists of one  hard tan-pink cylindrical fragment of bone measuring 0.7 x 0.1 x  0.1 cm.  The specimen is decalcified.  Entirely submitted.  One  cassette.    In addition to other data that may appear on the specimen  containers, all labels have been inspected to confirm the  presence of the patient's name and date of birth.  Bri ALCANTARA 11/09/2020 14:33    Disclaimer  Histological Processing Performed at [facility],  Department of Pathology, [address] [place] [address].                SUSIE NUNES 2        Surgical Consult Report          Disclaimer  Histological Processing Performed at [facility],  Department of Pathology, [address] [place] [address].

## 2020-11-11 NOTE — PROGRESS NOTE ADULT - SUBJECTIVE AND OBJECTIVE BOX
Date/Time Patient Seen:  		  Referring MD:   Data Reviewed	       Patient is a 74y old  Male who presents with a chief complaint of change in mental status (10 Nov 2020 17:28)      Subjective/HPI     PAST MEDICAL & SURGICAL HISTORY:  Diabetes mellitus    Hypertension          Medication list         MEDICATIONS  (STANDING):  aspirin enteric coated 81 milliGRAM(s) Oral daily  atorvastatin 40 milliGRAM(s) Oral at bedtime  cefTRIAXone   IVPB 2000 milliGRAM(s) IV Intermittent every 24 hours  clopidogrel Tablet 75 milliGRAM(s) Oral daily  dextrose 5%. 1000 milliLiter(s) (50 mL/Hr) IV Continuous <Continuous>  dextrose 50% Injectable 12.5 Gram(s) IV Push once  dextrose 50% Injectable 25 Gram(s) IV Push once  dextrose 50% Injectable 25 Gram(s) IV Push once  enoxaparin Injectable 40 milliGRAM(s) SubCutaneous daily  influenza   Vaccine 0.5 milliLiter(s) IntraMuscular once  insulin glargine Injectable (LANTUS) 10 Unit(s) SubCutaneous at bedtime  insulin lispro (ADMELOG) corrective regimen sliding scale   SubCutaneous three times a day before meals  insulin lispro (ADMELOG) corrective regimen sliding scale   SubCutaneous at bedtime  metoprolol tartrate 25 milliGRAM(s) Oral every 12 hours    MEDICATIONS  (PRN):  acetaminophen   Tablet .. 650 milliGRAM(s) Oral every 6 hours PRN Temp greater or equal to 38C (100.4F), Mild Pain (1 - 3)  dextrose 40% Gel 15 Gram(s) Oral once PRN Blood Glucose LESS THAN 70 milliGRAM(s)/deciliter  glucagon  Injectable 1 milliGRAM(s) IntraMuscular once PRN Glucose LESS THAN 70 milligrams/deciliter         Vitals log        ICU Vital Signs Last 24 Hrs  T(C): 36.8 (11 Nov 2020 04:10), Max: 36.8 (10 Nov 2020 08:00)  T(F): 98.2 (11 Nov 2020 04:10), Max: 98.3 (10 Nov 2020 08:00)  HR: 66 (11 Nov 2020 04:10) (57 - 66)  BP: 157/72 (11 Nov 2020 04:10) (139/69 - 168/73)  BP(mean): --  ABP: --  ABP(mean): --  RR: 18 (11 Nov 2020 04:10) (17 - 18)  SpO2: 96% (11 Nov 2020 04:10) (91% - 97%)           Input and Output:  I&O's Detail    10 Nov 2020 07:01  -  11 Nov 2020 07:00  --------------------------------------------------------  IN:    Oral Fluid: 500 mL  Total IN: 500 mL    OUT:  Total OUT: 0 mL    Total NET: 500 mL          Lab Data                        9.8    6.82  )-----------( 299      ( 11 Nov 2020 06:29 )             28.5     11-11    140  |  108  |  24<H>  ----------------------------<  137<H>  4.2   |  26  |  1.50<H>    Ca    8.7      11 Nov 2020 06:29  Mg     2.5     11-11              Review of Systems	      Objective     Physical Examination  heart s1s2  lung dec BS  abd soft        Pertinent Lab findings & Imaging      Diane:  NO   Adequate UO     I&O's Detail    10 Nov 2020 07:01  -  11 Nov 2020 07:00  --------------------------------------------------------  IN:    Oral Fluid: 500 mL  Total IN: 500 mL    OUT:  Total OUT: 0 mL    Total NET: 500 mL               Discussed with:     Cultures:	        Radiology

## 2020-11-11 NOTE — DIETITIAN INITIAL EVALUATION ADULT. - ORAL INTAKE PTA/DIET HISTORY
Pt reports good appetite and PO intake PTA. Pt states that for the past month he has not been taking care of himself regarding his diabetes, admits to consuming fast food, skipping meals, consuming a lot of sweets. Pt reports that he only checks his blood glucose level 1-2x week, usual reading 160-180mg/dL, on Metformin, current HgbA1c 7.8%, slightly elevated. Supplement use PTA includes MVI, Vitamin D3, Vitamin B12. NKFA

## 2020-11-11 NOTE — PROGRESS NOTE ADULT - PROBLEM SELECTOR PLAN 1
Strep bacteremia  Possibly from LLE cellulitis/wound -- patient had wound last week, now healed but   Podiatry consult noted  MRI -- no definitive osteo --   S/P bone biopsy -- cultures negative, cytopath: chronic osteo  No clear indication for surgery at this time  Patient was given the option -- he's refusing as well  Continue iv abx until 11.14.2020, then switch to po abx  Repeat cultures negative  ID f/u

## 2020-11-11 NOTE — PROGRESS NOTE ADULT - SUBJECTIVE AND OBJECTIVE BOX
[facility] -- Jas Plaza, Jeff, Rachel, Kate, Elissa Sanders  Office # [phone]      Follow Up:  AS    Subjective/Observations: Patient seen and examined. Events noted. Resting comfortably in bed. No complaints of chest pain, dyspnea, or palpitations reported. No signs of orthopnea or PND.       REVIEW OF SYSTEMS: All other review of systems is negative unless indicated above    PAST MEDICAL & SURGICAL HISTORY:  Diabetes mellitus    Hypertension        MEDICATIONS  (STANDING):  aspirin enteric coated 81 milliGRAM(s) Oral daily  atorvastatin 40 milliGRAM(s) Oral at bedtime  cefTRIAXone   IVPB 2000 milliGRAM(s) IV Intermittent every 24 hours  clopidogrel Tablet 75 milliGRAM(s) Oral daily  dextrose 5%. 1000 milliLiter(s) (50 mL/Hr) IV Continuous <Continuous>  dextrose 50% Injectable 12.5 Gram(s) IV Push once  dextrose 50% Injectable 25 Gram(s) IV Push once  dextrose 50% Injectable 25 Gram(s) IV Push once  enoxaparin Injectable 40 milliGRAM(s) SubCutaneous daily  influenza   Vaccine 0.5 milliLiter(s) IntraMuscular once  insulin glargine Injectable (LANTUS) 10 Unit(s) SubCutaneous at bedtime  insulin lispro (ADMELOG) corrective regimen sliding scale   SubCutaneous three times a day before meals  insulin lispro (ADMELOG) corrective regimen sliding scale   SubCutaneous at bedtime  metoprolol tartrate 25 milliGRAM(s) Oral every 12 hours    MEDICATIONS  (PRN):  acetaminophen   Tablet .. 650 milliGRAM(s) Oral every 6 hours PRN Temp greater or equal to 38C (100.4F), Mild Pain (1 - 3)  dextrose 40% Gel 15 Gram(s) Oral once PRN Blood Glucose LESS THAN 70 milliGRAM(s)/deciliter  glucagon  Injectable 1 milliGRAM(s) IntraMuscular once PRN Glucose LESS THAN 70 milligrams/deciliter  traMADol 25 milliGRAM(s) Oral every 6 hours PRN Moderate Pain (4 - 6)  traMADol 50 milliGRAM(s) Oral every 6 hours PRN Severe Pain (7 - 10)      Allergies    No Known Allergies    Intolerances            Vital Signs Last 24 Hrs  T(C): 37.4 (11 Nov 2020 19:58), Max: 37.4 (11 Nov 2020 19:58)  T(F): 99.3 (11 Nov 2020 19:58), Max: 99.3 (11 Nov 2020 19:58)  HR: 64 (11 Nov 2020 19:58) (60 - 66)  BP: 160/76 (11 Nov 2020 19:58) (145/75 - 164/78)  BP(mean): --  RR: 18 (11 Nov 2020 19:58) (18 - 18)  SpO2: 98% (11 Nov 2020 19:58) (96% - 98%)    I&O's Summary    10 Nov 2020 07:01  -  11 Nov 2020 07:00  --------------------------------------------------------  IN: 500 mL / OUT: 0 mL / NET: 500 mL          PHYSICAL EXAM:  TELE:   Constitutional: NAD, awake    HEENT: Moist Mucous Membranes, Anicteric  Pulmonary: Decreased breath sounds b/l. No rales, crackles or wheeze appreciated.   Cardiovascular: Regular, S1 and no S2 3/6 SM  Gastrointestinal: Bowel Sounds present, soft, nontender.   Lymph: No peripheral edema. No lymphadenopathy.  Skin: No visible rashes or ulcers. foot bandaged  Psych:  Mood & affect appropriate for situation    LABS: All Labs Reviewed:                        9.8    6.82  )-----------( 299      ( 11 Nov 2020 06:29 )             28.5                         9.6    7.35  )-----------( 283      ( 10 Nov 2020 07:09 )             28.2     11 Nov 2020 06:29    140    |  108    |  24     ----------------------------<  137    4.2     |  26     |  1.50   10 Nov 2020 07:09    142    |  110    |  23     ----------------------------<  128    4.0     |  25     |  1.40     Ca    8.7        11 Nov 2020 06:29  Ca    8.7        10 Nov 2020 07:09  Mg     2.5       11 Nov 2020 06:29  Mg     2.3       10 Nov 2020 07:09

## 2020-11-11 NOTE — PROGRESS NOTE ADULT - SUBJECTIVE AND OBJECTIVE BOX
Neurology Follow up note    SUSIE [id number]    HPI:  This is a 74 M with PMH of HTN DM HLD CAD who was BIBEMS to [facility] with complaints of change in mental status and rigors. The son found the patient confused and had urinated on himself. In the ER patient had a temp of 103. CT showed ground glass opacities. Patient was transferred to [place] for admission. In [place], patient is more lucid. He reports feeling weak for about a week. He status he was eating junk food and sweets. He denies fevers, chills, n/v/d/cough/CP/SOB/dysuria. He has been helping his brother move into an assisted living over the past 2 weeks.  (04 Nov 2020 10:38)      Interval History -leg still swollen    Patient is seen, chart was reviewed and case was discussed with the treatment team.  Pt is not in any distress.   Lying on bed comfortably.     Vital Signs Last 24 Hrs  T(C): 37.2 (11 Nov 2020 08:24), Max: 37.2 (11 Nov 2020 08:24)  T(F): 98.9 (11 Nov 2020 08:24), Max: 98.9 (11 Nov 2020 08:24)  HR: 60 (11 Nov 2020 08:24) (57 - 66)  BP: 146/78 (11 Nov 2020 08:24) (139/69 - 164/78)  BP(mean): --  RR: 18 (11 Nov 2020 08:24) (18 - 18)  SpO2: 96% (11 Nov 2020 08:24) (91% - 97%)        REVIEW OF SYSTEMS:    Eyes: No eye pain, visual disturbances, o  ENT:  No difficulty hearing, tinnitus, vertigo; No sinus or throat pain  Neck: No pain or stiffness  Respiratory: No  wheezing, chills or hemoptysis  Cardiovascular: No chest pain, palpitations, shortness of breath  Gastrointestinal: No abdominal or epigastric pain. No nausea, vomiting or hematemesis;   Genitourinary: No dysuria, , hematuria or incontinence  Neurological: No headaches,   Psychiatric: No anxiety, mood swings or difficulty sleeping  Musculoskeletal: No joint pain or swelling;   Skin: No itching, burning, rashes or lesions   Lymph Nodes: No enlarged glands  Endocrine: No heat or cold intolerance; No hair loss,   Allergy and Immunologic: No hives or eczema    On Neurological Examination:    Mental Status - Pt is alert, awake, oriented X3. . Follows commands well and able to answer questions appropriately  Mood and affect  normal    Speech -  Normal.    Cranial Nerves - Pupils 3 mm equal and reactive to light, extraocular eye movements intact.   Pt has no visual field deficit.  Pt has no facial asymmetry. Facial sensation is intact.  Tongue - is in midline.    Muscle tone - is normal all over.    Motor Exam - 5/5 of UE AND RLE  LLE 4/5  No drift. No shaking or tremors.    Sensory Exam -. Pt withdraws all extremities equally on stimulation. No asymmetry seen.      coordination:    Finger to nose: normal  .    Deep tendon Reflexes - 2 plus all over.      Neck Supple -  Yes.     MEDICATIONS  (STANDING):  aspirin enteric coated 81 milliGRAM(s) Oral daily  atorvastatin 40 milliGRAM(s) Oral at bedtime  cefTRIAXone   IVPB 2000 milliGRAM(s) IV Intermittent every 24 hours  clopidogrel Tablet 75 milliGRAM(s) Oral daily  dextrose 5%. 1000 milliLiter(s) (50 mL/Hr) IV Continuous <Continuous>  dextrose 50% Injectable 12.5 Gram(s) IV Push once  dextrose 50% Injectable 25 Gram(s) IV Push once  dextrose 50% Injectable 25 Gram(s) IV Push once  enoxaparin Injectable 40 milliGRAM(s) SubCutaneous daily  influenza   Vaccine 0.5 milliLiter(s) IntraMuscular once  insulin glargine Injectable (LANTUS) 10 Unit(s) SubCutaneous at bedtime  insulin lispro (ADMELOG) corrective regimen sliding scale   SubCutaneous three times a day before meals  insulin lispro (ADMELOG) corrective regimen sliding scale   SubCutaneous at bedtime  metoprolol tartrate 25 milliGRAM(s) Oral every 12 hours    MEDICATIONS  (PRN):  acetaminophen   Tablet .. 650 milliGRAM(s) Oral every 6 hours PRN Temp greater or equal to 38C (100.4F), Mild Pain (1 - 3)  dextrose 40% Gel 15 Gram(s) Oral once PRN Blood Glucose LESS THAN 70 milliGRAM(s)/deciliter  glucagon  Injectable 1 milliGRAM(s) IntraMuscular once PRN Glucose LESS THAN 70 milligrams/deciliter          Allergies    No Known Allergies    Intolerances               11-11    140  |  108  |  24<H>  ----------------------------<  137<H>  4.2   |  26  |  1.50<H>    Ca    8.7      11 Nov 2020 06:29  Mg     2.5     11-11          Hemoglobin A1C:     Vitamin B12     RADIOLOGY    ASSESSMENT AND PLAN:      seen for ams; improved  consistent metabolic encephalopathy  streptococcal bacteremia     analgesic prn  antibiotic as per ID  Physical therapy evaluation.  Pain is accessed and addressed.  Would continue to follow.

## 2020-11-11 NOTE — PROGRESS NOTE ADULT - PROBLEM SELECTOR PLAN 1
cont lantus 10 units qhs  cont humalog scale coverage qac/qhs  cont cons cho diet  goal bg 100-180 in hosp setting

## 2020-11-11 NOTE — PROGRESS NOTE ADULT - SUBJECTIVE AND OBJECTIVE BOX
Patient is a 74y old  Male who presents with a chief complaint of change in mental status (09 Nov 2020 11:04)      INTERVAL HPI/OVERNIGHT EVENTS: Patient seen and examined. NAD. No complaints.    Vital Signs Last 24 Hrs  T(C): 37.3 (11 Nov 2020 13:30), Max: 37.3 (11 Nov 2020 13:30)  T(F): 99.2 (11 Nov 2020 13:30), Max: 99.2 (11 Nov 2020 13:30)  HR: 66 (11 Nov 2020 13:30) (57 - 66)  BP: 145/75 (11 Nov 2020 13:30) (139/69 - 164/78)  BP(mean): --  RR: 18 (11 Nov 2020 13:30) (18 - 18)  SpO2: 96% (11 Nov 2020 13:30) (91% - 97%)    11-11    140  |  108  |  24<H>  ----------------------------<  137<H>  4.2   |  26  |  1.50<H>    Ca    8.7      11 Nov 2020 06:29  Mg     2.5     11-11                            9.8    6.82  )-----------( 299      ( 11 Nov 2020 06:29 )             28.5       CAPILLARY BLOOD GLUCOSE      POCT Blood Glucose.: 174 mg/dL (11 Nov 2020 12:41)  POCT Blood Glucose.: 160 mg/dL (11 Nov 2020 08:16)  POCT Blood Glucose.: 164 mg/dL (10 Nov 2020 21:08)  POCT Blood Glucose.: 156 mg/dL (10 Nov 2020 16:39)              acetaminophen   Tablet .. 650 milliGRAM(s) Oral every 6 hours PRN  aspirin enteric coated 81 milliGRAM(s) Oral daily  atorvastatin 40 milliGRAM(s) Oral at bedtime  cefTRIAXone   IVPB 2000 milliGRAM(s) IV Intermittent every 24 hours  clopidogrel Tablet 75 milliGRAM(s) Oral daily  dextrose 40% Gel 15 Gram(s) Oral once PRN  dextrose 5%. 1000 milliLiter(s) IV Continuous <Continuous>  dextrose 50% Injectable 12.5 Gram(s) IV Push once  dextrose 50% Injectable 25 Gram(s) IV Push once  dextrose 50% Injectable 25 Gram(s) IV Push once  enoxaparin Injectable 40 milliGRAM(s) SubCutaneous daily  glucagon  Injectable 1 milliGRAM(s) IntraMuscular once PRN  influenza   Vaccine 0.5 milliLiter(s) IntraMuscular once  insulin glargine Injectable (LANTUS) 10 Unit(s) SubCutaneous at bedtime  insulin lispro (ADMELOG) corrective regimen sliding scale   SubCutaneous three times a day before meals  insulin lispro (ADMELOG) corrective regimen sliding scale   SubCutaneous at bedtime  metoprolol tartrate 25 milliGRAM(s) Oral every 12 hours          d< from: MR Foot No Cont, Left (11.09.20 @ 08:10) >    EXAM:  MR FOOT LT                            PROCEDURE DATE:  11/09/2020          INTERPRETATION:  CLINICAL INDICATION: Left foot pain, evaluate for foreign body near the first metatarsophalangeal joint and first interspace region.    Multiplanar Multisequence MR of the left foot. Images obtained of the midfoot through forefoot. No IV contrast.    Prior Studies: Left foot radiographs 11/6/2020.    FINDINGS:  T2 hyperintense cystic changes and edema in the distal aspect of the great toe metatarsal and in the great toe proximal phalanx. No definite for body is visualized in the subcutaneous tissues. There is diffuse subcutaneous edema at the dorsum of the forefoot. No abnormal muscle edema. Visualized flexor and extensor tendons are grossly intact. Likely second hammertoe. No abnormal joint effusion. The Lisfranc ligament is grossly intact.    IMPRESSION:  No definite foreign body is seen in the subcutaneous tissues. Please note that MRI may not be sensitive for small foreign bodies or all types of foreign bodies. If there is persistent clinical concern for foreign body, correlation with contrast enhanced CT or ultrasound can be performed.    Diffuse subcutaneous edema. Correlate clinically to exclude cellulitis.    T2 hyperintense cystic changes and marrow edema about the great toe metatarsophalangeal joint, likely degenerative.    Likely second hammertoe, evaluation limited on nonweightbearing exam.            SARTHAK KRISHNAMURTHY MD; Attending Radiologist  This document has been electronically signed. Nov 9 2020 11:04AM    < end of copied text >            REVIEW OF SYSTEMS:  CONSTITUTIONAL: No fever, no weight loss, or no fatigue  NECK: No pain, no stiffness  RESPIRATORY: No cough, no wheezing, no chills, no hemoptysis, No shortness of breath  CARDIOVASCULAR: No chest pain, no palpitations, no dizziness, no leg swelling  GASTROINTESTINAL: No abdominal pain. No nausea, no vomiting, no hematemesis; No diarrhea, no constipation. No melena, no hematochezia.  GENITOURINARY: No dysuria, no frequency, no hematuria, no incontinence  NEUROLOGICAL: No headaches, no loss of strength, no numbness, no tremors  SKIN: No itching, no burning  MUSCULOSKELETAL: No joint pain, no swelling; No muscle, no back, no extremity pain  PSYCHIATRIC: No depression, no mood swings,   HEME/LYMPH: No easy bruising, no bleeding gums  ALLERY AND IMMUNOLOGIC: No hives       Consultant(s) Notes Reviewed:  [X] YES  [ ] NO    PHYSICAL EXAM:  GENERAL: NAD  HEAD:  Atraumatic, Normocephalic  EYES: EOMI, PERRLA, conjunctiva and sclera clear  ENMT: No tonsillar erythema, exudates, or enlargement; Moist mucous membranes  NECK: Supple, No JVD  NERVOUS SYSTEM:  Awake & alert  CHEST/LUNG: Clear to auscultation bilaterally; No rales, rhonchi, wheezing,  HEART: Regular rate and rhythm  ABDOMEN: Soft, Nontender, Nondistended; Bowel sounds present  EXTREMITIES:  + LLE erythema  LYMPH: No lymphadenopathy noted  SKIN: No rashes      Advanced care planning discussed with patient/family [X] YES   [ ] NO    Advanced care planning discussed with patient/family. Patient's health status was discussed. All appropriate changes have been made regarding patient's end-of-life care. Advanced care planning forms reviewed/discussed/completed.  20 minutes spent.

## 2020-11-11 NOTE — PROGRESS NOTE ADULT - ASSESSMENT
S/p Bone Biopsy of the Left 1st Metatarsal head and 1st proximal phalanx base. Per pathology results- chronic osteomyelitis.   Patient had discussion at bedside with Dr. Sebastian Paiz yesterday  about treatment options, it was recommended due to the patient having chronic osteomyelitis, surgical intervention via 1st left metatarsal head resection and proximal phalanx base resection. Patient states that he will discuss with infectious disease and think about it over night and will have a decision. All questions and concerns were answered to the fullest extent with the patient. Discussed risks, benefits, and alternatives with risks including but not limited to pain, swelling, infection, seroma, hematoma, loss of limb and loss of life.  Will continue to follow up on patient definite decision for recommended surgical intervention for chronic osteomyelitis of the left 1st metatarsal head and proximal phalanx base via Left 1st metatarsal head resection and proximal phalanx base resection  Discussed again with patient today to see if he would agree upon the surgical intervention for his chronic osteomyelitis, patient is refusing at this time and states that he would like to leave the decision to his physicians opinions     Patient also had a repeat Venous Duplex along the LLE to r/o DVT secondary to patient consistent compliant of posterior left lower extremity pain along the calf- with increased edema and erythema S/p Bone Biopsy of the Left 1st Metatarsal head and 1st proximal phalanx base. Per pathology results- chronic osteomyelitis.   Patient had discussion at bedside with Dr. Sebastian Paiz yesterday  about treatment options, it was recommended due to the patient having chronic osteomyelitis, surgical intervention via 1st left metatarsal head resection and proximal phalanx base resection. Patient states that he will discuss with infectious disease and think about it over night and will have a decision. All questions and concerns were answered to the fullest extent with the patient. Discussed risks, benefits, and alternatives with risks including but not limited to pain, swelling, infection, seroma, hematoma, loss of limb and loss of life.  Will continue to follow up on patient definite decision for recommended surgical intervention for chronic osteomyelitis of the left 1st metatarsal head and proximal phalanx base via Left 1st metatarsal head resection and proximal phalanx base resection in order to prevent sepsis, loss of limb, and loss of life    Patient also had a repeat Venous Duplex along the LLE to r/o DVT secondary to patient consistent compliant of posterior left lower extremity pain along the calf- with increased edema and erythema

## 2020-11-11 NOTE — DIETITIAN INITIAL EVALUATION ADULT. - OTHER INFO
As per chart pt is a 74 year old male with a PMH of HTN DM HLD CAD presented to the hospital with CC of mental status and rigors admitted with sepsis syndrome.    Pt seen at bedside. Pt reports currently good appetite and PO intake, per chart consuming about % of meals in house. Pt's admission weight 160.1lbs, current weight per chart (11/8) 182.1lbs. Pt reports current and UBW of 170-175lbs, stable, denies any significant recent weight changes. Denies any chewing/swallowing difficulties, denies any nausea/ vomiting/ diarrhea/ constipation, +BM 11/10.     No pressure injuries noted at this time

## 2020-11-11 NOTE — DIETITIAN INITIAL EVALUATION ADULT. - PERSON TAUGHT/METHOD
Pt given oral and written education on Consistent CHO diet. Educated on: 1) consuming consistent amount of CHO throughout the day, 2) meal planning, consuming CHO with protein, 3) simple vs. complex CHO, 4) treating hypoglycemia, 5) portion control/patient instructed/verbal instruction/written material

## 2020-11-11 NOTE — PROGRESS NOTE ADULT - SUBJECTIVE AND OBJECTIVE BOX
CAPILLARY BLOOD GLUCOSE      POCT Blood Glucose.: 160 mg/dL (11 Nov 2020 08:16)  POCT Blood Glucose.: 164 mg/dL (10 Nov 2020 21:08)  POCT Blood Glucose.: 156 mg/dL (10 Nov 2020 16:39)  POCT Blood Glucose.: 176 mg/dL (10 Nov 2020 12:28)      Vital Signs Last 24 Hrs  T(C): 37.2 (11 Nov 2020 08:24), Max: 37.2 (11 Nov 2020 08:24)  T(F): 98.9 (11 Nov 2020 08:24), Max: 98.9 (11 Nov 2020 08:24)  HR: 60 (11 Nov 2020 08:24) (57 - 66)  BP: 146/78 (11 Nov 2020 08:24) (139/69 - 164/78)  BP(mean): --  RR: 18 (11 Nov 2020 08:24) (17 - 18)  SpO2: 96% (11 Nov 2020 08:24) (91% - 97%)      Respiratory: CTA B/L  CV: RRR, S1S2, no murmurs, rubs or gallops  Abdominal: Soft, NT, ND +BS, Last BM  Extremities: No edema, + peripheral pulses     11-11    140  |  108  |  24<H>  ----------------------------<  137<H>  4.2   |  26  |  1.50<H>    Ca    8.7      11 Nov 2020 06:29  Mg     2.5     11-11        atorvastatin 40 milliGRAM(s) Oral at bedtime  dextrose 40% Gel 15 Gram(s) Oral once PRN  dextrose 50% Injectable 12.5 Gram(s) IV Push once  dextrose 50% Injectable 25 Gram(s) IV Push once  dextrose 50% Injectable 25 Gram(s) IV Push once  glucagon  Injectable 1 milliGRAM(s) IntraMuscular once PRN  insulin glargine Injectable (LANTUS) 10 Unit(s) SubCutaneous at bedtime  insulin lispro (ADMELOG) corrective regimen sliding scale   SubCutaneous three times a day before meals  insulin lispro (ADMELOG) corrective regimen sliding scale   SubCutaneous at bedtime

## 2020-11-12 LAB
ANION GAP SERPL CALC-SCNC: 8 MMOL/L — SIGNIFICANT CHANGE UP (ref 5–17)
BUN SERPL-MCNC: 25 MG/DL — HIGH (ref 7–23)
CALCIUM SERPL-MCNC: 8.5 MG/DL — SIGNIFICANT CHANGE UP (ref 8.5–10.1)
CHLORIDE SERPL-SCNC: 107 MMOL/L — SIGNIFICANT CHANGE UP (ref 96–108)
CO2 SERPL-SCNC: 25 MMOL/L — SIGNIFICANT CHANGE UP (ref 22–31)
CREAT SERPL-MCNC: 1.6 MG/DL — HIGH (ref 0.5–1.3)
GLUCOSE SERPL-MCNC: 138 MG/DL — HIGH (ref 70–99)
HCT VFR BLD CALC: 26.6 % — LOW (ref 39–50)
HGB BLD-MCNC: 9.6 G/DL — LOW (ref 13–17)
MAGNESIUM SERPL-MCNC: 2.5 MG/DL — SIGNIFICANT CHANGE UP (ref 1.6–2.6)
MCHC RBC-ENTMCNC: 32.3 PG — SIGNIFICANT CHANGE UP (ref 27–34)
MCHC RBC-ENTMCNC: 36.1 GM/DL — HIGH (ref 32–36)
MCV RBC AUTO: 89.6 FL — SIGNIFICANT CHANGE UP (ref 80–100)
NRBC # BLD: 0 /100 WBCS — SIGNIFICANT CHANGE UP (ref 0–0)
PLATELET # BLD AUTO: 354 K/UL — SIGNIFICANT CHANGE UP (ref 150–400)
POTASSIUM SERPL-MCNC: 4 MMOL/L — SIGNIFICANT CHANGE UP (ref 3.5–5.3)
POTASSIUM SERPL-SCNC: 4 MMOL/L — SIGNIFICANT CHANGE UP (ref 3.5–5.3)
RBC # BLD: 2.97 M/UL — LOW (ref 4.2–5.8)
RBC # FLD: 12.2 % — SIGNIFICANT CHANGE UP (ref 10.3–14.5)
SODIUM SERPL-SCNC: 140 MMOL/L — SIGNIFICANT CHANGE UP (ref 135–145)
WBC # BLD: 6.79 K/UL — SIGNIFICANT CHANGE UP (ref 3.8–10.5)
WBC # FLD AUTO: 6.79 K/UL — SIGNIFICANT CHANGE UP (ref 3.8–10.5)

## 2020-11-12 PROCEDURE — 99232 SBSQ HOSP IP/OBS MODERATE 35: CPT

## 2020-11-12 RX ADMIN — ENOXAPARIN SODIUM 40 MILLIGRAM(S): 100 INJECTION SUBCUTANEOUS at 11:33

## 2020-11-12 RX ADMIN — Medication 4: at 12:37

## 2020-11-12 RX ADMIN — Medication 25 MILLIGRAM(S): at 17:06

## 2020-11-12 RX ADMIN — CLOPIDOGREL BISULFATE 75 MILLIGRAM(S): 75 TABLET, FILM COATED ORAL at 11:33

## 2020-11-12 RX ADMIN — Medication 81 MILLIGRAM(S): at 11:33

## 2020-11-12 RX ADMIN — CEFTRIAXONE 100 MILLIGRAM(S): 500 INJECTION, POWDER, FOR SOLUTION INTRAMUSCULAR; INTRAVENOUS at 11:33

## 2020-11-12 RX ADMIN — ATORVASTATIN CALCIUM 40 MILLIGRAM(S): 80 TABLET, FILM COATED ORAL at 21:25

## 2020-11-12 RX ADMIN — INSULIN GLARGINE 10 UNIT(S): 100 INJECTION, SOLUTION SUBCUTANEOUS at 21:25

## 2020-11-12 RX ADMIN — Medication 25 MILLIGRAM(S): at 05:20

## 2020-11-12 NOTE — PROGRESS NOTE ADULT - SUBJECTIVE AND OBJECTIVE BOX
SUSIE NUNES is a 74yMale , patient examined and chart reviewed    INTERVAL HPI/ OVERNIGHT EVENTS:   Awake alert NAD.   C/o left posterior calf tenderness.  Afebrile. No events.      PAST MEDICAL & SURGICAL HISTORY:  Diabetes mellitus  Hypertension    For details regarding the patient's social history, family history, and other miscellaneous elements, please refer the initial infectious diseases consultation and/or the admitting history and physical examination for this admission.    ROS:  CONSTITUTIONAL:  Negative fever or chills  EYES:  Negative  blurry vision or double vision  CARDIOVASCULAR:  Negative for chest pain or palpitations  RESPIRATORY:  Negative for cough, wheezing, or SOB   GASTROINTESTINAL:  Negative for nausea, vomiting, diarrhea, constipation, or abdominal pain  GENITOURINARY:  Negative frequency, urgency or dysuria  NEUROLOGIC:  No headache, confusion, dizziness, lightheadedness  All other systems were reviewed and are negative       Current inpatient medications :    ANTIBIOTICS/RELEVANT:  cefTRIAXone   IVPB 2000 milliGRAM(s) IV Intermittent every 24 hours    MEDICATIONS  (STANDING):  MEDICATIONS  (STANDING):  aspirin enteric coated 81 milliGRAM(s) Oral daily  atorvastatin 40 milliGRAM(s) Oral at bedtime  clopidogrel Tablet 75 milliGRAM(s) Oral daily  dextrose 5%. 1000 milliLiter(s) (50 mL/Hr) IV Continuous <Continuous>  dextrose 50% Injectable 12.5 Gram(s) IV Push once  dextrose 50% Injectable 25 Gram(s) IV Push once  dextrose 50% Injectable 25 Gram(s) IV Push once  enoxaparin Injectable 40 milliGRAM(s) SubCutaneous daily  influenza   Vaccine 0.5 milliLiter(s) IntraMuscular once  insulin glargine Injectable (LANTUS) 10 Unit(s) SubCutaneous at bedtime  insulin lispro (ADMELOG) corrective regimen sliding scale   SubCutaneous three times a day before meals  insulin lispro (ADMELOG) corrective regimen sliding scale   SubCutaneous at bedtime  metoprolol tartrate 25 milliGRAM(s) Oral every 12 hours    MEDICATIONS  (PRN):  acetaminophen   Tablet .. 650 milliGRAM(s) Oral every 6 hours PRN Temp greater or equal to 38C (100.4F), Mild Pain (1 - 3)  dextrose 40% Gel 15 Gram(s) Oral once PRN Blood Glucose LESS THAN 70 milliGRAM(s)/deciliter  glucagon  Injectable 1 milliGRAM(s) IntraMuscular once PRN Glucose LESS THAN 70 milligrams/deciliter  traMADol 25 milliGRAM(s) Oral every 6 hours PRN Moderate Pain (4 - 6)  traMADol 50 milliGRAM(s) Oral every 6 hours PRN Severe Pain (7 - 10)    Objective:  Vital Signs Last 24 Hrs  T(C): 36.8 (2020 12:40), Max: 37.4 (2020 19:58)  T(F): 98.2 (2020 12:40), Max: 99.3 (2020 19:58)  HR: 64 (2020 12:40) (63 - 70)  BP: 133/77 (2020 12:40) (133/77 - 184/68)  RR: 17 (2020 12:40) (17 - 19)  SpO2: 95% (2020 12:40) (95% - 98%)    Physical Exam:  General: no acute distress  Eyes: sclera anicteric, pupils equal and reactive to light  ENMT: buccal mucosa moist, pharynx not injected  Neck: supple, trachea midline  Lungs: Decreased, no wheeze/rhonchi  Cardiovascular: regular rate and rhythm, S1 S2  Abdomen: soft, nontender, no organomegaly present, bowel sounds normal  Neurological: alert and oriented x3, Cranial Nerves II-XII grossly intact  Skin: no increased ecchymosis/petechiae/purpura  Lymph Nodes: no palpable cervical/supraclavicular lymph nodes enlargements  Extremities: Left LE erythema slowly improving + edema mild tenderness  Left foot drsg c/d/i no ulcer no erythema small blister sub met 1st metatarsal    LABS:                        9.6    6.79  )-----------( 354      ( 2020 06:32 )             26.6   12    140  |  107  |  25<H>  ----------------------------<  138<H>  4.0   |  25  |  1.60<H>    Ca    8.5      2020 06:32  Mg     2.5     -12      PTT - ( 2020 23:37 )  PTT:27.2 sec  Urinalysis Basic - ( 2020 00:52 )    Color: Yellow / Appearance: Clear / S.015 / pH: x  Gluc: x / Ketone: Negative  / Bili: Negative / Urobili: Negative mg/dL   Blood: x / Protein: 15 mg/dL / Nitrite: Negative   Leuk Esterase: Negative / RBC: 0-2 /HPF / WBC 0-2   Sq Epi: x / Non Sq Epi: Occasional / Bacteria: Occasional    Surgical Pathology Report (20 @ 14:00)  Surgical Final Report    Final Diagnosis    1. Bone, left proximal phalanx, 1st digit:  Chronic osteomyelitis.    2. Bone, left first metatarsal head:  Chronic osteomyelitis.      MICROBIOLOGY:    Culture - Tissue with Gram Stain (collected 2020 21:52)  Source: .Tissue left 1st met head bone culture  Gram Stain (10 Nov 2020 02:00):    No polymorphonuclear cells seen per low power field    No organisms seen per oil power field    Culture - Urine (collected 2020 14:18)  Source: .Urine Clean Catch (Midstream)  Final Report (2020 09:53):    No growth    Culture - Blood in AM (20 @ 09:26)    Specimen Source: .Blood Blood-Venous    Culture Results:   No growth to date.    Culture - Blood (20 @ 14:18)    -  Streptococcus sp. (Not Grp A, B or S pneumoniae): Detec    Gram Stain:   Growth in aerobic bottle: Gram Positive Cocci in Pairs and Chains  Growth in anaerobic bottle: Gram Positive Cocci in Pairs and Chains    -  Ceftriaxone: S 0.047    -  Clindamycin: S    -  Levofloxacin: S    -  Penicillin: S 0.016 Predicts results for ampicillin, amoxicillin, amoxicillin/clavulanate, ampicillin/sulbactam, 1st, 2nd and 3rd generation cephalosporins and carbapenems.    -  Vancomycin: S    Specimen Source: .Blood Blood-Peripheral    Organism: Blood Culture PCR    Organism: Streptococcus dysgalactiae    Organism: Streptococcus dysgalactiae    Culture Results:   Growth in aerobic and anaerobic bottles: Streptococcus dysgalactiae  (Group C/G)    RADIOLOGY & ADDITIONAL STUDIES:    EXAM:  MR FOOT LT                            PROCEDURE DATE:  2020          INTERPRETATION:  CLINICAL INDICATION: Left foot pain, evaluate for foreign body near the first metatarsophalangeal joint and first interspace region.    Multiplanar Multisequence MR of the left foot. Images obtained of the midfoot through forefoot. No IV contrast.    Prior Studies: Left foot radiographs 2020.    FINDINGS:  T2 hyperintense cystic changes and edema in the distal aspect of the great toe metatarsal and in the great toe proximal phalanx. No definite for body is visualized in the subcutaneous tissues. There is diffuse subcutaneous edema at the dorsum of the forefoot. No abnormal muscle edema. Visualized flexor and extensor tendons are grossly intact. Likely second hammertoe. No abnormal joint effusion. The Lisfranc ligament is grossly intact.    IMPRESSION:  No definite foreign body is seen in the subcutaneous tissues. Please note that MRI may not be sensitive for small foreign bodies or all types of foreign bodies. If there is persistent clinical concern for foreign body, correlation with contrast enhanced CT or ultrasound can be performed.    Diffuse subcutaneous edema. Correlate clinically to exclude cellulitis.    T2 hyperintense cystic changes and marrow edema about the great toe metatarsophalangeal joint, likely degenerative.    Likely second hammertoe, evaluation limited on nonweightbearing exam.      Assessment :   74 M with PMH of HTN DM HLD CAD presented to the hospital with CC of mental status and rigors admitted with sepsis syndrome. Sp fever.  - Strep sepsis sec Left LE cellulitis with lymphangitis.  Classic Strep cellulitis and will take time to resolved.   - Leukocytosis resolved  - CHF   - + troponins  - MARINA stable  - Sp left foot bone biopsy - Chronic osteomyelitis. Not source of cellulitis    Plan :   Cont Rocephin 2 grams IV daily day 5 today  No surgical intervention recommended  Repeat blood cultures NGTD  Trend temps and cbc  Elevate leg    D/w Dr Beauchamp.     Continue with present regiment.  Appropriate use of antibiotics and adverse effects reviewed.      I have discussed the above plan of care with patient in detail. He expressed understanding of the  treatment plan . Risks, benefits and alternatives discussed in detail. I have asked if he has any questions or concerns and appropriately addressed them to the best of my ability .    > 35 minutes were spent in direct patient care reviewing notes, medications ,labs data/ imaging , discussion with multidisciplinary team.    Thank you for allowing me to participate in care of your patient .    Stacey Dorado MD  Infectious Disease  [phone]

## 2020-11-12 NOTE — PROGRESS NOTE ADULT - PROBLEM SELECTOR PLAN 1
Tramadol pain optimization in progress  pt with sepsis - billy - ckd - dm - obesity - s/p AMS - NSTEMI  cardio follow up - on DAPT and AC - lovenox - TTE - severe AS - further w/u and tele monitor  ckd billy - I and O - monitor renal indices - serial labs - I and O  Podiatry cx noted - LE wound eval  sepsis eval - cx noted - ID eval noted - on emp ABX - source LE wound - fever 101.8 on 11 5 2020 - repeat covid pcr neg  DM care - serial FS - dietary discretion  obesity - likely at risk for MARCUS  monitor VS and HD and Sat  out of bed as tolerated  on RA  labs and imaging reviewed  ct chest more consistent with CHF - doubt PNA.

## 2020-11-12 NOTE — PROGRESS NOTE ADULT - SUBJECTIVE AND OBJECTIVE BOX
CAPILLARY BLOOD GLUCOSE      POCT Blood Glucose.: 142 mg/dL (12 Nov 2020 08:30)  POCT Blood Glucose.: 174 mg/dL (11 Nov 2020 22:21)  POCT Blood Glucose.: 118 mg/dL (11 Nov 2020 16:50)  POCT Blood Glucose.: 174 mg/dL (11 Nov 2020 12:41)      Vital Signs Last 24 Hrs  T(C): 36.8 (12 Nov 2020 08:00), Max: 37.4 (11 Nov 2020 19:58)  T(F): 98.2 (12 Nov 2020 08:00), Max: 99.3 (11 Nov 2020 19:58)  HR: 63 (12 Nov 2020 08:00) (63 - 70)  BP: 151/76 (12 Nov 2020 08:00) (145/75 - 184/68)  BP(mean): --  RR: 19 (12 Nov 2020 08:00) (18 - 19)  SpO2: 96% (12 Nov 2020 08:00) (96% - 98%)    General: WN/WD NAD  Respiratory: CTA B/L  CV: RRR, S1S2, no murmurs, rubs or gallops  Abdominal: Soft, NT, ND +BS, Last BM  Extremities: No edema, + peripheral pulses     11-12    140  |  107  |  25<H>  ----------------------------<  138<H>  4.0   |  25  |  1.60<H>    Ca    8.5      12 Nov 2020 06:32  Mg     2.5     11-12        atorvastatin 40 milliGRAM(s) Oral at bedtime  dextrose 40% Gel 15 Gram(s) Oral once PRN  dextrose 50% Injectable 12.5 Gram(s) IV Push once  dextrose 50% Injectable 25 Gram(s) IV Push once  dextrose 50% Injectable 25 Gram(s) IV Push once  glucagon  Injectable 1 milliGRAM(s) IntraMuscular once PRN  insulin glargine Injectable (LANTUS) 10 Unit(s) SubCutaneous at bedtime  insulin lispro (ADMELOG) corrective regimen sliding scale   SubCutaneous three times a day before meals  insulin lispro (ADMELOG) corrective regimen sliding scale   SubCutaneous at bedtime

## 2020-11-12 NOTE — PROGRESS NOTE ADULT - PROBLEM SELECTOR PLAN 1
Strep bacteremia  Possibly from LLE cellulitis/wound -- patient had wound last week, now healed but   Podiatry consult noted  MRI -- no definitive osteo --   S/P bone biopsy -- cultures negative, cytopath: chronic osteo  No clear indication for surgery at this time  Patient was given the option -- he's refusing as well  Continue iv abx -- today is day 5, will go home on po abx  Repeat cultures negative  ID f/u

## 2020-11-12 NOTE — PROGRESS NOTE ADULT - SUBJECTIVE AND OBJECTIVE BOX
Date/Time Patient Seen:  		  Referring MD:   Data Reviewed	       Patient is a 74y old  Male who presents with a chief complaint of change in mental status (11 Nov 2020 20:00)      Subjective/HPI     PAST MEDICAL & SURGICAL HISTORY:  Diabetes mellitus    Hypertension          Medication list         MEDICATIONS  (STANDING):  aspirin enteric coated 81 milliGRAM(s) Oral daily  atorvastatin 40 milliGRAM(s) Oral at bedtime  cefTRIAXone   IVPB 2000 milliGRAM(s) IV Intermittent every 24 hours  clopidogrel Tablet 75 milliGRAM(s) Oral daily  dextrose 5%. 1000 milliLiter(s) (50 mL/Hr) IV Continuous <Continuous>  dextrose 50% Injectable 12.5 Gram(s) IV Push once  dextrose 50% Injectable 25 Gram(s) IV Push once  dextrose 50% Injectable 25 Gram(s) IV Push once  enoxaparin Injectable 40 milliGRAM(s) SubCutaneous daily  influenza   Vaccine 0.5 milliLiter(s) IntraMuscular once  insulin glargine Injectable (LANTUS) 10 Unit(s) SubCutaneous at bedtime  insulin lispro (ADMELOG) corrective regimen sliding scale   SubCutaneous three times a day before meals  insulin lispro (ADMELOG) corrective regimen sliding scale   SubCutaneous at bedtime  metoprolol tartrate 25 milliGRAM(s) Oral every 12 hours    MEDICATIONS  (PRN):  acetaminophen   Tablet .. 650 milliGRAM(s) Oral every 6 hours PRN Temp greater or equal to 38C (100.4F), Mild Pain (1 - 3)  dextrose 40% Gel 15 Gram(s) Oral once PRN Blood Glucose LESS THAN 70 milliGRAM(s)/deciliter  glucagon  Injectable 1 milliGRAM(s) IntraMuscular once PRN Glucose LESS THAN 70 milligrams/deciliter  traMADol 25 milliGRAM(s) Oral every 6 hours PRN Moderate Pain (4 - 6)  traMADol 50 milliGRAM(s) Oral every 6 hours PRN Severe Pain (7 - 10)         Vitals log        ICU Vital Signs Last 24 Hrs  T(C): 36.7 (12 Nov 2020 05:04), Max: 37.4 (11 Nov 2020 19:58)  T(F): 98.1 (12 Nov 2020 05:04), Max: 99.3 (11 Nov 2020 19:58)  HR: 70 (12 Nov 2020 05:04) (60 - 70)  BP: 184/68 (12 Nov 2020 05:15) (145/75 - 184/68)  BP(mean): --  ABP: --  ABP(mean): --  RR: 18 (12 Nov 2020 05:04) (18 - 18)  SpO2: 98% (12 Nov 2020 05:04) (96% - 98%)           Input and Output:  I&O's Detail    11 Nov 2020 07:01  -  12 Nov 2020 07:00  --------------------------------------------------------  IN:  Total IN: 0 mL    OUT:    Voided (mL): 620 mL  Total OUT: 620 mL    Total NET: -620 mL          Lab Data                        9.6    6.79  )-----------( 354      ( 12 Nov 2020 06:32 )             26.6     11-12    140  |  107  |  25<H>  ----------------------------<  138<H>  4.0   |  25  |  1.60<H>    Ca    8.5      12 Nov 2020 06:32  Mg     2.5     11-12              Review of Systems	      Objective     Physical Examination    heart s1s2  lung dec BS  abd soft      Pertinent Lab findings & Imaging      Diane:  NO   Adequate UO     I&O's Detail    11 Nov 2020 07:01  -  12 Nov 2020 07:00  --------------------------------------------------------  IN:  Total IN: 0 mL    OUT:    Voided (mL): 620 mL  Total OUT: 620 mL    Total NET: -620 mL               Discussed with:     Cultures:	        Radiology

## 2020-11-12 NOTE — PROGRESS NOTE ADULT - PROBLEM SELECTOR PLAN 1
cont lantus 10 units qhs  cont ademelog scale coverage  cont cons cho diet  goal bg 100-180 in hosp setting

## 2020-11-12 NOTE — PROGRESS NOTE ADULT - SUBJECTIVE AND OBJECTIVE BOX
Patient is a 74y old  Male who presents with a chief complaint of change in mental status (09 Nov 2020 11:04)      INTERVAL HPI/OVERNIGHT EVENTS: Patient seen and examined. NAD. No complaints.    Vital Signs Last 24 Hrs  T(C): 36.9 (12 Nov 2020 16:50), Max: 37.4 (11 Nov 2020 19:58)  T(F): 98.4 (12 Nov 2020 16:50), Max: 99.3 (11 Nov 2020 19:58)  HR: 65 (12 Nov 2020 16:50) (63 - 70)  BP: 142/67 (12 Nov 2020 16:50) (133/77 - 184/68)  BP(mean): --  RR: 20 (12 Nov 2020 16:50) (17 - 20)  SpO2: 98% (12 Nov 2020 16:50) (95% - 98%)    11-12    140  |  107  |  25<H>  ----------------------------<  138<H>  4.0   |  25  |  1.60<H>    Ca    8.5      12 Nov 2020 06:32  Mg     2.5     11-12                            9.6    6.79  )-----------( 354      ( 12 Nov 2020 06:32 )             26.6       CAPILLARY BLOOD GLUCOSE      POCT Blood Glucose.: 124 mg/dL (12 Nov 2020 16:42)  POCT Blood Glucose.: 18 mg/dL (12 Nov 2020 16:41)  POCT Blood Glucose.: 19 mg/dL (12 Nov 2020 16:40)  POCT Blood Glucose.: 204 mg/dL (12 Nov 2020 12:33)  POCT Blood Glucose.: 142 mg/dL (12 Nov 2020 08:30)  POCT Blood Glucose.: 174 mg/dL (11 Nov 2020 22:21)              acetaminophen   Tablet .. 650 milliGRAM(s) Oral every 6 hours PRN  aspirin enteric coated 81 milliGRAM(s) Oral daily  atorvastatin 40 milliGRAM(s) Oral at bedtime  cefTRIAXone   IVPB 2000 milliGRAM(s) IV Intermittent every 24 hours  clopidogrel Tablet 75 milliGRAM(s) Oral daily  dextrose 40% Gel 15 Gram(s) Oral once PRN  dextrose 5%. 1000 milliLiter(s) IV Continuous <Continuous>  dextrose 50% Injectable 12.5 Gram(s) IV Push once  dextrose 50% Injectable 25 Gram(s) IV Push once  dextrose 50% Injectable 25 Gram(s) IV Push once  enoxaparin Injectable 40 milliGRAM(s) SubCutaneous daily  glucagon  Injectable 1 milliGRAM(s) IntraMuscular once PRN  influenza   Vaccine 0.5 milliLiter(s) IntraMuscular once  insulin glargine Injectable (LANTUS) 10 Unit(s) SubCutaneous at bedtime  insulin lispro (ADMELOG) corrective regimen sliding scale   SubCutaneous three times a day before meals  insulin lispro (ADMELOG) corrective regimen sliding scale   SubCutaneous at bedtime  metoprolol tartrate 25 milliGRAM(s) Oral every 12 hours  traMADol 25 milliGRAM(s) Oral every 6 hours PRN  traMADol 50 milliGRAM(s) Oral every 6 hours PRN        d< from: MR Foot No Cont, Left (11.09.20 @ 08:10) >    EXAM:  MR FOOT LT                            PROCEDURE DATE:  11/09/2020          INTERPRETATION:  CLINICAL INDICATION: Left foot pain, evaluate for foreign body near the first metatarsophalangeal joint and first interspace region.    Multiplanar Multisequence MR of the left foot. Images obtained of the midfoot through forefoot. No IV contrast.    Prior Studies: Left foot radiographs 11/6/2020.    FINDINGS:  T2 hyperintense cystic changes and edema in the distal aspect of the great toe metatarsal and in the great toe proximal phalanx. No definite for body is visualized in the subcutaneous tissues. There is diffuse subcutaneous edema at the dorsum of the forefoot. No abnormal muscle edema. Visualized flexor and extensor tendons are grossly intact. Likely second hammertoe. No abnormal joint effusion. The Lisfranc ligament is grossly intact.    IMPRESSION:  No definite foreign body is seen in the subcutaneous tissues. Please note that MRI may not be sensitive for small foreign bodies or all types of foreign bodies. If there is persistent clinical concern for foreign body, correlation with contrast enhanced CT or ultrasound can be performed.    Diffuse subcutaneous edema. Correlate clinically to exclude cellulitis.    T2 hyperintense cystic changes and marrow edema about the great toe metatarsophalangeal joint, likely degenerative.    Likely second hammertoe, evaluation limited on nonweightbearing exam.            SARTHAK KRISHNAMURTHY MD; Attending Radiologist  This document has been electronically signed. Nov 9 2020 11:04AM    < end of copied text >            REVIEW OF SYSTEMS:  CONSTITUTIONAL: No fever, no weight loss, or no fatigue  NECK: No pain, no stiffness  RESPIRATORY: No cough, no wheezing, no chills, no hemoptysis, No shortness of breath  CARDIOVASCULAR: No chest pain, no palpitations, no dizziness, no leg swelling  GASTROINTESTINAL: No abdominal pain. No nausea, no vomiting, no hematemesis; No diarrhea, no constipation. No melena, no hematochezia.  GENITOURINARY: No dysuria, no frequency, no hematuria, no incontinence  NEUROLOGICAL: No headaches, no loss of strength, no numbness, no tremors  SKIN: No itching, no burning  MUSCULOSKELETAL: No joint pain, no swelling; No muscle, no back, no extremity pain  PSYCHIATRIC: No depression, no mood swings,   HEME/LYMPH: No easy bruising, no bleeding gums  ALLERY AND IMMUNOLOGIC: No hives       Consultant(s) Notes Reviewed:  [X] YES  [ ] NO    PHYSICAL EXAM:  GENERAL: NAD  HEAD:  Atraumatic, Normocephalic  EYES: EOMI, PERRLA, conjunctiva and sclera clear  ENMT: No tonsillar erythema, exudates, or enlargement; Moist mucous membranes  NECK: Supple, No JVD  NERVOUS SYSTEM:  Awake & alert  CHEST/LUNG: Clear to auscultation bilaterally; No rales, rhonchi, wheezing,  HEART: Regular rate and rhythm  ABDOMEN: Soft, Nontender, Nondistended; Bowel sounds present  EXTREMITIES:  + LLE erythema  LYMPH: No lymphadenopathy noted  SKIN: No rashes      Advanced care planning discussed with patient/family [X] YES   [ ] NO    Advanced care planning discussed with patient/family. Patient's health status was discussed. All appropriate changes have been made regarding patient's end-of-life care. Advanced care planning forms reviewed/discussed/completed.  20 minutes spent.

## 2020-11-12 NOTE — PROGRESS NOTE ADULT - ASSESSMENT
MARINA: On ARB, ATN  Sepsis, Strep bacteremia, LLE cellulitis  Diabetes  Hypertension    Slow increase in creatinine trend. Will follow trend. Encourage PO intake. To continue current meds. On IV abx. Monitor blood sugar levels. Insulin coverage as needed. Dietary restriction. Avoid nephrotoxic meds as possible. Will follow electrolytes and renal function trend. MARINA, CKD: On ARB, ATN  Sepsis, Strep bacteremia, LLE cellulitis  Diabetes  Hypertension    Slow increase in creatinine trend. Likely at new baselineWill follow trend. Encourage PO intake. To continue current meds. On IV abx. Monitor blood sugar levels. Insulin coverage as needed. Dietary restriction. Avoid nephrotoxic meds as possible. Will follow electrolytes and renal function trend.

## 2020-11-12 NOTE — PROGRESS NOTE ADULT - SUBJECTIVE AND OBJECTIVE BOX
Neurology Follow up note    SUSIE [id number]    HPI:  This is a 74 M with PMH of HTN DM HLD CAD who was BIBEMS to [facility] with complaints of change in mental status and rigors. The son found the patient confused and had urinated on himself. In the ER patient had a temp of 103. CT showed ground glass opacities. Patient was transferred to [place] for admission. In [place], patient is more lucid. He reports feeling weak for about a week. He status he was eating junk food and sweets. He denies fevers, chills, n/v/d/cough/CP/SOB/dysuria. He has been helping his brother move into an assisted living over the past 2 weeks.  (04 Nov 2020 10:38)      Interval History -no new events    Patient is seen, chart was reviewed and case was discussed with the treatment team.  Pt is not in any distress.   Lying on bed comfortably.     Vital Signs Last 24 Hrs  T(C): 36.8 (12 Nov 2020 08:00), Max: 37.4 (11 Nov 2020 19:58)  T(F): 98.2 (12 Nov 2020 08:00), Max: 99.3 (11 Nov 2020 19:58)  HR: 63 (12 Nov 2020 08:00) (63 - 70)  BP: 151/76 (12 Nov 2020 08:00) (145/75 - 184/68)  BP(mean): --  RR: 19 (12 Nov 2020 08:00) (18 - 19)  SpO2: 96% (12 Nov 2020 08:00) (96% - 98%)        REVIEW OF SYSTEMS:    Eyes: No eye pain, visual disturbances,   ENT:  No difficulty hearing, tinnitus, vertigo; No sinus or throat pain  Neck: No pain or stiffness  Respiratory: No  wheezing, chills or hemoptysis  Cardiovascular: No chest pain, palpitations, shortness of breath  Gastrointestinal: No abdominal or epigastric pain. No nausea, vomiting or hematemesis;   Genitourinary: No dysuria, , hematuria or incontinence  Neurological: No headaches,   Psychiatric: No anxiety, mood swings or difficulty sleeping  Musculoskeletal: No joint pain or swelling;   Skin: No itching, burning, rashes or lesions   Lymph Nodes: No enlarged glands  Endocrine: No heat or cold intolerance; No hair loss,   Allergy and Immunologic: No hives or eczema    On Neurological Examination:    Mental Status - Pt is alert, awake, oriented X3. . Follows commands well and able to answer questions appropriately  Mood and affect  normal    Speech -  Normal.    Cranial Nerves - Pupils 3 mm equal and reactive to light, extraocular eye movements intact.   Pt has no visual field deficit.  Pt has no facial asymmetry. Facial sensation is intact.  Tongue - is in midline.    Muscle tone - is normal all over.    Motor Exam - 5/5 of UE AND RLE  LLE 4/5  No drift. No shaking or tremors.    Sensory Exam -. Pt withdraws all extremities equally on stimulation. No asymmetry seen.      coordination:    Finger to nose: normal  .    Deep tendon Reflexes - 2 plus all over.      Neck Supple -  Yes.     MEDICATIONS  (STANDING):  aspirin enteric coated 81 milliGRAM(s) Oral daily  atorvastatin 40 milliGRAM(s) Oral at bedtime  cefTRIAXone   IVPB 2000 milliGRAM(s) IV Intermittent every 24 hours  clopidogrel Tablet 75 milliGRAM(s) Oral daily  dextrose 5%. 1000 milliLiter(s) (50 mL/Hr) IV Continuous <Continuous>  dextrose 50% Injectable 12.5 Gram(s) IV Push once  dextrose 50% Injectable 25 Gram(s) IV Push once  dextrose 50% Injectable 25 Gram(s) IV Push once  enoxaparin Injectable 40 milliGRAM(s) SubCutaneous daily  influenza   Vaccine 0.5 milliLiter(s) IntraMuscular once  insulin glargine Injectable (LANTUS) 10 Unit(s) SubCutaneous at bedtime  insulin lispro (ADMELOG) corrective regimen sliding scale   SubCutaneous three times a day before meals  insulin lispro (ADMELOG) corrective regimen sliding scale   SubCutaneous at bedtime  metoprolol tartrate 25 milliGRAM(s) Oral every 12 hours    MEDICATIONS  (PRN):  acetaminophen   Tablet .. 650 milliGRAM(s) Oral every 6 hours PRN Temp greater or equal to 38C (100.4F), Mild Pain (1 - 3)  dextrose 40% Gel 15 Gram(s) Oral once PRN Blood Glucose LESS THAN 70 milliGRAM(s)/deciliter  glucagon  Injectable 1 milliGRAM(s) IntraMuscular once PRN Glucose LESS THAN 70 milligrams/deciliter          Allergies    No Known Allergies    Intolerances                                     9.6    6.79  )-----------( 354      ( 12 Nov 2020 06:32 )             26.6   11-12    140  |  107  |  25<H>  ----------------------------<  138<H>  4.0   |  25  |  1.60<H>    Ca    8.5      12 Nov 2020 06:32  Mg     2.5     11-12          Hemoglobin A1C:     Vitamin B12     RADIOLOGY    ASSESSMENT AND PLAN:      seen for ams; improved  consistent metabolic encephalopathy  streptococcal bacteremia   covid -19 exposure    analgesic prn  antibiotic as per ID  Physical therapy evaluation.  Pain is accessed and addressed.  Would continue to follow.

## 2020-11-12 NOTE — PROGRESS NOTE ADULT - SUBJECTIVE AND OBJECTIVE BOX
[facility] -- Jas Plaza, Jeff, Rachel, Tommy Love Savella  Office # [phone]      Follow Up:    severe as    Subjective/Observations:   No events overnight resting comfortably in bed.  No complaints of chest pain, dyspnea, or palpitations reported. No signs of orthopnea or PND.      REVIEW OF SYSTEMS: All other review of systems is negative unless indicated above    PAST MEDICAL & SURGICAL HISTORY:  Diabetes mellitus    Hypertension        MEDICATIONS  (STANDING):  aspirin enteric coated 81 milliGRAM(s) Oral daily  atorvastatin 40 milliGRAM(s) Oral at bedtime  cefTRIAXone   IVPB 2000 milliGRAM(s) IV Intermittent every 24 hours  clopidogrel Tablet 75 milliGRAM(s) Oral daily  dextrose 5%. 1000 milliLiter(s) (50 mL/Hr) IV Continuous <Continuous>  dextrose 50% Injectable 12.5 Gram(s) IV Push once  dextrose 50% Injectable 25 Gram(s) IV Push once  dextrose 50% Injectable 25 Gram(s) IV Push once  enoxaparin Injectable 40 milliGRAM(s) SubCutaneous daily  influenza   Vaccine 0.5 milliLiter(s) IntraMuscular once  insulin glargine Injectable (LANTUS) 10 Unit(s) SubCutaneous at bedtime  insulin lispro (ADMELOG) corrective regimen sliding scale   SubCutaneous three times a day before meals  insulin lispro (ADMELOG) corrective regimen sliding scale   SubCutaneous at bedtime  metoprolol tartrate 25 milliGRAM(s) Oral every 12 hours    MEDICATIONS  (PRN):  acetaminophen   Tablet .. 650 milliGRAM(s) Oral every 6 hours PRN Temp greater or equal to 38C (100.4F), Mild Pain (1 - 3)  dextrose 40% Gel 15 Gram(s) Oral once PRN Blood Glucose LESS THAN 70 milliGRAM(s)/deciliter  glucagon  Injectable 1 milliGRAM(s) IntraMuscular once PRN Glucose LESS THAN 70 milligrams/deciliter  traMADol 25 milliGRAM(s) Oral every 6 hours PRN Moderate Pain (4 - 6)  traMADol 50 milliGRAM(s) Oral every 6 hours PRN Severe Pain (7 - 10)      Allergies    No Known Allergies    Intolerances        Vital Signs Last 24 Hrs  T(C): 36.8 (12 Nov 2020 08:00), Max: 37.4 (11 Nov 2020 19:58)  T(F): 98.2 (12 Nov 2020 08:00), Max: 99.3 (11 Nov 2020 19:58)  HR: 63 (12 Nov 2020 08:00) (63 - 70)  BP: 151/76 (12 Nov 2020 08:00) (145/75 - 184/68)  BP(mean): --  RR: 19 (12 Nov 2020 08:00) (18 - 19)  SpO2: 96% (12 Nov 2020 08:00) (96% - 98%)    I&O's Summary    11 Nov 2020 07:01  -  12 Nov 2020 07:00  --------------------------------------------------------  IN: 0 mL / OUT: 620 mL / NET: -620 mL          PHYSICAL EXAM:  TELE:    Constitutional: NAD, awake and alert, well-developed  HEENT: Moist Mucous Membranes, Anicteric  Pulmonary: Non-labored, breath sounds are clear bilaterally, No wheezing, crackles or rhonchi  Cardiovascular: Regular, S1 and S2 nl, DILIP   Gastrointestinal: Bowel Sounds present, soft, nontender.   Lymph: No lymphadenopathy. No peripheral edema.  Skin: LEFT foot dressing   Psych:  Mood & affect appropriate    LABS: All Labs Reviewed:                        9.6    6.79  )-----------( 354      ( 12 Nov 2020 06:32 )             26.6                         9.8    6.82  )-----------( 299      ( 11 Nov 2020 06:29 )             28.5                         9.6    7.35  )-----------( 283      ( 10 Nov 2020 07:09 )             28.2     12 Nov 2020 06:32    140    |  107    |  25     ----------------------------<  138    4.0     |  25     |  1.60   11 Nov 2020 06:29    140    |  108    |  24     ----------------------------<  137    4.2     |  26     |  1.50   10 Nov 2020 07:09    142    |  110    |  23     ----------------------------<  128    4.0     |  25     |  1.40     Ca    8.5        12 Nov 2020 06:32  Ca    8.7        11 Nov 2020 06:29  Ca    8.7        10 Nov 2020 07:09  Mg     2.5       12 Nov 2020 06:32  Mg     2.5       11 Nov 2020 06:29  Mg     2.3       10 Nov 2020 07:09               ECG:  < from: 12 Lead ECG (11.04.20 @ 13:00) >  Ventricular Rate 77 BPM    Atrial Rate 77 BPM    P-R Interval 154 ms    QRS Duration 88 ms    Q-T Interval 374 ms    QTC Calculation(Bazett) 423 ms    P Axis 9 degrees    R Axis 3 degrees    T Axis 107 degrees    Diagnosis Line Normal sinus rhythm  Minimal voltage criteria for LVH, may be normal variant  Inferior infarct , age undetermined  Abnormal ECG  When compared with ECG of 10-JUL-2010 13:13,  No significant change was found        Echo:  < from: TTE Echo Complete w/o Contrast w/ Doppler (11.04.20 @ 11:23) >    OBSERVATIONS:  Actually difficult and limited study, unable to obtain subcostal views  Mitral Valve: Thickened leaflets, mild MR. Mitral annular calcification  Aortic Valve/Aorta: Calcified trileaflet aortic valve with decreased opening. Peak transaortic valve gradient is 69 mmHg with a mean transaortic valve gradient of 49 mmHg. The aortic valve area is calculated to be 0.7 sq cm by continuity equation. This is consistent with severe aortic stenosis  Tricuspid Valve: normal with trace TR.  Pulmonic Valve: Not well-visualized  Left Atrium: normal  Right Atrium: Not well-visualized  Left Ventricle: normal LV size and systolic function, estimated LVEF of 55-60%.  Right Ventricle: Grossly normal size and systolic function.  Pericardium/Pleura: normal, no significant pericardial effusion.      Conclusion:  Technically difficult and limited study  Normal left ventricular internal dimensions and systolic function, estimated LVEF of 55-60%.  Grossly normal RV size and systolic function.  Calcified trileaflet aortic valve with severe aortic stenosis  Mild MR  Trace TR.  No significant pericardial effusion.      < end of copied text >      Radiology:    < from: CT Chest No Cont (11.04.20 @ 01:33) >    CHEST:    LUNGS AND LARGE AIRWAYS: There is interlobular septal thickening and extensive scattered groundglass opacity in both lungs. Patent central airways.  No pulmonary nodules.  PLEURA: No pleural effusion.  VESSELS: Within normal limits.  HEART: Heart size is mildly enlarged. No pericardial effusion. Coronary artery vascular calcifications.  MEDIASTINUM AND CARRIE: No lymphadenopathy.  CHEST WALL AND LOWER NECK: Within normal limits.  VISUALIZED UPPER ABDOMEN: Within normal limits.  BONES: There is a right humeral prosthesis with streak artifact limiting evaluation. There are degenerative changes of the spine.    IMPRESSION: Moderate pulmonary edema. Please note that superimposed infection cannot be excluded.    < end of copied text >

## 2020-11-12 NOTE — PROGRESS NOTE ADULT - ASSESSMENT
74 year old male with PMH HTN, DM, HLD transferred from [place] for admission for AMS due to underlying sepsis due to possible PNA,     ACS  - Patient found to have elevated Troponin. Now down trending. No need to trend further  - Technically difficult ECHO showed normal LV & RV size and function EF 55-60%, severe AS  - Severe AS with evolving troponin in the setting of infection with bacteremia  - EKG with ST depressions in leads I, avL, and V5/V6, consistent with possible lateral ischemia.   - S/P full dose Lovenox for 48 hours   - Continue ASA 81 mg daily, Plavix 75 mg daily  - Continue atorvastatin 40 mg daily   - Continue BB  -will need outpatient ischemic work up   Severe AS  - Recent ECHO with severe AS  - CT chest notable pulmonary edema on CT chest. s/p 3 L NS, this is likely due to volume overload. Would hold off on diuresis. Patient appears euvolemic on examination   - Caution with IVF  - Patient would need outpatient evaluation for severe AS  - Continue BB     Hypertension  - BP: 151/76  -resume arb when cr improves   - Continue BB    Hyperlipidemia  - Continue Lipitor 40 mg HS   - Low fat diet    Acute Kidney injury  - Continue to monitor renal indices  - Avoid nephrotoxins   - Holding losartan and HCTZ, resume when MARINA resolves     Sepsis    - Management per primary team     - Monitor and replete lytes, keep K>4, Mg>2.  - All other medical needs as per primary team.  - Other cardiovascular workup will depend on clinical course.  - Will continue to follow.  Karis Ghosh FNP-C  Cardiology NP  [id number] [phone]     74 year old male with PMH HTN, DM, HLD transferred from [place] for admission for AMS due to underlying sepsis due to possible PNA,     ACS  - Patient found to have elevated Troponin. Now down trending. No need to trend further  - Technically difficult ECHO showed normal LV & RV size and function EF 55-60%, severe AS  - Severe AS with evolving troponin in the setting of infection with bacteremia  - EKG with ST depressions in leads I, avL, and V5/V6, consistent with possible lateral ischemia.   - S/P full dose Lovenox for 48 hours   - Continue ASA 81 mg daily, Plavix 75 mg daily  - Continue atorvastatin 40 mg daily   - Continue BB  -will need outpatient ischemic work up     Severe AS  - Recent ECHO with severe AS  - CT chest notable pulmonary edema on CT chest. s/p 3 L NS, this is likely due to volume overload. Would hold off on diuresis. Patient appears euvolemic on examination   - Caution with IVF  - Patient would need outpatient evaluation for severe AS  - Continue BB     Hypertension  - BP: 151/76  -resume arb when cr improves   - Continue BB    Hyperlipidemia  - Continue Lipitor 40 mg HS   - Low fat diet    Acute Kidney injury  - Continue to monitor renal indices  - Avoid nephrotoxins   - Holding losartan and HCTZ, resume when MARINA resolves     Sepsis    - Management per primary team     - Monitor and replete lytes, keep K>4, Mg>2.  - All other medical needs as per primary team.  - Other cardiovascular workup will depend on clinical course.  - Will continue to follow.  Karis Ghosh FNP-C  Cardiology NP  [id number] [phone]

## 2020-11-13 LAB
ANION GAP SERPL CALC-SCNC: 8 MMOL/L — SIGNIFICANT CHANGE UP (ref 5–17)
BUN SERPL-MCNC: 23 MG/DL — SIGNIFICANT CHANGE UP (ref 7–23)
CALCIUM SERPL-MCNC: 8.7 MG/DL — SIGNIFICANT CHANGE UP (ref 8.5–10.1)
CHLORIDE SERPL-SCNC: 108 MMOL/L — SIGNIFICANT CHANGE UP (ref 96–108)
CO2 SERPL-SCNC: 25 MMOL/L — SIGNIFICANT CHANGE UP (ref 22–31)
CREAT SERPL-MCNC: 1.5 MG/DL — HIGH (ref 0.5–1.3)
GLUCOSE SERPL-MCNC: 122 MG/DL — HIGH (ref 70–99)
MAGNESIUM SERPL-MCNC: 2.4 MG/DL — SIGNIFICANT CHANGE UP (ref 1.6–2.6)
POTASSIUM SERPL-MCNC: 3.9 MMOL/L — SIGNIFICANT CHANGE UP (ref 3.5–5.3)
POTASSIUM SERPL-SCNC: 3.9 MMOL/L — SIGNIFICANT CHANGE UP (ref 3.5–5.3)
SODIUM SERPL-SCNC: 141 MMOL/L — SIGNIFICANT CHANGE UP (ref 135–145)

## 2020-11-13 PROCEDURE — 99232 SBSQ HOSP IP/OBS MODERATE 35: CPT

## 2020-11-13 RX ADMIN — Medication 81 MILLIGRAM(S): at 11:29

## 2020-11-13 RX ADMIN — Medication 25 MILLIGRAM(S): at 05:04

## 2020-11-13 RX ADMIN — CLOPIDOGREL BISULFATE 75 MILLIGRAM(S): 75 TABLET, FILM COATED ORAL at 11:29

## 2020-11-13 RX ADMIN — INSULIN GLARGINE 10 UNIT(S): 100 INJECTION, SOLUTION SUBCUTANEOUS at 22:23

## 2020-11-13 RX ADMIN — Medication 25 MILLIGRAM(S): at 17:12

## 2020-11-13 RX ADMIN — ENOXAPARIN SODIUM 40 MILLIGRAM(S): 100 INJECTION SUBCUTANEOUS at 11:29

## 2020-11-13 RX ADMIN — ATORVASTATIN CALCIUM 40 MILLIGRAM(S): 80 TABLET, FILM COATED ORAL at 22:22

## 2020-11-13 RX ADMIN — Medication 4: at 12:03

## 2020-11-13 RX ADMIN — CEFTRIAXONE 100 MILLIGRAM(S): 500 INJECTION, POWDER, FOR SOLUTION INTRAMUSCULAR; INTRAVENOUS at 10:59

## 2020-11-13 NOTE — PROGRESS NOTE ADULT - PROBLEM SELECTOR PLAN 5
Improved and stable  Hold losartan, HCTZ and metformin  Caution with hydration due to possible CHF  Avoid nephrotoxic medications  Nephrology f/u Improved and stable  Hold losartan, HCTZ and metformin  Avoid nephrotoxic medications  Nephrology f/u

## 2020-11-13 NOTE — PROGRESS NOTE ADULT - SUBJECTIVE AND OBJECTIVE BOX
Neurology Follow up note    SUSIE [id number]    HPI:  This is a 74 M with PMH of HTN DM HLD CAD who was BIBEMS to [facility] with complaints of change in mental status and rigors. The son found the patient confused and had urinated on himself. In the ER patient had a temp of 103. CT showed ground glass opacities. Patient was transferred to [place] for admission. In [place], patient is more lucid. He reports feeling weak for about a week. He status he was eating junk food and sweets. He denies fevers, chills, n/v/d/cough/CP/SOB/dysuria. He has been helping his brother move into an assisted living over the past 2 weeks.  (04 Nov 2020 10:38)      Interval History -no new complaints    Patient is seen, chart was reviewed and case was discussed with the treatment team.  Pt is not in any distress.   Lying on bed comfortably.     Vital Signs Last 24 Hrs  T(C): 36.9 (13 Nov 2020 11:33), Max: 36.9 (12 Nov 2020 16:50)  T(F): 98.4 (13 Nov 2020 11:33), Max: 98.5 (13 Nov 2020 04:22)  HR: 61 (13 Nov 2020 11:33) (56 - 67)  BP: 151/72 (13 Nov 2020 11:33) (133/77 - 174/83)  BP(mean): --  RR: 20 (13 Nov 2020 11:33) (17 - 20)  SpO2: 100% (13 Nov 2020 11:33) (95% - 100%))        REVIEW OF SYSTEMS:    Eyes: No eye pain, visual disturbances,   ENT:  No difficulty hearing, tinnitus, vertigo; No sinus or throat pain  Neck: No pain or stiffness  Respiratory: No  wheezing, chills or hemoptysis  Cardiovascular: No chest pain, palpitations, shortness of breath  Gastrointestinal: No abdominal or epigastric pain. No nausea, vomiting or hematemesis;   Genitourinary: No dysuria, , hematuria or incontinence  Neurological: No headaches,   Psychiatric: No anxiety, mood swings or difficulty sleeping  Musculoskeletal: No joint pain or swelling;   Skin: No itching, burning, rashes or lesions   Lymph Nodes: No enlarged glands  Endocrine: No heat or cold intolerance; No hair loss,   Allergy and Immunologic: No hives or eczema    On Neurological Examination:    Mental Status - Pt is alert, awake, oriented X3. . Follows commands well and able to answer questions appropriately  Mood and affect  normal    Speech -  Normal.    Cranial Nerves - Pupils 3 mm equal and reactive to light, extraocular eye movements intact.   Pt has no visual field deficit.  Pt has no facial asymmetry. Facial sensation is intact.  Tongue - is in midline.    Muscle tone - is normal all over.    Motor Exam - 5/5 of UE AND RLE  LLE 4/5  No drift. No shaking or tremors.    Sensory Exam -. Pt withdraws all extremities equally on stimulation. No asymmetry seen.      coordination:    Finger to nose: normal  .    Deep tendon Reflexes - 2 plus all over.      Neck Supple -  Yes.     MEDICATIONS  (STANDING):  aspirin enteric coated 81 milliGRAM(s) Oral daily  atorvastatin 40 milliGRAM(s) Oral at bedtime  cefTRIAXone   IVPB 2000 milliGRAM(s) IV Intermittent every 24 hours  clopidogrel Tablet 75 milliGRAM(s) Oral daily  dextrose 5%. 1000 milliLiter(s) (50 mL/Hr) IV Continuous <Continuous>  dextrose 50% Injectable 12.5 Gram(s) IV Push once  dextrose 50% Injectable 25 Gram(s) IV Push once  dextrose 50% Injectable 25 Gram(s) IV Push once  enoxaparin Injectable 40 milliGRAM(s) SubCutaneous daily  influenza   Vaccine 0.5 milliLiter(s) IntraMuscular once  insulin glargine Injectable (LANTUS) 10 Unit(s) SubCutaneous at bedtime  insulin lispro (ADMELOG) corrective regimen sliding scale   SubCutaneous three times a day before meals  insulin lispro (ADMELOG) corrective regimen sliding scale   SubCutaneous at bedtime  metoprolol tartrate 25 milliGRAM(s) Oral every 12 hours    MEDICATIONS  (PRN):  acetaminophen   Tablet .. 650 milliGRAM(s) Oral every 6 hours PRN Temp greater or equal to 38C (100.4F), Mild Pain (1 - 3)  dextrose 40% Gel 15 Gram(s) Oral once PRN Blood Glucose LESS THAN 70 milliGRAM(s)/deciliter  glucagon  Injectable 1 milliGRAM(s) IntraMuscular once PRN Glucose LESS THAN 70 milligrams/deciliter          Allergies    No Known Allergies    Intolerances               11-13    141  |  108  |  23  ----------------------------<  122<H>  3.9   |  25  |  1.50<H>    Ca    8.7      13 Nov 2020 06:31  Mg     2.4     11-13            Hemoglobin A1C:     Vitamin B12     RADIOLOGY    ASSESSMENT AND PLAN:      seen for ams; improved  consistent metabolic encephalopathy  streptococcal bacteremia   covid -19 exposure    increase ambulation  analgesic prn  antibiotic as per ID  Physical therapy evaluation.  Pain is accessed and addressed.  Would continue to follow.

## 2020-11-13 NOTE — PROGRESS NOTE ADULT - SUBJECTIVE AND OBJECTIVE BOX
[facility] -- Jas Plaza, Rachel Aguilar, Tommy Love Savella, Goodger  Office # [phone]    Follow Up:      Subjective/Observations:     REVIEW OF SYSTEMS: All other review of systems is negative unless indicated above  PAST MEDICAL & SURGICAL HISTORY:  Diabetes mellitus    Hypertension      MEDICATIONS  (STANDING):  aspirin enteric coated 81 milliGRAM(s) Oral daily  atorvastatin 40 milliGRAM(s) Oral at bedtime  cefTRIAXone   IVPB 2000 milliGRAM(s) IV Intermittent every 24 hours  clopidogrel Tablet 75 milliGRAM(s) Oral daily  dextrose 5%. 1000 milliLiter(s) (50 mL/Hr) IV Continuous <Continuous>  dextrose 50% Injectable 12.5 Gram(s) IV Push once  dextrose 50% Injectable 25 Gram(s) IV Push once  dextrose 50% Injectable 25 Gram(s) IV Push once  enoxaparin Injectable 40 milliGRAM(s) SubCutaneous daily  influenza   Vaccine 0.5 milliLiter(s) IntraMuscular once  insulin glargine Injectable (LANTUS) 10 Unit(s) SubCutaneous at bedtime  insulin lispro (ADMELOG) corrective regimen sliding scale   SubCutaneous three times a day before meals  insulin lispro (ADMELOG) corrective regimen sliding scale   SubCutaneous at bedtime  metoprolol tartrate 25 milliGRAM(s) Oral every 12 hours    MEDICATIONS  (PRN):  acetaminophen   Tablet .. 650 milliGRAM(s) Oral every 6 hours PRN Temp greater or equal to 38C (100.4F), Mild Pain (1 - 3)  dextrose 40% Gel 15 Gram(s) Oral once PRN Blood Glucose LESS THAN 70 milliGRAM(s)/deciliter  glucagon  Injectable 1 milliGRAM(s) IntraMuscular once PRN Glucose LESS THAN 70 milligrams/deciliter  traMADol 50 milliGRAM(s) Oral every 6 hours PRN Severe Pain (7 - 10)  traMADol 25 milliGRAM(s) Oral every 6 hours PRN Moderate Pain (4 - 6)    Allergies    No Known Allergies    Intolerances      Vital Signs Last 24 Hrs  T(C): 36.9 (13 Nov 2020 11:33), Max: 36.9 (12 Nov 2020 16:50)  T(F): 98.4 (13 Nov 2020 11:33), Max: 98.5 (13 Nov 2020 04:22)  HR: 61 (13 Nov 2020 11:33) (56 - 67)  BP: 151/72 (13 Nov 2020 11:33) (142/67 - 174/83)  BP(mean): --  RR: 20 (13 Nov 2020 11:33) (18 - 20)  SpO2: 100% (13 Nov 2020 11:33) (95% - 100%)  I&O's Summary    12 Nov 2020 07:01  -  13 Nov 2020 07:00  --------------------------------------------------------  IN: 1030 mL / OUT: 1510 mL / NET: -480 mL    13 Nov 2020 07:01  -  13 Nov 2020 13:29  --------------------------------------------------------  IN: 350 mL / OUT: 0 mL / NET: 350 mL        PHYSICAL EXAM:  TELE:   Constitutional: NAD, awake and alert, well-developed  HEENT: Moist Mucous Membranes, Anicteric  Pulmonary: Non-labored, breath sounds are clear bilaterally, No wheezing, rales or rhonchi  Cardiovascular: Regular, S1 and S2, No murmurs, rubs, gallops or clicks  Gastrointestinal: Bowel Sounds present, soft, nontender.   Lymph: No peripheral edema. No lymphadenopathy.  Skin: No visible rashes or ulcers.  Psych:  Mood & affect appropriate  LABS: All Labs Reviewed:                        9.6    6.79  )-----------( 354      ( 12 Nov 2020 06:32 )             26.6                         9.8    6.82  )-----------( 299      ( 11 Nov 2020 06:29 )             28.5     13 Nov 2020 06:31    141    |  108    |  23     ----------------------------<  122    3.9     |  25     |  1.50   12 Nov 2020 06:32    140    |  107    |  25     ----------------------------<  138    4.0     |  25     |  1.60   11 Nov 2020 06:29    140    |  108    |  24     ----------------------------<  137    4.2     |  26     |  1.50     Ca    8.7        13 Nov 2020 06:31  Ca    8.5        12 Nov 2020 06:32  Ca    8.7        11 Nov 2020 06:29  Mg     2.4       13 Nov 2020 06:31  Mg     2.5       12 Nov 2020 06:32  Mg     2.5       11 Nov 2020 06:29               [facility] [place]  Cardiology   Spectra #[phone]     [facility] -- Jas Plaza, Rachel Aguilar, Tommy Love Savella, Goodger  Office # [phone]    Follow Up:  Volume overload, AMS s/p Fall    Subjective/Observations: Awake and alert, sitting on the chair.  Comfortable on RA.  Claims to be ambulating in the room with no dizziness, lightheadedness.  No respiratory or cardiac discomfort    REVIEW OF SYSTEMS: All other review of systems is negative unless indicated above  PAST MEDICAL & SURGICAL HISTORY:  Diabetes mellitus    Hypertension    MEDICATIONS  (STANDING):  aspirin enteric coated 81 milliGRAM(s) Oral daily  atorvastatin 40 milliGRAM(s) Oral at bedtime  cefTRIAXone   IVPB 2000 milliGRAM(s) IV Intermittent every 24 hours  clopidogrel Tablet 75 milliGRAM(s) Oral daily  dextrose 5%. 1000 milliLiter(s) (50 mL/Hr) IV Continuous <Continuous>  dextrose 50% Injectable 12.5 Gram(s) IV Push once  dextrose 50% Injectable 25 Gram(s) IV Push once  dextrose 50% Injectable 25 Gram(s) IV Push once  enoxaparin Injectable 40 milliGRAM(s) SubCutaneous daily  influenza   Vaccine 0.5 milliLiter(s) IntraMuscular once  insulin glargine Injectable (LANTUS) 10 Unit(s) SubCutaneous at bedtime  insulin lispro (ADMELOG) corrective regimen sliding scale   SubCutaneous three times a day before meals  insulin lispro (ADMELOG) corrective regimen sliding scale   SubCutaneous at bedtime  metoprolol tartrate 25 milliGRAM(s) Oral every 12 hours    MEDICATIONS  (PRN):  acetaminophen   Tablet .. 650 milliGRAM(s) Oral every 6 hours PRN Temp greater or equal to 38C (100.4F), Mild Pain (1 - 3)  dextrose 40% Gel 15 Gram(s) Oral once PRN Blood Glucose LESS THAN 70 milliGRAM(s)/deciliter  glucagon  Injectable 1 milliGRAM(s) IntraMuscular once PRN Glucose LESS THAN 70 milligrams/deciliter  traMADol 50 milliGRAM(s) Oral every 6 hours PRN Severe Pain (7 - 10)  traMADol 25 milliGRAM(s) Oral every 6 hours PRN Moderate Pain (4 - 6)    Allergies    No Known Allergies    Intolerances    Vital Signs Last 24 Hrs  T(C): 36.9 (13 Nov 2020 11:33), Max: 36.9 (12 Nov 2020 16:50)  T(F): 98.4 (13 Nov 2020 11:33), Max: 98.5 (13 Nov 2020 04:22)  HR: 61 (13 Nov 2020 11:33) (56 - 67)  BP: 151/72 (13 Nov 2020 11:33) (142/67 - 174/83)  BP(mean): --  RR: 20 (13 Nov 2020 11:33) (18 - 20)  SpO2: 100% (13 Nov 2020 11:33) (95% - 100%)  I&O's Summary    12 Nov 2020 07:01  -  13 Nov 2020 07:00  --------------------------------------------------------  IN: 1030 mL / OUT: 1510 mL / NET: -480 mL    13 Nov 2020 07:01  -  13 Nov 2020 13:29  --------------------------------------------------------  IN: 350 mL / OUT: 0 mL / NET: 350 mL    PHYSICAL EXAM:  TELE: SR/SB  Constitutional: NAD, awake and alert, obese  HEENT: Moist Mucous Membranes, Anicteric  Pulmonary: Non-labored, breath sounds are clear bilaterally, No wheezing, rales or rhonchi  Cardiovascular: Regular, S1 and S2, + murmurs, no rubs, gallops or clicks  Gastrointestinal: Bowel Sounds present, soft, nontender.   Lymph: No peripheral edema. No lymphadenopathy.  Skin: No visible rashes.  right foot/leg ulcers, dressing dry and intact  Psych:  Mood & affect appropriate  LABS: All Labs Reviewed:                        9.6    6.79  )-----------( 354      ( 12 Nov 2020 06:32 )             26.6                         9.8    6.82  )-----------( 299      ( 11 Nov 2020 06:29 )             28.5     13 Nov 2020 06:31    141    |  108    |  23     ----------------------------<  122    3.9     |  25     |  1.50   12 Nov 2020 06:32    140    |  107    |  25     ----------------------------<  138    4.0     |  25     |  1.60   11 Nov 2020 06:29    140    |  108    |  24     ----------------------------<  137    4.2     |  26     |  1.50     Ca    8.7        13 Nov 2020 06:31  Ca    8.5        12 Nov 2020 06:32  Ca    8.7        11 Nov 2020 06:29  Mg     2.4       13 Nov 2020 06:31  Mg     2.5       12 Nov 2020 06:32  Mg     2.5       11 Nov 2020 06:29       EXAM:  ECHO TTE WO CON COMP W DOPP         PROCEDURE DATE:  11/04/2020        INTERPRETATION:  INDICATION: Abnormal EKG  Sonographer PH    Blood Pressure unavailable    Height 165 cm     Weight 72.6 kg       BSA 1.8 sq m    Dimensions:  LA 3.7  Normal Values: 2.0 - 4.0 cm  Ao 3.6        Normal Values: 2.0 - 3.8 cm  SEPTUM 1.3       Normal Values: 0.6 - 1.2 cm  PWT 1.0       Normal Values: 0.6 - 1.1 cm  LVIDd 4.9         Normal Values: 3.0 - 5.6 cm  LVIDs 3.3         Normal Values: 1.8 - 4.0 cm      OBSERVATIONS:  Actually difficult and limited study, unable to obtain subcostal views  Mitral Valve: Thickened leaflets, mild MR. Mitral annular calcification  Aortic Valve/Aorta: Calcified trileaflet aortic valve with decreased opening. Peak transaortic valve gradient is 69 mmHg with a mean transaortic valve gradient of 49 mmHg. The aortic valve area is calculated to be 0.7 sq cm by continuity equation. This is consistent with severe aortic stenosis  Tricuspid Valve: normal with trace TR.  Pulmonic Valve: Not well-visualized  Left Atrium: normal  Right Atrium: Not well-visualized  Left Ventricle: normal LV size and systolic function, estimated LVEF of 55-60%.  Right Ventricle: Grossly normal size and systolic function.  Pericardium/Pleura: normal, no significant pericardial effusion.    Conclusion:  Technically difficult and limited study  Normal left ventricular internal dimensions and systolic function, estimated LVEF of 55-60%.  Grossly normal RV size and systolic function.  Calcified trileaflet aortic valve with severe aortic stenosis  Mild MR  Trace TR.  No significant pericardial effusion.      BOBBY LEA MD; Attending Cardiologist  This document has been electronically signed. Nov 5 2020  8:02AM    EXAM:  CT CHEST                          PROCEDURE DATE:  11/04/2020      INTERPRETATION:  CLINICAL INFORMATION: Cough    COMPARISON: There are no prior studies available for comparison.    TECHNIQUE: A volumetric CT acquisition of the chest was obtained from the thoracic inlet to the upper abdomen, without administration of intravenous contrast. MIP images were also obtained.    FINDINGS:    CHEST:    LUNGS AND LARGE AIRWAYS: There is interlobular septal thickening and extensive scattered groundglass opacity in both lungs. Patent central airways.  No pulmonary nodules.  PLEURA: No pleural effusion.  VESSELS: Within normal limits.  HEART: Heart size is mildly enlarged. No pericardial effusion. Coronary artery vascular calcifications.  MEDIASTINUM AND CARRIE: No lymphadenopathy.  CHEST WALL AND LOWER NECK: Within normal limits.  VISUALIZED UPPER ABDOMEN: Within normal limits.  BONES: There is a right humeral prosthesis with streak artifact limiting evaluation. There are degenerative changes of the spine.    IMPRESSION: Moderate pulmonary edema. Please note that superimposed infection cannot be excluded.    DANITA ARZATE MD; Attending Radiologist  This document has been electronically signed. Nov 4 2020  2:37AM    Ventricular Rate 77 BPM    Atrial Rate 77 BPM    P-R Interval 154 ms    QRS Duration 88 ms    Q-T Interval 374 ms    QTC Calculation(Bazett) 423 ms    P Axis 9 degrees    R Axis 3 degrees    T Axis 107 degrees    Diagnosis Line Normal sinus rhythm  Minimal voltage criteria for LVH, may be normal variant  Inferior infarct , age undetermined  Abnormal ECG  When compared with ECG of 10-JUL-2010 13:13,  No significant change was found  Confirmed by Roe Ramos MD ([id number]) on 11/5/2020 12:45:13 PM

## 2020-11-13 NOTE — PROGRESS NOTE ADULT - ASSESSMENT
74 year old male with PMH HTN, DM, HLD transferred from [place] for admission for AMS due to underlying sepsis due to possible PNA,     NSTEMI  - Patient found to have elevated Troponin which peaked at 5.08 but trended down  - Technically difficult ECHO showed normal LV & RV size and function EF 55-60%, severe AS  - EKG with ST depressions in leads I, avL, and V5/V6, consistent with possible lateral ischemia.   - S/P full dose Lovenox for 48 hours   - Continue DAPT with ASA and Plavix  - Continue BB  - Continue statin  - Can reintroduce ARB at a lower dose in am for as long as renal function remains stable  - No clinical evidence of ACS to this point  - Will need outpatient ischemic eval, both RHC and LHC ad renal function tolerate  - Monitor and replete lytes, keep K>4, Mg>2.    Severe AS  - Recent ECHO with severe AS  - CT chest notable pulmonary edema on CT chest.  Would hold off on diuresis.  Patient appears euvolemic on examination   - Caution with IVF  - Patient would need outpatient evaluation for severe AS  - Continue BB.  Would keep HR at 60's    Hypertension  - BP: 151/76  - Resume home Norvasc.  If renal function remains stable, can resume ARB at a lower dose  - Continue BB    Hyperlipidemia  - Continue statin    Acute Kidney injury  - Continue to monitor renal indices.  Creatinine normalizing  - Avoid nephrotoxins   - Hold HCTZ for now    Sepsis  - RLE cellulitis  - Management per primary team     - All other medical needs as per primary team.  - Other cardiovascular workup will depend on clinical course.  - Will continue to follow.    Kasey Mcknight DNP, NP-C  Cardiology   Spectra #[phone]

## 2020-11-13 NOTE — PROGRESS NOTE ADULT - PROBLEM SELECTOR PLAN 1
Strep bacteremia  Possibly from LLE cellulitis/wound -- patient had wound last week, now healed but less tender  Podiatry consult noted  MRI -- no definitive osteo  S/P bone biopsy -- cultures negative, cytopath: chronic osteo  No clear indication for surgery at this time  Patient was given the option -- he's refusing as well  Continue iv abx --, will go home on po abx x 2 weeks in AM  Repeat cultures negative  ID f/u

## 2020-11-13 NOTE — PROGRESS NOTE ADULT - ASSESSMENT
MARINA, CKD: On ARB, ATN  Sepsis, Strep bacteremia, LLE cellulitis  Diabetes  Hypertension    Stable renal indices. Encourage PO intake. To continue current meds. On IV abx. Monitor blood sugar levels. Insulin coverage as needed. Dietary restriction. Avoid nephrotoxic meds as possible.   Will follow electrolytes and renal function trend.

## 2020-11-13 NOTE — PROGRESS NOTE ADULT - SUBJECTIVE AND OBJECTIVE BOX
Patient is a 74y old  Male who presents with a chief complaint of change in mental status (13 Nov 2020 11:37)      INTERVAL HPI/OVERNIGHT EVENTS: Patient seen and examined. NAD. No complaints.    Vital Signs Last 24 Hrs  T(C): 36.9 (13 Nov 2020 11:33), Max: 36.9 (12 Nov 2020 16:50)  T(F): 98.4 (13 Nov 2020 11:33), Max: 98.5 (13 Nov 2020 04:22)  HR: 61 (13 Nov 2020 11:33) (56 - 67)  BP: 151/72 (13 Nov 2020 11:33) (133/77 - 174/83)  BP(mean): --  RR: 20 (13 Nov 2020 11:33) (17 - 20)  SpO2: 100% (13 Nov 2020 11:33) (95% - 100%)    11-13    141  |  108  |  23  ----------------------------<  122<H>  3.9   |  25  |  1.50<H>    Ca    8.7      13 Nov 2020 06:31  Mg     2.4     11-13                            9.6    6.79  )-----------( 354      ( 12 Nov 2020 06:32 )             26.6       CAPILLARY BLOOD GLUCOSE      POCT Blood Glucose.: 243 mg/dL (13 Nov 2020 11:52)  POCT Blood Glucose.: 146 mg/dL (13 Nov 2020 07:46)  POCT Blood Glucose.: 131 mg/dL (12 Nov 2020 21:23)  POCT Blood Glucose.: 124 mg/dL (12 Nov 2020 16:42)  POCT Blood Glucose.: 18 mg/dL (12 Nov 2020 16:41)  POCT Blood Glucose.: 19 mg/dL (12 Nov 2020 16:40)              acetaminophen   Tablet .. 650 milliGRAM(s) Oral every 6 hours PRN  aspirin enteric coated 81 milliGRAM(s) Oral daily  atorvastatin 40 milliGRAM(s) Oral at bedtime  cefTRIAXone   IVPB 2000 milliGRAM(s) IV Intermittent every 24 hours  clopidogrel Tablet 75 milliGRAM(s) Oral daily  dextrose 40% Gel 15 Gram(s) Oral once PRN  dextrose 5%. 1000 milliLiter(s) IV Continuous <Continuous>  dextrose 50% Injectable 12.5 Gram(s) IV Push once  dextrose 50% Injectable 25 Gram(s) IV Push once  dextrose 50% Injectable 25 Gram(s) IV Push once  enoxaparin Injectable 40 milliGRAM(s) SubCutaneous daily  glucagon  Injectable 1 milliGRAM(s) IntraMuscular once PRN  influenza   Vaccine 0.5 milliLiter(s) IntraMuscular once  insulin glargine Injectable (LANTUS) 10 Unit(s) SubCutaneous at bedtime  insulin lispro (ADMELOG) corrective regimen sliding scale   SubCutaneous three times a day before meals  insulin lispro (ADMELOG) corrective regimen sliding scale   SubCutaneous at bedtime  metoprolol tartrate 25 milliGRAM(s) Oral every 12 hours  traMADol 50 milliGRAM(s) Oral every 6 hours PRN  traMADol 25 milliGRAM(s) Oral every 6 hours PRN              REVIEW OF SYSTEMS:  CONSTITUTIONAL: No fever, no weight loss, or no fatigue  NECK: No pain, no stiffness  RESPIRATORY: No cough, no wheezing, no chills, no hemoptysis, No shortness of breath  CARDIOVASCULAR: No chest pain, no palpitations, no dizziness, no leg swelling  GASTROINTESTINAL: No abdominal pain. No nausea, no vomiting, no hematemesis; No diarrhea, no constipation. No melena, no hematochezia.  GENITOURINARY: No dysuria, no frequency, no hematuria, no incontinence  NEUROLOGICAL: No headaches, no loss of strength, no numbness, no tremors  SKIN: No itching, no burning  MUSCULOSKELETAL: No joint pain, no swelling; No muscle, no back, no extremity pain  PSYCHIATRIC: No depression, no mood swings,   HEME/LYMPH: No easy bruising, no bleeding gums  ALLERY AND IMMUNOLOGIC: No hives       Consultant(s) Notes Reviewed:  [X] YES  [ ] NO    PHYSICAL EXAM:  GENERAL: NAD  HEAD:  Atraumatic, Normocephalic  EYES: EOMI, PERRLA, conjunctiva and sclera clear  ENMT: No tonsillar erythema, exudates, or enlargement; Moist mucous membranes  NECK: Supple, No JVD  NERVOUS SYSTEM:  Awake & alert  CHEST/LUNG: Clear to auscultation bilaterally; No rales, rhonchi, wheezing,  HEART: Regular rate and rhythm  ABDOMEN: Soft, Nontender, Nondistended; Bowel sounds present  EXTREMITIES:  No clubbing, cyanosis, or edema  LYMPH: No lymphadenopathy noted  SKIN: No rashes      Advanced care planning discussed with patient/family [X] YES   [ ] NO    Advanced care planning discussed with patient/family. Patient's health status was discussed. All appropriate changes have been made regarding patient's end-of-life care. Advanced care planning forms reviewed/discussed/completed.  20 minutes spent.

## 2020-11-13 NOTE — PROGRESS NOTE ADULT - SUBJECTIVE AND OBJECTIVE BOX
SUSIE NUNES is a 74yMale , patient examined and chart reviewed    INTERVAL HPI/ OVERNIGHT EVENTS:   Awake alert NAD.  Afebrile.    PAST MEDICAL & SURGICAL HISTORY:  Diabetes mellitus  Hypertension    For details regarding the patient's social history, family history, and other miscellaneous elements, please refer the initial infectious diseases consultation and/or the admitting history and physical examination for this admission.    ROS:  CONSTITUTIONAL:  Negative fever or chills  EYES:  Negative  blurry vision or double vision  CARDIOVASCULAR:  Negative for chest pain or palpitations  RESPIRATORY:  Negative for cough, wheezing, or SOB   GASTROINTESTINAL:  Negative for nausea, vomiting, diarrhea, constipation, or abdominal pain  GENITOURINARY:  Negative frequency, urgency or dysuria  NEUROLOGIC:  No headache, confusion, dizziness, lightheadedness  All other systems were reviewed and are negative       Current inpatient medications :    ANTIBIOTICS/RELEVANT:  cefTRIAXone   IVPB 2000 milliGRAM(s) IV Intermittent every 24 hours    MEDICATIONS  (STANDING):  aspirin enteric coated 81 milliGRAM(s) Oral daily  atorvastatin 40 milliGRAM(s) Oral at bedtime  clopidogrel Tablet 75 milliGRAM(s) Oral daily  dextrose 5%. 1000 milliLiter(s) (50 mL/Hr) IV Continuous <Continuous>  dextrose 50% Injectable 12.5 Gram(s) IV Push once  dextrose 50% Injectable 25 Gram(s) IV Push once  dextrose 50% Injectable 25 Gram(s) IV Push once  enoxaparin Injectable 40 milliGRAM(s) SubCutaneous daily  influenza   Vaccine 0.5 milliLiter(s) IntraMuscular once  insulin glargine Injectable (LANTUS) 10 Unit(s) SubCutaneous at bedtime  insulin lispro (ADMELOG) corrective regimen sliding scale   SubCutaneous three times a day before meals  insulin lispro (ADMELOG) corrective regimen sliding scale   SubCutaneous at bedtime  metoprolol tartrate 25 milliGRAM(s) Oral every 12 hours    MEDICATIONS  (PRN):  acetaminophen   Tablet .. 650 milliGRAM(s) Oral every 6 hours PRN Temp greater or equal to 38C (100.4F), Mild Pain (1 - 3)  dextrose 40% Gel 15 Gram(s) Oral once PRN Blood Glucose LESS THAN 70 milliGRAM(s)/deciliter  glucagon  Injectable 1 milliGRAM(s) IntraMuscular once PRN Glucose LESS THAN 70 milligrams/deciliter  traMADol 50 milliGRAM(s) Oral every 6 hours PRN Severe Pain (7 - 10)  traMADol 25 milliGRAM(s) Oral every 6 hours PRN Moderate Pain (4 - 6)    Objective:  Vital Signs Last 24 Hrs  T(C): 36.9 (2020 11:33), Max: 36.9 (2020 16:50)  T(F): 98.4 (2020 11:33), Max: 98.5 (2020 04:22)  HR: 61 (2020 11:33) (56 - 67)  BP: 151/72 (2020 11:33) (142/67 - 174/83)  RR: 20 (2020 11:33) (18 - 20)  SpO2: 100% (2020 11:33) (95% - 100%)    Physical Exam:  General: no acute distress  Eyes: sclera anicteric, pupils equal and reactive to light  ENMT: buccal mucosa moist, pharynx not injected  Neck: supple, trachea midline  Lungs: Decreased, no wheeze/rhonchi  Cardiovascular: regular rate and rhythm, S1 S2  Abdomen: soft, nontender, no organomegaly present, bowel sounds normal  Neurological: alert and oriented x3, Cranial Nerves II-XII grossly intact  Skin: no increased ecchymosis/petechiae/purpura  Lymph Nodes: no palpable cervical/supraclavicular lymph nodes enlargements  Extremities: Left LE erythema slowly improving + edema mild tenderness  Left foot drsg c/d/i no ulcer no erythema small blister sub met 1st metatarsal    LABS:                                   9.6    6.79  )-----------( 354      ( 2020 06:32 )             26.6   11-13    141  |  108  |  23  ----------------------------<  122<H>  3.9   |  25  |  1.50<H>    Ca    8.7      2020 06:31  Mg     2.4     11-13      PTT - ( 2020 23:37 )  PTT:27.2 sec  Urinalysis Basic - ( 2020 00:52 )    Color: Yellow / Appearance: Clear / S.015 / pH: x  Gluc: x / Ketone: Negative  / Bili: Negative / Urobili: Negative mg/dL   Blood: x / Protein: 15 mg/dL / Nitrite: Negative   Leuk Esterase: Negative / RBC: 0-2 /HPF / WBC 0-2   Sq Epi: x / Non Sq Epi: Occasional / Bacteria: Occasional    Surgical Pathology Report (20 @ 14:00)  Surgical Final Report    Final Diagnosis    1. Bone, left proximal phalanx, 1st digit:  Chronic osteomyelitis.    2. Bone, left first metatarsal head:  Chronic osteomyelitis.      MICROBIOLOGY:    Culture - Tissue with Gram Stain (collected 2020 21:52)  Source: .Tissue left 1st met head bone culture  Gram Stain (10 Nov 2020 02:00):    No polymorphonuclear cells seen per low power field    No organisms seen per oil power field    Culture - Urine (collected 2020 14:18)  Source: .Urine Clean Catch (Midstream)  Final Report (2020 09:53):    No growth    Culture - Blood in AM (20 @ 09:26)    Specimen Source: .Blood Blood-Venous    Culture Results:   No growth to date.    Culture - Blood (20 @ 14:18)    -  Streptococcus sp. (Not Grp A, B or S pneumoniae): Detec    Gram Stain:   Growth in aerobic bottle: Gram Positive Cocci in Pairs and Chains  Growth in anaerobic bottle: Gram Positive Cocci in Pairs and Chains    -  Ceftriaxone: S 0.047    -  Clindamycin: S    -  Levofloxacin: S    -  Penicillin: S 0.016 Predicts results for ampicillin, amoxicillin, amoxicillin/clavulanate, ampicillin/sulbactam, 1st, 2nd and 3rd generation cephalosporins and carbapenems.    -  Vancomycin: S    Specimen Source: .Blood Blood-Peripheral    Organism: Blood Culture PCR    Organism: Streptococcus dysgalactiae    Organism: Streptococcus dysgalactiae    Culture Results:   Growth in aerobic and anaerobic bottles: Streptococcus dysgalactiae  (Group C/G)    RADIOLOGY & ADDITIONAL STUDIES:    EXAM:  MR FOOT LT                            PROCEDURE DATE:  2020          INTERPRETATION:  CLINICAL INDICATION: Left foot pain, evaluate for foreign body near the first metatarsophalangeal joint and first interspace region.    Multiplanar Multisequence MR of the left foot. Images obtained of the midfoot through forefoot. No IV contrast.    Prior Studies: Left foot radiographs 2020.    FINDINGS:  T2 hyperintense cystic changes and edema in the distal aspect of the great toe metatarsal and in the great toe proximal phalanx. No definite for body is visualized in the subcutaneous tissues. There is diffuse subcutaneous edema at the dorsum of the forefoot. No abnormal muscle edema. Visualized flexor and extensor tendons are grossly intact. Likely second hammertoe. No abnormal joint effusion. The Lisfranc ligament is grossly intact.    IMPRESSION:  No definite foreign body is seen in the subcutaneous tissues. Please note that MRI may not be sensitive for small foreign bodies or all types of foreign bodies. If there is persistent clinical concern for foreign body, correlation with contrast enhanced CT or ultrasound can be performed.    Diffuse subcutaneous edema. Correlate clinically to exclude cellulitis.    T2 hyperintense cystic changes and marrow edema about the great toe metatarsophalangeal joint, likely degenerative.    Likely second hammertoe, evaluation limited on nonweightbearing exam.      Assessment :   74 M with PMH of HTN DM HLD CAD presented to the hospital with CC of mental status and rigors admitted with sepsis syndrome. Sp fever.  - Strep sepsis sec Left LE cellulitis with lymphangitis.  Classic Strep cellulitis and will take time to resolved.   - Leukocytosis resolved  - CHF   - + troponins  - MARINA stable  - Sp left foot bone biopsy - Chronic osteomyelitis. Not source of cellulitis    Plan :   Cont Rocephin 2 grams IV daily till tmrw  Can dc home on Keflex 500mg po q6h x 14 days   No surgical intervention recommended  Repeat blood cultures NGTD  Trend temps and cbc  Elevate leg  Fu wound care    D/w Dr Beauchamp.     Continue with present regiment.  Appropriate use of antibiotics and adverse effects reviewed.      I have discussed the above plan of care with patient in detail. He expressed understanding of the  treatment plan . Risks, benefits and alternatives discussed in detail. I have asked if he has any questions or concerns and appropriately addressed them to the best of my ability .    > 35 minutes were spent in direct patient care reviewing notes, medications ,labs data/ imaging , discussion with multidisciplinary team.    Thank you for allowing me to participate in care of your patient .    Stacey Dorado MD  Infectious Disease  [phone]

## 2020-11-13 NOTE — PROGRESS NOTE ADULT - SUBJECTIVE AND OBJECTIVE BOX
CAPILLARY BLOOD GLUCOSE      POCT Blood Glucose.: 131 mg/dL (12 Nov 2020 21:23)  POCT Blood Glucose.: 124 mg/dL (12 Nov 2020 16:42)  POCT Blood Glucose.: 18 mg/dL (12 Nov 2020 16:41)  POCT Blood Glucose.: 19 mg/dL (12 Nov 2020 16:40)  POCT Blood Glucose.: 204 mg/dL (12 Nov 2020 12:33)  POCT Blood Glucose.: 142 mg/dL (12 Nov 2020 08:30)      Vital Signs Last 24 Hrs  T(C): 36.5 (13 Nov 2020 07:26), Max: 36.9 (12 Nov 2020 16:50)  T(F): 97.7 (13 Nov 2020 07:26), Max: 98.5 (13 Nov 2020 04:22)  HR: 56 (13 Nov 2020 07:26) (56 - 67)  BP: 152/81 (13 Nov 2020 07:26) (133/77 - 174/83)  BP(mean): --  RR: 18 (13 Nov 2020 07:26) (17 - 20)  SpO2: 100% (13 Nov 2020 07:26) (95% - 100%)    General: WN/WD NAD  Respiratory: CTA B/L  CV: RRR, S1S2, no murmurs, rubs or gallops  Abdominal: Soft, NT, ND +BS, Last BM  Extremities: le foot dsg intact     11-13    141  |  108  |  23  ----------------------------<  122<H>  3.9   |  25  |  1.50<H>    Ca    8.7      13 Nov 2020 06:31  Mg     2.4     11-13        atorvastatin 40 milliGRAM(s) Oral at bedtime  dextrose 40% Gel 15 Gram(s) Oral once PRN  dextrose 50% Injectable 12.5 Gram(s) IV Push once  dextrose 50% Injectable 25 Gram(s) IV Push once  dextrose 50% Injectable 25 Gram(s) IV Push once  glucagon  Injectable 1 milliGRAM(s) IntraMuscular once PRN  insulin glargine Injectable (LANTUS) 10 Unit(s) SubCutaneous at bedtime  insulin lispro (ADMELOG) corrective regimen sliding scale   SubCutaneous three times a day before meals  insulin lispro (ADMELOG) corrective regimen sliding scale   SubCutaneous at bedtime

## 2020-11-13 NOTE — PROGRESS NOTE ADULT - ASSESSMENT
S/p Bone Biopsy of the Left 1st Metatarsal head and 1st proximal phalanx base. Per pathology results- chronic osteomyelitis.   Patient had discussion at bedside with Dr. Sebastian Paiz earlier this week  about treatment options, it was recommended due to the patient having chronic osteomyelitis, surgical intervention via 1st left metatarsal head resection and proximal phalanx base resection. Patient refuses surgical intervention at this time . All questions and concerns were answered to the fullest extent with the patient. Discussed risks, benefits, and alternatives with risks including but not limited to pain, swelling, infection, seroma, hematoma, loss of limb and loss of life.  Will continue to follow up on patient definite decision for recommended surgical intervention for chronic osteomyelitis of the left 1st metatarsal head and proximal phalanx base via Left 1st metatarsal head resection and proximal phalanx base resection in order to prevent sepsis, loss of limb, and loss of life      Patient also had a repeat Venous Duplex along the LLE to r/o DVT secondary to patient consistent compliant of posterior left lower extremity pain along the calf- with increased edema and erythema

## 2020-11-13 NOTE — PROGRESS NOTE ADULT - SUBJECTIVE AND OBJECTIVE BOX
Patient is a 74y old  Male who presents with a chief complaint of change in mental status (05 Nov 2020 14:15)  Patient seen in follow up for MARINA.       PAST MEDICAL HISTORY:  Diabetes mellitus  Hypertension    MEDICATIONS  (STANDING):  aspirin enteric coated 81 milliGRAM(s) Oral daily  atorvastatin 40 milliGRAM(s) Oral at bedtime  cefTRIAXone   IVPB 2000 milliGRAM(s) IV Intermittent every 24 hours  clopidogrel Tablet 75 milliGRAM(s) Oral daily  dextrose 5%. 1000 milliLiter(s) (50 mL/Hr) IV Continuous <Continuous>  dextrose 50% Injectable 12.5 Gram(s) IV Push once  dextrose 50% Injectable 25 Gram(s) IV Push once  dextrose 50% Injectable 25 Gram(s) IV Push once  enoxaparin Injectable 40 milliGRAM(s) SubCutaneous daily  influenza   Vaccine 0.5 milliLiter(s) IntraMuscular once  insulin glargine Injectable (LANTUS) 10 Unit(s) SubCutaneous at bedtime  insulin lispro (ADMELOG) corrective regimen sliding scale   SubCutaneous three times a day before meals  insulin lispro (ADMELOG) corrective regimen sliding scale   SubCutaneous at bedtime  metoprolol tartrate 25 milliGRAM(s) Oral every 12 hours    MEDICATIONS  (PRN):  acetaminophen   Tablet .. 650 milliGRAM(s) Oral every 6 hours PRN Temp greater or equal to 38C (100.4F), Mild Pain (1 - 3)  dextrose 40% Gel 15 Gram(s) Oral once PRN Blood Glucose LESS THAN 70 milliGRAM(s)/deciliter  glucagon  Injectable 1 milliGRAM(s) IntraMuscular once PRN Glucose LESS THAN 70 milligrams/deciliter  traMADol 25 milliGRAM(s) Oral every 6 hours PRN Moderate Pain (4 - 6)  traMADol 50 milliGRAM(s) Oral every 6 hours PRN Severe Pain (7 - 10)    T(C): 36.5 (11-13-20 @ 07:26), Max: 37.4 (11-11-20 @ 19:58)  HR: 56 (11-13-20 @ 07:26) (56 - 70)  BP: 152/81 (11-13-20 @ 07:26) (133/77 - 184/68)  RR: 18 (11-13-20 @ 07:26)  SpO2: 100% (11-13-20 @ 07:26)  Wt(kg): --  I&O's Detail    12 Nov 2020 07:01  -  13 Nov 2020 07:00  --------------------------------------------------------  IN:    IV PiggyBack: 50 mL    Oral Fluid: 980 mL  Total IN: 1030 mL    OUT:    Voided (mL): 1510 mL  Total OUT: 1510 mL    Total NET: -480 mL        PHYSICAL EXAM:  General: No distress  Respiratory: b/l air entry  Cardiovascular: S1 S2  Gastrointestinal: soft  Extremities:  + edema                        LABORATORY:                        9.6    6.79  )-----------( 354      ( 12 Nov 2020 06:32 )             26.6     11-13    141  |  108  |  23  ----------------------------<  122<H>  3.9   |  25  |  1.50<H>    Ca    8.7      13 Nov 2020 06:31  Mg     2.4     11-13      Sodium, Serum: 141 mmol/L (11-13 @ 06:31)  Sodium, Serum: 140 mmol/L (11-12 @ 06:32)    Potassium, Serum: 3.9 mmol/L (11-13 @ 06:31)  Potassium, Serum: 4.0 mmol/L (11-12 @ 06:32)    Hemoglobin: 9.6 g/dL (11-12 @ 06:32)  Hemoglobin: 9.8 g/dL (11-11 @ 06:29)    Creatinine, Serum 1.50 (11-13 @ 06:31)  Creatinine, Serum 1.60 (11-12 @ 06:32)  Creatinine, Serum 1.50 (11-11 @ 06:29)

## 2020-11-13 NOTE — PROGRESS NOTE ADULT - SUBJECTIVE AND OBJECTIVE BOX
74y year old Male seen at [facility] [address] is s/p Left 1st metatarsal head and proximal phalanx bone biopsy with Dr. Sebastian Paiz, DOS 11/9/20. The patient is refusing the recommended surgical intervention at this time.  Denies any fever, chills, nausea, vomiting, chest pain, shortness of breath, or calf pain at this time.    Allergies    No Known Allergies    Intolerances    REVIEW OF SYSTEMS    PAST MEDICAL & SURGICAL HISTORY:  Diabetes mellitus    Hypertension          MEDICATIONS  (STANDING):  aspirin enteric coated 81 milliGRAM(s) Oral daily  atorvastatin 40 milliGRAM(s) Oral at bedtime  cefTRIAXone   IVPB 2000 milliGRAM(s) IV Intermittent every 24 hours  clopidogrel Tablet 75 milliGRAM(s) Oral daily  dextrose 5%. 1000 milliLiter(s) (50 mL/Hr) IV Continuous <Continuous>  dextrose 50% Injectable 12.5 Gram(s) IV Push once  dextrose 50% Injectable 25 Gram(s) IV Push once  dextrose 50% Injectable 25 Gram(s) IV Push once  enoxaparin Injectable 40 milliGRAM(s) SubCutaneous daily  influenza   Vaccine 0.5 milliLiter(s) IntraMuscular once  insulin glargine Injectable (LANTUS) 10 Unit(s) SubCutaneous at bedtime  insulin lispro (ADMELOG) corrective regimen sliding scale   SubCutaneous three times a day before meals  insulin lispro (ADMELOG) corrective regimen sliding scale   SubCutaneous at bedtime  metoprolol tartrate 25 milliGRAM(s) Oral every 12 hours    MEDICATIONS  (PRN):  acetaminophen   Tablet .. 650 milliGRAM(s) Oral every 6 hours PRN Temp greater or equal to 38C (100.4F), Mild Pain (1 - 3)  dextrose 40% Gel 15 Gram(s) Oral once PRN Blood Glucose LESS THAN 70 milliGRAM(s)/deciliter  glucagon  Injectable 1 milliGRAM(s) IntraMuscular once PRN Glucose LESS THAN 70 milligrams/deciliter    Vital Signs Last 24 Hrs  T(C): 36.9 (13 Nov 2020 16:16), Max: 36.9 (13 Nov 2020 04:22)  T(F): 98.4 (13 Nov 2020 16:16), Max: 98.5 (13 Nov 2020 04:22)  HR: 72 (13 Nov 2020 16:16) (56 - 72)  BP: 130/73 (13 Nov 2020 16:16) (130/73 - 174/83)  BP(mean): --  RR: 18 (13 Nov 2020 16:16) (18 - 20)  SpO2: 96% (13 Nov 2020 16:16) (95% - 100%)    PHYSICAL EXAM:  Vascular: DP & PT faintly palpable bilaterally, Capillary refill 3 seconds, Digital hair present bilaterally, mild edema and erythema along the plantar 1st and 1st interspace of the left foot, LLE anterior cellulitis, skin warm to the touch to the LLE, Non pitting edema diffusely along the LLE, minimal ecchymosis along the dorsum of the left 1st proximal phalanx   Neurological: Light touch sensation intact bilaterally  Musculoskeletal: 5/5 strength in all quadrants bilaterally, Limited ROM of the left Ankle Joint, no pain upon palpation sub 1st plantar hyperkeratotic lesion and 1st interspace of the left foot   Dermatological:   1. Hyperkeratotic lesion sub distal lateral 1st MTPJ size 0.3cmx0.2cm with edema and erythema extending to the 1st interspace, no active drainage, does not probe to bone   2. Subdermal dried hemorrhage along the left 1st interspace   3. s/p bone biopsy of the 1st left metatarsal head-incision site 0.1cm- epithelization, no active drainage   4. s/p bone biopsy of the 1st left proximal phalanx base- incision site 0.1cm- epithelization present, no active drainage  5. Pre-Ulcerative lesion along the posterior left calf- size 6.0cmx4.5cm- weeping serous drainage        CBC Full  -  ( 12 Nov 2020 06:32 )  WBC Count : 6.79 K/uL  RBC Count : 2.97 M/uL  Hemoglobin : 9.6 g/dL  Hematocrit : 26.6 %  Platelet Count - Automated : 354 K/uL  Mean Cell Volume : 89.6 fl  Mean Cell Hemoglobin : 32.3 pg  Mean Cell Hemoglobin Concentration : 36.1 gm/dL  Auto Neutrophil # : x  Auto Lymphocyte # : x  Auto Monocyte # : x  Auto Eosinophil # : x  Auto Basophil # : x  Auto Neutrophil % : x  Auto Lymphocyte % : x  Auto Monocyte % : x  Auto Eosinophil % : x  Auto Basophil % : x        ----------CHEM PANEL----------    11-13    141  |  108  |  23  ----------------------------<  122<H>  3.9   |  25  |  1.50<H>    Ca    8.7      13 Nov 2020 06:31  Mg     2.4     11-13              Imaging:   XAM:  FOOT LEFT (MINIMUM 3 VIEWS)                            PROCEDURE DATE:  11/06/2020          INTERPRETATION:  CLINICAL INDICATION:  "Rule out foreign body subcutaneous first interspace"    COMPARISON:  None.    TECHNIQUE:  AP, oblique and lateral views.    FINDINGS:  2 surgical screws traverse the distal aspect of the left tibia. Chronic appearing deformity of the distal left tibia identified. The tibiotalar articulation appears markedly narrowed with associated degenerative osteophyte formation. There is also loss of height of the posterior aspect of the talus as well as narrowing of the talar/calcaneal articulation posteriorly. There is no evidence for acute fracture or dislocation. No additional radiodense foreign bodies are identified excepting the previously described surgical hardware. No significant soft tissue swelling is evident.    IMPRESSION:  As above.      EXAM:  MR FOOT LT                            PROCEDURE DATE:  11/09/2020          INTERPRETATION:  CLINICAL INDICATION: Left foot pain, evaluate for foreign body near the first metatarsophalangeal joint and first interspace region.    Multiplanar Multisequence MR of the left foot. Images obtained of the midfoot through forefoot. No IV contrast.    Prior Studies: Left foot radiographs 11/6/2020.    FINDINGS:  T2 hyperintense cystic changes and edema in the distal aspect of the great toe metatarsal and in the great toe proximal phalanx. No definite for body is visualized in the subcutaneous tissues. There is diffuse subcutaneous edema at the dorsum of the forefoot. No abnormal muscle edema. Visualized flexor and extensor tendons are grossly intact. Likely second hammertoe. No abnormal joint effusion. The Lisfranc ligament is grossly intact.    IMPRESSION:  No definite foreign body is seen in the subcutaneous tissues. Please note that MRI may not be sensitive for small foreign bodies or all types of foreign bodies. If there is persistent clinical concern for foreign body, correlation with contrast enhanced CT or ultrasound can be performed.    Diffuse subcutaneous edema. Correlate clinically to exclude cellulitis.    T2 hyperintense cystic changes and marrow edema about the great toe metatarsophalangeal joint, likely degenerative.    Likely second hammertoe, evaluation limited on nonweightbearing exam.            SARTHAK KRISHNAMURTHY MD; Attending Radiologist    ACCESSION No:  [id number]    MANJU, GENE                      2        Surgical Final Report          Final Diagnosis    1. Bone, left proximal phalanx, 1st digit:  Chronic osteomyelitis.    2. Bone, left first metatarsal head:  Chronic osteomyelitis.    Note: No evidence of a bone cyst or an atypical cyst is noted.  Please correlate clinically and radiographically.    Verified by: Aleksandar Dawn MD  (Electronic Signature)  Reported on: 11/10/20 11:25 [facility], [facility], [address] [place] [id number]  Phone: [phone]   Fax: [phone]  _________________________________________________________________    Clinical History  Rule out osteomyelitis vs bone cyst vs atypical bone cyst    Specimen(s) Submitted  1-Left proximal phalanx 1st bone  2-Left 1st metatarsal head bone    Gross Description  1.   The specimen is received in formalin and the specimen  container is labeled: Left proximal phalanx 1st.  It consists of  one hard tan-pink fragment of bone measuring 0.3 x 0.1 x 0.1 cm.  The specimen is decalcified.  Entirely submitted.  One cassette.    2.   The specimen is received in formalin and the specimen  container is labeled: Left first met head.  It consists of one  hard tan-pink cylindrical fragment of bone measuring 0.7 x 0.1 x  0.1 cm.  The specimen is decalcified.  Entirely submitted.  One  cassette.    In addition to other data that may appear on the specimen  containers, all labels have been inspected to confirm the  presence of the patient's name and date of birth.  Bri ALCANTARA 11/09/2020 14:33    Disclaimer  Histological Processing Performed at [facility],  Department of Pathology, [address] [place] [address].                SUSIE NUNES 2        Surgical Consult Report          Disclaimer  Histological Processing Performed at [facility],  Department of Pathology, [address] [place] [address].

## 2020-11-13 NOTE — PROGRESS NOTE ADULT - SUBJECTIVE AND OBJECTIVE BOX
Date/Time Patient Seen:  		  Referring MD:   Data Reviewed	       Patient is a 74y old  Male who presents with a chief complaint of change in mental status (13 Nov 2020 07:35)      Subjective/HPI     PAST MEDICAL & SURGICAL HISTORY:  Diabetes mellitus    Hypertension          Medication list         MEDICATIONS  (STANDING):  aspirin enteric coated 81 milliGRAM(s) Oral daily  atorvastatin 40 milliGRAM(s) Oral at bedtime  cefTRIAXone   IVPB 2000 milliGRAM(s) IV Intermittent every 24 hours  clopidogrel Tablet 75 milliGRAM(s) Oral daily  dextrose 5%. 1000 milliLiter(s) (50 mL/Hr) IV Continuous <Continuous>  dextrose 50% Injectable 12.5 Gram(s) IV Push once  dextrose 50% Injectable 25 Gram(s) IV Push once  dextrose 50% Injectable 25 Gram(s) IV Push once  enoxaparin Injectable 40 milliGRAM(s) SubCutaneous daily  influenza   Vaccine 0.5 milliLiter(s) IntraMuscular once  insulin glargine Injectable (LANTUS) 10 Unit(s) SubCutaneous at bedtime  insulin lispro (ADMELOG) corrective regimen sliding scale   SubCutaneous three times a day before meals  insulin lispro (ADMELOG) corrective regimen sliding scale   SubCutaneous at bedtime  metoprolol tartrate 25 milliGRAM(s) Oral every 12 hours    MEDICATIONS  (PRN):  acetaminophen   Tablet .. 650 milliGRAM(s) Oral every 6 hours PRN Temp greater or equal to 38C (100.4F), Mild Pain (1 - 3)  dextrose 40% Gel 15 Gram(s) Oral once PRN Blood Glucose LESS THAN 70 milliGRAM(s)/deciliter  glucagon  Injectable 1 milliGRAM(s) IntraMuscular once PRN Glucose LESS THAN 70 milligrams/deciliter  traMADol 25 milliGRAM(s) Oral every 6 hours PRN Moderate Pain (4 - 6)  traMADol 50 milliGRAM(s) Oral every 6 hours PRN Severe Pain (7 - 10)         Vitals log        ICU Vital Signs Last 24 Hrs  T(C): 36.5 (13 Nov 2020 07:26), Max: 36.9 (12 Nov 2020 16:50)  T(F): 97.7 (13 Nov 2020 07:26), Max: 98.5 (13 Nov 2020 04:22)  HR: 56 (13 Nov 2020 07:26) (56 - 67)  BP: 152/81 (13 Nov 2020 07:26) (133/77 - 174/83)  BP(mean): --  ABP: --  ABP(mean): --  RR: 18 (13 Nov 2020 07:26) (17 - 20)  SpO2: 100% (13 Nov 2020 07:26) (95% - 100%)           Input and Output:  I&O's Detail    12 Nov 2020 07:01  -  13 Nov 2020 07:00  --------------------------------------------------------  IN:    IV PiggyBack: 50 mL    Oral Fluid: 980 mL  Total IN: 1030 mL    OUT:    Voided (mL): 1510 mL  Total OUT: 1510 mL    Total NET: -480 mL          Lab Data                        9.6    6.79  )-----------( 354      ( 12 Nov 2020 06:32 )             26.6     11-13    141  |  108  |  23  ----------------------------<  122<H>  3.9   |  25  |  1.50<H>    Ca    8.7      13 Nov 2020 06:31  Mg     2.4     11-13              Review of Systems	      Objective     Physical Examination    heart s1s2  lung dec BS  abd soft  on RA      Pertinent Lab findings & Imaging      Diane:  NO   Adequate UO     I&O's Detail    12 Nov 2020 07:01  -  13 Nov 2020 07:00  --------------------------------------------------------  IN:    IV PiggyBack: 50 mL    Oral Fluid: 980 mL  Total IN: 1030 mL    OUT:    Voided (mL): 1510 mL  Total OUT: 1510 mL    Total NET: -480 mL               Discussed with:     Cultures:	        Radiology

## 2020-11-14 ENCOUNTER — TRANSCRIPTION ENCOUNTER (OUTPATIENT)
Age: 75
End: 2020-11-14

## 2020-11-14 VITALS
DIASTOLIC BLOOD PRESSURE: 98 MMHG | HEART RATE: 72 BPM | TEMPERATURE: 98 F | SYSTOLIC BLOOD PRESSURE: 129 MMHG | OXYGEN SATURATION: 98 % | RESPIRATION RATE: 19 BRPM

## 2020-11-14 PROCEDURE — 80048 BASIC METABOLIC PNL TOTAL CA: CPT

## 2020-11-14 PROCEDURE — 88311 DECALCIFY TISSUE: CPT

## 2020-11-14 PROCEDURE — 80061 LIPID PANEL: CPT

## 2020-11-14 PROCEDURE — 82803 BLOOD GASES ANY COMBINATION: CPT

## 2020-11-14 PROCEDURE — 99232 SBSQ HOSP IP/OBS MODERATE 35: CPT

## 2020-11-14 PROCEDURE — 97161 PT EVAL LOW COMPLEX 20 MIN: CPT

## 2020-11-14 PROCEDURE — 87040 BLOOD CULTURE FOR BACTERIA: CPT

## 2020-11-14 PROCEDURE — C8929: CPT

## 2020-11-14 PROCEDURE — 84443 ASSAY THYROID STIM HORMONE: CPT

## 2020-11-14 PROCEDURE — 82550 ASSAY OF CK (CPK): CPT

## 2020-11-14 PROCEDURE — 84100 ASSAY OF PHOSPHORUS: CPT

## 2020-11-14 PROCEDURE — 97116 GAIT TRAINING THERAPY: CPT

## 2020-11-14 PROCEDURE — 80076 HEPATIC FUNCTION PANEL: CPT

## 2020-11-14 PROCEDURE — 87075 CULTR BACTERIA EXCEPT BLOOD: CPT

## 2020-11-14 PROCEDURE — 73718 MRI LOWER EXTREMITY W/O DYE: CPT

## 2020-11-14 PROCEDURE — 82009 KETONE BODYS QUAL: CPT

## 2020-11-14 PROCEDURE — 84145 PROCALCITONIN (PCT): CPT

## 2020-11-14 PROCEDURE — 88307 TISSUE EXAM BY PATHOLOGIST: CPT

## 2020-11-14 PROCEDURE — 73630 X-RAY EXAM OF FOOT: CPT

## 2020-11-14 PROCEDURE — 76770 US EXAM ABDO BACK WALL COMP: CPT

## 2020-11-14 PROCEDURE — 93005 ELECTROCARDIOGRAM TRACING: CPT

## 2020-11-14 PROCEDURE — 82553 CREATINE MB FRACTION: CPT

## 2020-11-14 PROCEDURE — 83036 HEMOGLOBIN GLYCOSYLATED A1C: CPT

## 2020-11-14 PROCEDURE — 83605 ASSAY OF LACTIC ACID: CPT

## 2020-11-14 PROCEDURE — 83735 ASSAY OF MAGNESIUM: CPT

## 2020-11-14 PROCEDURE — 93971 EXTREMITY STUDY: CPT

## 2020-11-14 PROCEDURE — 99283 EMERGENCY DEPT VISIT LOW MDM: CPT

## 2020-11-14 PROCEDURE — 85652 RBC SED RATE AUTOMATED: CPT

## 2020-11-14 PROCEDURE — 87449 NOS EACH ORGANISM AG IA: CPT

## 2020-11-14 PROCEDURE — 85027 COMPLETE CBC AUTOMATED: CPT

## 2020-11-14 PROCEDURE — 36415 COLL VENOUS BLD VENIPUNCTURE: CPT

## 2020-11-14 PROCEDURE — 0225U NFCT DS DNA&RNA 21 SARSCOV2: CPT

## 2020-11-14 PROCEDURE — 86769 SARS-COV-2 COVID-19 ANTIBODY: CPT

## 2020-11-14 PROCEDURE — 84484 ASSAY OF TROPONIN QUANT: CPT

## 2020-11-14 PROCEDURE — 85025 COMPLETE CBC W/AUTO DIFF WBC: CPT

## 2020-11-14 PROCEDURE — 87070 CULTURE OTHR SPECIMN AEROBIC: CPT

## 2020-11-14 PROCEDURE — 87899 AGENT NOS ASSAY W/OPTIC: CPT

## 2020-11-14 PROCEDURE — 86140 C-REACTIVE PROTEIN: CPT

## 2020-11-14 PROCEDURE — 82962 GLUCOSE BLOOD TEST: CPT

## 2020-11-14 PROCEDURE — 97530 THERAPEUTIC ACTIVITIES: CPT

## 2020-11-14 PROCEDURE — U0003: CPT

## 2020-11-14 PROCEDURE — 83880 ASSAY OF NATRIURETIC PEPTIDE: CPT

## 2020-11-14 PROCEDURE — 93306 TTE W/DOPPLER COMPLETE: CPT

## 2020-11-14 RX ORDER — LOSARTAN POTASSIUM 100 MG/1
50 TABLET, FILM COATED ORAL DAILY
Refills: 0 | Status: DISCONTINUED | OUTPATIENT
Start: 2020-11-14 | End: 2020-11-14

## 2020-11-14 RX ORDER — LOSARTAN POTASSIUM 100 MG/1
1 TABLET, FILM COATED ORAL
Qty: 0 | Refills: 0 | DISCHARGE

## 2020-11-14 RX ORDER — ACETAMINOPHEN 500 MG
2 TABLET ORAL
Qty: 0 | Refills: 0 | DISCHARGE
Start: 2020-11-14

## 2020-11-14 RX ORDER — AMLODIPINE BESYLATE 2.5 MG/1
5 TABLET ORAL DAILY
Refills: 0 | Status: DISCONTINUED | OUTPATIENT
Start: 2020-11-14 | End: 2020-11-14

## 2020-11-14 RX ORDER — AMLODIPINE BESYLATE 2.5 MG/1
1 TABLET ORAL
Qty: 0 | Refills: 0 | DISCHARGE

## 2020-11-14 RX ORDER — CLOPIDOGREL BISULFATE 75 MG/1
1 TABLET, FILM COATED ORAL
Qty: 30 | Refills: 0
Start: 2020-11-14 | End: 2020-12-13

## 2020-11-14 RX ORDER — CEPHALEXIN 500 MG
1 CAPSULE ORAL
Qty: 56 | Refills: 0
Start: 2020-11-14 | End: 2020-11-27

## 2020-11-14 RX ORDER — AMLODIPINE BESYLATE 2.5 MG/1
1 TABLET ORAL
Qty: 30 | Refills: 0
Start: 2020-11-14 | End: 2020-12-13

## 2020-11-14 RX ORDER — METOPROLOL TARTRATE 50 MG
1 TABLET ORAL
Qty: 30 | Refills: 0
Start: 2020-11-14 | End: 2020-12-13

## 2020-11-14 RX ORDER — LOSARTAN POTASSIUM 100 MG/1
1 TABLET, FILM COATED ORAL
Qty: 30 | Refills: 0
Start: 2020-11-14 | End: 2020-12-13

## 2020-11-14 RX ADMIN — Medication 81 MILLIGRAM(S): at 11:59

## 2020-11-14 RX ADMIN — LOSARTAN POTASSIUM 50 MILLIGRAM(S): 100 TABLET, FILM COATED ORAL at 08:33

## 2020-11-14 RX ADMIN — AMLODIPINE BESYLATE 5 MILLIGRAM(S): 2.5 TABLET ORAL at 08:33

## 2020-11-14 RX ADMIN — CLOPIDOGREL BISULFATE 75 MILLIGRAM(S): 75 TABLET, FILM COATED ORAL at 11:59

## 2020-11-14 RX ADMIN — Medication 2: at 12:26

## 2020-11-14 RX ADMIN — CEFTRIAXONE 100 MILLIGRAM(S): 500 INJECTION, POWDER, FOR SOLUTION INTRAMUSCULAR; INTRAVENOUS at 10:36

## 2020-11-14 RX ADMIN — ENOXAPARIN SODIUM 40 MILLIGRAM(S): 100 INJECTION SUBCUTANEOUS at 11:59

## 2020-11-14 RX ADMIN — Medication 25 MILLIGRAM(S): at 06:49

## 2020-11-14 NOTE — DISCHARGE NOTE PROVIDER - CARE PROVIDER_API CALL
Augustin Love)  Cardio [facility]  [address] [place] [address]  Phone: [phone]  Fax: [phone]  Follow Up Time: 2 weeks    ANTHONY DOUGLASS  Internal Medicine  [address], [address]  [place] [address]  Phone: [phone]  Fax: [phone]  Follow Up Time: 1 week   Augustin Love)  Cardio [facility]  [address] [place] [address]  Phone: [phone]  Fax: [phone]  Follow Up Time: 2 weeks    ANTHONY DOUGLASS  Internal Medicine  [address], [address]  [place] [address]  Phone: [phone]  Fax: [phone]  Follow Up Time: 1 week    Roe Sharma  INFECTIOUS DISEASE  [address] [place] [address]  Phone: [phone]  Fax: [phone]  Follow Up Time: 2 weeks

## 2020-11-14 NOTE — DISCHARGE NOTE PROVIDER - CARE PROVIDERS DIRECT ADDRESSES
,[email or web address]

## 2020-11-14 NOTE — PROGRESS NOTE ADULT - PROBLEM SELECTOR PLAN 1
BP optimization noted - am orders reviewed -   Tramadol pain optimization in progress  pt with sepsis - billy - ckd - dm - obesity - s/p AMS - NSTEMI  cardio follow up - on DAPT and AC - lovenox - TTE - severe AS - further w/u and tele monitor  ckd billy - I and O - monitor renal indices - serial labs - I and O  Podiatry cx noted - LE wound eval  sepsis eval - cx noted - ID eval noted - on emp ABX - source LE wound - fever 101.8 on 11 5 2020 - repeat covid pcr neg  DM care - serial FS - dietary discretion  obesity - likely at risk for MARCUS  monitor VS and HD and Sat  out of bed as tolerated  on RA  labs and imaging reviewed  ct chest more consistent with CHF - doubt PNA.

## 2020-11-14 NOTE — PROGRESS NOTE ADULT - PROBLEM SELECTOR PLAN 5
Improved and stable  Hold losartan, HCTZ and metformin  Avoid nephrotoxic medications  Nephrology f/u

## 2020-11-14 NOTE — DISCHARGE NOTE NURSING/CASE MANAGEMENT/SOCIAL WORK - PATIENT PORTAL LINK FT
You can access the FollowMyHealth Patient Portal offered by [facility] by registering at the following website: [email or web address]. By joining [employer]’s FollowMyHealth portal, you will also be able to view your health information using other applications (apps) compatible with our system.

## 2020-11-14 NOTE — DISCHARGE NOTE PROVIDER - HOSPITAL COURSE
This is a 74 M with PMH of HTN DM HLD CAD who was BIBEMS to [facility] with complaints of change in mental status and rigors. The son found the patient confused and had urinated on himself. In the ER patient had a temp of 103. CT showed ground glass opacities. Patient was transferred to [place] for admission. In [place], patient is more lucid. He reports feeling weak for about a week. He status he was eating junk food and sweets. He denies fevers, chills, n/v/d/cough/CP/SOB/dysuria. He has been helping his brother move into an assisted living over the past 2 weeks.     Patient diagnosed with strep sepsis sec Left LE cellulitis with lymphangitis.  Classic Strep cellulitis and will take time to resolved.   Sp left foot bone biopsy - Chronic osteomyelitis. Not source of cellulitis.  Repeat cultures were negative  He received 10 days of iv rocephin in house  He will go home on keflex x 14 days.  Cellulitis improving.     >35 minutes spent on discharge This is a 74 M with PMH of HTN DM HLD CAD who was BIBEMS to [facility] with complaints of change in mental status and rigors. The son found the patient confused and had urinated on himself. In the ER patient had a temp of 103. CT showed ground glass opacities. Patient was transferred to [place] for admission. In [place], patient is more lucid. He reports feeling weak for about a week. He status he was eating junk food and sweets. He denies fevers, chills, n/v/d/cough/CP/SOB/dysuria. He has been helping his brother move into an assisted living over the past 2 weeks.     Patient diagnosed with strep sepsis sec Left LE cellulitis with lymphangitis.  Classic Strep cellulitis and will take time to resolved.   Sp left foot bone biopsy - Chronic osteomyelitis. Not source of cellulitis.  Repeat cultures were negative  He received 10 days of iv rocephin in house  He will go home on keflex x 14 days.  Cellulitis improving.  Patient also had +troponin -- peaked at 5.0  Cardio diagnosed with +troponin secondary to NSTEMI.  ECHO: normal EF with severe aortic stenosis  Patient will need outpatient cardiac work-up  His cardiac meds were adjusted.    >35 minutes spent on discharge

## 2020-11-14 NOTE — PROGRESS NOTE ADULT - PROBLEM SELECTOR PLAN 1
- Patient seen and evaluated at bedside   - Incision sited dressed with Aquacel AG and DSD  - Posterior calf dressed with Adaptic Touch and DSD w/ ACE bandage for mild compression   - Discussion of treatment options mentioned above   - Patient refusing surgical intervention at this time of left 1st metatarsal head resection and proximal phalanx base resection secondary to chronic osteomyelitis- risks discussed as mentioned above   - Podiatry team will continue to follow patient while in house

## 2020-11-14 NOTE — PROGRESS NOTE ADULT - ASSESSMENT
74 M with PMH of HTN DM HLD CAD who was admitted for mental status and rigors. Found to have sepsis and also MARINA. Renal indices are now improving towards baseline. Continue IVF and also ARB at the present doses. Hold metformin and HCTZ.

## 2020-11-14 NOTE — PHARMACOTHERAPY INTERVENTION NOTE - COMMENTS
Prescriptions filled at [facility].  Prescriptions: [employer] testing supplies (meter, lancets, test strips)  Brought to bedside and counseled.  [facility] name: Kana Johnson PharmD  Person(s) counseled (translation used?,family member called? if relevant): Patient  Counseling materials provided/counseling aids used: Testing supply materials  Time spent Counselin minutes  Counseling provided according to ASHP guidelines including side effects

## 2020-11-14 NOTE — PROGRESS NOTE ADULT - SUBJECTIVE AND OBJECTIVE BOX
Neurology follow up note  COVERING DR YADIRA RICHARDS [id number]      Interval History:    Patient feels ok no new complaints.    MEDICATIONS    acetaminophen   Tablet .. 650 milliGRAM(s) Oral every 6 hours PRN  amLODIPine   Tablet 5 milliGRAM(s) Oral daily  aspirin enteric coated 81 milliGRAM(s) Oral daily  atorvastatin 40 milliGRAM(s) Oral at bedtime  clopidogrel Tablet 75 milliGRAM(s) Oral daily  dextrose 40% Gel 15 Gram(s) Oral once PRN  dextrose 5%. 1000 milliLiter(s) IV Continuous <Continuous>  dextrose 50% Injectable 12.5 Gram(s) IV Push once  dextrose 50% Injectable 25 Gram(s) IV Push once  dextrose 50% Injectable 25 Gram(s) IV Push once  enoxaparin Injectable 40 milliGRAM(s) SubCutaneous daily  glucagon  Injectable 1 milliGRAM(s) IntraMuscular once PRN  influenza   Vaccine 0.5 milliLiter(s) IntraMuscular once  insulin glargine Injectable (LANTUS) 10 Unit(s) SubCutaneous at bedtime  insulin lispro (ADMELOG) corrective regimen sliding scale   SubCutaneous three times a day before meals  insulin lispro (ADMELOG) corrective regimen sliding scale   SubCutaneous at bedtime  losartan 50 milliGRAM(s) Oral daily  metoprolol tartrate 25 milliGRAM(s) Oral every 12 hours  traMADol 25 milliGRAM(s) Oral every 6 hours PRN  traMADol 50 milliGRAM(s) Oral every 6 hours PRN      Allergies    No Known Allergies    Intolerances            Vital Signs Last 24 Hrs  T(C): 36.7 (14 Nov 2020 09:43), Max: 37.1 (13 Nov 2020 23:35)  T(F): 98 (14 Nov 2020 09:43), Max: 98.7 (13 Nov 2020 23:35)  HR: 60 (14 Nov 2020 09:43) (60 - 72)  BP: 169/74 (14 Nov 2020 09:43) (130/73 - 179/76)  BP(mean): --  RR: 18 (14 Nov 2020 09:43) (18 - 18)  SpO2: 95% (14 Nov 2020 09:43) (95% - 96%)      REVIEW OF SYSTEMS:     Constitutional: No fever, chills, fatigue, weakness  Eyes: no eye pain, visual disturbances, or discharge  ENT:  No difficulty hearing, tinnitus, vertigo; No sinus or throat pain  Neck: No pain or stiffness  Respiratory: No cough, dyspnea, wheezing   Cardiovascular: No chest pain, palpitations,   Gastrointestinal: No abdominal or epigastric pain. No nausea, vomiting  No diarrhea or constipation.   Genitourinary: No dysuria, frequency, hematuria or incontinence  Neurological: No headaches, lightheadedness, vertigo, numbness or tremors  Psychiatric: No depression, anxiety, mood swings or difficulty sleeping  Musculoskeletal: No joint pain or swelling; No muscle, back or extremity pain  Skin: No itching, burning, rashes or lesions   Lymph Nodes: No enlarged glands  Endocrine: No heat or cold intolerance; No hair loss   Allergy and Immunologic: No hives or eczema    On Neurological Examination:    Mental Status - Patient is alert, awake, oriented X3.    Follow simple commands  Follow complex commands      Speech -   Fluent                      Cranial Nerves - Pupils 3 mm equal and reactive to light,   extraocular eye movements intact.   smile symmetric  intact bilateral NLF    Motor Exam -   positive movement of all 4 extremities    Muscle tone - is normal all over.  No asymmetry is seen.      Sensory    Bilateral intact to light touch    GENERAL Exam: Nontoxic , No Acute Distress   	  HEENT:  normocephalic, atraumatic  		  LUNGS: Clear bilaterally    	  HEART: Normal S1S2   No murmur RRR        	  GI/ ABDOMEN:  Soft  Non tender    EXTREMITIES:   No Edema  No Clubbing  No Cyanosis     MUSCULOSKELETAL: Normal Range of Motion  	   SKIN: Normal  No Ecchymosis               LABS:      11-13    141  |  108  |  23  ----------------------------<  122<H>  3.9   |  25  |  1.50<H>    Ca    8.7      13 Nov 2020 06:31  Mg     2.4     11-13      Hemoglobin A1C:       Vitamin B12         RADIOLOGY        ASSESSMENT AND PLAN:      ams metabolic encephalopathy  streptococcal bacteremia   hypertension monitor SBP as needed  advance care directives discussed with patient       30 minutes spent on total encounter; more than 50% of the visit was spent counseling and/or coordinating care by the attending physician.

## 2020-11-14 NOTE — PROGRESS NOTE ADULT - SUBJECTIVE AND OBJECTIVE BOX
[facility] -- Jas Plaza, Jeff, Rachel, Tommy Love Savella  Office # [phone]      Follow Up:    cad  Subjective/Observations:     No events overnight resting comfortably in bed.  No complaints of chest pain, dyspnea, or palpitations reported. No signs of orthopnea or PND.  awaiting discharge home  REVIEW OF SYSTEMS: All other review of systems is negative unless indicated above    PAST MEDICAL & SURGICAL HISTORY:  Diabetes mellitus    Hypertension        MEDICATIONS  (STANDING):  amLODIPine   Tablet 5 milliGRAM(s) Oral daily  aspirin enteric coated 81 milliGRAM(s) Oral daily  atorvastatin 40 milliGRAM(s) Oral at bedtime  clopidogrel Tablet 75 milliGRAM(s) Oral daily  dextrose 5%. 1000 milliLiter(s) (50 mL/Hr) IV Continuous <Continuous>  dextrose 50% Injectable 12.5 Gram(s) IV Push once  dextrose 50% Injectable 25 Gram(s) IV Push once  dextrose 50% Injectable 25 Gram(s) IV Push once  enoxaparin Injectable 40 milliGRAM(s) SubCutaneous daily  influenza   Vaccine 0.5 milliLiter(s) IntraMuscular once  insulin glargine Injectable (LANTUS) 10 Unit(s) SubCutaneous at bedtime  insulin lispro (ADMELOG) corrective regimen sliding scale   SubCutaneous three times a day before meals  insulin lispro (ADMELOG) corrective regimen sliding scale   SubCutaneous at bedtime  losartan 50 milliGRAM(s) Oral daily  metoprolol tartrate 25 milliGRAM(s) Oral every 12 hours    MEDICATIONS  (PRN):  acetaminophen   Tablet .. 650 milliGRAM(s) Oral every 6 hours PRN Temp greater or equal to 38C (100.4F), Mild Pain (1 - 3)  dextrose 40% Gel 15 Gram(s) Oral once PRN Blood Glucose LESS THAN 70 milliGRAM(s)/deciliter  glucagon  Injectable 1 milliGRAM(s) IntraMuscular once PRN Glucose LESS THAN 70 milligrams/deciliter  traMADol 25 milliGRAM(s) Oral every 6 hours PRN Moderate Pain (4 - 6)  traMADol 50 milliGRAM(s) Oral every 6 hours PRN Severe Pain (7 - 10)      Allergies    No Known Allergies    Intolerances        Vital Signs Last 24 Hrs  T(C): 36.7 (14 Nov 2020 09:43), Max: 37.1 (13 Nov 2020 23:35)  T(F): 98 (14 Nov 2020 09:43), Max: 98.7 (13 Nov 2020 23:35)  HR: 60 (14 Nov 2020 09:43) (60 - 72)  BP: 169/74 (14 Nov 2020 09:43) (130/73 - 179/76)  BP(mean): --  RR: 18 (14 Nov 2020 09:43) (18 - 20)  SpO2: 95% (14 Nov 2020 09:43) (95% - 100%)    I&O's Summary    13 Nov 2020 07:01  -  14 Nov 2020 07:00  --------------------------------------------------------  IN: 890 mL / OUT: 500 mL / NET: 390 mL    14 Nov 2020 07:01  -  14 Nov 2020 11:24  --------------------------------------------------------  IN: 50 mL / OUT: 0 mL / NET: 50 mL          PHYSICAL EXAM:  TELE:   Constitutional: NAD, awake and alert, well-developed  HEENT: Moist Mucous Membranes, Anicteric  Pulmonary: Non-labored, breath sounds are clear bilaterally, No wheezing, crackles or rhonchi  Cardiovascular: Regular, S1 and S2 nl, + murmur   Gastrointestinal: Bowel Sounds present, soft, nontender.   Lymph: No lymphadenopathy. No peripheral edema.  Skin: No visible rashes or ulcers.  Psych:  Mood & affect appropriate    LABS: All Labs Reviewed:                        9.6    6.79  )-----------( 354      ( 12 Nov 2020 06:32 )             26.6     13 Nov 2020 06:31    141    |  108    |  23     ----------------------------<  122    3.9     |  25     |  1.50   12 Nov 2020 06:32    140    |  107    |  25     ----------------------------<  138    4.0     |  25     |  1.60     Ca    8.7        13 Nov 2020 06:31  Ca    8.5        12 Nov 2020 06:32  Mg     2.4       13 Nov 2020 06:31  Mg     2.5       12 Nov 2020 06:32               ECG:  < from: 12 Lead ECG (11.04.20 @ 13:00) >    Ventricular Rate 77 BPM    Atrial Rate 77 BPM    P-R Interval 154 ms    QRS Duration 88 ms    Q-T Interval 374 ms    QTC Calculation(Bazett) 423 ms    P Axis 9 degrees    R Axis 3 degrees    T Axis 107 degrees    Diagnosis Line Normal sinus rhythm  Minimal voltage criteria for LVH, may be normal variant  Inferior infarct , age undetermined  Abnormal ECG  When compared with ECG of 10-JUL-2010 13:13,  No significant change was found    < end of copied text >    Echo:  < from: TTE Echo Complete w/o Contrast w/ Doppler (11.04.20 @ 11:23) >  OBSERVATIONS:  Actually difficult and limited study, unable to obtain subcostal views  Mitral Valve: Thickened leaflets, mild MR. Mitral annular calcification  Aortic Valve/Aorta: Calcified trileaflet aortic valve with decreased opening. Peak transaortic valve gradient is 69 mmHg with a mean transaortic valve gradient of 49 mmHg. The aortic valve area is calculated to be 0.7 sq cm by continuity equation. This is consistent with severe aortic stenosis  Tricuspid Valve: normal with trace TR.  Pulmonic Valve: Not well-visualized  Left Atrium: normal  Right Atrium: Not well-visualized  Left Ventricle: normal LV size and systolic function, estimated LVEF of 55-60%.  Right Ventricle: Grossly normal size and systolic function.  Pericardium/Pleura: normal, no significant pericardial effusion.      Conclusion:  Technically difficult and limited study  Normal left ventricular internal dimensions and systolic function, estimated LVEF of 55-60%.  Grossly normal RV size and systolic function.  Calcified trileaflet aortic valve with severe aortic stenosis  Mild MR  Trace TR.  No significant pericardial effusion.          < end of copied text >    Radiology:

## 2020-11-14 NOTE — PROGRESS NOTE ADULT - ATTENDING COMMENTS
Infectious Diseases will continue to follow.   Please call with any questions.   I am covering for Dr. Dorado today.  Shiloh Savage M.D.  [facility], Division of Infectious Diseases [phone]  For over the weekend and after hours, please call [phone]  Dr. Annette Hedrick will be covering the service this weekend
I personally saw and examined the patient in detail.  I have spoken to the above provider regarding the assessment and plan of care.  I reviewed the above assessment and plan of care, and agree.  I have made changes in the body of the note where appropriate.
Seen/examined. agree with above.
The patient was personally seen and examined, in addition to being examined and evaluated by NP.  All elements of the note were edited where appropriate.
The patient was personally seen and examined, in addition to being examined and evaluated by NP.  All elements of the note were edited where appropriate.    severe AS with evolving troponins in the setting of infection with bacteremia  will need to be determined whether inpt or outpt assessment of cad/AS will be appropriate, depending upon his clinical course  elevated trop can easily represent demand in this setting, and cath is not mandatory based on progression thus far
Chart reviewed    Patient seen and examined    Agree with plan as outlined above
Seen/examined. agree with above.
D/C home
D/C planning home in AM

## 2020-11-14 NOTE — PROGRESS NOTE ADULT - REASON FOR ADMISSION
change in mental status

## 2020-11-14 NOTE — PROGRESS NOTE ADULT - ASSESSMENT
74 year old male with PMH HTN, DM, HLD transferred from [place] for admission for AMS due to underlying sepsis due to possible PNA,     NSTEMI  - Patient found to have elevated Troponin which peaked at 5.08 but trended down  - Technically difficult ECHO showed normal LV & RV size and function EF 55-60%, severe AS  - EKG with ST depressions in leads I, avL, and V5/V6, consistent with possible lateral ischemia.   - S/P full dose Lovenox for 48 hours   - Continue DAPT with ASA and Plavix  - Continue BB  - Continue statin  - Continue losartan   - No clinical evidence of ACS to this point  - Will need outpatient ischemic eval,   - Monitor and replete lytes, keep K>4, Mg>2.    Severe AS  - Recent ECHO with severe AS  - CT chest notable pulmonary edema on CT chest.  Would hold off on diuresis.  Patient appears euvolemic on examination   - Caution with IVF  - Patient would need outpatient evaluation for severe AS  - Continue BB.  Would keep HR at 60's        Hyperlipidemia  - Continue statin    Sepsis  - RLE cellulitis  - Management per primary team     stable from cv standpoint for dc to follow up in one week   Karis Ghosh FNP-C  Cardiology NP  [id number] [phone]

## 2020-11-14 NOTE — PROGRESS NOTE ADULT - SUBJECTIVE AND OBJECTIVE BOX
Date/Time Patient Seen:  		  Referring MD:   Data Reviewed	       Patient is a 74y old  Male who presents with a chief complaint of change in mental status (14 Nov 2020 08:04)      Subjective/HPI     PAST MEDICAL & SURGICAL HISTORY:  Diabetes mellitus    Hypertension          Medication list         MEDICATIONS  (STANDING):  amLODIPine   Tablet 5 milliGRAM(s) Oral daily  aspirin enteric coated 81 milliGRAM(s) Oral daily  atorvastatin 40 milliGRAM(s) Oral at bedtime  cefTRIAXone   IVPB 2000 milliGRAM(s) IV Intermittent every 24 hours  clopidogrel Tablet 75 milliGRAM(s) Oral daily  dextrose 5%. 1000 milliLiter(s) (50 mL/Hr) IV Continuous <Continuous>  dextrose 50% Injectable 12.5 Gram(s) IV Push once  dextrose 50% Injectable 25 Gram(s) IV Push once  dextrose 50% Injectable 25 Gram(s) IV Push once  enoxaparin Injectable 40 milliGRAM(s) SubCutaneous daily  influenza   Vaccine 0.5 milliLiter(s) IntraMuscular once  insulin glargine Injectable (LANTUS) 10 Unit(s) SubCutaneous at bedtime  insulin lispro (ADMELOG) corrective regimen sliding scale   SubCutaneous three times a day before meals  insulin lispro (ADMELOG) corrective regimen sliding scale   SubCutaneous at bedtime  losartan 50 milliGRAM(s) Oral daily  metoprolol tartrate 25 milliGRAM(s) Oral every 12 hours    MEDICATIONS  (PRN):  acetaminophen   Tablet .. 650 milliGRAM(s) Oral every 6 hours PRN Temp greater or equal to 38C (100.4F), Mild Pain (1 - 3)  dextrose 40% Gel 15 Gram(s) Oral once PRN Blood Glucose LESS THAN 70 milliGRAM(s)/deciliter  glucagon  Injectable 1 milliGRAM(s) IntraMuscular once PRN Glucose LESS THAN 70 milligrams/deciliter  traMADol 25 milliGRAM(s) Oral every 6 hours PRN Moderate Pain (4 - 6)  traMADol 50 milliGRAM(s) Oral every 6 hours PRN Severe Pain (7 - 10)         Vitals log        ICU Vital Signs Last 24 Hrs  T(C): 36.8 (14 Nov 2020 04:45), Max: 37.1 (13 Nov 2020 23:35)  T(F): 98.3 (14 Nov 2020 04:45), Max: 98.7 (13 Nov 2020 23:35)  HR: 69 (14 Nov 2020 04:45) (61 - 72)  BP: 179/76 (14 Nov 2020 04:45) (130/73 - 179/76)  BP(mean): --  ABP: --  ABP(mean): --  RR: 18 (14 Nov 2020 04:45) (18 - 20)  SpO2: 96% (14 Nov 2020 04:45) (95% - 100%)           Input and Output:  I&O's Detail    13 Nov 2020 07:01  -  14 Nov 2020 07:00  --------------------------------------------------------  IN:    IV PiggyBack: 50 mL    Oral Fluid: 840 mL  Total IN: 890 mL    OUT:    Voided (mL): 500 mL  Total OUT: 500 mL    Total NET: 390 mL          Lab Data    11-13    141  |  108  |  23  ----------------------------<  122<H>  3.9   |  25  |  1.50<H>    Ca    8.7      13 Nov 2020 06:31  Mg     2.4     11-13              Review of Systems	      Objective     Physical Examination    heart s1s2  lung dec BS  abd soft      Pertinent Lab findings & Imaging      Diane:  NO   Adequate UO     I&O's Detail    13 Nov 2020 07:01  -  14 Nov 2020 07:00  --------------------------------------------------------  IN:    IV PiggyBack: 50 mL    Oral Fluid: 840 mL  Total IN: 890 mL    OUT:    Voided (mL): 500 mL  Total OUT: 500 mL    Total NET: 390 mL               Discussed with:     Cultures:	        Radiology

## 2020-11-14 NOTE — DISCHARGE NOTE PROVIDER - PROVIDER TOKENS
PROVIDER:[TOKEN:[[id number]],FOLLOWUP:[2 weeks]],PROVIDER:[TOKEN:[[id number]],FOLLOWUP:[1 week]] PROVIDER:[TOKEN:[[id number]],FOLLOWUP:[2 weeks]],PROVIDER:[TOKEN:[[id number]],FOLLOWUP:[1 week]],PROVIDER:[TOKEN:[[id number]],FOLLOWUP:[2 weeks]]

## 2020-11-14 NOTE — PROGRESS NOTE ADULT - SUBJECTIVE AND OBJECTIVE BOX
Patient is a 74y old  Male who presents with a chief complaint of change in mental status (14 Nov 2020 09:15)      INTERVAL HPI/OVERNIGHT EVENTS: Patient seen and examined. NAD. No complaints.    Vital Signs Last 24 Hrs  T(C): 36.7 (14 Nov 2020 09:43), Max: 37.1 (13 Nov 2020 23:35)  T(F): 98 (14 Nov 2020 09:43), Max: 98.7 (13 Nov 2020 23:35)  HR: 60 (14 Nov 2020 09:43) (60 - 72)  BP: 169/74 (14 Nov 2020 09:43) (130/73 - 179/76)  BP(mean): --  RR: 18 (14 Nov 2020 09:43) (18 - 20)  SpO2: 95% (14 Nov 2020 09:43) (95% - 100%)    11-13    141  |  108  |  23  ----------------------------<  122<H>  3.9   |  25  |  1.50<H>    Ca    8.7      13 Nov 2020 06:31  Mg     2.4     11-13          CAPILLARY BLOOD GLUCOSE      POCT Blood Glucose.: 130 mg/dL (14 Nov 2020 07:59)  POCT Blood Glucose.: 144 mg/dL (13 Nov 2020 22:20)  POCT Blood Glucose.: 192 mg/dL (13 Nov 2020 21:17)  POCT Blood Glucose.: 97 mg/dL (13 Nov 2020 16:51)  POCT Blood Glucose.: 243 mg/dL (13 Nov 2020 11:52)              acetaminophen   Tablet .. 650 milliGRAM(s) Oral every 6 hours PRN  amLODIPine   Tablet 5 milliGRAM(s) Oral daily  aspirin enteric coated 81 milliGRAM(s) Oral daily  atorvastatin 40 milliGRAM(s) Oral at bedtime  clopidogrel Tablet 75 milliGRAM(s) Oral daily  dextrose 40% Gel 15 Gram(s) Oral once PRN  dextrose 5%. 1000 milliLiter(s) IV Continuous <Continuous>  dextrose 50% Injectable 12.5 Gram(s) IV Push once  dextrose 50% Injectable 25 Gram(s) IV Push once  dextrose 50% Injectable 25 Gram(s) IV Push once  enoxaparin Injectable 40 milliGRAM(s) SubCutaneous daily  glucagon  Injectable 1 milliGRAM(s) IntraMuscular once PRN  influenza   Vaccine 0.5 milliLiter(s) IntraMuscular once  insulin glargine Injectable (LANTUS) 10 Unit(s) SubCutaneous at bedtime  insulin lispro (ADMELOG) corrective regimen sliding scale   SubCutaneous three times a day before meals  insulin lispro (ADMELOG) corrective regimen sliding scale   SubCutaneous at bedtime  losartan 50 milliGRAM(s) Oral daily  metoprolol tartrate 25 milliGRAM(s) Oral every 12 hours  traMADol 25 milliGRAM(s) Oral every 6 hours PRN  traMADol 50 milliGRAM(s) Oral every 6 hours PRN              REVIEW OF SYSTEMS:  CONSTITUTIONAL: No fever, no weight loss, or no fatigue  NECK: No pain, no stiffness  RESPIRATORY: No cough, no wheezing, no chills, no hemoptysis, No shortness of breath  CARDIOVASCULAR: No chest pain, no palpitations, no dizziness, no leg swelling  GASTROINTESTINAL: No abdominal pain. No nausea, no vomiting, no hematemesis; No diarrhea, no constipation. No melena, no hematochezia.  GENITOURINARY: No dysuria, no frequency, no hematuria, no incontinence  NEUROLOGICAL: No headaches, no loss of strength, no numbness, no tremors  SKIN: No itching, no burning  MUSCULOSKELETAL: No joint pain, no swelling; No muscle, no back, no extremity pain  PSYCHIATRIC: No depression, no mood swings,   HEME/LYMPH: No easy bruising, no bleeding gums  ALLERY AND IMMUNOLOGIC: No hives       Consultant(s) Notes Reviewed:  [X] YES  [ ] NO    PHYSICAL EXAM:  GENERAL: NAD  HEAD:  Atraumatic, Normocephalic  EYES: EOMI, PERRLA, conjunctiva and sclera clear  ENMT: No tonsillar erythema, exudates, or enlargement; Moist mucous membranes  NECK: Supple, No JVD  NERVOUS SYSTEM:  Awake & alert  CHEST/LUNG: Clear to auscultation bilaterally; No rales, rhonchi, wheezing,  HEART: Regular rate and rhythm  ABDOMEN: Soft, Nontender, Nondistended; Bowel sounds present  EXTREMITIES:  No clubbing, cyanosis, or edema  LYMPH: No lymphadenopathy noted  SKIN: No rashes      Advanced care planning discussed with patient/family [X] YES   [ ] NO    Advanced care planning discussed with patient/family. Patient's health status was discussed. All appropriate changes have been made regarding patient's end-of-life care. Advanced care planning forms reviewed/discussed/completed.  20 minutes spent.

## 2020-11-14 NOTE — PROGRESS NOTE ADULT - PROBLEM SELECTOR PROBLEM 1
Altered mental status
Altered mental status
Sepsis, due to unspecified organism, unspecified whether acute organ dysfunction present
Type 2 diabetes mellitus with other specified complication, without long-term current use of insulin
Callus of foot
Chronic osteomyelitis
Bone cyst of left foot

## 2020-11-14 NOTE — PROGRESS NOTE ADULT - SUBJECTIVE AND OBJECTIVE BOX
SUSIE NUNES  74y  Male    Patient is a 74y old  Male who presents with a chief complaint of change in mental status (14 Nov 2020 11:59)        PAST MEDICAL & SURGICAL HISTORY:  Diabetes mellitus    Hypertension            PHYSICAL EXAM:    T(C): 36.7 (11-14-20 @ 09:43), Max: 37.1 (11-13-20 @ 23:35)  HR: 60 (11-14-20 @ 09:43) (60 - 72)  BP: 169/74 (11-14-20 @ 09:43) (130/73 - 179/76)  RR: 18 (11-14-20 @ 09:43) (18 - 18)  SpO2: 95% (11-14-20 @ 09:43) (95% - 96%)  Wt(kg): --    I&O's Detail    13 Nov 2020 07:01  -  14 Nov 2020 07:00  --------------------------------------------------------  IN:    IV PiggyBack: 50 mL    Oral Fluid: 840 mL  Total IN: 890 mL    OUT:    Voided (mL): 500 mL  Total OUT: 500 mL    Total NET: 390 mL      14 Nov 2020 07:01  -  14 Nov 2020 12:28  --------------------------------------------------------  IN:    IV PiggyBack: 50 mL  Total IN: 50 mL    OUT:  Total OUT: 0 mL    Total NET: 50 mL          Respiratory: clear anteriorly, decreased BS at bases  Cardiovascular: S1 S2  Gastrointestinal: soft NT ND +BS  Extremities: edema   Neuro: Awake and alert    MEDICATIONS  (STANDING):  amLODIPine   Tablet 5 milliGRAM(s) Oral daily  aspirin enteric coated 81 milliGRAM(s) Oral daily  atorvastatin 40 milliGRAM(s) Oral at bedtime  clopidogrel Tablet 75 milliGRAM(s) Oral daily  dextrose 5%. 1000 milliLiter(s) (50 mL/Hr) IV Continuous <Continuous>  dextrose 50% Injectable 12.5 Gram(s) IV Push once  dextrose 50% Injectable 25 Gram(s) IV Push once  dextrose 50% Injectable 25 Gram(s) IV Push once  enoxaparin Injectable 40 milliGRAM(s) SubCutaneous daily  influenza   Vaccine 0.5 milliLiter(s) IntraMuscular once  insulin glargine Injectable (LANTUS) 10 Unit(s) SubCutaneous at bedtime  insulin lispro (ADMELOG) corrective regimen sliding scale   SubCutaneous three times a day before meals  insulin lispro (ADMELOG) corrective regimen sliding scale   SubCutaneous at bedtime  losartan 50 milliGRAM(s) Oral daily  metoprolol tartrate 25 milliGRAM(s) Oral every 12 hours    MEDICATIONS  (PRN):  acetaminophen   Tablet .. 650 milliGRAM(s) Oral every 6 hours PRN Temp greater or equal to 38C (100.4F), Mild Pain (1 - 3)  dextrose 40% Gel 15 Gram(s) Oral once PRN Blood Glucose LESS THAN 70 milliGRAM(s)/deciliter  glucagon  Injectable 1 milliGRAM(s) IntraMuscular once PRN Glucose LESS THAN 70 milligrams/deciliter  traMADol 25 milliGRAM(s) Oral every 6 hours PRN Moderate Pain (4 - 6)  traMADol 50 milliGRAM(s) Oral every 6 hours PRN Severe Pain (7 - 10)          11-13    141  |  108  |  23  ----------------------------<  122<H>  3.9   |  25  |  1.50<H>    Ca    8.7      13 Nov 2020 06:31  Mg     2.4     11-13        Creatinine Trend: Creatinine Trend: 1.50<--, 1.60<--, 1.50<--, 1.40<--, 1.60<--, 1.70<--

## 2020-11-14 NOTE — DISCHARGE NOTE PROVIDER - NSDCMRMEDTOKEN_GEN_ALL_CORE_FT
acetaminophen 325 mg oral tablet: 2 tab(s) orally every 6 hours, As needed, Temp greater or equal to 38C (100.4F), Mild Pain (1 - 3)  amLODIPine 10 mg oral tablet: 1 tab(s) orally once a day  aspirin 81 mg oral tablet: 1 tab(s) orally once a day  atorvastatin 40 mg oral tablet: 1 tab(s) orally once a day  Centrum Men&#x27;s oral tablet: 1 tab(s) orally once a day  Feosol 325 mg (65 mg elemental iron) oral tablet: 1 tab(s) orally once a day  glucometer (per patient&#x27;s insurance): 1 application subcutaneous 2 times a day  E11.9 Dispense as per insurance   hydroCHLOROthiazide 12.5 mg oral capsule: 1 cap(s) orally once a day  lancets: 1 application subcutaneously 2 times a day   E11.9  Dispense as per insurance coverage  losartan 100 mg oral tablet: 1 tab(s) orally once a day  metFORMIN 500 mg oral tablet: 2 tab(s) orally once a day (at bedtime)   as per patient    *Script written as 1T BID*  test strips (per patient&#x27;s insurance): 1 application subcutaneously 2 times a day . ** Compatible with patient&#x27;s glucometer ** Dispense as per insurance company   E11.9  Vitamin B-12 1000 mcg oral tablet: 1 tab(s) orally once a day  Vitamin D3 5000 intl units (125 mcg) oral tablet: 1 tab(s) orally once a day   acetaminophen 325 mg oral tablet: 2 tab(s) orally every 6 hours, As needed, Temp greater or equal to 38C (100.4F), Mild Pain (1 - 3)  amLODIPine 5 mg oral tablet: 1 tab(s) orally once a day  aspirin 81 mg oral tablet: 1 tab(s) orally once a day  atorvastatin 40 mg oral tablet: 1 tab(s) orally once a day  Centrum Men&#x27;s oral tablet: 1 tab(s) orally once a day  clopidogrel 75 mg oral tablet: 1 tab(s) orally once a day  Feosol 325 mg (65 mg elemental iron) oral tablet: 1 tab(s) orally once a day  Keflex 500 mg oral capsule: 1 cap(s) orally 4 times a day   losartan 50 mg oral tablet: 1 tab(s) orally once a day  metFORMIN 500 mg oral tablet: 2 tab(s) orally once a day (at bedtime)   as per patient    *Script written as 1T BID*  Toprol-XL 50 mg oral tablet, extended release: 1 tab(s) orally once a day   Vitamin B-12 1000 mcg oral tablet: 1 tab(s) orally once a day  Vitamin D3 5000 intl units (125 mcg) oral tablet: 1 tab(s) orally once a day

## 2020-11-14 NOTE — PROGRESS NOTE ADULT - SUBJECTIVE AND OBJECTIVE BOX
74y year old Male seen at [facility] [address] is s/p Left 1st metatarsal head and proximal phalanx bone biopsy with Dr. Sebastian Paiz, DOS 11/9/20. The patient is refusing the recommended surgical intervention at this time.  Denies any fever, chills, nausea, vomiting, chest pain, shortness of breath, or calf pain at this time.    Allergies    No Known Allergies    Intolerances    REVIEW OF SYSTEMS    PAST MEDICAL & SURGICAL HISTORY:  Diabetes mellitus    Hypertension          MEDICATIONS  (STANDING):  aspirin enteric coated 81 milliGRAM(s) Oral daily  atorvastatin 40 milliGRAM(s) Oral at bedtime  cefTRIAXone   IVPB 2000 milliGRAM(s) IV Intermittent every 24 hours  clopidogrel Tablet 75 milliGRAM(s) Oral daily  dextrose 5%. 1000 milliLiter(s) (50 mL/Hr) IV Continuous <Continuous>  dextrose 50% Injectable 12.5 Gram(s) IV Push once  dextrose 50% Injectable 25 Gram(s) IV Push once  dextrose 50% Injectable 25 Gram(s) IV Push once  enoxaparin Injectable 40 milliGRAM(s) SubCutaneous daily  influenza   Vaccine 0.5 milliLiter(s) IntraMuscular once  insulin glargine Injectable (LANTUS) 10 Unit(s) SubCutaneous at bedtime  insulin lispro (ADMELOG) corrective regimen sliding scale   SubCutaneous three times a day before meals  insulin lispro (ADMELOG) corrective regimen sliding scale   SubCutaneous at bedtime  metoprolol tartrate 25 milliGRAM(s) Oral every 12 hours    MEDICATIONS  (PRN):  acetaminophen   Tablet .. 650 milliGRAM(s) Oral every 6 hours PRN Temp greater or equal to 38C (100.4F), Mild Pain (1 - 3)  dextrose 40% Gel 15 Gram(s) Oral once PRN Blood Glucose LESS THAN 70 milliGRAM(s)/deciliter  glucagon  Injectable 1 milliGRAM(s) IntraMuscular once PRN Glucose LESS THAN 70 milligrams/deciliter      Vital Signs Last 24 Hrs  T(C): 36.7 (14 Nov 2020 09:43), Max: 37.1 (13 Nov 2020 23:35)  T(F): 98 (14 Nov 2020 09:43), Max: 98.7 (13 Nov 2020 23:35)  HR: 60 (14 Nov 2020 09:43) (60 - 72)  BP: 169/74 (14 Nov 2020 09:43) (130/73 - 179/76)  BP(mean): --  RR: 18 (14 Nov 2020 09:43) (18 - 18)  SpO2: 95% (14 Nov 2020 09:43) (95% - 96%)    PHYSICAL EXAM:  Vascular: DP & PT faintly palpable bilaterally, Capillary refill 3 seconds, Digital hair present bilaterally, mild edema and erythema along the plantar 1st and 1st interspace of the left foot, LLE anterior cellulitis, skin warm to the touch to the LLE, Non pitting edema diffusely along the LLE, minimal ecchymosis along the dorsum of the left 1st proximal phalanx   Neurological: Light touch sensation intact bilaterally  Musculoskeletal: 5/5 strength in all quadrants bilaterally, Limited ROM of the left Ankle Joint, no pain upon palpation sub 1st plantar hyperkeratotic lesion and 1st interspace of the left foot   Dermatological:   1. Hyperkeratotic lesion sub distal lateral 1st MTPJ size 0.3cmx0.2cm with edema and erythema extending to the 1st interspace, no active drainage, does not probe to bone   2. Subdermal dried hemorrhage along the left 1st interspace   3. s/p bone biopsy of the 1st left metatarsal head-incision site 0.1cm- epithelization, no active drainage   4. s/p bone biopsy of the 1st left proximal phalanx base- incision site 0.1cm- epithelization present, no active drainage  5. Pre-Ulcerative lesion along the posterior left calf- size 6.0cmx4.5cm- weeping serous drainage                ----------CHEM PANEL----------  11-13    141  |  108  |  23  ----------------------------<  122<H>  3.9   |  25  |  1.50<H>    Ca    8.7      13 Nov 2020 06:31  Mg     2.4     11-13                  Imaging:   XAM:  FOOT LEFT (MINIMUM 3 VIEWS)                            PROCEDURE DATE:  11/06/2020          INTERPRETATION:  CLINICAL INDICATION:  "Rule out foreign body subcutaneous first interspace"    COMPARISON:  None.    TECHNIQUE:  AP, oblique and lateral views.    FINDINGS:  2 surgical screws traverse the distal aspect of the left tibia. Chronic appearing deformity of the distal left tibia identified. The tibiotalar articulation appears markedly narrowed with associated degenerative osteophyte formation. There is also loss of height of the posterior aspect of the talus as well as narrowing of the talar/calcaneal articulation posteriorly. There is no evidence for acute fracture or dislocation. No additional radiodense foreign bodies are identified excepting the previously described surgical hardware. No significant soft tissue swelling is evident.    IMPRESSION:  As above.      EXAM:  MR FOOT LT                            PROCEDURE DATE:  11/09/2020          INTERPRETATION:  CLINICAL INDICATION: Left foot pain, evaluate for foreign body near the first metatarsophalangeal joint and first interspace region.    Multiplanar Multisequence MR of the left foot. Images obtained of the midfoot through forefoot. No IV contrast.    Prior Studies: Left foot radiographs 11/6/2020.    FINDINGS:  T2 hyperintense cystic changes and edema in the distal aspect of the great toe metatarsal and in the great toe proximal phalanx. No definite for body is visualized in the subcutaneous tissues. There is diffuse subcutaneous edema at the dorsum of the forefoot. No abnormal muscle edema. Visualized flexor and extensor tendons are grossly intact. Likely second hammertoe. No abnormal joint effusion. The Lisfranc ligament is grossly intact.    IMPRESSION:  No definite foreign body is seen in the subcutaneous tissues. Please note that MRI may not be sensitive for small foreign bodies or all types of foreign bodies. If there is persistent clinical concern for foreign body, correlation with contrast enhanced CT or ultrasound can be performed.    Diffuse subcutaneous edema. Correlate clinically to exclude cellulitis.    T2 hyperintense cystic changes and marrow edema about the great toe metatarsophalangeal joint, likely degenerative.    Likely second hammertoe, evaluation limited on nonweightbearing exam.            SARTHAK KRISHNAMURTHY MD; Attending Radiologist    ACCESSION No:  [id number]    SUSIE NUNES                      2        Surgical Final Report          Final Diagnosis    1. Bone, left proximal phalanx, 1st digit:  Chronic osteomyelitis.    2. Bone, left first metatarsal head:  Chronic osteomyelitis.    Note: No evidence of a bone cyst or an atypical cyst is noted.  Please correlate clinically and radiographically.    Verified by: Aleksandar Dawn MD  (Electronic Signature)  Reported on: 11/10/20 11:25 [facility], [facility], [address] [place] [address]  Phone: [phone]   Fax: [phone]  _________________________________________________________________    Clinical History  Rule out osteomyelitis vs bone cyst vs atypical bone cyst    Specimen(s) Submitted  1-Left proximal phalanx 1st bone  2-Left 1st metatarsal head bone    Gross Description  1.   The specimen is received in formalin and the specimen  container is labeled: Left proximal phalanx 1st.  It consists of  one hard tan-pink fragment of bone measuring 0.3 x 0.1 x 0.1 cm.  The specimen is decalcified.  Entirely submitted.  One cassette.    2.   The specimen is received in formalin and the specimen  container is labeled: Left first met head.  It consists of one  hard tan-pink cylindrical fragment of bone measuring 0.7 x 0.1 x  0.1 cm.  The specimen is decalcified.  Entirely submitted.  One  cassette.    In addition to other data that may appear on the specimen  containers, all labels have been inspected to confirm the  presence of the patient's name and date of birth.  Bri ALCANTARA 11/09/2020 14:33    Disclaimer  Histological Processing Performed at [facility],  Department of Pathology, [address] [place] [address].                SUSIE NUNES                      2        Surgical Consult Report          Disclaimer  Histological Processing Performed at [facility],  Department of Pathology, [address] [place] [address].

## 2020-11-14 NOTE — DISCHARGE NOTE PROVIDER - NSDCCPCAREPLAN_GEN_ALL_CORE_FT
PRINCIPAL DISCHARGE DIAGNOSIS  Diagnosis: Sepsis, due to unspecified organism, unspecified whether acute organ dysfunction present  Assessment and Plan of Treatment: Finish course of antibiotics.  Follow-up with your primary care doctor within 1 week.        SECONDARY DISCHARGE DIAGNOSES  Diagnosis: Aortic stenosis  Assessment and Plan of Treatment: Follow-up with cardiologist within 1-2 weeks.    Diagnosis: Essential hypertension  Assessment and Plan of Treatment: Continue current medications  Follow-up with your primary care doctor within 1 week.

## 2021-07-09 PROBLEM — E11.9 TYPE 2 DIABETES MELLITUS WITHOUT COMPLICATIONS: Chronic | Status: ACTIVE | Noted: 2020-11-03

## 2021-07-10 ENCOUNTER — INPATIENT (INPATIENT)
Facility: HOSPITAL | Age: 76
LOS: 12 days | Discharge: HOME HEALTH | DRG: 949 | End: 2021-07-23
Attending: PHYSICAL MEDICINE & REHABILITATION | Admitting: PHYSICAL MEDICINE & REHABILITATION
Payer: MEDICARE

## 2021-07-10 VITALS
WEIGHT: 162.92 LBS | OXYGEN SATURATION: 97 % | TEMPERATURE: 98 F | RESPIRATION RATE: 16 BRPM | SYSTOLIC BLOOD PRESSURE: 132 MMHG | HEART RATE: 74 BPM | HEIGHT: 65 IN | DIASTOLIC BLOOD PRESSURE: 76 MMHG

## 2021-07-10 DIAGNOSIS — R60.0 LOCALIZED EDEMA: ICD-10-CM

## 2021-07-10 DIAGNOSIS — I69.351 HEMIPLEGIA AND HEMIPARESIS FOLLOWING CEREBRAL INFARCTION AFFECTING RIGHT DOMINANT SIDE: ICD-10-CM

## 2021-07-10 DIAGNOSIS — I63.9 CEREBRAL INFARCTION, UNSPECIFIED: ICD-10-CM

## 2021-07-10 DIAGNOSIS — N18.30 CHRONIC KIDNEY DISEASE, STAGE 3 UNSPECIFIED: ICD-10-CM

## 2021-07-10 DIAGNOSIS — R53.81 OTHER MALAISE: ICD-10-CM

## 2021-07-10 DIAGNOSIS — Z48.812 ENCOUNTER FOR SURGICAL AFTERCARE FOLLOWING SURGERY ON THE CIRCULATORY SYSTEM: ICD-10-CM

## 2021-07-10 DIAGNOSIS — Z79.84 LONG TERM (CURRENT) USE OF ORAL HYPOGLYCEMIC DRUGS: ICD-10-CM

## 2021-07-10 DIAGNOSIS — Z79.02 LONG TERM (CURRENT) USE OF ANTITHROMBOTICS/ANTIPLATELETS: ICD-10-CM

## 2021-07-10 DIAGNOSIS — Z79.82 LONG TERM (CURRENT) USE OF ASPIRIN: ICD-10-CM

## 2021-07-10 DIAGNOSIS — I12.9 HYPERTENSIVE CHRONIC KIDNEY DISEASE WITH STAGE 1 THROUGH STAGE 4 CHRONIC KIDNEY DISEASE, OR UNSPECIFIED CHRONIC KIDNEY DISEASE: ICD-10-CM

## 2021-07-10 DIAGNOSIS — E43 UNSPECIFIED SEVERE PROTEIN-CALORIE MALNUTRITION: ICD-10-CM

## 2021-07-10 DIAGNOSIS — D64.9 ANEMIA, UNSPECIFIED: ICD-10-CM

## 2021-07-10 DIAGNOSIS — I69.392 FACIAL WEAKNESS FOLLOWING CEREBRAL INFARCTION: ICD-10-CM

## 2021-07-10 DIAGNOSIS — Z96.611 PRESENCE OF RIGHT ARTIFICIAL SHOULDER JOINT: ICD-10-CM

## 2021-07-10 DIAGNOSIS — Z95.2 PRESENCE OF PROSTHETIC HEART VALVE: ICD-10-CM

## 2021-07-10 DIAGNOSIS — Z51.89 ENCOUNTER FOR OTHER SPECIFIED AFTERCARE: ICD-10-CM

## 2021-07-10 DIAGNOSIS — Z95.1 PRESENCE OF AORTOCORONARY BYPASS GRAFT: ICD-10-CM

## 2021-07-10 DIAGNOSIS — E88.09 OTHER DISORDERS OF PLASMA-PROTEIN METABOLISM, NOT ELSEWHERE CLASSIFIED: ICD-10-CM

## 2021-07-10 DIAGNOSIS — Z87.891 PERSONAL HISTORY OF NICOTINE DEPENDENCE: ICD-10-CM

## 2021-07-10 DIAGNOSIS — I63.29 CEREBRAL INFARCTION DUE TO UNSPECIFIED OCCLUSION OR STENOSIS OF OTHER PRECEREBRAL ARTERIES: ICD-10-CM

## 2021-07-10 DIAGNOSIS — R79.89 OTHER SPECIFIED ABNORMAL FINDINGS OF BLOOD CHEMISTRY: ICD-10-CM

## 2021-07-10 DIAGNOSIS — M25.531 PAIN IN RIGHT WRIST: ICD-10-CM

## 2021-07-10 DIAGNOSIS — R26.9 UNSPECIFIED ABNORMALITIES OF GAIT AND MOBILITY: ICD-10-CM

## 2021-07-10 DIAGNOSIS — I69.322 DYSARTHRIA FOLLOWING CEREBRAL INFARCTION: ICD-10-CM

## 2021-07-10 DIAGNOSIS — E78.5 HYPERLIPIDEMIA, UNSPECIFIED: ICD-10-CM

## 2021-07-10 DIAGNOSIS — E11.22 TYPE 2 DIABETES MELLITUS WITH DIABETIC CHRONIC KIDNEY DISEASE: ICD-10-CM

## 2021-07-10 DIAGNOSIS — I25.810 ATHEROSCLEROSIS OF CORONARY ARTERY BYPASS GRAFT(S) WITHOUT ANGINA PECTORIS: ICD-10-CM

## 2021-07-10 LAB — GLUCOSE BLDC GLUCOMTR-MCNC: 169 MG/DL — HIGH (ref 70–99)

## 2021-07-10 RX ORDER — DEXTROSE 50 % IN WATER 50 %
15 SYRINGE (ML) INTRAVENOUS ONCE
Refills: 0 | Status: DISCONTINUED | OUTPATIENT
Start: 2021-07-10 | End: 2021-07-23

## 2021-07-10 RX ORDER — DEXTROSE 50 % IN WATER 50 %
12.5 SYRINGE (ML) INTRAVENOUS ONCE
Refills: 0 | Status: DISCONTINUED | OUTPATIENT
Start: 2021-07-10 | End: 2021-07-23

## 2021-07-10 RX ORDER — ASPIRIN/CALCIUM CARB/MAGNESIUM 324 MG
81 TABLET ORAL DAILY
Refills: 0 | Status: DISCONTINUED | OUTPATIENT
Start: 2021-07-10 | End: 2021-07-23

## 2021-07-10 RX ORDER — ACETAMINOPHEN 500 MG
650 TABLET ORAL EVERY 6 HOURS
Refills: 0 | Status: DISCONTINUED | OUTPATIENT
Start: 2021-07-10 | End: 2021-07-23

## 2021-07-10 RX ORDER — CHOLECALCIFEROL (VITAMIN D3) 125 MCG
1000 CAPSULE ORAL DAILY
Refills: 0 | Status: DISCONTINUED | OUTPATIENT
Start: 2021-07-10 | End: 2021-07-23

## 2021-07-10 RX ORDER — DEXTROSE 50 % IN WATER 50 %
25 SYRINGE (ML) INTRAVENOUS ONCE
Refills: 0 | Status: DISCONTINUED | OUTPATIENT
Start: 2021-07-10 | End: 2021-07-23

## 2021-07-10 RX ORDER — TAMSULOSIN HYDROCHLORIDE 0.4 MG/1
0.4 CAPSULE ORAL AT BEDTIME
Refills: 0 | Status: DISCONTINUED | OUTPATIENT
Start: 2021-07-10 | End: 2021-07-23

## 2021-07-10 RX ORDER — FAMOTIDINE 10 MG/ML
20 INJECTION INTRAVENOUS DAILY
Refills: 0 | Status: DISCONTINUED | OUTPATIENT
Start: 2021-07-10 | End: 2021-07-23

## 2021-07-10 RX ORDER — ATORVASTATIN CALCIUM 80 MG/1
40 TABLET, FILM COATED ORAL AT BEDTIME
Refills: 0 | Status: DISCONTINUED | OUTPATIENT
Start: 2021-07-10 | End: 2021-07-23

## 2021-07-10 RX ORDER — PREGABALIN 225 MG/1
1000 CAPSULE ORAL AT BEDTIME
Refills: 0 | Status: DISCONTINUED | OUTPATIENT
Start: 2021-07-10 | End: 2021-07-23

## 2021-07-10 RX ORDER — SODIUM CHLORIDE 9 MG/ML
1000 INJECTION, SOLUTION INTRAVENOUS
Refills: 0 | Status: DISCONTINUED | OUTPATIENT
Start: 2021-07-10 | End: 2021-07-23

## 2021-07-10 RX ORDER — CLOPIDOGREL BISULFATE 75 MG/1
75 TABLET, FILM COATED ORAL DAILY
Refills: 0 | Status: DISCONTINUED | OUTPATIENT
Start: 2021-07-10 | End: 2021-07-23

## 2021-07-10 RX ORDER — INSULIN LISPRO 100/ML
VIAL (ML) SUBCUTANEOUS
Refills: 0 | Status: DISCONTINUED | OUTPATIENT
Start: 2021-07-10 | End: 2021-07-13

## 2021-07-10 RX ORDER — METOPROLOL TARTRATE 50 MG
50 TABLET ORAL DAILY
Refills: 0 | Status: DISCONTINUED | OUTPATIENT
Start: 2021-07-10 | End: 2021-07-20

## 2021-07-10 RX ORDER — MAGNESIUM HYDROXIDE 400 MG/1
30 TABLET, CHEWABLE ORAL DAILY
Refills: 0 | Status: DISCONTINUED | OUTPATIENT
Start: 2021-07-10 | End: 2021-07-23

## 2021-07-10 RX ORDER — POLYETHYLENE GLYCOL 3350 17 G/17G
17 POWDER, FOR SOLUTION ORAL DAILY
Refills: 0 | Status: DISCONTINUED | OUTPATIENT
Start: 2021-07-10 | End: 2021-07-11

## 2021-07-10 RX ORDER — INSULIN LISPRO 100/ML
VIAL (ML) SUBCUTANEOUS AT BEDTIME
Refills: 0 | Status: DISCONTINUED | OUTPATIENT
Start: 2021-07-10 | End: 2021-07-13

## 2021-07-10 RX ORDER — GLUCAGON INJECTION, SOLUTION 0.5 MG/.1ML
1 INJECTION, SOLUTION SUBCUTANEOUS ONCE
Refills: 0 | Status: DISCONTINUED | OUTPATIENT
Start: 2021-07-10 | End: 2021-07-23

## 2021-07-10 RX ORDER — LOSARTAN POTASSIUM 100 MG/1
25 TABLET, FILM COATED ORAL DAILY
Refills: 0 | Status: DISCONTINUED | OUTPATIENT
Start: 2021-07-10 | End: 2021-07-20

## 2021-07-10 RX ADMIN — TAMSULOSIN HYDROCHLORIDE 0.4 MILLIGRAM(S): 0.4 CAPSULE ORAL at 21:03

## 2021-07-10 RX ADMIN — PREGABALIN 1000 MICROGRAM(S): 225 CAPSULE ORAL at 21:03

## 2021-07-10 RX ADMIN — Medication 4: at 17:56

## 2021-07-10 RX ADMIN — ATORVASTATIN CALCIUM 40 MILLIGRAM(S): 80 TABLET, FILM COATED ORAL at 21:02

## 2021-07-10 NOTE — H&P ADULT - NSHPREVIEWOFSYSTEMS_GEN_ALL_CORE
REVIEW OF SYSTEMS  Constitutional: No fever, No Chills, No fatigue  HEENT: No eye pain, No visual disturbances, No difficulty hearing, No Dysphagia   Pulm: No cough,  No shortness of breath  Cardio: No chest pain, No palpitations  GI:  No abdominal pain, No nausea, No vomiting  : No dysuria, No frequency, No hematuria  Neuro: No headaches, No memory loss, No loss of strength, No numbness, No tremors  Skin: No itching, No rashes, No lesions   Endo: No temperature intolerance  MSK: No joint pain, No joint swelling, No muscle pain, No Neck or back pain  Psych:  No depression, No anxiety REVIEW OF SYSTEMS  Constitutional: No fever, No Chills, No fatigue  HEENT: No eye pain, No visual disturbances, No difficulty hearing, No Dysphagia   Pulm: No cough,  No shortness of breath  Cardio: No chest pain, No palpitations  GI:  No abdominal pain, No nausea, No vomiting  : No dysuria, No frequency, No hematuria  Neuro: No headaches, No memory loss, +loss of strength, No numbness, No tremors  Skin: No itching, No rashes, No lesions   Endo: No temperature intolerance  MSK: No joint pain, + joint swelling, No muscle pain, No Neck or back pain  Psych:  No depression, No anxiety

## 2021-07-10 NOTE — H&P ADULT - ASSESSMENT
Assessment/Plan:  75 year old patient male w/ HTN, CAD, DM2, HLD, h/o LLE Osteomyelitis/Cellulitis (November, 2020), Right shoulder replacement and severe aortic stenosis presented to [facility] on 6/29 for JONN, AVR and CABG x5.  Post op course complicated by  right sided weakness and slurred speech. MRI confirms embolic appearing strokes acute ischemic infarcts R P-O wedge shape, and L temporal and L ventral roopa.  Not IV tPA candidate with recent OHS. Patient now admitted for a multidisciplinary rehab program. 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  - Gait Instability, ADL impairments and Functional impairments: start Comprehensive Rehab Program of PT/OT/SLP  - 3 hours a day, 5 days a week  - Orthotics as needed     #HTN  #CAD/Aortic Stenosis s/p AVR CABGx5    #HLD    #DM2        #Sleep  - melatonin PRN     #Skin  - Skin on admission  - Pressure injury/Skin: OOB to Chair, PT/OT     #Pain Mgmt   - Tylenol PRN, Oxycodone PRN    #GI/Bowel Mgmt   - Continent c/w Senna, Miralax PRN    #/Bladder Mgmt   -  PVR    #FEN   - Diet - Regular + Thins  [CCHO, DASH/TLC]    - Dysphagia  SLP - evaluation and treatment    #Precautions / PROPHYLAXIS:   - Falls, Cardiac, Sternal, Spinal, Seizure   - ortho: Weight bearing status: WBAT   - Lungs: Aspiration, Incentive Spirometer   - DVT: Lovenox        MEDICAL PROGNOSIS: GOOD                                   REHAB POTENTIAL: GOOD  ESTIMATED DISPOSITION: HOME                             ELOS: 10-14 Days   EXPECTED THERAPY:     P.T. 1hr/day       O.T. 1hr/day      S.L.P. 1hr/day      EXP FREQUENCY: 5 days per 7 day period     PRESCREEN COMPARISON: I have reviewed the prescreen information and I have found no relevant changes between the preadmission screening and my post admission evaluation     RATIONALE FOR INPATIENT ADMISSION - Patient demonstrates the following: (check all that apply)  [X] Medically appropriate for rehabilitation admission  [X] Has attainable rehab goals with an appropriate initial discharge plan  [X] Has rehabilitation potential (expected to make a significant improvement within a reasonable period of time)   [X] Requires close medical managment by a rehab physician, rehab nursing care, Hospitalist and comprehensive interdisciplinary team (including PT, OT, & or SLP, Prosthetics and Orthotics)     Assessment/Plan:  75 year old patient male w/ HTN, CAD, DM2, HLD, h/o LLE Osteomyelitis/Cellulitis (November, 2020), Right shoulder replacement and severe aortic stenosis presented to [facility] on 6/29 for JONN, AVR and CABG x5.  Post op course complicated by  right sided weakness and slurred speech. MRI confirms embolic appearing strokes acute ischemic infarcts R P-O wedge shape, and L temporal and L ventral roopa.  Not IV tPA candidate with recent OHS. Patient now admitted for a multidisciplinary rehab program. 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  - Gait Instability, ADL impairments and Functional impairments: start Comprehensive Rehab Program of PT/OT/SLP  - 3 hours a day, 5 days a week  - Orthotics as needed   - c/w lipitor 40mg po qhs, ASA81 po qd    #HTN  #CAD and Severe Aortic Stenosis s/p AVR + CABGx5  - c/w losartan 25mg po qd  - c/w metoprolol succinate ER 50mg po qd    #HLD  - c/w lipitor as above    #DM2  - FS+ISS  - f/u admission A1C    #Sleep  - melatonin PRN can be added if needed    #Skin  - Skin on admission  - Pressure injury/Skin: OOB to Chair, PT/OT     #Pain Mgmt   - Tylenol PRN    #GI/Bowel Mgmt   - Continent c/w Miralax PRN    #/Bladder Mgmt   -  PVR SC if PVR > 350cc    #FEN   - Diet - Regular + Thins  [CCHO, DASH/TLC]    - Dysphagia  SLP - evaluation and treatment    #Precautions / PROPHYLAXIS:   - Falls, Cardiac, Sternal, Spinal, Seizure   - ortho: Weight bearing status: WBAT   - Lungs: Aspiration, Incentive Spirometer   - DVT: Lovenox        MEDICAL PROGNOSIS: GOOD                                   REHAB POTENTIAL: GOOD  ESTIMATED DISPOSITION: HOME                             ELOS: 10-14 Days   EXPECTED THERAPY:     P.T. 1hr/day       O.T. 1hr/day      S.L.P. 1hr/day      EXP FREQUENCY: 5 days per 7 day period     PRESCREEN COMPARISON: I have reviewed the prescreen information and I have found no relevant changes between the preadmission screening and my post admission evaluation     RATIONALE FOR INPATIENT ADMISSION - Patient demonstrates the following: (check all that apply)  [X] Medically appropriate for rehabilitation admission  [X] Has attainable rehab goals with an appropriate initial discharge plan  [X] Has rehabilitation potential (expected to make a significant improvement within a reasonable period of time)   [X] Requires close medical managment by a rehab physician, rehab nursing care, Hospitalist and comprehensive interdisciplinary team (including PT, OT, & or SLP, Prosthetics and Orthotics)     Assessment/Plan:  75 year old patient male w/ HTN, CAD, DM2, HLD, h/o LLE Osteomyelitis/Cellulitis (November, 2020), Right shoulder replacement and severe aortic stenosis presented to [facility] on 6/29 for JONN, AVR and CABG x5.  Post op course complicated by  right sided weakness and slurred speech. MRI confirms embolic appearing strokes acute ischemic infarcts R P-O wedge shape, and L temporal and L ventral roopa.  Not IV tPA candidate with recent OHS. Patient now admitted for a multidisciplinary rehab program. 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    #HTN  #CAD and Severe Aortic Stenosis s/p AVR + CABGx5  - c/w losartan 25mg po qd  - c/w metoprolol succinate ER 50mg po qd    #HLD  - c/w lipitor as above    #DM2  - FS+ISS  - f/u admission A1C    #Sleep  - melatonin PRN can be added if needed    #Skin  - Skin on admission  - Pressure injury/Skin: OOB to Chair, PT/OT     #Pain Mgmt   - Tylenol PRN    #GI/Bowel Mgmt   - Continent c/w Miralax PRN    #/Bladder Mgmt   -  PVR SC if PVR > 350cc    #FEN   - Diet - Regular + Thins  [CCHO, DASH/TLC]    - Dysphagia  SLP - evaluation and treatment    #Precautions / PROPHYLAXIS:   - Falls  - Lungs: Aspiration, Incentive Spirometer   - DVT: ASA81 + Plavix        MEDICAL PROGNOSIS: GOOD                                   REHAB POTENTIAL: GOOD  ESTIMATED DISPOSITION: HOME                             ELOS: 10-14 Days   EXPECTED THERAPY:     P.T. 1hr/day       O.T. 1hr/day      S.L.P. 1hr/day      EXP FREQUENCY: 5 days per 7 day period     PRESCREEN COMPARISON: I have reviewed the prescreen information and I have found no relevant changes between the preadmission screening and my post admission evaluation     RATIONALE FOR INPATIENT ADMISSION - Patient demonstrates the following: (check all that apply)  [X] Medically appropriate for rehabilitation admission  [X] Has attainable rehab goals with an appropriate initial discharge plan  [X] Has rehabilitation potential (expected to make a significant improvement within a reasonable period of time)   [X] Requires close medical managment by a rehab physician, rehab nursing care, Hospitalist and comprehensive interdisciplinary team (including PT, OT, & or SLP, Prosthetics and Orthotics)     Assessment/Plan:  75 year old patient male w/ HTN, CAD, DM2, HLD, h/o LLE Osteomyelitis/Cellulitis (November, 2020), Right shoulder replacement and severe aortic stenosis presented to [facility] on 6/29 for JONN, AVR and CABG x5.  Post op course complicated by  right sided weakness and slurred speech. MRI confirms embolic appearing strokes acute ischemic infarcts R P-O wedge shape, and L temporal and L ventral roopa.  Not IV tPA candidate with recent OHS. Patient now admitted for a multidisciplinary rehab program. 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    #HTN  #CAD and Severe Aortic Stenosis s/p AVR + CABGx5  - maintain sternal precautions.  - c/w losartan 25mg po qd  - c/w metoprolol succinate ER 50mg po qd    #HLD  - c/w lipitor as above    #DM2  - FS+ISS  - f/u admission A1C    #Sleep  - melatonin PRN can be added if needed    #Skin  - Skin on admission: No sacral decubital ulcers. Sternal incision is C/D/I. RLE bypass site c/d/i  - Pressure injury/Skin: OOB to Chair, PT/OT     #Pain Mgmt   - Tylenol PRN    #GI/Bowel Mgmt   - Continent c/w Miralax PRN    #/Bladder Mgmt   -  PVR SC if PVR > 350cc    #FEN   - Diet - Regular + Thins  [CCHO, DASH/TLC]    - Dysphagia  SLP - evaluation and treatment    #Precautions / PROPHYLAXIS:   - Falls, sternal precautions  - Lungs: Aspiration, Incentive Spirometer   - DVT: ASA81 + Plavix        MEDICAL PROGNOSIS: GOOD                                   REHAB POTENTIAL: GOOD  ESTIMATED DISPOSITION: HOME                             ELOS: 10-14 Days   EXPECTED THERAPY:     P.T. 1hr/day       O.T. 1hr/day      S.L.P. 1hr/day      EXP FREQUENCY: 5 days per 7 day period     PRESCREEN COMPARISON: I have reviewed the prescreen information and I have found no relevant changes between the preadmission screening and my post admission evaluation     RATIONALE FOR INPATIENT ADMISSION - Patient demonstrates the following: (check all that apply)  [X] Medically appropriate for rehabilitation admission  [X] Has attainable rehab goals with an appropriate initial discharge plan  [X] Has rehabilitation potential (expected to make a significant improvement within a reasonable period of time)   [X] Requires close medical managment by a rehab physician, rehab nursing care, Hospitalist and comprehensive interdisciplinary team (including PT, OT, & or SLP, Prosthetics and Orthotics)     Assessment/Plan:  75 year old patient male w/ HTN, CAD, DM2, HLD, h/o LLE Osteomyelitis/Cellulitis (November, 2020), Right shoulder replacement and severe aortic stenosis presented to [facility] on 6/29 for JONN, AVR and CABG x5.  Post op course complicated by  right sided weakness and slurred speech. MRI confirms embolic appearing strokes acute ischemic infarcts R P-O wedge shape, and L temporal and L ventral roopa.  Not IV tPA candidate with recent OHS. Patient now admitted for a multidisciplinary rehab program. 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    #HTN  #CAD and Severe Aortic Stenosis s/p AVR + CABGx5  - maintain sternal precautions.  - c/w losartan 25mg po qd  - c/w metoprolol succinate ER 50mg po qd    #HLD  - c/w lipitor as above    #DM2  - FS+ISS  - f/u admission A1C    #Sleep  - melatonin PRN can be added if needed    #Skin  - Skin on admission: No sacral decubital ulcers. Sternal incision is C/D/I. RLE bypass site c/d/i  - Pressure injury/Skin: OOB to Chair, PT/OT     #Pain Mgmt   - Tylenol PRN    #GI/Bowel Mgmt   - Continent c/w Miralax PRN    #/Bladder Mgmt   -  PVR SC if PVR > 350cc    #FEN   - Diet - Regular + Thins  [CCHO, DASH/TLC]    - Dysphagia  SLP - evaluation and treatment    #Precautions / PROPHYLAXIS:   - Falls, sternal precautions  - Lungs: Aspiration, Incentive Spirometer   - DVT: ASA81 + Plavix        MEDICAL PROGNOSIS: GOOD                                   REHAB POTENTIAL: GOOD  ESTIMATED DISPOSITION: HOME                             ELOS: 18-20 Days   EXPECTED THERAPY:     P.T. 1hr/day       O.T. 1hr/day      S.L.P. 1hr/day      EXP FREQUENCY: 5 days per 7 day period

## 2021-07-10 NOTE — H&P ADULT - ATTENDING COMMENTS
Chart reviewed. Patient seen at bedside  Able to provide history  I have reviewed resident's note and agree with findings and plan as documented  75 year old male admitted to rehab after recent CABG/AVR and post op course complicated by ?embolic strokes. Not a candidate for TPA    This am patient denies any active issues, wants to get stronger  Vital Signs Last 24 Hrs  T(C): 36.5 (11 Jul 2021 09:06), Max: 36.9 (10 Jul 2021 17:33)  T(F): 97.7 (11 Jul 2021 09:06), Max: 98.5 (10 Jul 2021 17:33)  HR: 69 (11 Jul 2021 09:06) (69 - 74)  BP: 120/71 (11 Jul 2021 09:06) (118/74 - 132/76)  BP(mean): --  RR: 15 (11 Jul 2021 09:06) (15 - 16)  SpO2: 97% (11 Jul 2021 09:06) (96% - 97%)  NAD, seated in chair  Heart: S1S2  Lungs: poor air entry,   Abd:soft  Ext: no calf tenderness  Neuro:aaox3, no aphasia, unable to test visual field,   no dysarthria  Mild right facial droop  Right hemiparesis 4/5  Sensory intact to light touch  Skin: sternal sensation: clean                          9.2    6.35  )-----------( 394      ( 11 Jul 2021 05:00 )             27.6     07-11    142  |  108  |  24<H>  ----------------------------<  134<H>  4.2   |  26  |  1.63<H>    Ca    9.0      11 Jul 2021 05:00    TPro  6.4  /  Alb  2.6<L>  /  TBili  0.5  /  DBili  x   /  AST  17  /  ALT  21  /  AlkPhos  75  07-11      Begin full rehab program  On ASA, Plavix and statins  Continue losartan, metoprolol,     DM-2 : metformin, SSI    Bowel and bladder program: Will change miralax to prn as patient states that is too strong and has no control over BM    Hospitalist consult for medical comorbidities management

## 2021-07-10 NOTE — H&P ADULT - HISTORY OF PRESENT ILLNESS
75 year old patient male w/ HTN, CAD, DM, HLD, h/o LLE Osteomyelitis/Cellulitis (November, 2020), Right shoulder replacement and severe aortic stenosis presented to [facility] on 6/29 for JONN, AVR and CABG x5.  Pt reports that he was hospitalized @ [facility] in November, 2020 for LLE osteomyelitis/sepsis at which time pt underwent Echocardiogram which revealed, "aortic stenosis". After the completion of antibiotic treatment for LLE cellulitis, pt underwent cardiac catheterization on 4/19/2021 with Dr. Mundo Terrell which revealed, "aortic stenosis and multi vessel coronary artery disease" . Pt reports worsening fatigability and pt noted "taking too many naps throughout the day" over the past 7 months.  Aortic stenosis severe and Coronary artery disease underwent JONN and AVR and CABG x5 on 06/29/2021.  Post op course complicated by  right sided weakness and slurred speech. MRI confirms embolic appearing strokes acute ischemic infarcts R P-O wedge shape, and L temporal and L ventral roopa.  Not IV tPA candidate with recent OHS. Not interventional candidate with no evidence clinically and on CTA of LVO.  Patient was evaluated by PT/OT who recommended acute rehab -- patient was admitted to [facility] for acute rehab on 7/10/21.      75 year old patient male w/ HTN, CAD, DM, HLD, h/o LLE Osteomyelitis/Cellulitis (November, 2020), Right shoulder replacement and severe aortic stenosis presented to [facility] on 6/29 for JONN, AVR and CABG x5.  Pt reports that he was hospitalized @ [facility] in November, 2020 for LLE osteomyelitis/sepsis at which time pt underwent Echocardiogram which revealed, "aortic stenosis". After the completion of antibiotic treatment for LLE cellulitis, pt underwent cardiac catheterization on 4/19/2021 with Dr. Mundo Terrell which revealed, "aortic stenosis and multi vessel coronary artery disease" . Pt reports worsening fatigability and pt noted "taking too many naps throughout the day" over the past 7 months.  Aortic stenosis severe and Coronary artery disease underwent JONN and AVR and CABG x5 on 06/29/2021.  Post op course complicated by  right sided weakness and slurred speech. MRI confirms embolic appearing strokes acute ischemic infarcts R P-O wedge shape, and L temporal and L ventral roopa.  Not IV tPA candidate with recent OHS. Not interventional candidate with no evidence clinically and on CTA of LVO. Started on Plavix on 7/10/21 for secondary stroke prevention.  Patient was evaluated by PT/OT who recommended acute rehab -- patient was admitted to [facility] for acute rehab on 7/10/21.

## 2021-07-10 NOTE — H&P ADULT - NSHPPHYSICALEXAM_GEN_ALL_CORE
Vital Signs  T(C): --  HR: --  BP: --  RR: --  SpO2: --    Gen - NAD, Comfortable  HEENT - NCAT, EOMI, MMM  Neck - Supple, No limited ROM  Pulm - CTAB, No wheeze, No rhonchi, No crackles  Cardiovascular - RRR, S1S2, No m/r/g  Abdomen - Soft, NT/ND, +BS  Extremities - No C/C/E, No calf tenderness  Neuro-     Cognitive - AAOx3     Communication - Fluent, No dysarthria     Attention: Intact      Judgement: Good evidence of judgement     Memory: Recall 3 objects immediate and 3 min later         Cranial Nerves - CN 2-12 intact     Motor -                    LEFT    UE - ShAB 5/5, EF 5/5, EE 5/5, WE 5/5,  5/5                    RIGHT UE - ShAB 5/5, EF 5/5, EE 5/5, WE 5/5,  5/5                    LEFT    LE - HF 5/5, KE 5/5, DF 5/5, PF 5/5                    RIGHT LE - HF 5/5, KE 5/5, DF 5/5, PF 5/5        Sensory - Intact to LT     Reflexes - DTR Intact, No primitive reflex     Coordination - FTN intact     Tone - normal  Psychiatric - Mood stable, Affect WNL  Skin: Intact  Wounds: None Present Vital Signs  T(C): --  HR: --  BP: --  RR: --  SpO2: --    Gen - NAD, Comfortable  HEENT - NCAT, EOMI, MMM  Neck - Supple, No limited ROM  Pulm - CTAB, No wheeze, No rhonchi, No crackles  Cardiovascular - RRR, S1S2, No m/r/g  Abdomen - Soft, NT/ND, +BS  Extremities - No C/C/E, No calf tenderness  Neuro-     Cognitive - AAOx3     Communication - Fluent, mild dysarthria     Attention: Intact      Judgement: Good evidence of judgement     Memory: Recall 3 objects immediate and 3 min later         Cranial Nerves - Left facial droop     Motor -                    LEFT    UE - ShAB 5/5, EF 5/5, EE 5/5, WE 5/5,  5/5                    RIGHT UE - ShAB 4/5, EF 4/5, EE 4/5, WE 4/5,  4/5                    LEFT    LE - HF 5/5, KE 5/5, DF 5/5, PF 5/5                    RIGHT LE - HF 4/5, KE 4/5, DF 5/5, PF 5/5        Sensory - Intact to LT     Reflexes - DTR Intact, No primitive reflex     Coordination - FTN intact on left, some difficulty on the right but likely 2/2 weakness.     Tone - normal  Psychiatric - Mood stable, Affect WNL  Skin: No sacral decubital ulcers. Sternal incision is C/D/I. RLE bypass site c/d/i Gen - NAD, Comfortable  HEENT - NCAT, EOMI, MMM  Neck - Supple, No limited ROM  Pulm - CTAB, No wheeze, No rhonchi, No crackles  Cardiovascular - RRR, S1S2, No m/r/g  Abdomen - Soft, NT/ND, +BS  Extremities - No C/C/E, No calf tenderness  Neuro-     Cognitive - AAOx3     Communication - Fluent, mild dysarthria     Attention: Intact      Judgement: Good evidence of judgement     Memory: Recall 3 objects immediate and 3 min later         Cranial Nerves - right facial droop     Motor -                    LEFT    UE - ShAB 5/5, EF 5/5, EE 5/5, WE 5/5,  5/5                    RIGHT UE - ShAB 4/5, EF 4/5, EE 4/5, WE 4/5,  4/5                    LEFT    LE - HF 5/5, KE 5/5, DF 5/5, PF 5/5                    RIGHT LE - HF 4/5, KE 4/5, DF 5/5, PF 5/5        Sensory - Intact to LT     Reflexes - DTR Intact, No primitive reflex     Coordination - FTN intact on left, some difficulty on the right but likely 2/2 weakness.     Tone - normal  Psychiatric - Mood stable, Affect WNL  Skin: No sacral decubital ulcers. Sternal incision is C/D/I. RLE bypass site c/d/i

## 2021-07-10 NOTE — H&P ADULT - NSHPSOCIALHISTORY_GEN_ALL_CORE
Denies ETOH, smoking, Rec Drug    Prior Level of Functioning: Independent   Social History: Marital Status: single   Children: 1 son mentioned           Reside:  with patient   Employment Status:  not known   Recreational Activities/Hobbies:  Pre-hospital Living Environment: Patient lives with son in a two level house [place] with rails  [address], 5 steps to living areas, 5 more steps to bed/bath.)  Primary Language:  English   Preferred Language: English   Hand Dominance: Right.   Current Functional Status    Functional Mobility: 07/07/2021 PT note  Bed mobility:  Mod A  Sit to/from stand:  Mod A  Ambulation:  100 feet RW Mod A decreased step  length Denies ETOH, smoking, Rec Drug    Prior Level of Functioning: Independent   Social History: Marital Status: single   Children: 1 son mentioned           Reside:  with patient   Employment Status:  retired, worked in [employer]   Recreational Activities/Hobbies:  Pre-hospital Living Environment: Patient lives with son in a two level house [place] with rails  [address], 5 steps to living areas, 5 more steps to bed/bath.)  Primary Language:  English   Preferred Language: English   Hand Dominance: Right.   Current Functional Status    Functional Mobility: 07/07/2021 PT note  Bed mobility:  Mod A  Sit to/from stand:  Mod A  Ambulation:  100 feet RW Mod A decreased step  length

## 2021-07-11 LAB
A1C WITH ESTIMATED AVERAGE GLUCOSE RESULT: 6.9 % — HIGH (ref 4–5.6)
ALBUMIN SERPL ELPH-MCNC: 2.6 G/DL — LOW (ref 3.3–5)
ALP SERPL-CCNC: 75 U/L — SIGNIFICANT CHANGE UP (ref 40–120)
ALT FLD-CCNC: 21 U/L — SIGNIFICANT CHANGE UP (ref 10–45)
ANION GAP SERPL CALC-SCNC: 8 MMOL/L — SIGNIFICANT CHANGE UP (ref 5–17)
AST SERPL-CCNC: 17 U/L — SIGNIFICANT CHANGE UP (ref 10–40)
BASOPHILS # BLD AUTO: 0.02 K/UL — SIGNIFICANT CHANGE UP (ref 0–0.2)
BASOPHILS NFR BLD AUTO: 0.3 % — SIGNIFICANT CHANGE UP (ref 0–2)
BILIRUB SERPL-MCNC: 0.5 MG/DL — SIGNIFICANT CHANGE UP (ref 0.2–1.2)
BUN SERPL-MCNC: 24 MG/DL — HIGH (ref 7–23)
CALCIUM SERPL-MCNC: 9 MG/DL — SIGNIFICANT CHANGE UP (ref 8.4–10.5)
CHLORIDE SERPL-SCNC: 108 MMOL/L — SIGNIFICANT CHANGE UP (ref 96–108)
CO2 SERPL-SCNC: 26 MMOL/L — SIGNIFICANT CHANGE UP (ref 22–31)
COVID-19 SPIKE DOMAIN AB INTERP: POSITIVE
COVID-19 SPIKE DOMAIN ANTIBODY RESULT: >250 U/ML — HIGH
CREAT SERPL-MCNC: 1.63 MG/DL — HIGH (ref 0.5–1.3)
EOSINOPHIL # BLD AUTO: 0.07 K/UL — SIGNIFICANT CHANGE UP (ref 0–0.5)
EOSINOPHIL NFR BLD AUTO: 1.1 % — SIGNIFICANT CHANGE UP (ref 0–6)
ESTIMATED AVERAGE GLUCOSE: 151 MG/DL — HIGH (ref 68–114)
GLUCOSE BLDC GLUCOMTR-MCNC: 153 MG/DL — HIGH (ref 70–99)
GLUCOSE BLDC GLUCOMTR-MCNC: 155 MG/DL — HIGH (ref 70–99)
GLUCOSE BLDC GLUCOMTR-MCNC: 169 MG/DL — HIGH (ref 70–99)
GLUCOSE BLDC GLUCOMTR-MCNC: 177 MG/DL — HIGH (ref 70–99)
GLUCOSE SERPL-MCNC: 134 MG/DL — HIGH (ref 70–99)
HCT VFR BLD CALC: 27.6 % — LOW (ref 39–50)
HGB BLD-MCNC: 9.2 G/DL — LOW (ref 13–17)
IMM GRANULOCYTES NFR BLD AUTO: 0.8 % — SIGNIFICANT CHANGE UP (ref 0–1.5)
LYMPHOCYTES # BLD AUTO: 1.34 K/UL — SIGNIFICANT CHANGE UP (ref 1–3.3)
LYMPHOCYTES # BLD AUTO: 21.1 % — SIGNIFICANT CHANGE UP (ref 13–44)
MCHC RBC-ENTMCNC: 29.8 PG — SIGNIFICANT CHANGE UP (ref 27–34)
MCHC RBC-ENTMCNC: 33.3 GM/DL — SIGNIFICANT CHANGE UP (ref 32–36)
MCV RBC AUTO: 89.3 FL — SIGNIFICANT CHANGE UP (ref 80–100)
MONOCYTES # BLD AUTO: 0.62 K/UL — SIGNIFICANT CHANGE UP (ref 0–0.9)
MONOCYTES NFR BLD AUTO: 9.8 % — SIGNIFICANT CHANGE UP (ref 2–14)
NEUTROPHILS # BLD AUTO: 4.25 K/UL — SIGNIFICANT CHANGE UP (ref 1.8–7.4)
NEUTROPHILS NFR BLD AUTO: 66.9 % — SIGNIFICANT CHANGE UP (ref 43–77)
NRBC # BLD: 0 /100 WBCS — SIGNIFICANT CHANGE UP (ref 0–0)
PLATELET # BLD AUTO: 394 K/UL — SIGNIFICANT CHANGE UP (ref 150–400)
POTASSIUM SERPL-MCNC: 4.2 MMOL/L — SIGNIFICANT CHANGE UP (ref 3.5–5.3)
POTASSIUM SERPL-SCNC: 4.2 MMOL/L — SIGNIFICANT CHANGE UP (ref 3.5–5.3)
PROT SERPL-MCNC: 6.4 G/DL — SIGNIFICANT CHANGE UP (ref 6–8.3)
RBC # BLD: 3.09 M/UL — LOW (ref 4.2–5.8)
RBC # FLD: 12.8 % — SIGNIFICANT CHANGE UP (ref 10.3–14.5)
SARS-COV-2 IGG+IGM SERPL QL IA: >250 U/ML — HIGH
SARS-COV-2 IGG+IGM SERPL QL IA: POSITIVE
SODIUM SERPL-SCNC: 142 MMOL/L — SIGNIFICANT CHANGE UP (ref 135–145)
WBC # BLD: 6.35 K/UL — SIGNIFICANT CHANGE UP (ref 3.8–10.5)
WBC # FLD AUTO: 6.35 K/UL — SIGNIFICANT CHANGE UP (ref 3.8–10.5)

## 2021-07-11 PROCEDURE — 99223 1ST HOSP IP/OBS HIGH 75: CPT | Mod: GC

## 2021-07-11 PROCEDURE — 99223 1ST HOSP IP/OBS HIGH 75: CPT

## 2021-07-11 RX ORDER — POLYETHYLENE GLYCOL 3350 17 G/17G
17 POWDER, FOR SOLUTION ORAL DAILY
Refills: 0 | Status: DISCONTINUED | OUTPATIENT
Start: 2021-07-11 | End: 2021-07-23

## 2021-07-11 RX ADMIN — Medication 2: at 17:18

## 2021-07-11 RX ADMIN — TAMSULOSIN HYDROCHLORIDE 0.4 MILLIGRAM(S): 0.4 CAPSULE ORAL at 21:27

## 2021-07-11 RX ADMIN — Medication 1000 UNIT(S): at 12:11

## 2021-07-11 RX ADMIN — ATORVASTATIN CALCIUM 40 MILLIGRAM(S): 80 TABLET, FILM COATED ORAL at 21:27

## 2021-07-11 RX ADMIN — LOSARTAN POTASSIUM 25 MILLIGRAM(S): 100 TABLET, FILM COATED ORAL at 05:13

## 2021-07-11 RX ADMIN — Medication 50 MILLIGRAM(S): at 05:13

## 2021-07-11 RX ADMIN — CLOPIDOGREL BISULFATE 75 MILLIGRAM(S): 75 TABLET, FILM COATED ORAL at 12:10

## 2021-07-11 RX ADMIN — Medication 2: at 08:05

## 2021-07-11 RX ADMIN — FAMOTIDINE 20 MILLIGRAM(S): 10 INJECTION INTRAVENOUS at 12:11

## 2021-07-11 RX ADMIN — Medication 81 MILLIGRAM(S): at 12:11

## 2021-07-11 RX ADMIN — Medication 2: at 12:05

## 2021-07-11 RX ADMIN — PREGABALIN 1000 MICROGRAM(S): 225 CAPSULE ORAL at 21:27

## 2021-07-11 NOTE — CONSULT NOTE ADULT - SUBJECTIVE AND OBJECTIVE BOX
HPI:  74yo male w/ HTN, CAD, DM, HLD, h/o LLE Osteomyelitis/Cellulitis (November, 2020), Right shoulder replacement and severe aortic stenosis presented to [facility] on 6/29 for JONN, AVR and CABG x5.  Pt reports that he was hospitalized at [facility] in November, 2020 for LLE osteomyelitis/sepsis at which time pt underwent Echocardiogram which revealed, "aortic stenosis". After the completion of antibiotic treatment for LLE cellulitis, pt underwent cardiac catheterization on 4/19/2021 with Dr. Mundo Terrell which revealed, "aortic stenosis and multi vessel coronary artery disease" . Pt reports worsening fatigability and pt noted "taking too many naps throughout the day" over the past 7 months.  Aortic stenosis severe and Coronary artery disease underwent JONN and AVR and CABG x5 on 06/29/2021.  Post op course complicated by  right sided weakness and slurred speech. MRI confirms embolic appearing strokes acute ischemic infarcts R P-O wedge shape, and L temporal and L ventral roopa.  Not IV tPA candidate with recent OHS. Not interventional candidate with no evidence clinically and on CTA of LVO. Started on Plavix on 7/10/21 for secondary stroke prevention.    Pt seen and examined at bedside.  No acute events overnight. Pt with complaints of have RUE weakness which has mildly improved since the initial symptoms. He denies pain or loss of sensation. States if feels heavy at times. States he has RLE weakness but not as concerning as his RUE.   Pt denies cp, palpitations, sob, abd pain, N/V, fever, chills    Home Medications:  acetaminophen 325 mg oral tablet: 2 tab(s) orally every 6 hours, As needed, Temp greater or equal to 38C (100.4F), Mild Pain (1 - 3) (14 Nov 2020 08:10)  aspirin 81 mg oral tablet: 1 tab(s) orally once a day (04 Nov 2020 10:41)  atorvastatin 40 mg oral tablet: 1 tab(s) orally once a day (04 Nov 2020 10:41)  Centrum Men&#x27;s oral tablet: 1 tab(s) orally once a day (04 Nov 2020 10:41)  Feosol 325 mg (65 mg elemental iron) oral tablet: 1 tab(s) orally once a day (04 Nov 2020 10:41)  metFORMIN 500 mg oral tablet: 2 tab(s) orally once a day (at bedtime)   as per patient    *Script written as 1T BID* (04 Nov 2020 10:41)  Vitamin B-12 1000 mcg oral tablet: 1 tab(s) orally once a day (04 Nov 2020 10:41)  Vitamin D3 5000 intl units (125 mcg) oral tablet: 1 tab(s) orally once a day (04 Nov 2020 10:41)      PAST MEDICAL & SURGICAL HISTORY:  Hypertension  Diabetes mellitus  CAD  CABG        Review of Systems:   CONSTITUTIONAL: No fever, weight loss, or fatigue  EYES: No eye pain, visual disturbances, or discharge  ENMT:  No difficulty hearing, tinnitus, vertigo; No sinus or throat pain  NECK: No pain or stiffness  BREASTS: No pain, masses, or nipple discharge  RESPIRATORY: No cough, wheezing, chills or hemoptysis; No shortness of breath  CARDIOVASCULAR: No chest pain, palpitations, dizziness, or leg swelling  GASTROINTESTINAL: No abdominal or epigastric pain. No nausea, vomiting, or hematemesis; No diarrhea or constipation. No melena or hematochezia.  GENITOURINARY: No dysuria, frequency, hematuria, or incontinence  NEUROLOGICAL: No headaches, memory loss, loss of strength, numbness, or tremors  SKIN: No itching, burning, rashes, or lesions   LYMPH NODES: No enlarged glands  ENDOCRINE: No heat or cold intolerance; No hair loss  MUSCULOSKELETAL: No joint pain or swelling; No muscle, back, or extremity pain  PSYCHIATRIC: No depression, anxiety, mood swings, or difficulty sleeping  HEME/LYMPH: No easy bruising, or bleeding gums  ALLERY AND IMMUNOLOGIC: No hives or eczema    Allergies  No Known Allergies    Social History:   Former smoker, quit >20years ago  Occasional EtOH use  Lives home with son    FAMILY HISTORY:  Heart disease in parents and siblings    MEDICATIONS  (STANDING):  aspirin  chewable 81 milliGRAM(s) Oral daily  atorvastatin 40 milliGRAM(s) Oral at bedtime  cholecalciferol 1000 Unit(s) Oral daily  clopidogrel Tablet 75 milliGRAM(s) Oral daily  cyanocobalamin 1000 MICROGram(s) Oral at bedtime  dextrose 40% Gel 15 Gram(s) Oral once  dextrose 5%. 1000 milliLiter(s) (50 mL/Hr) IV Continuous <Continuous>  dextrose 5%. 1000 milliLiter(s) (100 mL/Hr) IV Continuous <Continuous>  dextrose 50% Injectable 25 Gram(s) IV Push once  dextrose 50% Injectable 12.5 Gram(s) IV Push once  dextrose 50% Injectable 25 Gram(s) IV Push once  famotidine    Tablet 20 milliGRAM(s) Oral daily  glucagon  Injectable 1 milliGRAM(s) IntraMuscular once  insulin lispro (ADMELOG) corrective regimen sliding scale   SubCutaneous three times a day before meals  insulin lispro (ADMELOG) corrective regimen sliding scale   SubCutaneous at bedtime  losartan 25 milliGRAM(s) Oral daily  metoprolol succinate ER 50 milliGRAM(s) Oral daily  polyethylene glycol 3350 17 Gram(s) Oral daily  tamsulosin 0.4 milliGRAM(s) Oral at bedtime    MEDICATIONS  (PRN):  acetaminophen   Tablet .. 650 milliGRAM(s) Oral every 6 hours PRN Mild Pain (1 - 3)  magnesium hydroxide Suspension 30 milliLiter(s) Oral daily PRN Constipation      Vital Signs Last 24 Hrs  T(C): 36.5 (11 Jul 2021 09:06), Max: 36.9 (10 Jul 2021 17:33)  T(F): 97.7 (11 Jul 2021 09:06), Max: 98.5 (10 Jul 2021 17:33)  HR: 69 (11 Jul 2021 09:06) (69 - 74)  BP: 120/71 (11 Jul 2021 09:06) (118/74 - 132/76)  BP(mean): --  RR: 15 (11 Jul 2021 09:06) (15 - 16)  SpO2: 97% (11 Jul 2021 09:06) (96% - 97%)  CAPILLARY BLOOD GLUCOSE      POCT Blood Glucose.: 153 mg/dL (11 Jul 2021 07:32)  POCT Blood Glucose.: 169 mg/dL (10 Jul 2021 21:01)        PHYSICAL EXAM:  GENERAL: NAD, well-developed  HEAD:  Atraumatic, Normocephalic  EYES: EOMI, PERRLA, conjunctiva and sclera clear  NECK: Supple, No JVD  CHEST/LUNG: Clear to auscultation bilaterally; No wheeze, nonlabored breathing  HEART: Regular rate and rhythm; No murmurs, rubs, or gallops  ABDOMEN: Soft, Nontender, Nondistended; Bowel sounds present  EXTREMITIES:  2+ Peripheral Pulses, No clubbing, cyanosis, or edema  NEUROLOGY: AAOx3, non-focal  PSYCH: calm, appropriate mood  SKIN: Sternotomy surgical incision c/d/i    LABS:                        9.2    6.35  )-----------( 394      ( 11 Jul 2021 05:00 )             27.6     07-11    142  |  108  |  24<H>  ----------------------------<  134<H>  4.2   |  26  |  1.63<H>    Ca    9.0      11 Jul 2021 05:00    TPro  6.4  /  Alb  2.6<L>  /  TBili  0.5  /  DBili  x   /  AST  17  /  ALT  21  /  AlkPhos  75  07-11                RADIOLOGY & ADDITIONAL TESTS:  prior hospital records reviewed  Discussed case with Dr. Yuly Mackey

## 2021-07-11 NOTE — CONSULT NOTE ADULT - ASSESSMENT
75 year old patient male w/ HTN, CAD, DM2, HLD, h/o LLE Osteomyelitis/Cellulitis (November, 2020), Right shoulder replacement and severe aortic stenosis presented to [facility] on 6/29 for JONN, AVR and CABG x5.  Post op course complicated by  right sided weakness and slurred speech. MRI confirms embolic appearing strokes acute ischemic infarcts R P-O wedge shape, and L temporal and L ventral roopa.  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

## 2021-07-12 LAB
ALBUMIN SERPL ELPH-MCNC: 2.7 G/DL — LOW (ref 3.3–5)
ALP SERPL-CCNC: 79 U/L — SIGNIFICANT CHANGE UP (ref 40–120)
ALT FLD-CCNC: 20 U/L — SIGNIFICANT CHANGE UP (ref 10–45)
ANION GAP SERPL CALC-SCNC: 10 MMOL/L — SIGNIFICANT CHANGE UP (ref 5–17)
AST SERPL-CCNC: 19 U/L — SIGNIFICANT CHANGE UP (ref 10–40)
BASOPHILS # BLD AUTO: 0.04 K/UL — SIGNIFICANT CHANGE UP (ref 0–0.2)
BASOPHILS NFR BLD AUTO: 0.6 % — SIGNIFICANT CHANGE UP (ref 0–2)
BILIRUB SERPL-MCNC: 0.6 MG/DL — SIGNIFICANT CHANGE UP (ref 0.2–1.2)
BUN SERPL-MCNC: 29 MG/DL — HIGH (ref 7–23)
CALCIUM SERPL-MCNC: 8.7 MG/DL — SIGNIFICANT CHANGE UP (ref 8.4–10.5)
CHLORIDE SERPL-SCNC: 104 MMOL/L — SIGNIFICANT CHANGE UP (ref 96–108)
CO2 SERPL-SCNC: 25 MMOL/L — SIGNIFICANT CHANGE UP (ref 22–31)
CREAT SERPL-MCNC: 1.69 MG/DL — HIGH (ref 0.5–1.3)
EOSINOPHIL # BLD AUTO: 0.08 K/UL — SIGNIFICANT CHANGE UP (ref 0–0.5)
EOSINOPHIL NFR BLD AUTO: 1.1 % — SIGNIFICANT CHANGE UP (ref 0–6)
GLUCOSE BLDC GLUCOMTR-MCNC: 161 MG/DL — HIGH (ref 70–99)
GLUCOSE BLDC GLUCOMTR-MCNC: 171 MG/DL — HIGH (ref 70–99)
GLUCOSE BLDC GLUCOMTR-MCNC: 177 MG/DL — HIGH (ref 70–99)
GLUCOSE BLDC GLUCOMTR-MCNC: 194 MG/DL — HIGH (ref 70–99)
GLUCOSE SERPL-MCNC: 140 MG/DL — HIGH (ref 70–99)
HCT VFR BLD CALC: 27.7 % — LOW (ref 39–50)
HGB BLD-MCNC: 9.4 G/DL — LOW (ref 13–17)
IMM GRANULOCYTES NFR BLD AUTO: 0.6 % — SIGNIFICANT CHANGE UP (ref 0–1.5)
LYMPHOCYTES # BLD AUTO: 1.43 K/UL — SIGNIFICANT CHANGE UP (ref 1–3.3)
LYMPHOCYTES # BLD AUTO: 20.1 % — SIGNIFICANT CHANGE UP (ref 13–44)
MCHC RBC-ENTMCNC: 30 PG — SIGNIFICANT CHANGE UP (ref 27–34)
MCHC RBC-ENTMCNC: 33.9 GM/DL — SIGNIFICANT CHANGE UP (ref 32–36)
MCV RBC AUTO: 88.5 FL — SIGNIFICANT CHANGE UP (ref 80–100)
MONOCYTES # BLD AUTO: 0.65 K/UL — SIGNIFICANT CHANGE UP (ref 0–0.9)
MONOCYTES NFR BLD AUTO: 9.1 % — SIGNIFICANT CHANGE UP (ref 2–14)
NEUTROPHILS # BLD AUTO: 4.88 K/UL — SIGNIFICANT CHANGE UP (ref 1.8–7.4)
NEUTROPHILS NFR BLD AUTO: 68.5 % — SIGNIFICANT CHANGE UP (ref 43–77)
NRBC # BLD: 0 /100 WBCS — SIGNIFICANT CHANGE UP (ref 0–0)
PLATELET # BLD AUTO: 416 K/UL — HIGH (ref 150–400)
POTASSIUM SERPL-MCNC: 4.1 MMOL/L — SIGNIFICANT CHANGE UP (ref 3.5–5.3)
POTASSIUM SERPL-SCNC: 4.1 MMOL/L — SIGNIFICANT CHANGE UP (ref 3.5–5.3)
PROT SERPL-MCNC: 6.4 G/DL — SIGNIFICANT CHANGE UP (ref 6–8.3)
RBC # BLD: 3.13 M/UL — LOW (ref 4.2–5.8)
RBC # FLD: 13 % — SIGNIFICANT CHANGE UP (ref 10.3–14.5)
SODIUM SERPL-SCNC: 139 MMOL/L — SIGNIFICANT CHANGE UP (ref 135–145)
URATE SERPL-MCNC: 6.6 MG/DL — SIGNIFICANT CHANGE UP (ref 3.4–8.8)
WBC # BLD: 7.12 K/UL — SIGNIFICANT CHANGE UP (ref 3.8–10.5)
WBC # FLD AUTO: 7.12 K/UL — SIGNIFICANT CHANGE UP (ref 3.8–10.5)

## 2021-07-12 PROCEDURE — 99233 SBSQ HOSP IP/OBS HIGH 50: CPT

## 2021-07-12 PROCEDURE — 73110 X-RAY EXAM OF WRIST: CPT | Mod: 26,RT

## 2021-07-12 RX ADMIN — Medication 50 MILLIGRAM(S): at 05:21

## 2021-07-12 RX ADMIN — Medication 2: at 08:04

## 2021-07-12 RX ADMIN — PREGABALIN 1000 MICROGRAM(S): 225 CAPSULE ORAL at 21:43

## 2021-07-12 RX ADMIN — FAMOTIDINE 20 MILLIGRAM(S): 10 INJECTION INTRAVENOUS at 12:25

## 2021-07-12 RX ADMIN — Medication 2: at 17:32

## 2021-07-12 RX ADMIN — Medication 2: at 12:23

## 2021-07-12 RX ADMIN — CLOPIDOGREL BISULFATE 75 MILLIGRAM(S): 75 TABLET, FILM COATED ORAL at 12:24

## 2021-07-12 RX ADMIN — TAMSULOSIN HYDROCHLORIDE 0.4 MILLIGRAM(S): 0.4 CAPSULE ORAL at 21:43

## 2021-07-12 RX ADMIN — ATORVASTATIN CALCIUM 40 MILLIGRAM(S): 80 TABLET, FILM COATED ORAL at 21:43

## 2021-07-12 RX ADMIN — Medication 81 MILLIGRAM(S): at 12:24

## 2021-07-12 RX ADMIN — LOSARTAN POTASSIUM 25 MILLIGRAM(S): 100 TABLET, FILM COATED ORAL at 05:21

## 2021-07-12 RX ADMIN — Medication 1000 UNIT(S): at 12:25

## 2021-07-12 NOTE — DIETITIAN INITIAL EVALUATION ADULT. - OTHER INFO
Initial Nutrition Assessment   75yr Old Male   Dx: CVA  Diet - Consistent Carbohydrate DASH-TLC Diet w/ Thin Liquids  (Recommend Change Diet to Consistent Carbohydrate Low Sodium Diet   Recommend Initiate Glucerna 8oz PO Daily - Provides 220kcal-10grams of Protein)   Denies Food Allergy/Intolerance  Tolerates Diet Well - No Chewing/Swallowing Complications (Per Patient) - But States Usually Eats Softer Foods  Surgical Incision on Sternum & Right Calf and No Pressure Ulcers (as Per Nursing Flow Sheets)  No Edema Noted (as Per Nursing Flow Sheets)  No Recent Nausea/Vomiting/Diarrhea/Constipation (as Per Patient)

## 2021-07-12 NOTE — PROGRESS NOTE ADULT - SUBJECTIVE AND OBJECTIVE BOX
Subjective: Right wrist pain.       HISTORY OF PRESENT ILLNESS  75 year old patient male w/ HTN, CAD, DM, HLD, h/o LLE Osteomyelitis/Cellulitis (November, 2020), Right shoulder replacement and severe aortic stenosis presented to [facility] on 6/29 for JONN, AVR and CABG x5.  Pt reports that he was hospitalized @ [facility] in November, 2020 for LLE osteomyelitis/sepsis at which time pt underwent Echocardiogram which revealed, "aortic stenosis". After the completion of antibiotic treatment for LLE cellulitis, pt underwent cardiac catheterization on 4/19/2021 with Dr. Mundo Terrell which revealed, "aortic stenosis and multi vessel coronary artery disease" . Pt reports worsening fatigability and pt noted "taking too many naps throughout the day" over the past 7 months.  Aortic stenosis severe and Coronary artery disease underwent JONN and AVR and CABG x5 on 06/29/2021.  Post op course complicated by  right sided weakness and slurred speech. MRI confirms embolic appearing strokes acute ischemic infarcts R P-O wedge shape, and L temporal and L ventral roopa.  Not IV tPA candidate with recent OHS. Not interventional candidate with no evidence clinically and on CTA of LVO. Started on Plavix on 7/10/21 for secondary stroke prevention.  Patient was evaluated by PT/OT who recommended acute rehab -- patient was admitted to [facility] for acute rehab on 7/10/21.       PAST MEDICAL & SURGICAL HISTORY:  Hypertension    Diabetes mellitus     REVIEW OF SYMPTOMS  [X] Constitutional WNL     [X] Cardio WNL            [X] Resp WNL           [X] GI WNL                          [X]  WNL                   [X] Heme WNL              [X] Endo WNL                     [X] Skin WNL                 [X] MSK right wrist pain          [X] Neuro right weakness                 [X] Cognitive WNL        [X] Psych WNL      VITALS  Vital Signs Last 24 Hrs  T(C): 36.4 (12 Jul 2021 07:57), Max: 36.9 (11 Jul 2021 19:29)  T(F): 97.6 (12 Jul 2021 07:57), Max: 98.4 (11 Jul 2021 19:29)  HR: 73 (12 Jul 2021 07:57) (71 - 78)  BP: 100/60 (12 Jul 2021 07:57) (100/60 - 124/74)  BP(mean): --  RR: 16 (12 Jul 2021 07:57) (16 - 16)  SpO2: 95% (12 Jul 2021 07:57) (95% - 95%)      PHYSICAL EXAM  Constitutional - NAD in chair  HEENT - NCAT, EOMI  Neck - Supple, No limited ROM  Chest - diminished, midsternal incision CDI  Cardiovascular - RR  Abdomen - BS+, Soft, NTND  Extremities - right wrist limited ROM and tender, No calf tenderness   Skin-no rash  Woumds- healing well, midchest puncture wounds, midsternal and RLE harvest sites scabbed and healing well.      Neurologic Exam - Awake, Alert, at baseline, no new deficits as compared to HP.     Psychiatric - Mood stable, Affect WNL    RECENT LABS                        9.4    7.12  )-----------( 416      ( 12 Jul 2021 06:27 )             27.7     07-12    139  |  104  |  29<H>  ----------------------------<  140<H>  4.1   |  25  |  1.69<H>    Ca    8.7      12 Jul 2021 06:27    TPro  6.4  /  Alb  2.7<L>  /  TBili  0.6  /  DBili  x   /  AST  19  /  ALT  20  /  AlkPhos  79  07-12      LIVER FUNCTIONS - ( 12 Jul 2021 06:27 )  Alb: 2.7 g/dL / Pro: 6.4 g/dL / ALK PHOS: 79 U/L / ALT: 20 U/L / AST: 19 U/L / GGT: x                 RADIOLOGY/OTHER RESULTS      CURRENT MEDICATIONS  MEDICATIONS  (STANDING):  aspirin  chewable 81 milliGRAM(s) Oral daily  atorvastatin 40 milliGRAM(s) Oral at bedtime  cholecalciferol 1000 Unit(s) Oral daily  clopidogrel Tablet 75 milliGRAM(s) Oral daily  cyanocobalamin 1000 MICROGram(s) Oral at bedtime  dextrose 40% Gel 15 Gram(s) Oral once  dextrose 5%. 1000 milliLiter(s) (50 mL/Hr) IV Continuous <Continuous>  dextrose 5%. 1000 milliLiter(s) (100 mL/Hr) IV Continuous <Continuous>  dextrose 50% Injectable 25 Gram(s) IV Push once  dextrose 50% Injectable 12.5 Gram(s) IV Push once  dextrose 50% Injectable 25 Gram(s) IV Push once  famotidine    Tablet 20 milliGRAM(s) Oral daily  glucagon  Injectable 1 milliGRAM(s) IntraMuscular once  insulin lispro (ADMELOG) corrective regimen sliding scale   SubCutaneous three times a day before meals  insulin lispro (ADMELOG) corrective regimen sliding scale   SubCutaneous at bedtime  losartan 25 milliGRAM(s) Oral daily  metoprolol succinate ER 50 milliGRAM(s) Oral daily  tamsulosin 0.4 milliGRAM(s) Oral at bedtime    MEDICATIONS  (PRN):  acetaminophen   Tablet .. 650 milliGRAM(s) Oral every 6 hours PRN Mild Pain (1 - 3)  magnesium hydroxide Suspension 30 milliLiter(s) Oral daily PRN Constipation  polyethylene glycol 3350 17 Gram(s) Oral daily PRN Constipation      ASSESSMENT & PLAN    Continue comprehensive acute rehab program consisting of 3hrs/day of OT/PT and SLP.

## 2021-07-12 NOTE — DIETITIAN INITIAL EVALUATION ADULT. - PERTINENT LABORATORY DATA
7/11   Na-142  K-4.2  BUN-24H  Creat-1.63  Gluc-134H  Hemoglobin A1c - 6.9H  POCT (over Last 2 Days) - Ranging from 153-177

## 2021-07-12 NOTE — DIETITIAN INITIAL EVALUATION ADULT. - PERTINENT MEDS FT
ASA, Plavix, Admelog, Lipitor, Admelog, Lipitor, Pepcid, Cozaar, Metoprolol, Flomax, Vitamin D, Vitamin B12,

## 2021-07-12 NOTE — DIETITIAN INITIAL EVALUATION ADULT. - ADD RECOMMEND
1) Monitor Weights, Intake, Tolerance, Skin, POCT & Labwork   2) Education Provided on Need for Supplementation & Consistent Carbohydrate Diet 3) Glucerna 8oz PO Daily 4) Recommend Change Diet to Consistent Carbohydrate Low Sodium Diet 5) Continue Nutrition Plan of Care

## 2021-07-12 NOTE — DIETITIAN INITIAL EVALUATION ADULT. - PHYSCIAL ASSESSMENT
Temporalis, Orbital Fat Pads, Bicep, Tricep, Gastrocnemius(Calf) & Hand (Interosseous) Wasting Observed  (Per Nutrition Focused Physical Exam)

## 2021-07-12 NOTE — DIETITIAN INITIAL EVALUATION ADULT. - ORAL INTAKE PTA/DIET HISTORY
Patient Does Not Follow Diet @Home But Tries to Eat Less Salt/Sugar, Consumes 3 Meals a Day & Doesn't Take Vitamin/Supplements @Home     on Consistent Carbohydrate DASH-TLC Diet w/ Thin Liquids   Recommend Change Diet to Consistent Carbohydrate Low Sodium Diet   Recommend Initiate Glucerna 8oz PO Daily (Provides 220kcal-10grams of Protein) - Education Provided on Need for Supplementation & Consistent Carbohydrate Diet

## 2021-07-12 NOTE — DIETITIAN NUTRITION RISK NOTIFICATION - TREATMENT: THE FOLLOWING DIET HAS BEEN RECOMMENDED
Recommend Change Diet to Consistent Carbohydrate Low Sodium Diet   Recommend Initiate Glucerna 8oz PO Daily (Provides 220kcal-10grams of Protein)   Education Provided on Need for Supplementation

## 2021-07-12 NOTE — CHART NOTE - NSCHARTNOTEFT_GEN_A_CORE
REHABILITATION DIAGNOSIS/IMPAIRMENT GROUP CODE:  left cva with right weakness    COMORBIDITIES/COMPLICATING CONDITIONS IMPACTING REHABILITATION:  HEALTH ISSUES - PROBLEM Dx: ckd, dm2, pain, HTn, constipation, cad        PAST MEDICAL & SURGICAL HISTORY:  Hypertension    Diabetes mellitus        Based upon consideration of the patient's impairments, functional status, complicating conditions and any other contributing factors and after information garnered from the assessments of all therapy disciplines involved in treating the patient and other pertinent clinicians:    INTERDISCIPLINARY REHABILITATION INTERVENTIONS:    [  x ] Transfer Training  [x   ] Bed Mobility  [ x  ] Therapeutic Exercise  [ x  ] Balance/Coordination Exercises  [ x  ] Locomotion retraining  [  x ] Stairs  [  x ] Functional Transfer Training  [  x ] Bowel/Bladder program  [ x  ] Pain Management  [ x  ] Skin/Wound Care  [ x  ] Visual/Perceptual Training  [ x  ] Therapeutic Recreation Activities  [ x  ] Neuromuscular Re-education  [ x  ] Activities of Daily Living  [  x ] Speech Exercise  [x   ] Swallowing Exercises  [ x  ] Vital Stim  [ x  ] Dietary Supplements  [   ] Calorie Count  [   ] Cognitive Exercises  [ x  ] Congnitive/Linguistic Treatment  [   ] Behavior Program  [ x ] Neuropsych Therapy  [ x  ] Patient/Family Counseling  [ x  ] Family Training  [   ] Community Re-entry  [  x ] Orthotic Evaluation  [   ] Prosthetic Eval/Training    MEDICAL PROGNOSIS:  good    REHAB POTENTIAL:  good  EXPECTED DAILY THERAPY:         PT: 1h       OT:1h       ST:1h       S&O:eval    EXPECTED INTENSITY OF PROGRAM:  3h daily, 5d week    EXPECTED FREQUENCY OF PROGRAM:  daily    ESTIMATED LOS:  14d    ESTIMATED DISPOSITION:  home c     INTERDISCIPLINARY FUNCTIONAL OUTCOMES?GOALS:         Gait/Mobility: mod I       Transfers: mod I       ADLs: mod I       Functional Transfers: CG       Medication Management:independent       Communication: indep       Cognitive: indep       Dysphagia: indep       Bladder mod I       Bowel: mod I

## 2021-07-12 NOTE — PROGRESS NOTE ADULT - ASSESSMENT
75 year old patient male w/ HTN, CAD, DM2, HLD, h/o LLE Osteomyelitis/Cellulitis (November, 2020), Right shoulder replacement and severe aortic stenosis presented to [facility] on 6/29 for JONN, AVR and CABG x5.  Post op course complicated by  right sided weakness and slurred speech. MRI confirms embolic appearing strokes acute ischemic infarcts R P-O wedge shape, and L temporal and L ventral roopa.  Not IV tPA candidate with recent OHS. Patient now admitted for a multidisciplinary rehab program. 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  - Gait Instability, ADL impairments and Functional impairments: on Comprehensive Rehab Program of PT/OT/SLP  - 3 hours a day, 5 days a week  - s/p CABG-appears embolic  - c/w lipitor, ASA  - cleared for Plavix on 7/10    #CAD and Severe AS  - s/p AVR + CABGx5-incentive spirometry/deep breathing, pain control  - maintain sternal precautions-healing well  - BP control w/ losartan and metoprolol  -ASA/Plavix and Lipitor    #HTN   -losartan and metoprolol    #DM2  - A1C 6.9%  -consistent carbs diet added  -FS ACHS  -BG not at goal-On metformin at home per 2020 records. Currently Cr elevated-hospitalist to follow up.    #pain  -right wrist pain/limited ROM  -will check Uric acid, ?gout  -hospitalist f/up  -may use cool compress  -Xr right wrist    #Elevated cr  -?CKD vs MARINA  -labs are following  -will review [facility] records, Cr 2.1 Nov 2020 per records  -?continue Cozaar for BP control-hospitalist to follow up    #Pain Mgmt   - Tylenol PRN    #GI/Bowel Mgmt   - Continent c/w Miralax PRN

## 2021-07-13 LAB
GLUCOSE BLDC GLUCOMTR-MCNC: 151 MG/DL — HIGH (ref 70–99)
GLUCOSE BLDC GLUCOMTR-MCNC: 237 MG/DL — HIGH (ref 70–99)

## 2021-07-13 PROCEDURE — 99232 SBSQ HOSP IP/OBS MODERATE 35: CPT

## 2021-07-13 RX ORDER — INSULIN LISPRO 100/ML
VIAL (ML) SUBCUTANEOUS
Refills: 0 | Status: DISCONTINUED | OUTPATIENT
Start: 2021-07-13 | End: 2021-07-23

## 2021-07-13 RX ADMIN — Medication 2: at 08:21

## 2021-07-13 RX ADMIN — LOSARTAN POTASSIUM 25 MILLIGRAM(S): 100 TABLET, FILM COATED ORAL at 05:44

## 2021-07-13 RX ADMIN — PREGABALIN 1000 MICROGRAM(S): 225 CAPSULE ORAL at 21:09

## 2021-07-13 RX ADMIN — Medication 4: at 21:10

## 2021-07-13 RX ADMIN — TAMSULOSIN HYDROCHLORIDE 0.4 MILLIGRAM(S): 0.4 CAPSULE ORAL at 21:09

## 2021-07-13 RX ADMIN — Medication 1000 UNIT(S): at 12:07

## 2021-07-13 RX ADMIN — FAMOTIDINE 20 MILLIGRAM(S): 10 INJECTION INTRAVENOUS at 12:07

## 2021-07-13 RX ADMIN — CLOPIDOGREL BISULFATE 75 MILLIGRAM(S): 75 TABLET, FILM COATED ORAL at 12:07

## 2021-07-13 RX ADMIN — ATORVASTATIN CALCIUM 40 MILLIGRAM(S): 80 TABLET, FILM COATED ORAL at 21:09

## 2021-07-13 RX ADMIN — Medication 81 MILLIGRAM(S): at 12:07

## 2021-07-13 RX ADMIN — Medication 50 MILLIGRAM(S): at 05:44

## 2021-07-13 NOTE — PROGRESS NOTE ADULT - ASSESSMENT
75 year old patient male w/ HTN, CAD, DM2, HLD, h/o LLE Osteomyelitis/Cellulitis (November, 2020), Right shoulder replacement and severe aortic stenosis presented to [facility] on 6/29 for JONN, AVR and CABG x5.  Post op course complicated by  right sided weakness and slurred speech. MRI confirms embolic appearing strokes acute ischemic infarcts R P-O wedge shape, and L temporal and L ventral roopa.  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  #Right UE weakness, right wrist pain  -Start Comprehensive Rehab Program of PT/OT/SLP  -Continue ASA/Plavix/Statin  -XRAY of right wrist obtained, noted to have right humeral fracture from 2019    #HTN  #CAD  #Severe Aortic Stenosis   -s/p AVR + CABGx5  -c/w losartan 25mg po qd  -c/w metoprolol succinate ER 50mg po qd    #DM2  -Ha1c 6.9  -Continue SSI + Fingersticks two times a day    #Anemia  -Chronic   -Baseline Hb seems to be around 9-10  -Continue to monitor    #CKD Stage III  -Stable at baseline Cr 1.4-1.6  -Avoid nephrotoxic agents  -Monitor renal function    #DVT ppx  SCD    Discussed with Rehab Team

## 2021-07-13 NOTE — PROGRESS NOTE ADULT - SUBJECTIVE AND OBJECTIVE BOX
Patient is a 75y old  Male who presents with a chief complaint of Left CVA, right sided hemiparesis (13 Jul 2021 09:04)    Report still having pain in right wrist going into right thumb  Cannot make a thumb  Had a BM yesterday    Patient seen and examined at bedside.    ALLERGIES:  No Known Allergies    MEDICATIONS  (STANDING):  aspirin  chewable 81 milliGRAM(s) Oral daily  atorvastatin 40 milliGRAM(s) Oral at bedtime  cholecalciferol 1000 Unit(s) Oral daily  clopidogrel Tablet 75 milliGRAM(s) Oral daily  cyanocobalamin 1000 MICROGram(s) Oral at bedtime  dextrose 40% Gel 15 Gram(s) Oral once  dextrose 5%. 1000 milliLiter(s) (50 mL/Hr) IV Continuous <Continuous>  dextrose 5%. 1000 milliLiter(s) (100 mL/Hr) IV Continuous <Continuous>  dextrose 50% Injectable 25 Gram(s) IV Push once  dextrose 50% Injectable 12.5 Gram(s) IV Push once  dextrose 50% Injectable 25 Gram(s) IV Push once  famotidine    Tablet 20 milliGRAM(s) Oral daily  glucagon  Injectable 1 milliGRAM(s) IntraMuscular once  insulin lispro (ADMELOG) corrective regimen sliding scale   SubCutaneous three times a day before meals  insulin lispro (ADMELOG) corrective regimen sliding scale   SubCutaneous at bedtime  losartan 25 milliGRAM(s) Oral daily  metoprolol succinate ER 50 milliGRAM(s) Oral daily  tamsulosin 0.4 milliGRAM(s) Oral at bedtime    MEDICATIONS  (PRN):  acetaminophen   Tablet .. 650 milliGRAM(s) Oral every 6 hours PRN Mild Pain (1 - 3)  magnesium hydroxide Suspension 30 milliLiter(s) Oral daily PRN Constipation  polyethylene glycol 3350 17 Gram(s) Oral daily PRN Constipation    Vital Signs Last 24 Hrs  T(F): 98 (13 Jul 2021 08:24), Max: 98 (12 Jul 2021 20:15)  HR: 67 (13 Jul 2021 08:24) (67 - 76)  BP: 110/66 (13 Jul 2021 08:24) (110/66 - 150/68)  RR: 16 (13 Jul 2021 08:24) (16 - 16)  SpO2: 94% (13 Jul 2021 08:24) (94% - 97%)  I&O's Summary    BMI (kg/m2): 27.1 (07-10-21 @ 17:33)  PHYSICAL EXAM:  General: NAD, A/O x 3  ENT: MMM, no scleral icterus  Neck: Supple, No JVD, no thyroidomegaly  Lungs: Clear to auscultation bilaterally, no wheezes, no rales, no rhonchi, good inspiratory effort  Cardio: RRR, S1/S2, No murmurs  Abdomen: Soft, Nontender, Nondistended; Bowel sounds present  Extremities:Right wrist painful to touch, cannot contract hand all the way, pulses present and strong, sensation intact   No calf tenderness, No pitting edema, no skin changes    LABS:                        9.4    7.12  )-----------( 416      ( 12 Jul 2021 06:27 )             27.7       07-12    139  |  104  |  30  ----------------------------<  140  4.1   |  25  |  1.69    Ca    8.7      12 Jul 2021 06:27    TPro  6.5  /  Alb  2.7  /  TBili  0.6  /  DBili  x   /  AST  19  /  ALT  20  /  AlkPhos  79  07-12     eGFR if Non African American: 39 mL/min/1.73M2 (07-12-21 @ 06:27)  eGFR if African American: 45 mL/min/1.73M2 (07-12-21 @ 06:27)    POCT Blood Glucose.: 151 mg/dL (13 Jul 2021 07:42)  POCT Blood Glucose.: 194 mg/dL (12 Jul 2021 20:47)  POCT Blood Glucose.: 161 mg/dL (12 Jul 2021 17:26)  POCT Blood Glucose.: 171 mg/dL (12 Jul 2021 12:05)    COVID-19 PCR: NotDetec (11-05-20 @ 19:01)  COVID-19 PCR: NotDetec (11-04-20 @ 04:58)      RADIOLOGY & ADDITIONAL TESTS:  Xray Shoulder 2 Views, Right (09.30.19 @ 00:04) >  Impression:  Comminuted fracture of the right humeral head.

## 2021-07-13 NOTE — PROGRESS NOTE ADULT - ASSESSMENT
75 year old patient male w/ HTN, CAD, DM2, HLD, h/o LLE Osteomyelitis/Cellulitis (November, 2020), Right shoulder replacement and severe aortic stenosis presented to [facility] on 6/29 for JONN, AVR and CABG x5.  Post op course complicated by  right sided weakness and slurred speech. MRI confirms embolic appearing strokes acute ischemic infarcts R P-O wedge shape, and L temporal and L ventral roopa.  Not IV tPA candidate with recent OHS. Patient now admitted for a multidisciplinary rehab program. 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  - Gait Instability, ADL impairments and Functional impairments: on Comprehensive Rehab Program of PT/OT/SLP  - 3 hours a day, 5 days a week  - s/p CABG- embolic CVA  - c/w lipitor, ASA  - cleared for Plavix on 7/10    #CAD and Severe AS  - s/p AVR + CABGx5-incentive spirometry/deep breathing, pain control  - maintain sternal precautions-healing well  - BP control w/ losartan and metoprolol  -ASA/Plavix and Lipitor    #HTN   -losartan and metoprolol currently  -hospitalist zeke requested re/Cozaar in view of elevated cr-will follow recs    #DM2  - A1C 6.9%  -consistent carbs diet added  -FS ACHS  -BG not at goal-On metformin at home per 2020 records. Currently Cr elevated-hospitalist to follow up.    #pain  -right wrist pain/limited ROM  -uric acid wnl  -hospitalist f/up requested  -may use cool compress  -Xr right wrist completed    #Elevated cr  -?CKD vs MARINA  -labs are following  - Cr 2.1 Nov 2020 per records  -?continue Cozaar for BP control-hospitalist to follow up    #Pain Mgmt   - Tylenol PRN    #GI/Bowel Mgmt   - Continent c/w Miralax   -BM 7/12     75 year old patient male w/ HTN, CAD, DM2, HLD, h/o LLE Osteomyelitis/Cellulitis (November, 2020), Right shoulder replacement and severe aortic stenosis presented to [facility] on 6/29 for JONN, AVR and CABG x5.  Post op course complicated by  right sided weakness and slurred speech. MRI confirms embolic appearing strokes acute ischemic infarcts R P-O wedge shape, and L temporal and L ventral roopa.  Not IV tPA candidate with recent OHS. Patient now admitted for a multidisciplinary rehab program. 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  - Gait Instability, ADL impairments and Functional impairments: on Comprehensive Rehab Program of PT/OT/SLP  - 3 hours a day, 5 days a week  - s/p CABG- embolic CVA  - c/w lipitor, ASA  - cleared for Plavix on 7/10    #CAD and Severe AS  - s/p AVR + CABGx5-incentive spirometry/deep breathing, pain control  - maintain sternal precautions-healing well  - BP control w/ losartan and metoprolol  -ASA/Plavix and Lipitor    #HTN   -losartan and metoprolol currently  -hospitalist zeke requested re/Cozaar in view of elevated cr-will follow recs    #DM2  - A1C 6.9%  -consistent carbs diet added  -FS ACHS  -BG not at goal-On metformin at home per 2020 records. Currently Cr elevated-hospitalist to follow up.    #pain  -right wrist pain/limited ROM  -uric acid wnl  -hospitalist f/up requested  -may use cool compress  -Xr right wrist completed    #Elevated cr  -?CKD vs MARINA  -labs are following  - Cr 2.1 Nov 2020 per records  -?continue Cozaar for BP control-hospitalist to follow up    #Pain Mgmt   - Tylenol PRN    #GI/Bowel Mgmt   - Continent c/w Miralax   -BM 7/12      #Discharge planning  Team meeting DATE: 7/13  SW: PH with son who works during the day, 1 [place], 7+7 stairs to bedroom  OT: supervision eating and grooming, min A UBD, toilet transfers, mod A bathing and toileting, max A LBD  PT: mod A transfers, ambulating 70' with RW with min A and W/C follow, no stairs yet  ST: regular diet with thins, no language deficits  EDOD: 7/23

## 2021-07-13 NOTE — PROGRESS NOTE ADULT - SUBJECTIVE AND OBJECTIVE BOX
Subjective: Right wrist pain.       HISTORY OF PRESENT ILLNESS  75 year old patient male w/ HTN, CAD, DM, HLD, h/o LLE Osteomyelitis/Cellulitis (November, 2020), Right shoulder replacement and severe aortic stenosis presented to [facility] on 6/29 for OJNN, AVR and CABG x5.  Pt reports that he was hospitalized @ [facility] in November, 2020 for LLE osteomyelitis/sepsis at which time pt underwent Echocardiogram which revealed, "aortic stenosis". After the completion of antibiotic treatment for LLE cellulitis, pt underwent cardiac catheterization on 4/19/2021 with Dr. Mundo Terrell which revealed, "aortic stenosis and multi vessel coronary artery disease" . Pt reports worsening fatigability and pt noted "taking too many naps throughout the day" over the past 7 months.  Aortic stenosis severe and Coronary artery disease underwent JONN and AVR and CABG x5 on 06/29/2021.  Post op course complicated by  right sided weakness and slurred speech. MRI confirms embolic appearing strokes acute ischemic infarcts R P-O wedge shape, and L temporal and L ventral roopa.  Not IV tPA candidate with recent OHS. Not interventional candidate with no evidence clinically and on CTA of LVO. Started on Plavix on 7/10/21 for secondary stroke prevention.  Patient was evaluated by PT/OT who recommended acute rehab -- patient was admitted to [facility] for acute rehab on 7/10/21.      PAST MEDICAL & SURGICAL HISTORY:  Hypertension    Diabetes mellitus     REVIEW OF SYMPTOMS  [X] Constitutional WNL     [X] Cardio htn            [X] Resp WNL           [X] GI WNL                          [X]                   [X] Heme anemia              [X] Endo WNL                     [X] Skin WNL                 [X] MSK wrist pain           [X] Neuro WNL                   [X] Cognitive WNL        [X] Psych WNL      VITALS  Vital Signs Last 24 Hrs  T(C): 36.7 (13 Jul 2021 08:24), Max: 36.7 (12 Jul 2021 20:15)  T(F): 98 (13 Jul 2021 08:24), Max: 98 (12 Jul 2021 20:15)  HR: 67 (13 Jul 2021 08:24) (67 - 76)  BP: 110/66 (13 Jul 2021 08:24) (110/66 - 150/68)  BP(mean): --  RR: 16 (13 Jul 2021 08:24) (16 - 16)  SpO2: 94% (13 Jul 2021 08:24) (94% - 97%)     PHYSICAL EXAM  Constitutional - NAD in chair  HEENT - NCAT, EOMI  Neck - Supple, No limited ROM  Chest - diminished, midsternal incision CDI  Cardiovascular - RR  Abdomen - BS+, Soft, NTND  Extremities - right wrist limited ROM and tender, No calf tenderness   Skin-no rash  Woumds- healing well, midchest puncture wounds, midsternal and RLE harvest sites scabbed and healing well.      Neurologic Exam - Awake, Alert, at baseline with right sided weakness     Psychiatric - Mood stable, Affect WNL        RECENT LABS                        9.4    7.12  )-----------( 416      ( 12 Jul 2021 06:27 )             27.7     07-12    139  |  104  |  30<H>  ----------------------------<  140<H>  4.1   |  25  |  1.69<H>    Ca    8.7      12 Jul 2021 06:27    TPro  6.5  /  Alb  2.7<L>  /  TBili  0.6  /  DBili  x   /  AST  19  /  ALT  20  /  AlkPhos  79  07-12      LIVER FUNCTIONS - ( 12 Jul 2021 06:27 )  Alb: 2.7 g/dL / Pro: 6.5 g/dL / ALK PHOS: 79 U/L / ALT: 20 U/L / AST: 19 U/L / GGT: x                           RADIOLOGY/OTHER RESULTS      CURRENT MEDICATIONS  MEDICATIONS  (STANDING):  aspirin  chewable 81 milliGRAM(s) Oral daily  atorvastatin 40 milliGRAM(s) Oral at bedtime  cholecalciferol 1000 Unit(s) Oral daily  clopidogrel Tablet 75 milliGRAM(s) Oral daily  cyanocobalamin 1000 MICROGram(s) Oral at bedtime  dextrose 40% Gel 15 Gram(s) Oral once  dextrose 5%. 1000 milliLiter(s) (50 mL/Hr) IV Continuous <Continuous>  dextrose 5%. 1000 milliLiter(s) (100 mL/Hr) IV Continuous <Continuous>  dextrose 50% Injectable 25 Gram(s) IV Push once  dextrose 50% Injectable 12.5 Gram(s) IV Push once  dextrose 50% Injectable 25 Gram(s) IV Push once  famotidine    Tablet 20 milliGRAM(s) Oral daily  glucagon  Injectable 1 milliGRAM(s) IntraMuscular once  insulin lispro (ADMELOG) corrective regimen sliding scale   SubCutaneous three times a day before meals  insulin lispro (ADMELOG) corrective regimen sliding scale   SubCutaneous at bedtime  losartan 25 milliGRAM(s) Oral daily  metoprolol succinate ER 50 milliGRAM(s) Oral daily  tamsulosin 0.4 milliGRAM(s) Oral at bedtime    MEDICATIONS  (PRN):  acetaminophen   Tablet .. 650 milliGRAM(s) Oral every 6 hours PRN Mild Pain (1 - 3)  magnesium hydroxide Suspension 30 milliLiter(s) Oral daily PRN Constipation  polyethylene glycol 3350 17 Gram(s) Oral daily PRN Constipation      ASSESSMENT & PLAN    Continue comprehensive acute rehab program consisting of 3hrs/day of OT/PT and SLP.

## 2021-07-14 LAB
ALBUMIN SERPL ELPH-MCNC: 2.9 G/DL — LOW (ref 3.3–5)
ALP SERPL-CCNC: 88 U/L — SIGNIFICANT CHANGE UP (ref 40–120)
ALT FLD-CCNC: 20 U/L — SIGNIFICANT CHANGE UP (ref 10–45)
ANION GAP SERPL CALC-SCNC: 9 MMOL/L — SIGNIFICANT CHANGE UP (ref 5–17)
AST SERPL-CCNC: 20 U/L — SIGNIFICANT CHANGE UP (ref 10–40)
BILIRUB SERPL-MCNC: 0.5 MG/DL — SIGNIFICANT CHANGE UP (ref 0.2–1.2)
BUN SERPL-MCNC: 32 MG/DL — HIGH (ref 7–23)
CALCIUM SERPL-MCNC: 8.8 MG/DL — SIGNIFICANT CHANGE UP (ref 8.4–10.5)
CHLORIDE SERPL-SCNC: 104 MMOL/L — SIGNIFICANT CHANGE UP (ref 96–108)
CO2 SERPL-SCNC: 26 MMOL/L — SIGNIFICANT CHANGE UP (ref 22–31)
CREAT SERPL-MCNC: 1.6 MG/DL — HIGH (ref 0.5–1.3)
GLUCOSE BLDC GLUCOMTR-MCNC: 165 MG/DL — HIGH (ref 70–99)
GLUCOSE BLDC GLUCOMTR-MCNC: 170 MG/DL — HIGH (ref 70–99)
GLUCOSE BLDC GLUCOMTR-MCNC: 275 MG/DL — HIGH (ref 70–99)
GLUCOSE SERPL-MCNC: 164 MG/DL — HIGH (ref 70–99)
HCT VFR BLD CALC: 29.5 % — LOW (ref 39–50)
HGB BLD-MCNC: 9.7 G/DL — LOW (ref 13–17)
MCHC RBC-ENTMCNC: 29.9 PG — SIGNIFICANT CHANGE UP (ref 27–34)
MCHC RBC-ENTMCNC: 32.9 GM/DL — SIGNIFICANT CHANGE UP (ref 32–36)
MCV RBC AUTO: 91 FL — SIGNIFICANT CHANGE UP (ref 80–100)
NRBC # BLD: 0 /100 WBCS — SIGNIFICANT CHANGE UP (ref 0–0)
PLATELET # BLD AUTO: 474 K/UL — HIGH (ref 150–400)
POTASSIUM SERPL-MCNC: 4.3 MMOL/L — SIGNIFICANT CHANGE UP (ref 3.5–5.3)
POTASSIUM SERPL-SCNC: 4.3 MMOL/L — SIGNIFICANT CHANGE UP (ref 3.5–5.3)
PROT SERPL-MCNC: 6.8 G/DL — SIGNIFICANT CHANGE UP (ref 6–8.3)
RBC # BLD: 3.24 M/UL — LOW (ref 4.2–5.8)
RBC # FLD: 12.9 % — SIGNIFICANT CHANGE UP (ref 10.3–14.5)
SODIUM SERPL-SCNC: 139 MMOL/L — SIGNIFICANT CHANGE UP (ref 135–145)
WBC # BLD: 5.89 K/UL — SIGNIFICANT CHANGE UP (ref 3.8–10.5)
WBC # FLD AUTO: 5.89 K/UL — SIGNIFICANT CHANGE UP (ref 3.8–10.5)

## 2021-07-14 PROCEDURE — 99232 SBSQ HOSP IP/OBS MODERATE 35: CPT

## 2021-07-14 RX ADMIN — Medication 1000 UNIT(S): at 11:31

## 2021-07-14 RX ADMIN — ATORVASTATIN CALCIUM 40 MILLIGRAM(S): 80 TABLET, FILM COATED ORAL at 21:28

## 2021-07-14 RX ADMIN — TAMSULOSIN HYDROCHLORIDE 0.4 MILLIGRAM(S): 0.4 CAPSULE ORAL at 21:28

## 2021-07-14 RX ADMIN — Medication 81 MILLIGRAM(S): at 11:31

## 2021-07-14 RX ADMIN — CLOPIDOGREL BISULFATE 75 MILLIGRAM(S): 75 TABLET, FILM COATED ORAL at 11:31

## 2021-07-14 RX ADMIN — Medication 6: at 17:09

## 2021-07-14 RX ADMIN — FAMOTIDINE 20 MILLIGRAM(S): 10 INJECTION INTRAVENOUS at 11:31

## 2021-07-14 RX ADMIN — Medication 50 MILLIGRAM(S): at 05:22

## 2021-07-14 RX ADMIN — PREGABALIN 1000 MICROGRAM(S): 225 CAPSULE ORAL at 21:28

## 2021-07-14 RX ADMIN — Medication 2: at 08:18

## 2021-07-14 RX ADMIN — LOSARTAN POTASSIUM 25 MILLIGRAM(S): 100 TABLET, FILM COATED ORAL at 05:22

## 2021-07-14 NOTE — PROGRESS NOTE ADULT - SUBJECTIVE AND OBJECTIVE BOX
Patient is a 75y old  Male who presents with a chief complaint of Left CVA, right sided hemiparesis (14 Jul 2021 11:18)    No events overnight   Denies chest pain, SOB  Reports pain in right hand has improved with brace  Had a BM yesterday    Patient seen and examined at bedside.    ALLERGIES:  No Known Allergies    MEDICATIONS  (STANDING):  aspirin  chewable 81 milliGRAM(s) Oral daily  atorvastatin 40 milliGRAM(s) Oral at bedtime  cholecalciferol 1000 Unit(s) Oral daily  clopidogrel Tablet 75 milliGRAM(s) Oral daily  cyanocobalamin 1000 MICROGram(s) Oral at bedtime  dextrose 40% Gel 15 Gram(s) Oral once  dextrose 5%. 1000 milliLiter(s) (50 mL/Hr) IV Continuous <Continuous>  dextrose 5%. 1000 milliLiter(s) (100 mL/Hr) IV Continuous <Continuous>  dextrose 50% Injectable 25 Gram(s) IV Push once  dextrose 50% Injectable 12.5 Gram(s) IV Push once  dextrose 50% Injectable 25 Gram(s) IV Push once  famotidine    Tablet 20 milliGRAM(s) Oral daily  glucagon  Injectable 1 milliGRAM(s) IntraMuscular once  insulin lispro (ADMELOG) corrective regimen sliding scale   SubCutaneous two times a day before meals  losartan 25 milliGRAM(s) Oral daily  metoprolol succinate ER 50 milliGRAM(s) Oral daily  tamsulosin 0.4 milliGRAM(s) Oral at bedtime    MEDICATIONS  (PRN):  acetaminophen   Tablet .. 650 milliGRAM(s) Oral every 6 hours PRN Mild Pain (1 - 3)  magnesium hydroxide Suspension 30 milliLiter(s) Oral daily PRN Constipation  polyethylene glycol 3350 17 Gram(s) Oral daily PRN Constipation    Vital Signs Last 24 Hrs  T(F): 98 (14 Jul 2021 09:08), Max: 98.2 (13 Jul 2021 19:40)  HR: 60 (14 Jul 2021 09:08) (60 - 70)  BP: 114/64 (14 Jul 2021 09:08) (114/64 - 146/72)  RR: 18 (14 Jul 2021 09:08) (16 - 18)  SpO2: 96% (14 Jul 2021 09:08) (96% - 97%)  I&O's Summary    BMI (kg/m2): 27.1 (07-10-21 @ 17:33)  PHYSICAL EXAM:  General: NAD, A/O x 3  ENT: MMM, no scleral icterus  Neck: Supple, No JVD, no thyroidomegaly  Lungs: Clear to auscultation bilaterally, no wheezes, no rales, no rhonchi, good inspiratory effort  Cardio: RRR, S1/S2, No murmurs  Abdomen: Soft, Nontender, Nondistended; Bowel sounds present  Extremities:Right hand with brace, muscle weakness noted, +2radial pulse, fingers warm, sensation intact,  No calf tenderness, No pitting edema, no skin changes    LABS:                        9.7    5.89  )-----------( 474      ( 14 Jul 2021 05:30 )             29.5       07-14    139  |  104  |  32  ----------------------------<  164  4.3   |  26  |  1.60    Ca    8.8      14 Jul 2021 05:30    TPro  6.8  /  Alb  2.9  /  TBili  0.5  /  DBili  x   /  AST  20  /  ALT  20  /  AlkPhos  88  07-14     eGFR if Non African American: 41 mL/min/1.73M2 (07-14-21 @ 05:30)  eGFR if : 48 mL/min/1.73M2 (07-14-21 @ 05:30)    POCT Blood Glucose.: 165 mg/dL (14 Jul 2021 08:02)  POCT Blood Glucose.: 237 mg/dL (13 Jul 2021 21:07)    COVID-19 PCR: NotDetec (11-05-20 @ 19:01)  COVID-19 PCR: NotDetec (11-04-20 @ 04:58)      RADIOLOGY & ADDITIONAL TESTS:    Xray of right hand pending read

## 2021-07-14 NOTE — PROGRESS NOTE ADULT - SUBJECTIVE AND OBJECTIVE BOX
Subjective: Urinary incontinence. Right wrist pain improved.       HISTORY OF PRESENT ILLNESS  75 year old patient male w/ HTN, CAD, DM, HLD, h/o LLE Osteomyelitis/Cellulitis (November, 2020), Right shoulder replacement and severe aortic stenosis presented to [facility] on 6/29 for JONN, AVR and CABG x5.  Pt reports that he was hospitalized @ [facility] in November, 2020 for LLE osteomyelitis/sepsis at which time pt underwent Echocardiogram which revealed, "aortic stenosis". After the completion of antibiotic treatment for LLE cellulitis, pt underwent cardiac catheterization on 4/19/2021 with Dr. Mundo Terrell which revealed, "aortic stenosis and multi vessel coronary artery disease" . Pt reports worsening fatigability and pt noted "taking too many naps throughout the day" over the past 7 months.  Aortic stenosis severe and Coronary artery disease underwent JONN and AVR and CABG x5 on 06/29/2021.  Post op course complicated by  right sided weakness and slurred speech. MRI confirms embolic appearing strokes acute ischemic infarcts R P-O wedge shape, and L temporal and L ventral roopa.  Not IV tPA candidate with recent OHS. Not interventional candidate with no evidence clinically and on CTA of LVO. Started on Plavix on 7/10/21 for secondary stroke prevention.  Patient was evaluated by PT/OT who recommended acute rehab -- patient was admitted to [facility] for acute rehab on 7/10/21.       PAST MEDICAL & SURGICAL HISTORY:  Hypertension    Diabetes mellitus     REVIEW OF SYMPTOMS  [X] Constitutional WNL     [X] Cardio htn            [X] Resp WNL           [X] GI WNL                          [X]                   [X] Heme anemia              [X] Endo WNL                     [X] Skin WNL                 [X] MSK wrist pain           [X] Neuro WNL                   [X] Cognitive WNL        [X] Psych WNL      VITALS  Vital Signs Last 24 Hrs  T(C): 36.7 (14 Jul 2021 09:08), Max: 36.8 (13 Jul 2021 19:40)  T(F): 98 (14 Jul 2021 09:08), Max: 98.2 (13 Jul 2021 19:40)  HR: 60 (14 Jul 2021 09:08) (60 - 70)  BP: 114/64 (14 Jul 2021 09:08) (114/64 - 146/72)  BP(mean): --  RR: 18 (14 Jul 2021 09:08) (16 - 18)  SpO2: 96% (14 Jul 2021 09:08) (96% - 97%)     PHYSICAL EXAM  Constitutional - NAD in chair  HEENT - NCAT, EOMI  Neck - Supple, No limited ROM  Chest - diminished, midsternal incision CDI  Cardiovascular - RR  Abdomen - BS+, Soft, NTND  Extremities - right wrist limited ROM and tender, No calf tenderness   Skin-no rash  Woumds- healing well, midchest puncture wounds, midsternal and RLE harvest sites scabbed and healing well.      Neurologic Exam - Awake, Alert, at baseline with right sided weakness     Psychiatric - Mood stable, Affect WNL        RECENT LABS                        9.7    5.89  )-----------( 474      ( 14 Jul 2021 05:30 )             29.5     07-14    139  |  104  |  32<H>  ----------------------------<  164<H>  4.3   |  26  |  1.60<H>    Ca    8.8      14 Jul 2021 05:30    TPro  6.8  /  Alb  2.9<L>  /  TBili  0.5  /  DBili  x   /  AST  20  /  ALT  20  /  AlkPhos  88  07-14      LIVER FUNCTIONS - ( 14 Jul 2021 05:30 )  Alb: 2.9 g/dL / Pro: 6.8 g/dL / ALK PHOS: 88 U/L / ALT: 20 U/L / AST: 20 U/L / GGT: x               RADIOLOGY/OTHER RESULTS      CURRENT MEDICATIONS  MEDICATIONS  (STANDING):  aspirin  chewable 81 milliGRAM(s) Oral daily  atorvastatin 40 milliGRAM(s) Oral at bedtime  cholecalciferol 1000 Unit(s) Oral daily  clopidogrel Tablet 75 milliGRAM(s) Oral daily  cyanocobalamin 1000 MICROGram(s) Oral at bedtime  dextrose 40% Gel 15 Gram(s) Oral once  dextrose 5%. 1000 milliLiter(s) (50 mL/Hr) IV Continuous <Continuous>  dextrose 5%. 1000 milliLiter(s) (100 mL/Hr) IV Continuous <Continuous>  dextrose 50% Injectable 25 Gram(s) IV Push once  dextrose 50% Injectable 12.5 Gram(s) IV Push once  dextrose 50% Injectable 25 Gram(s) IV Push once  famotidine    Tablet 20 milliGRAM(s) Oral daily  glucagon  Injectable 1 milliGRAM(s) IntraMuscular once  insulin lispro (ADMELOG) corrective regimen sliding scale   SubCutaneous two times a day before meals  losartan 25 milliGRAM(s) Oral daily  metoprolol succinate ER 50 milliGRAM(s) Oral daily  tamsulosin 0.4 milliGRAM(s) Oral at bedtime    MEDICATIONS  (PRN):  acetaminophen   Tablet .. 650 milliGRAM(s) Oral every 6 hours PRN Mild Pain (1 - 3)  magnesium hydroxide Suspension 30 milliLiter(s) Oral daily PRN Constipation  polyethylene glycol 3350 17 Gram(s) Oral daily PRN Constipation      ASSESSMENT & PLAN    Continue comprehensive acute rehab program consisting of 3hrs/day of OT/PT and SLP.

## 2021-07-14 NOTE — PROGRESS NOTE ADULT - ASSESSMENT
75 year old patient male w/ HTN, CAD, DM2, HLD, h/o LLE Osteomyelitis/Cellulitis (November, 2020), Right shoulder replacement and severe aortic stenosis presented to [facility] on 6/29 for JONN, AVR and CABG x5.  Post op course complicated by  right sided weakness and slurred speech. MRI confirms embolic appearing strokes acute ischemic infarcts R P-O wedge shape, and L temporal and L ventral roopa.  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  #Right UE weakness, right wrist pain  -Start Comprehensive Rehab Program of PT/OT/SLP  -Continue ASA/Plavix/Statin  -XRAY of right wrist obtained; waiting for read;  noted to have right humeral fracture from 2019    #HTN  #CAD  #Severe Aortic Stenosis   -s/p AVR + CABGx5  -c/w losartan 25mg po qd  -c/w metoprolol succinate ER 50mg po qd    #DM2  -Ha1c 6.9  -Continue SSI + Fingersticks two times a day    #Anemia  -Chronic   -Baseline Hb seems to be around 9-10  -Continue to monitor    #CKD Stage III  -Stable at baseline Cr 1.4-1.6  -Avoid nephrotoxic agents  -Monitor renal function    #DVT ppx  SCD    Discussed with Rehab Team

## 2021-07-14 NOTE — PROGRESS NOTE ADULT - ASSESSMENT
75 year old patient male w/ HTN, CAD, DM2, HLD, h/o LLE Osteomyelitis/Cellulitis (November, 2020), Right shoulder replacement and severe aortic stenosis presented to [facility] on 6/29 for JONN, AVR and CABG x5.  Post op course complicated by  right sided weakness and slurred speech. MRI confirms embolic appearing strokes acute ischemic infarcts R P-O wedge shape, and L temporal and L ventral roopa.  Not IV tPA candidate with recent OHS. Patient now admitted for a multidisciplinary rehab program. 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  - Gait Instability, ADL impairments and Functional impairments: on Comprehensive Rehab Program of PT/OT/SLP  - 3 hours a day, 5 days a week  - s/p CABG- embolic CVA  - c/w lipitor, ASA  - cleared for Plavix on 7/10    #CAD and Severe AS  - s/p AVR + CABGx5-incentive spirometry/deep breathing, pain control  - maintain sternal precautions-healing well  - BP control w/ losartan and metoprolol  - ASA/Plavix and Lipitor    #HTN   -losartan and metoprolol currently  -hospitalist zeke requested re/Cozaar in view of elevated cr-will follow recs    #DM2  - A1C 6.9%  -consistent carbs diet added  -FS ACHS  -BG not at goal-On metformin at home per 2020 records. Currently Cr elevated-hospitalist to follow up.    #pain  -right wrist pain/limited ROM  -uric acid wnl  -hospitalist f/up requested  -may use cool compress, splint in place-pain improved  -Xr right wrist completed    #Elevated cr  -?CKD vs MARINA  -labs are following  - Cr elevated  -?continue Cozaar for BP control-hospitalist requested for follow up    #Pain Mgmt   - Tylenol PRN    #GI/Bowel Mgmt   - Continent c/w Miralax   -BM 7/12      #Discharge planning  Team meeting DATE: 7/13  SW: PH with son who works during the day, 1 [place], 7+7 stairs to bedroom  OT: supervision eating and grooming, min A UBD, toilet transfers, mod A bathing and toileting, max A LBD  PT: mod A transfers, ambulating 70' with RW with min A and W/C follow, no stairs yet  ST: regular diet with thins, no language deficits  EDOD: 7/23

## 2021-07-15 LAB
ALBUMIN SERPL ELPH-MCNC: 2.7 G/DL — LOW (ref 3.3–5)
ALP SERPL-CCNC: 87 U/L — SIGNIFICANT CHANGE UP (ref 40–120)
ALT FLD-CCNC: 17 U/L — SIGNIFICANT CHANGE UP (ref 10–45)
ANION GAP SERPL CALC-SCNC: 10 MMOL/L — SIGNIFICANT CHANGE UP (ref 5–17)
AST SERPL-CCNC: 24 U/L — SIGNIFICANT CHANGE UP (ref 10–40)
BASOPHILS # BLD AUTO: 0.02 K/UL — SIGNIFICANT CHANGE UP (ref 0–0.2)
BASOPHILS NFR BLD AUTO: 0.4 % — SIGNIFICANT CHANGE UP (ref 0–2)
BILIRUB SERPL-MCNC: 0.4 MG/DL — SIGNIFICANT CHANGE UP (ref 0.2–1.2)
BUN SERPL-MCNC: 33 MG/DL — HIGH (ref 7–23)
CALCIUM SERPL-MCNC: 9.1 MG/DL — SIGNIFICANT CHANGE UP (ref 8.4–10.5)
CHLORIDE SERPL-SCNC: 106 MMOL/L — SIGNIFICANT CHANGE UP (ref 96–108)
CO2 SERPL-SCNC: 21 MMOL/L — LOW (ref 22–31)
CREAT SERPL-MCNC: 1.41 MG/DL — HIGH (ref 0.5–1.3)
EOSINOPHIL # BLD AUTO: 0.15 K/UL — SIGNIFICANT CHANGE UP (ref 0–0.5)
EOSINOPHIL NFR BLD AUTO: 2.8 % — SIGNIFICANT CHANGE UP (ref 0–6)
GLUCOSE BLDC GLUCOMTR-MCNC: 157 MG/DL — HIGH (ref 70–99)
GLUCOSE BLDC GLUCOMTR-MCNC: 165 MG/DL — HIGH (ref 70–99)
GLUCOSE BLDC GLUCOMTR-MCNC: 204 MG/DL — HIGH (ref 70–99)
GLUCOSE SERPL-MCNC: 164 MG/DL — HIGH (ref 70–99)
HCT VFR BLD CALC: 29.4 % — LOW (ref 39–50)
HGB BLD-MCNC: 9.8 G/DL — LOW (ref 13–17)
IMM GRANULOCYTES NFR BLD AUTO: 0.7 % — SIGNIFICANT CHANGE UP (ref 0–1.5)
LYMPHOCYTES # BLD AUTO: 1.41 K/UL — SIGNIFICANT CHANGE UP (ref 1–3.3)
LYMPHOCYTES # BLD AUTO: 26.1 % — SIGNIFICANT CHANGE UP (ref 13–44)
MCHC RBC-ENTMCNC: 30.2 PG — SIGNIFICANT CHANGE UP (ref 27–34)
MCHC RBC-ENTMCNC: 33.3 GM/DL — SIGNIFICANT CHANGE UP (ref 32–36)
MCV RBC AUTO: 90.7 FL — SIGNIFICANT CHANGE UP (ref 80–100)
MONOCYTES # BLD AUTO: 0.67 K/UL — SIGNIFICANT CHANGE UP (ref 0–0.9)
MONOCYTES NFR BLD AUTO: 12.4 % — SIGNIFICANT CHANGE UP (ref 2–14)
NEUTROPHILS # BLD AUTO: 3.11 K/UL — SIGNIFICANT CHANGE UP (ref 1.8–7.4)
NEUTROPHILS NFR BLD AUTO: 57.6 % — SIGNIFICANT CHANGE UP (ref 43–77)
NRBC # BLD: 0 /100 WBCS — SIGNIFICANT CHANGE UP (ref 0–0)
PLATELET # BLD AUTO: 412 K/UL — HIGH (ref 150–400)
POTASSIUM SERPL-MCNC: 4.5 MMOL/L — SIGNIFICANT CHANGE UP (ref 3.5–5.3)
POTASSIUM SERPL-SCNC: 4.5 MMOL/L — SIGNIFICANT CHANGE UP (ref 3.5–5.3)
PROT SERPL-MCNC: 6.6 G/DL — SIGNIFICANT CHANGE UP (ref 6–8.3)
RBC # BLD: 3.24 M/UL — LOW (ref 4.2–5.8)
RBC # FLD: 13 % — SIGNIFICANT CHANGE UP (ref 10.3–14.5)
SODIUM SERPL-SCNC: 137 MMOL/L — SIGNIFICANT CHANGE UP (ref 135–145)
WBC # BLD: 5.4 K/UL — SIGNIFICANT CHANGE UP (ref 3.8–10.5)
WBC # FLD AUTO: 5.4 K/UL — SIGNIFICANT CHANGE UP (ref 3.8–10.5)

## 2021-07-15 PROCEDURE — 99232 SBSQ HOSP IP/OBS MODERATE 35: CPT

## 2021-07-15 RX ADMIN — Medication 50 MILLIGRAM(S): at 05:30

## 2021-07-15 RX ADMIN — CLOPIDOGREL BISULFATE 75 MILLIGRAM(S): 75 TABLET, FILM COATED ORAL at 12:05

## 2021-07-15 RX ADMIN — TAMSULOSIN HYDROCHLORIDE 0.4 MILLIGRAM(S): 0.4 CAPSULE ORAL at 21:47

## 2021-07-15 RX ADMIN — LOSARTAN POTASSIUM 25 MILLIGRAM(S): 100 TABLET, FILM COATED ORAL at 05:30

## 2021-07-15 RX ADMIN — Medication 2: at 07:52

## 2021-07-15 RX ADMIN — Medication 1000 UNIT(S): at 12:05

## 2021-07-15 RX ADMIN — FAMOTIDINE 20 MILLIGRAM(S): 10 INJECTION INTRAVENOUS at 12:05

## 2021-07-15 RX ADMIN — Medication 81 MILLIGRAM(S): at 12:05

## 2021-07-15 RX ADMIN — Medication 4: at 17:01

## 2021-07-15 RX ADMIN — PREGABALIN 1000 MICROGRAM(S): 225 CAPSULE ORAL at 21:47

## 2021-07-15 RX ADMIN — ATORVASTATIN CALCIUM 40 MILLIGRAM(S): 80 TABLET, FILM COATED ORAL at 21:47

## 2021-07-15 NOTE — PROGRESS NOTE ADULT - SUBJECTIVE AND OBJECTIVE BOX
Subjective: Night uneventful tolerating therapy, offers no new complaints. Incisions healing well, no chest pain.      HISTORY OF PRESENT ILLNESS  75 year old patient male w/ HTN, CAD, DM, HLD, h/o LLE Osteomyelitis/Cellulitis (November, 2020), Right shoulder replacement and severe aortic stenosis presented to [facility] on 6/29 for JONN, AVR and CABG x5.  Pt reports that he was hospitalized @ [facility] in November, 2020 for LLE osteomyelitis/sepsis at which time pt underwent Echocardiogram which revealed, "aortic stenosis". After the completion of antibiotic treatment for LLE cellulitis, pt underwent cardiac catheterization on 4/19/2021 with Dr. Mundo Terrell which revealed, "aortic stenosis and multi vessel coronary artery disease" . Pt reports worsening fatigability and pt noted "taking too many naps throughout the day" over the past 7 months.  Aortic stenosis severe and Coronary artery disease underwent JONN and AVR and CABG x5 on 06/29/2021.  Post op course complicated by  right sided weakness and slurred speech. MRI confirms embolic appearing strokes acute ischemic infarcts R P-O wedge shape, and L temporal and L ventral roopa.  Not IV tPA candidate with recent OHS. Not interventional candidate with no evidence clinically and on CTA of LVO. Started on Plavix on 7/10/21 for secondary stroke prevention.  Patient was evaluated by PT/OT who recommended acute rehab -- patient was admitted to [facility] for acute rehab on 7/10/21.       PAST MEDICAL & SURGICAL HISTORY:  Hypertension    Diabetes mellitus     REVIEW OF SYMPTOMS  [X] Constitutional WNL     [X] Cardio htn            [X] Resp WNL           [X] GI WNL                          [X]                   [X] Heme anemia              [X] Endo WNL                     [X] Skin WNL                 [X] MSK wrist pain           [X] Neuro WNL                   [X] Cognitive WNL        [X] Psych WNL      VITALS  Vital Signs Last 24 Hrs  T(C): 36.6 (15 Jul 2021 08:22), Max: 36.6 (15 Jul 2021 08:22)  T(F): 97.9 (15 Jul 2021 08:22), Max: 97.9 (15 Jul 2021 08:22)  HR: 68 (15 Jul 2021 08:22) (67 - 74)  BP: 112/63 (15 Jul 2021 08:22) (112/63 - 125/70)  BP(mean): --  RR: 15 (15 Jul 2021 08:22) (15 - 17)  SpO2: 95% (15 Jul 2021 08:22) (95% - 95%)       PHYSICAL EXAM  Constitutional - NAD in chair  HEENT - NCAT, EOMI  Neck - Supple, No limited ROM  Chest - diminished, midsternal incision CDI  Cardiovascular - RR  Abdomen - BS+, Soft, NTND  Extremities - right wrist limited ROM and tender, No calf tenderness   Skin-no rash  Woumds- healing well, midchest puncture wounds, midsternal and RLE harvest sites scabbed and healing well.      Neurologic Exam - Awake, Alert, at baseline with right sided weakness     Psychiatric - Mood stable, Affect WNL      RECENT LABS                        9.8    5.40  )-----------( 412      ( 15 Jul 2021 05:10 )             29.4     07-15    137  |  106  |  33<H>  ----------------------------<  164<H>  4.5   |  21<L>  |  1.41<H>    Ca    9.1      15 Jul 2021 05:10    TPro  6.6  /  Alb  2.7<L>  /  TBili  0.4  /  DBili  x   /  AST  24  /  ALT  17  /  AlkPhos  87  07-15      LIVER FUNCTIONS - ( 15 Jul 2021 05:10 )  Alb: 2.7 g/dL / Pro: 6.6 g/dL / ALK PHOS: 87 U/L / ALT: 17 U/L / AST: 24 U/L / GGT: x                           RADIOLOGY/OTHER RESULTS      CURRENT MEDICATIONS  MEDICATIONS  (STANDING):  aspirin  chewable 81 milliGRAM(s) Oral daily  atorvastatin 40 milliGRAM(s) Oral at bedtime  cholecalciferol 1000 Unit(s) Oral daily  clopidogrel Tablet 75 milliGRAM(s) Oral daily  cyanocobalamin 1000 MICROGram(s) Oral at bedtime  dextrose 40% Gel 15 Gram(s) Oral once  dextrose 5%. 1000 milliLiter(s) (50 mL/Hr) IV Continuous <Continuous>  dextrose 5%. 1000 milliLiter(s) (100 mL/Hr) IV Continuous <Continuous>  dextrose 50% Injectable 25 Gram(s) IV Push once  dextrose 50% Injectable 12.5 Gram(s) IV Push once  dextrose 50% Injectable 25 Gram(s) IV Push once  famotidine    Tablet 20 milliGRAM(s) Oral daily  glucagon  Injectable 1 milliGRAM(s) IntraMuscular once  insulin lispro (ADMELOG) corrective regimen sliding scale   SubCutaneous two times a day before meals  losartan 25 milliGRAM(s) Oral daily  metoprolol succinate ER 50 milliGRAM(s) Oral daily  tamsulosin 0.4 milliGRAM(s) Oral at bedtime    MEDICATIONS  (PRN):  acetaminophen   Tablet .. 650 milliGRAM(s) Oral every 6 hours PRN Mild Pain (1 - 3)  magnesium hydroxide Suspension 30 milliLiter(s) Oral daily PRN Constipation  polyethylene glycol 3350 17 Gram(s) Oral daily PRN Constipation      ASSESSMENT & PLAN    Continue comprehensive acute rehab program consisting of 3hrs/day of OT/PT and SLP.

## 2021-07-15 NOTE — PROGRESS NOTE ADULT - ATTENDING COMMENTS
No new complaints  Stable neurologically  Functionally improving  Medicine input noted
Patient medically and neurologically stable. Making progress towards rehab goals.   Continue rehab program.
Patient examined, medical records reviewed  Labs and mediations reviewed as well  Continue Stroke ppx therapy , monitor post op recovery, renal and LFT function  BP management  Goal LDL<7  Goal HbA1c <7  Aspiration and fall precautions  Medicine input noted

## 2021-07-15 NOTE — CHART NOTE - NSCHARTNOTEFT_GEN_A_CORE
Nutrition Follow Up Note  Hospital Course   (Per Electronic Medical Record)    Source:  Patient [X]  Medical Record [X]      Diet:   Consistent Carbohydrate Low Sodium Diet w/ Thin Liquids  Tolerates Diet Consistency Well  No Chewing/Swallowing Difficulties  No Recent Nausea, Vomiting, Diarrhea or Constipation (as Per Patient)  States Good PO Intake/Appetite   on Glucerna 8oz PO Daily (Provides 220kcal-10grams of Protein)  Patient Takes Nutrition Supplement   Education Provided on Need for Supplementation  Obtained Food Preferences from Patient    Enteral/Parenteral Nutrition: Not Applicable    Current Weight: 162.9lb on 7/10  Obtain New Weight  Obtain Weights Weekly     Pertinent Medications: MEDICATIONS  (STANDING):  aspirin  chewable 81 milliGRAM(s) Oral daily  atorvastatin 40 milliGRAM(s) Oral at bedtime  cholecalciferol 1000 Unit(s) Oral daily  clopidogrel Tablet 75 milliGRAM(s) Oral daily  cyanocobalamin 1000 MICROGram(s) Oral at bedtime  dextrose 40% Gel 15 Gram(s) Oral once  dextrose 5%. 1000 milliLiter(s) (50 mL/Hr) IV Continuous <Continuous>  dextrose 5%. 1000 milliLiter(s) (100 mL/Hr) IV Continuous <Continuous>  dextrose 50% Injectable 25 Gram(s) IV Push once  dextrose 50% Injectable 12.5 Gram(s) IV Push once  dextrose 50% Injectable 25 Gram(s) IV Push once  famotidine    Tablet 20 milliGRAM(s) Oral daily  glucagon  Injectable 1 milliGRAM(s) IntraMuscular once  insulin lispro (ADMELOG) corrective regimen sliding scale   SubCutaneous two times a day before meals  losartan 25 milliGRAM(s) Oral daily  metoprolol succinate ER 50 milliGRAM(s) Oral daily  tamsulosin 0.4 milliGRAM(s) Oral at bedtime    MEDICATIONS  (PRN):  acetaminophen   Tablet .. 650 milliGRAM(s) Oral every 6 hours PRN Mild Pain (1 - 3)  magnesium hydroxide Suspension 30 milliLiter(s) Oral daily PRN Constipation  polyethylene glycol 3350 17 Gram(s) Oral daily PRN Constipation    Pertinent Labs:  07-15 Na137 mmol/L Glu 164 mg/dL<H> K+ 4.5 mmol/L Cr  1.41 mg/dL<H> BUN 33 mg/dL<H> 07-15 Alb 2.7 g/dL<L>    POCT (over Last 3 Days) - Ranging from 151-275    Skin: No Pressure Ulcers  Multiple Surgical Incisions  (as Per Nursing Flow Sheet)     Edema: None Noted     Last Bowel Movement: on 7/14    Estimated Needs:   [X] No Change Since Previous Assessment    Previous Nutrition Diagnosis:   Severe Malnutrition     Nutrition Diagnosis is [X] Ongoing - Patient Continues on Nutrition Supplement, Patient Takes Nutrition Supplement & Nutrition Education Provided on Need for Supplementation     New Nutrition Diagnosis: [X] Not Applicable    Interventions:   1. Education Provided on Need for Supplementation    2. Recommend Continue Nutrition Plan of Care     Monitoring & Evaluation:   [X] Weights   [X] PO Intake   [X] Skin Integrity   [X] Follow Up (Per Protocol)  [X] Tolerance to Diet Prescription   [X] Other: Labs & POCT    Registered Dietitian/Nutritionist Remains Available.  Wallace Duarte RDN    Pager # [phone]  Phone# [phone]

## 2021-07-15 NOTE — PROGRESS NOTE ADULT - ASSESSMENT
75 year old patient male w/ HTN, CAD, DM2, HLD, h/o LLE Osteomyelitis/Cellulitis (November, 2020), Right shoulder replacement and severe aortic stenosis presented to [facility] on 6/29 for JONN, AVR and CABG x5.  Post op course complicated by  right sided weakness and slurred speech. MRI confirms embolic appearing strokes acute ischemic infarcts R P-O wedge shape, and L temporal and L ventral roopa.  Not IV tPA candidate with recent OHS. Patient now admitted for a multidisciplinary rehab program. 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  - Gait Instability, ADL impairments and Functional impairments: on Comprehensive Rehab Program of PT/OT/SLP  - 3 hours a day, 5 days a week  - s/p CABG- embolic CVA  - c/w lipitor, ASA  - cleared for Plavix on 7/10    #CAD and Severe AS  - s/p AVR + CABGx5-incentive spirometry/deep breathing, pain control  - maintain sternal precautions-healing well  - BP control w/ losartan and metoprolol  - ASA/Plavix and Lipitor    #HTN   -losartan and metoprolol currently  -hospitalist zeke requested re/Cozaar in view of elevated cr-will follow recs    #DM2  - A1C 6.9%  -consistent carbs diet added  -FS ACHS  -BG not at goal-On metformin at home per 2020 records. Currently Cr elevated-hospitalist to follow up.    #pain  -right wrist pain/limited ROM  -uric acid wnl  -hospitalist f/up requested  -may use cool compress, splint in place-pain improved  -Xr right wrist completed    #Elevated cr  -?CKD vs MARINA  -labs are following  - Cr improved  -?continue Cozaar for BP control-hospitalist requested for follow up    #Pain Mgmt   - Tylenol PRN    #GI/Bowel Mgmt   - Continent c/w Miralax       #Discharge planning  Team meeting DATE: 7/13  SW: PH with son who works during the day, 1 [place], 7+7 stairs to bedroom  OT: supervision eating and grooming, min A UBD, toilet transfers, mod A bathing and toileting, max A LBD  PT: mod A transfers, ambulating 70' with RW with min A and W/C follow, no stairs yet  ST: regular diet with thins, no language deficits  EDOD: 7/23

## 2021-07-16 LAB
GLUCOSE BLDC GLUCOMTR-MCNC: 143 MG/DL — HIGH (ref 70–99)
GLUCOSE BLDC GLUCOMTR-MCNC: 153 MG/DL — HIGH (ref 70–99)

## 2021-07-16 RX ADMIN — Medication 1000 UNIT(S): at 11:20

## 2021-07-16 RX ADMIN — Medication 50 MILLIGRAM(S): at 05:17

## 2021-07-16 RX ADMIN — Medication 81 MILLIGRAM(S): at 11:20

## 2021-07-16 RX ADMIN — PREGABALIN 1000 MICROGRAM(S): 225 CAPSULE ORAL at 22:08

## 2021-07-16 RX ADMIN — FAMOTIDINE 20 MILLIGRAM(S): 10 INJECTION INTRAVENOUS at 11:21

## 2021-07-16 RX ADMIN — ATORVASTATIN CALCIUM 40 MILLIGRAM(S): 80 TABLET, FILM COATED ORAL at 22:08

## 2021-07-16 RX ADMIN — TAMSULOSIN HYDROCHLORIDE 0.4 MILLIGRAM(S): 0.4 CAPSULE ORAL at 22:08

## 2021-07-16 RX ADMIN — LOSARTAN POTASSIUM 25 MILLIGRAM(S): 100 TABLET, FILM COATED ORAL at 05:17

## 2021-07-16 RX ADMIN — CLOPIDOGREL BISULFATE 75 MILLIGRAM(S): 75 TABLET, FILM COATED ORAL at 11:21

## 2021-07-16 RX ADMIN — Medication 2: at 17:08

## 2021-07-16 NOTE — PROGRESS NOTE ADULT - ASSESSMENT
75 year old patient male w/ HTN, CAD, DM2, HLD, h/o LLE Osteomyelitis/Cellulitis (November, 2020), Right shoulder replacement and severe aortic stenosis presented to [facility] on 6/29 for JONN, AVR and CABG x5.  Post op course complicated by  right sided weakness and slurred speech. MRI confirms embolic appearing strokes acute ischemic infarcts R P-O wedge shape, and L temporal and L ventral roopa.  Not IV tPA candidate with recent OHS. Patient now admitted for a multidisciplinary rehab program. 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  - Gait Instability, ADL impairments and Functional impairments: on Comprehensive Rehab Program of PT/OT/SLP  - 3 hours a day, 5 days a week  - s/p CABG- embolic CVA  - c/w lipitor, ASA  - cleared for Plavix on 7/10    #CAD and Severe AS  - s/p AVR + CABGx5-incentive spirometry/deep breathing, pain control  - maintain sternal precautions-healing well  - BP control w/ losartan and metoprolol  - ASA/Plavix and Lipitor    #HTN   -losartan and metoprolol currently  -hospitalist zeke requested re/Cozaar in view of elevated cr-will follow recs    #DM2  - A1C 6.9%  -consistent carbs diet added  -FS ACHS  -BG not at goal-On metformin at home per 2020 records. Currently Cr elevated-hospitalist to follow up.    #pain  -right wrist pain/limited ROM  -uric acid wnl  -hospitalist f/up requested  -may use cool compress, splint in place-pain improved  -Xr right wrist completed    #Elevated cr  -?CKD vs MARINA  -labs are following  - Cr improved  -?continue Cozaar for BP control-hospitalist requested for follow up    #Pain Mgmt   - Tylenol PRN    #GI/Bowel Mgmt   - Continent c/w Miralax       #Discharge planning  Team meeting DATE: 7/13  SW: PH with son who works during the day, 1 [place], 7+7 stairs to bedroom  OT: supervision eating and grooming, min A UBD, toilet transfers, mod A bathing and toileting, max A LBD  PT: mod A transfers, ambulating 70' with RW with min A and W/C follow, no stairs yet  ST: regular diet with thins, no language deficits  EDOD: 7/23

## 2021-07-16 NOTE — PROGRESS NOTE ADULT - SUBJECTIVE AND OBJECTIVE BOX
HPI:  75 year old patient male w/ HTN, CAD, DM, HLD, h/o LLE Osteomyelitis/Cellulitis (November, 2020), Right shoulder replacement and severe aortic stenosis presented to [facility] on 6/29 for JONN, AVR and CABG x5.  Pt reports that he was hospitalized @ [facility] [place] in November, 2020 for LLE osteomyelitis/sepsis at which time pt underwent Echocardiogram which revealed, "aortic stenosis". After the completion of antibiotic treatment for LLE cellulitis, pt underwent cardiac catheterization on 4/19/2021 with Dr. Mundo Terrell which revealed, "aortic stenosis and multi vessel coronary artery disease" . Pt reports worsening fatigability and pt noted "taking too many naps throughout the day" over the past 7 months.  Aortic stenosis severe and Coronary artery disease underwent JONN and AVR and CABG x5 on 06/29/2021.  Post op course complicated by  right sided weakness and slurred speech. MRI confirms embolic appearing strokes acute ischemic infarcts R P-O wedge shape, and L temporal and L ventral roopa.  Not IV tPA candidate with recent OHS. Not interventional candidate with no evidence clinically and on CTA of LVO. Started on Plavix on 7/10/21 for secondary stroke prevention.  Patient was evaluated by PT/OT who recommended acute rehab -- patient was admitted to [facility] for acute rehab on 7/10/21.     SUBJECTIVE / INTERVAL HPI: Patient seen and examined in PT gym. He states that his right wrist pain is much improved and he has begun to use the hand functionally. He is doing well in therapy and denies shortness of breath, nausea, or constipation.     REVIEW OF SYSTEMS:    CONSTITUTIONAL: No fevers or chills  EYES/ENT: No visual changes;  No vertigo or throat pain   NECK: No pain or stiffness  RESPIRATORY: No cough, wheezing, or shortness of breath  CARDIOVASCULAR: No chest pain or palpitations  GASTROINTESTINAL: No abdominal or epigastric pain. No nausea or vomiting. No diarrhea or constipation.   GENITOURINARY: No dysuria, frequency or hematuria  NEUROLOGICAL: No numbness, + weakness  SKIN: No itching, rashes  MSK: +right wrist pain improving      VITAL SIGNS:  Vital Signs Last 24 Hrs  T(C): 36.6 (16 Jul 2021 07:51), Max: 36.7 (15 Jul 2021 20:05)  T(F): 97.8 (16 Jul 2021 07:51), Max: 98.1 (15 Jul 2021 20:05)  HR: 63 (16 Jul 2021 07:51) (63 - 80)  BP: 108/70 (16 Jul 2021 07:51) (108/70 - 116/68)  BP(mean): --  RR: 16 (16 Jul 2021 07:51) (16 - 16)  SpO2: 95% (16 Jul 2021 07:51) (95% - 96%)    PHYSICAL EXAM:    General: NAD, sitting in PT gym working with therapist  HEENT: NC/AT; PERRL, anicteric sclera; MMM  Neck: supple  Cardiovascular: +S1/S2, RRR, +sternal incision intact and clean  Respiratory: no respiratory distress, breathing comfortably  Gastrointestinal: soft, NT/ND  Extremities: warm, well perfused; no edema, clubbing or cyanosis  Neurological: AAOx3; Motor exam with mild right sided weakness improving. No sensory deficits.    MEDICATIONS:  MEDICATIONS  (STANDING):  aspirin  chewable 81 milliGRAM(s) Oral daily  atorvastatin 40 milliGRAM(s) Oral at bedtime  cholecalciferol 1000 Unit(s) Oral daily  clopidogrel Tablet 75 milliGRAM(s) Oral daily  cyanocobalamin 1000 MICROGram(s) Oral at bedtime  dextrose 40% Gel 15 Gram(s) Oral once  dextrose 5%. 1000 milliLiter(s) (50 mL/Hr) IV Continuous <Continuous>  dextrose 5%. 1000 milliLiter(s) (100 mL/Hr) IV Continuous <Continuous>  dextrose 50% Injectable 25 Gram(s) IV Push once  dextrose 50% Injectable 12.5 Gram(s) IV Push once  dextrose 50% Injectable 25 Gram(s) IV Push once  famotidine    Tablet 20 milliGRAM(s) Oral daily  glucagon  Injectable 1 milliGRAM(s) IntraMuscular once  insulin lispro (ADMELOG) corrective regimen sliding scale   SubCutaneous two times a day before meals  losartan 25 milliGRAM(s) Oral daily  metoprolol succinate ER 50 milliGRAM(s) Oral daily  tamsulosin 0.4 milliGRAM(s) Oral at bedtime    MEDICATIONS  (PRN):  acetaminophen   Tablet .. 650 milliGRAM(s) Oral every 6 hours PRN Mild Pain (1 - 3)  magnesium hydroxide Suspension 30 milliLiter(s) Oral daily PRN Constipation  polyethylene glycol 3350 17 Gram(s) Oral daily PRN Constipation      ALLERGIES:  Allergies    No Known Allergies    Intolerances        LABS:                        9.8    5.40  )-----------( 412      ( 15 Jul 2021 05:10 )             29.4     07-15    137  |  106  |  33<H>  ----------------------------<  164<H>  4.5   |  21<L>  |  1.41<H>    Ca    9.1      15 Jul 2021 05:10    TPro  6.6  /  Alb  2.7<L>  /  TBili  0.4  /  DBili  x   /  AST  24  /  ALT  17  /  AlkPhos  87  07-15        CAPILLARY BLOOD GLUCOSE      POCT Blood Glucose.: 143 mg/dL (16 Jul 2021 07:41)      RADIOLOGY & ADDITIONAL TESTS: Reviewed.

## 2021-07-17 LAB
GLUCOSE BLDC GLUCOMTR-MCNC: 162 MG/DL — HIGH (ref 70–99)
GLUCOSE BLDC GLUCOMTR-MCNC: 164 MG/DL — HIGH (ref 70–99)
GLUCOSE BLDC GLUCOMTR-MCNC: 185 MG/DL — HIGH (ref 70–99)

## 2021-07-17 PROCEDURE — 99232 SBSQ HOSP IP/OBS MODERATE 35: CPT

## 2021-07-17 RX ADMIN — Medication 1000 UNIT(S): at 11:39

## 2021-07-17 RX ADMIN — Medication 2: at 16:50

## 2021-07-17 RX ADMIN — ATORVASTATIN CALCIUM 40 MILLIGRAM(S): 80 TABLET, FILM COATED ORAL at 22:05

## 2021-07-17 RX ADMIN — CLOPIDOGREL BISULFATE 75 MILLIGRAM(S): 75 TABLET, FILM COATED ORAL at 11:39

## 2021-07-17 RX ADMIN — Medication 81 MILLIGRAM(S): at 11:39

## 2021-07-17 RX ADMIN — TAMSULOSIN HYDROCHLORIDE 0.4 MILLIGRAM(S): 0.4 CAPSULE ORAL at 22:05

## 2021-07-17 RX ADMIN — Medication 50 MILLIGRAM(S): at 05:49

## 2021-07-17 RX ADMIN — FAMOTIDINE 20 MILLIGRAM(S): 10 INJECTION INTRAVENOUS at 11:39

## 2021-07-17 RX ADMIN — PREGABALIN 1000 MICROGRAM(S): 225 CAPSULE ORAL at 22:05

## 2021-07-17 RX ADMIN — LOSARTAN POTASSIUM 25 MILLIGRAM(S): 100 TABLET, FILM COATED ORAL at 05:49

## 2021-07-17 RX ADMIN — Medication 2: at 08:14

## 2021-07-17 NOTE — PROGRESS NOTE ADULT - ASSESSMENT
75 year old patient male w/ HTN, CAD, DM2, HLD, h/o LLE Osteomyelitis/Cellulitis (November, 2020), Right shoulder replacement and severe aortic stenosis presented to [facility] on 6/29 for JONN, AVR and CABG x5.  Post op course complicated by  right sided weakness and slurred speech. MRI confirms embolic appearing strokes acute ischemic infarcts R P-O wedge shape, and L temporal and L ventral roopa.  Not IV tPA candidate with recent OHS. Patient now admitted for a multidisciplinary rehab program. 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  - Gait Instability, ADL impairments and Functional impairments: on Comprehensive Rehab Program of PT/OT/SLP  - 3 hours a day, 5 days a week  - s/p CABG- embolic CVA  - c/w lipitor, ASA  - cleared for Plavix on 7/10    #CAD and Severe AS  - s/p AVR + CABGx5-incentive spirometry/deep breathing, pain control  - maintain sternal precautions-healing well  - BP control w/ losartan and metoprolol  - ASA/Plavix and Lipitor    #HTN   -losartan and metoprolol currently  -hospitalist zeke requested re/Cozaar in view of elevated cr-will follow recs    #DM2  - A1C 6.9%  -consistent carbs diet added  -FS ACHS  -BG not at goal-On metformin at home per 2020 records. Currently Cr elevated-hospitalist to follow up.    #pain  -right wrist pain/limited ROM  -uric acid wnl  -hospitalist f/up requested  -may use cool compress, splint in place-pain improved  -Xr right wrist completed    #Elevated cr  -?CKD vs MARINA  -labs are following  - Cr improved  -?continue Cozaar for BP control-hospitalist requested for follow up    #Pain Mgmt   - Tylenol PRN    #GI/Bowel Mgmt   - Continent c/w Miralax       #Discharge planning  Team meeting DATE: 7/13  SW: PH with son who works during the day, 1 [place], 7+7 stairs to bedroom  OT: supervision eating and grooming, min A UBD, toilet transfers, mod A bathing and toileting, max A LBD  PT: mod A transfers, ambulating 70' with RW with min A and W/C follow, no stairs yet  ST: regular diet with thins, no language deficits  EDOD: 7/23

## 2021-07-17 NOTE — PROGRESS NOTE ADULT - SUBJECTIVE AND OBJECTIVE BOX
Hospitalist: Suzanne Pelayo DO    CHIEF COMPLAINT: Patient is a 75y old  male who presents with a chief complaint of Left CVA, right sided hemiparesis (16 Jul 2021 10:56)      SUBJECTIVE / OVERNIGHT EVENTS: Patient seen and examined. No acute events overnight. Pain well controlled and patient without any complaints.    MEDICATIONS  (STANDING):  aspirin  chewable 81 milliGRAM(s) Oral daily  atorvastatin 40 milliGRAM(s) Oral at bedtime  cholecalciferol 1000 Unit(s) Oral daily  clopidogrel Tablet 75 milliGRAM(s) Oral daily  cyanocobalamin 1000 MICROGram(s) Oral at bedtime  dextrose 40% Gel 15 Gram(s) Oral once  dextrose 5%. 1000 milliLiter(s) (50 mL/Hr) IV Continuous <Continuous>  dextrose 5%. 1000 milliLiter(s) (100 mL/Hr) IV Continuous <Continuous>  dextrose 50% Injectable 25 Gram(s) IV Push once  dextrose 50% Injectable 12.5 Gram(s) IV Push once  dextrose 50% Injectable 25 Gram(s) IV Push once  famotidine    Tablet 20 milliGRAM(s) Oral daily  glucagon  Injectable 1 milliGRAM(s) IntraMuscular once  insulin lispro (ADMELOG) corrective regimen sliding scale   SubCutaneous two times a day before meals  losartan 25 milliGRAM(s) Oral daily  metoprolol succinate ER 50 milliGRAM(s) Oral daily  tamsulosin 0.4 milliGRAM(s) Oral at bedtime    MEDICATIONS  (PRN):  acetaminophen   Tablet .. 650 milliGRAM(s) Oral every 6 hours PRN Mild Pain (1 - 3)  magnesium hydroxide Suspension 30 milliLiter(s) Oral daily PRN Constipation  polyethylene glycol 3350 17 Gram(s) Oral daily PRN Constipation      VITALS:  T(F): 97.6 (07-17-21 @ 09:02), Max: 98 (07-16-21 @ 20:00)  HR: 64 (07-17-21 @ 09:02) (64 - 72)  BP: 109/64 (07-17-21 @ 09:02) (109/64 - 145/62)  RR: 18 (07-17-21 @ 09:02) (16 - 18)  SpO2: 96% (07-17-21 @ 09:02)      REVIEW OF SYSTEMS:  For ROV please refer back to H&P       PHYSICAL EXAM:  General: NAD, A/O x 3  ENT: MMM, no scleral icterus  Neck: Supple, No JVD, no thyroidomegaly  Lungs: Clear to auscultation bilaterally, no wheezes, no rales, no rhonchi, good inspiratory effort  Cardio: RRR, S1/S2, No murmurs  Abdomen: Soft, Nontender, Nondistended; Bowel sounds present  Extremities:Right hand with brace, muscle weakness noted, +2radial pulse, fingers warm, sensation intact,  No calf tenderness, No pitting edema, no skin changes         CAPILLARY BLOOD GLUCOSE  POCT Blood Glucose.: 162 mg/dL (17 Jul 2021 08:07)  POCT Blood Glucose.: 153 mg/dL (16 Jul 2021 17:01)        MICROBIOLOGY:          RADIOLOGY & ADDITIONAL TESTS:    Imaging Personally Reviewed:    [X] Consultant(s) Notes Reviewed:  [X] Care Discussed with Consultants/Other Providers:

## 2021-07-17 NOTE — PROGRESS NOTE ADULT - ASSESSMENT
75 year old patient male w/ HTN, CAD, DM2, HLD, h/o LLE Osteomyelitis/Cellulitis (November, 2020), Right shoulder replacement and severe aortic stenosis presented to [facility] on 6/29 for JONN, AVR and CABG x5.  Post op course complicated by  right sided weakness and slurred speech. MRI confirms embolic appearing strokes acute ischemic infarcts R P-O wedge shape, and L temporal and L ventral roopa.  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  #Right UE weakness, right wrist pain  -Start Comprehensive Rehab Program of PT/OT/SLP  -Continue ASA/Plavix/Statin  -XRAY of right wrist obtained; waiting for read;  noted to have right humeral fracture from 2019    #HTN  #CAD  #Severe Aortic Stenosis   -s/p AVR + CABGx5  -c/w losartan 25mg po qd  -c/w metoprolol succinate ER 50mg po qd    #DM2  -Ha1c 6.9  -Continue SSI + Fingersticks two times a day    #Anemia  -Chronic   -Baseline Hb seems to be around 9-10  -Continue to monitor    #CKD Stage III  -Stable at baseline Cr 1.4-1.6  -Avoid nephrotoxic agents  -Monitor renal function    #DVT ppx  SCD

## 2021-07-17 NOTE — PROGRESS NOTE ADULT - SUBJECTIVE AND OBJECTIVE BOX
HPI:  75 year old patient male w/ HTN, CAD, DM, HLD, h/o LLE Osteomyelitis/Cellulitis (November, 2020), Right shoulder replacement and severe aortic stenosis presented to [facility] on 6/29 for JONN, AVR and CABG x5.  Pt reports that he was hospitalized @ [facility] in November, 2020 for LLE osteomyelitis/sepsis at which time pt underwent Echocardiogram which revealed, "aortic stenosis". After the completion of antibiotic treatment for LLE cellulitis, pt underwent cardiac catheterization on 4/19/2021 with Dr. Mundo Terrell which revealed, "aortic stenosis and multi vessel coronary artery disease" . Pt reports worsening fatigability and pt noted "taking too many naps throughout the day" over the past 7 months.  Aortic stenosis severe and Coronary artery disease underwent JONN and AVR and CABG x5 on 06/29/2021.  Post op course complicated by  right sided weakness and slurred speech. MRI confirms embolic appearing strokes acute ischemic infarcts R P-O wedge shape, and L temporal and L ventral roopa.  Not IV tPA candidate with recent OHS. Not interventional candidate with no evidence clinically and on CTA of LVO. Started on Plavix on 7/10/21 for secondary stroke prevention.  Patient was evaluated by PT/OT who recommended acute rehab -- patient was admitted to [facility] for acute rehab on 7/10/21.     subjective: [facility]  No acute events overnight. Participating well in therapy. Pain is well controlled. Denies chest pain, SOB, nausea, vomiting, constipation or diarrhea. Slept well last night.   T(C): 36.4 (07-17-21 @ 09:02), Max: 36.7 (07-16-21 @ 20:00)  HR: 64 (07-17-21 @ 09:02) (64 - 72)  BP: 109/64 (07-17-21 @ 09:02) (109/64 - 145/62)  RR: 18 (07-17-21 @ 09:02) (16 - 18)  SpO2: 96% (07-17-21 @ 09:02) (96% - 98%)         REVIEW OF SYSTEMS:    CONSTITUTIONAL: No fevers or chills  EYES/ENT: No visual changes;  No vertigo or throat pain   NECK: No pain or stiffness  RESPIRATORY: No cough, wheezing, or shortness of breath  CARDIOVASCULAR: No chest pain or palpitations  GASTROINTESTINAL: No abdominal or epigastric pain. No nausea or vomiting. No diarrhea or constipation.   GENITOURINARY: No dysuria, frequency or hematuria  NEUROLOGICAL: No numbness, + weakness  SKIN: No itching, rashes  MSK: +right wrist pain improving      VITAL SIGNS:   PHYSICAL EXAM:    General: NAD, sitting in PT gym working with therapist  HEENT: NC/AT; PERRL, anicteric sclera; MMM  Neck: supple  Cardiovascular: +S1/S2, RRR, +sternal incision intact and clean  Respiratory: no respiratory distress, breathing comfortably  Gastrointestinal: soft, NT/ND  Extremities: warm, well perfused; no edema, clubbing or cyanosis  Neurological: AAOx3; Motor exam with mild right sided weakness improving. No sensory deficits.    MEDICATIONS:  MEDICATIONS  (STANDING):  aspirin  chewable 81 milliGRAM(s) Oral daily  atorvastatin 40 milliGRAM(s) Oral at bedtime  cholecalciferol 1000 Unit(s) Oral daily  clopidogrel Tablet 75 milliGRAM(s) Oral daily  cyanocobalamin 1000 MICROGram(s) Oral at bedtime  dextrose 40% Gel 15 Gram(s) Oral once  dextrose 5%. 1000 milliLiter(s) (50 mL/Hr) IV Continuous <Continuous>  dextrose 5%. 1000 milliLiter(s) (100 mL/Hr) IV Continuous <Continuous>  dextrose 50% Injectable 25 Gram(s) IV Push once  dextrose 50% Injectable 12.5 Gram(s) IV Push once  dextrose 50% Injectable 25 Gram(s) IV Push once  famotidine    Tablet 20 milliGRAM(s) Oral daily  glucagon  Injectable 1 milliGRAM(s) IntraMuscular once  insulin lispro (ADMELOG) corrective regimen sliding scale   SubCutaneous two times a day before meals  losartan 25 milliGRAM(s) Oral daily  metoprolol succinate ER 50 milliGRAM(s) Oral daily  tamsulosin 0.4 milliGRAM(s) Oral at bedtime    MEDICATIONS  (PRN):  acetaminophen   Tablet .. 650 milliGRAM(s) Oral every 6 hours PRN Mild Pain (1 - 3)  magnesium hydroxide Suspension 30 milliLiter(s) Oral daily PRN Constipation  polyethylene glycol 3350 17 Gram(s) Oral daily PRN Constipation      ALLERGIES:  Allergies    No Known Allergies    Intolerances        LABS:                        9.8    5.40  )-----------( 412      ( 15 Jul 2021 05:10 )             29.4     07-15    137  |  106  |  33<H>  ----------------------------<  164<H>  4.5   |  21<L>  |  1.41<H>    Ca    9.1      15 Jul 2021 05:10    TPro  6.6  /  Alb  2.7<L>  /  TBili  0.4  /  DBili  x   /  AST  24  /  ALT  17  /  AlkPhos  87  07-15        CAPILLARY BLOOD GLUCOSE      POCT Blood Glucose.: 143 mg/dL (16 Jul 2021 07:41)      RADIOLOGY & ADDITIONAL TESTS: Reviewed.

## 2021-07-18 LAB
GLUCOSE BLDC GLUCOMTR-MCNC: 155 MG/DL — HIGH (ref 70–99)
GLUCOSE BLDC GLUCOMTR-MCNC: 166 MG/DL — HIGH (ref 70–99)
GLUCOSE BLDC GLUCOMTR-MCNC: 256 MG/DL — HIGH (ref 70–99)

## 2021-07-18 PROCEDURE — 99232 SBSQ HOSP IP/OBS MODERATE 35: CPT

## 2021-07-18 RX ADMIN — TAMSULOSIN HYDROCHLORIDE 0.4 MILLIGRAM(S): 0.4 CAPSULE ORAL at 20:31

## 2021-07-18 RX ADMIN — Medication 2: at 07:30

## 2021-07-18 RX ADMIN — LOSARTAN POTASSIUM 25 MILLIGRAM(S): 100 TABLET, FILM COATED ORAL at 06:09

## 2021-07-18 RX ADMIN — Medication 2: at 17:09

## 2021-07-18 RX ADMIN — CLOPIDOGREL BISULFATE 75 MILLIGRAM(S): 75 TABLET, FILM COATED ORAL at 11:56

## 2021-07-18 RX ADMIN — Medication 1000 UNIT(S): at 11:56

## 2021-07-18 RX ADMIN — PREGABALIN 1000 MICROGRAM(S): 225 CAPSULE ORAL at 20:31

## 2021-07-18 RX ADMIN — Medication 50 MILLIGRAM(S): at 05:24

## 2021-07-18 RX ADMIN — ATORVASTATIN CALCIUM 40 MILLIGRAM(S): 80 TABLET, FILM COATED ORAL at 20:31

## 2021-07-18 RX ADMIN — FAMOTIDINE 20 MILLIGRAM(S): 10 INJECTION INTRAVENOUS at 11:56

## 2021-07-18 RX ADMIN — Medication 81 MILLIGRAM(S): at 11:56

## 2021-07-18 NOTE — PROGRESS NOTE ADULT - SUBJECTIVE AND OBJECTIVE BOX
Hospitalist: Suzanne Pelayo DO    CHIEF COMPLAINT: Patient is a 75y old  male who presents with a chief complaint of Left CVA, right sided hemiparesis (17 Jul 2021 19:49)      SUBJECTIVE / OVERNIGHT EVENTS: Patient seen and examined. No acute events overnight. Pain well controlled and patient without any complaints.    MEDICATIONS  (STANDING):  aspirin  chewable 81 milliGRAM(s) Oral daily  atorvastatin 40 milliGRAM(s) Oral at bedtime  cholecalciferol 1000 Unit(s) Oral daily  clopidogrel Tablet 75 milliGRAM(s) Oral daily  cyanocobalamin 1000 MICROGram(s) Oral at bedtime  dextrose 40% Gel 15 Gram(s) Oral once  dextrose 5%. 1000 milliLiter(s) (50 mL/Hr) IV Continuous <Continuous>  dextrose 5%. 1000 milliLiter(s) (100 mL/Hr) IV Continuous <Continuous>  dextrose 50% Injectable 25 Gram(s) IV Push once  dextrose 50% Injectable 12.5 Gram(s) IV Push once  dextrose 50% Injectable 25 Gram(s) IV Push once  famotidine    Tablet 20 milliGRAM(s) Oral daily  glucagon  Injectable 1 milliGRAM(s) IntraMuscular once  insulin lispro (ADMELOG) corrective regimen sliding scale   SubCutaneous two times a day before meals  losartan 25 milliGRAM(s) Oral daily  metoprolol succinate ER 50 milliGRAM(s) Oral daily  tamsulosin 0.4 milliGRAM(s) Oral at bedtime    MEDICATIONS  (PRN):  acetaminophen   Tablet .. 650 milliGRAM(s) Oral every 6 hours PRN Mild Pain (1 - 3)  magnesium hydroxide Suspension 30 milliLiter(s) Oral daily PRN Constipation  polyethylene glycol 3350 17 Gram(s) Oral daily PRN Constipation      VITALS:  T(F): 98.1 (07-18-21 @ 08:40), Max: 98.1 (07-18-21 @ 08:40)  HR: 68 (07-18-21 @ 08:40) (65 - 75)  BP: 116/70 (07-18-21 @ 08:40) (116/70 - 148/82)  RR: 18 (07-18-21 @ 08:40) (16 - 18)  SpO2: 94% (07-18-21 @ 08:40)      REVIEW OF SYSTEMS:  For ROV please refer back to H&P     PHYSICAL EXAM:  General: NAD, A/O x 3  ENT: MMM, no scleral icterus  Neck: Supple, No JVD, no thyroidomegaly  Lungs: Clear to auscultation bilaterally, no wheezes, no rales, no rhonchi, good inspiratory effort  Cardio: RRR, S1/S2, No murmurs  Abdomen: Soft, Nontender, Nondistended; Bowel sounds present  Extremities:Right hand with brace, muscle weakness noted, +2radial pulse, fingers warm, sensation intact,  No calf tenderness, No pitting edema, no skin changes           CAPILLARY BLOOD GLUCOSE  POCT Blood Glucose.: 155 mg/dL (18 Jul 2021 07:25)  POCT Blood Glucose.: 164 mg/dL (17 Jul 2021 22:09)  POCT Blood Glucose.: 185 mg/dL (17 Jul 2021 16:49)      RADIOLOGY & ADDITIONAL TESTS:    Imaging Personally Reviewed:    [X] Consultant(s) Notes Reviewed:  [X] Care Discussed with Consultants/Other Providers:

## 2021-07-18 NOTE — PROGRESS NOTE ADULT - SUBJECTIVE AND OBJECTIVE BOX
HPI:  75 year old patient male w/ HTN, CAD, DM, HLD, h/o LLE Osteomyelitis/Cellulitis (November, 2020), Right shoulder replacement and severe aortic stenosis presented to [facility] on 6/29 for JONN, AVR and CABG x5.  Pt reports that he was hospitalized @ [facility] in November, 2020 for LLE osteomyelitis/sepsis at which time pt underwent Echocardiogram which revealed, "aortic stenosis". After the completion of antibiotic treatment for LLE cellulitis, pt underwent cardiac catheterization on 4/19/2021 with Dr. Mundo Terrell which revealed, "aortic stenosis and multi vessel coronary artery disease" . Pt reports worsening fatigability and pt noted "taking too many naps throughout the day" over the past 7 months.  Aortic stenosis severe and Coronary artery disease underwent JONN and AVR and CABG x5 on 06/29/2021.  Post op course complicated by  right sided weakness and slurred speech. MRI confirms embolic appearing strokes acute ischemic infarcts R P-O wedge shape, and L temporal and L ventral roopa.  Not IV tPA candidate with recent OHS. Not interventional candidate with no evidence clinically and on CTA of LVO. Started on Plavix on 7/10/21 for secondary stroke prevention.  Patient was evaluated by PT/OT who recommended acute rehab -- patient was admitted to [facility] for acute rehab on 7/10/21.     subjective: Patient seen and evaluated this afternoon No acute events overnight. Participating well in therapy. Pain is well controlled. Denies chest pain, SOB, nausea, vomiting, constipation or diarrhea. Slept well last night.             REVIEW OF SYSTEMS:    CONSTITUTIONAL: No fevers or chills  EYES/ENT: No visual changes;  No vertigo or throat pain   NECK: No pain or stiffness  RESPIRATORY: No cough, wheezing, or shortness of breath  CARDIOVASCULAR: No chest pain or palpitations  GASTROINTESTINAL: No abdominal or epigastric pain. No nausea or vomiting. No diarrhea or constipation.   GENITOURINARY: No dysuria, frequency or hematuria  NEUROLOGICAL: No numbness, + weakness  SKIN: No itching, rashes  MSK: +right wrist pain improving      VITAL SIGNS:   Vital Signs Last 24 Hrs  T(C): 36.7 (18 Jul 2021 08:40), Max: 36.7 (17 Jul 2021 20:22)  T(F): 98.1 (18 Jul 2021 08:40), Max: 98.1 (18 Jul 2021 08:40)  HR: 68 (18 Jul 2021 08:40) (65 - 75)  BP: 116/70 (18 Jul 2021 08:40) (116/70 - 148/82)  BP(mean): --  RR: 18 (18 Jul 2021 08:40) (16 - 18)  SpO2: 94% (18 Jul 2021 08:40) (94% - 96%)  PHYSICAL EXAM:    General: NAD, sitting in PT gym working with therapist  HEENT: NC/AT; PERRL, anicteric sclera; MMM  Neck: supple  Cardiovascular: +S1/S2, RRR, +sternal incision intact and clean  Respiratory: no respiratory distress, breathing comfortably  Gastrointestinal: soft, NT/ND  Extremities: warm, well perfused; no edema, clubbing or cyanosis  Neurological: AAOx3; Motor exam with mild right sided weakness improving. No sensory deficits.    MEDICATIONS:  MEDICATIONS  (STANDING):  aspirin  chewable 81 milliGRAM(s) Oral daily  atorvastatin 40 milliGRAM(s) Oral at bedtime  cholecalciferol 1000 Unit(s) Oral daily  clopidogrel Tablet 75 milliGRAM(s) Oral daily  cyanocobalamin 1000 MICROGram(s) Oral at bedtime  dextrose 40% Gel 15 Gram(s) Oral once  dextrose 5%. 1000 milliLiter(s) (50 mL/Hr) IV Continuous <Continuous>  dextrose 5%. 1000 milliLiter(s) (100 mL/Hr) IV Continuous <Continuous>  dextrose 50% Injectable 25 Gram(s) IV Push once  dextrose 50% Injectable 12.5 Gram(s) IV Push once  dextrose 50% Injectable 25 Gram(s) IV Push once  famotidine    Tablet 20 milliGRAM(s) Oral daily  glucagon  Injectable 1 milliGRAM(s) IntraMuscular once  insulin lispro (ADMELOG) corrective regimen sliding scale   SubCutaneous two times a day before meals  losartan 25 milliGRAM(s) Oral daily  metoprolol succinate ER 50 milliGRAM(s) Oral daily  tamsulosin 0.4 milliGRAM(s) Oral at bedtime    MEDICATIONS  (PRN):  acetaminophen   Tablet .. 650 milliGRAM(s) Oral every 6 hours PRN Mild Pain (1 - 3)  magnesium hydroxide Suspension 30 milliLiter(s) Oral daily PRN Constipation  polyethylene glycol 3350 17 Gram(s) Oral daily PRN Constipation      ALLERGIES:  Allergies    No Known Allergies    Intolerances        LABS:                        9.8    5.40  )-----------( 412      ( 15 Jul 2021 05:10 )             29.4     07-15    137  |  106  |  33<H>  ----------------------------<  164<H>  4.5   |  21<L>  |  1.41<H>    Ca    9.1      15 Jul 2021 05:10    TPro  6.6  /  Alb  2.7<L>  /  TBili  0.4  /  DBili  x   /  AST  24  /  ALT  17  /  AlkPhos  87  07-15        CAPILLARY BLOOD GLUCOSE      POCT Blood Glucose.: 143 mg/dL (16 Jul 2021 07:41)      RADIOLOGY & ADDITIONAL TESTS: Reviewed.

## 2021-07-18 NOTE — PROGRESS NOTE ADULT - ASSESSMENT
75 year old patient male w/ HTN, CAD, DM2, HLD, h/o LLE Osteomyelitis/Cellulitis (November, 2020), Right shoulder replacement and severe aortic stenosis presented to [facility] on 6/29 for JONN, AVR and CABG x5.  Post op course complicated by  right sided weakness and slurred speech. MRI confirms embolic appearing strokes acute ischemic infarcts R P-O wedge shape, and L temporal and L ventral roopa.  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  #Right UE weakness, right wrist pain  -Start Comprehensive Rehab Program of PT/OT/SLP  -Continue ASA/Plavix/Statin  -XRAY of right wrist obtained; waiting for read;  noted to have right humeral fracture from 2019    #HTN  #CAD  #Severe Aortic Stenosis   -s/p AVR + CABGx5  -c/w losartan 25mg po qd  -c/w metoprolol succinate ER 50mg po qd    #DM2  -Ha1c 6.9  -Continue SSI + Fingersticks two times a day    #Anemia  -Chronic   -Baseline Hb seems to be around 9-10  -Continue to monitor    #CKD Stage III  -Stable at baseline Cr 1.4-1.6  -Avoid nephrotoxic agents  -Monitor renal function    #DVT ppx  SCD

## 2021-07-18 NOTE — PROGRESS NOTE ADULT - ASSESSMENT
75 year old patient male w/ HTN, CAD, DM2, HLD, h/o LLE Osteomyelitis/Cellulitis (November, 2020), Right shoulder replacement and severe aortic stenosis presented to [facility] on 6/29 for JONN, AVR and CABG x5.  Post op course complicated by  right sided weakness and slurred speech. MRI confirms embolic appearing strokes acute ischemic infarcts R P-O wedge shape, and L temporal and L ventral roopa.  Not IV tPA candidate with recent OHS. Patient now admitted for a multidisciplinary rehab program. 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  - Gait Instability, ADL impairments and Functional impairments: on Comprehensive Rehab Program of PT/OT/SLP  - 3 hours a day, 5 days a week  - s/p CABG- embolic CVA  - c/w lipitor, ASA  - cleared for Plavix on 7/10    #CAD and Severe AS  - s/p AVR + CABGx5-incentive spirometry/deep breathing, pain control  - maintain sternal precautions-healing well  - BP control w/ losartan and metoprolol  - ASA/Plavix and Lipitor    #HTN   -losartan and metoprolol currently  -hospitalist zeke requested re/Cozaar in view of elevated cr-will follow recs    #DM2  - A1C 6.9%  -consistent carbs diet added  -FS ACHS  -BG not at goal-On metformin at home per 2020 records. Currently Cr elevated-hospitalist to follow up.    #pain  -right wrist pain/limited ROM  -uric acid wnl  -hospitalist f/up requested  -may use cool compress, splint in place-pain improved  -Xr right wrist completed    #Elevated cr  -?CKD vs MARINA  -labs are following  - Cr improved  -?continue Cozaar for BP control-hospitalist requested for follow up    #Pain Mgmt   - Tylenol PRN    #GI/Bowel Mgmt   - Continent c/w Miralax       #Discharge planning  Team meeting DATE: 7/13  SW: PH with son who works during the day, 1 [place], 7+7 stairs to bedroom  OT: supervision eating and grooming, min A UBD, toilet transfers, mod A bathing and toileting, max A LBD  PT: mod A transfers, ambulating 70' with RW with min A and W/C follow, no stairs yet  ST: regular diet with thins, no language deficits  EDOD: 7/23

## 2021-07-19 LAB
ALBUMIN SERPL ELPH-MCNC: 2.8 G/DL — LOW (ref 3.3–5)
ALP SERPL-CCNC: 93 U/L — SIGNIFICANT CHANGE UP (ref 40–120)
ALT FLD-CCNC: 20 U/L — SIGNIFICANT CHANGE UP (ref 10–45)
ANION GAP SERPL CALC-SCNC: 8 MMOL/L — SIGNIFICANT CHANGE UP (ref 5–17)
AST SERPL-CCNC: 15 U/L — SIGNIFICANT CHANGE UP (ref 10–40)
BASOPHILS # BLD AUTO: 0.03 K/UL — SIGNIFICANT CHANGE UP (ref 0–0.2)
BASOPHILS NFR BLD AUTO: 0.6 % — SIGNIFICANT CHANGE UP (ref 0–2)
BILIRUB SERPL-MCNC: 0.4 MG/DL — SIGNIFICANT CHANGE UP (ref 0.2–1.2)
BUN SERPL-MCNC: 32 MG/DL — HIGH (ref 7–23)
CALCIUM SERPL-MCNC: 8.9 MG/DL — SIGNIFICANT CHANGE UP (ref 8.4–10.5)
CHLORIDE SERPL-SCNC: 107 MMOL/L — SIGNIFICANT CHANGE UP (ref 96–108)
CO2 SERPL-SCNC: 25 MMOL/L — SIGNIFICANT CHANGE UP (ref 22–31)
CREAT SERPL-MCNC: 1.56 MG/DL — HIGH (ref 0.5–1.3)
EOSINOPHIL # BLD AUTO: 0.08 K/UL — SIGNIFICANT CHANGE UP (ref 0–0.5)
EOSINOPHIL NFR BLD AUTO: 1.6 % — SIGNIFICANT CHANGE UP (ref 0–6)
GLUCOSE BLDC GLUCOMTR-MCNC: 168 MG/DL — HIGH (ref 70–99)
GLUCOSE BLDC GLUCOMTR-MCNC: 186 MG/DL — HIGH (ref 70–99)
GLUCOSE SERPL-MCNC: 168 MG/DL — HIGH (ref 70–99)
HCT VFR BLD CALC: 28.4 % — LOW (ref 39–50)
HGB BLD-MCNC: 9.3 G/DL — LOW (ref 13–17)
IMM GRANULOCYTES NFR BLD AUTO: 0.6 % — SIGNIFICANT CHANGE UP (ref 0–1.5)
LYMPHOCYTES # BLD AUTO: 1.22 K/UL — SIGNIFICANT CHANGE UP (ref 1–3.3)
LYMPHOCYTES # BLD AUTO: 24.6 % — SIGNIFICANT CHANGE UP (ref 13–44)
MCHC RBC-ENTMCNC: 29.2 PG — SIGNIFICANT CHANGE UP (ref 27–34)
MCHC RBC-ENTMCNC: 32.7 GM/DL — SIGNIFICANT CHANGE UP (ref 32–36)
MCV RBC AUTO: 89 FL — SIGNIFICANT CHANGE UP (ref 80–100)
MONOCYTES # BLD AUTO: 0.63 K/UL — SIGNIFICANT CHANGE UP (ref 0–0.9)
MONOCYTES NFR BLD AUTO: 12.7 % — SIGNIFICANT CHANGE UP (ref 2–14)
NEUTROPHILS # BLD AUTO: 2.96 K/UL — SIGNIFICANT CHANGE UP (ref 1.8–7.4)
NEUTROPHILS NFR BLD AUTO: 59.9 % — SIGNIFICANT CHANGE UP (ref 43–77)
NRBC # BLD: 0 /100 WBCS — SIGNIFICANT CHANGE UP (ref 0–0)
PLATELET # BLD AUTO: 377 K/UL — SIGNIFICANT CHANGE UP (ref 150–400)
POTASSIUM SERPL-MCNC: 4.3 MMOL/L — SIGNIFICANT CHANGE UP (ref 3.5–5.3)
POTASSIUM SERPL-SCNC: 4.3 MMOL/L — SIGNIFICANT CHANGE UP (ref 3.5–5.3)
PROT SERPL-MCNC: 6.5 G/DL — SIGNIFICANT CHANGE UP (ref 6–8.3)
RBC # BLD: 3.19 M/UL — LOW (ref 4.2–5.8)
RBC # FLD: 13.1 % — SIGNIFICANT CHANGE UP (ref 10.3–14.5)
SODIUM SERPL-SCNC: 140 MMOL/L — SIGNIFICANT CHANGE UP (ref 135–145)
WBC # BLD: 4.95 K/UL — SIGNIFICANT CHANGE UP (ref 3.8–10.5)
WBC # FLD AUTO: 4.95 K/UL — SIGNIFICANT CHANGE UP (ref 3.8–10.5)

## 2021-07-19 PROCEDURE — 99232 SBSQ HOSP IP/OBS MODERATE 35: CPT

## 2021-07-19 RX ADMIN — FAMOTIDINE 20 MILLIGRAM(S): 10 INJECTION INTRAVENOUS at 11:18

## 2021-07-19 RX ADMIN — PREGABALIN 1000 MICROGRAM(S): 225 CAPSULE ORAL at 21:11

## 2021-07-19 RX ADMIN — CLOPIDOGREL BISULFATE 75 MILLIGRAM(S): 75 TABLET, FILM COATED ORAL at 11:18

## 2021-07-19 RX ADMIN — ATORVASTATIN CALCIUM 40 MILLIGRAM(S): 80 TABLET, FILM COATED ORAL at 21:11

## 2021-07-19 RX ADMIN — TAMSULOSIN HYDROCHLORIDE 0.4 MILLIGRAM(S): 0.4 CAPSULE ORAL at 21:11

## 2021-07-19 RX ADMIN — Medication 50 MILLIGRAM(S): at 05:27

## 2021-07-19 RX ADMIN — Medication 81 MILLIGRAM(S): at 11:18

## 2021-07-19 RX ADMIN — Medication 2: at 17:14

## 2021-07-19 RX ADMIN — Medication 2: at 08:01

## 2021-07-19 RX ADMIN — LOSARTAN POTASSIUM 25 MILLIGRAM(S): 100 TABLET, FILM COATED ORAL at 05:27

## 2021-07-19 RX ADMIN — Medication 1000 UNIT(S): at 11:18

## 2021-07-19 NOTE — PROGRESS NOTE ADULT - ASSESSMENT
75 year old patient male w/ HTN, CAD, DM2, HLD, h/o LLE Osteomyelitis/Cellulitis (November, 2020), Right shoulder replacement and severe aortic stenosis presented to [facility] on 6/29 for JONN, AVR and CABG x5.  Post op course complicated by  right sided weakness and slurred speech. MRI confirms embolic appearing strokes acute ischemic infarcts R P-O wedge shape, and L temporal and L ventral roopa.  Not IV tPA candidate with recent OHS. Patient now admitted for a multidisciplinary rehab program. 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  - Gait Instability, ADL impairments and Functional impairments: on Comprehensive Rehab Program of PT/OT/SLP  - 3 hours a day, 5 days a week  - s/p CABG- embolic CVA  - c/w lipitor, ASA  - cleared for Plavix on 7/10    #CAD and Severe AS  - s/p AVR + CABGx5-incentive spirometry/deep breathing, pain control  - maintain sternal precautions-healing well  - BP control w/ losartan and metoprolol  - ASA/Plavix and Lipitor    #HTN   -losartan and metoprolol currently  -hospitalist zeke requested re/Cozaar in view of elevated cr-will follow recs    #DM2  - A1C 6.9%  -consistent carbs diet added  -FS ACHS  -BG not at goal-On metformin at home per 2020 records. Currently Cr elevated-hospitalist to follow up.    #pain  -right wrist pain/limited ROM  -uric acid wnl  -hospitalist f/up requested  -may use cool compress, splint in place-pain improved  -Xr right wrist completed    #Elevated cr  -?CKD vs MARINA  -labs are following  - Cr improved  -continue Cozaar for BP control-hospitalist requested for follow up    #Pain Mgmt   - Tylenol PRN    #GI/Bowel Mgmt   - Continent c/w Miralax       #Discharge planning  Team meeting DATE: 7/13  SW: PH with son who works during the day, 1 [place], 7+7 stairs to bedroom  OT: supervision eating and grooming, min A UBD, toilet transfers, mod A bathing and toileting, max A LBD  PT: mod A transfers, ambulating 70' with RW with min A and W/C follow, no stairs yet  ST: regular diet with thins, no language deficits  EDOD: 7/23

## 2021-07-19 NOTE — PROGRESS NOTE ADULT - SUBJECTIVE AND OBJECTIVE BOX
HPI:  75 year old patient male w/ HTN, CAD, DM, HLD, h/o LLE Osteomyelitis/Cellulitis (November, 2020), Right shoulder replacement and severe aortic stenosis presented to [facility] on 6/29 for JONN, AVR and CABG x5.  Pt reports that he was hospitalized @ [facility] in November, 2020 for LLE osteomyelitis/sepsis at which time pt underwent Echocardiogram which revealed, "aortic stenosis". After the completion of antibiotic treatment for LLE cellulitis, pt underwent cardiac catheterization on 4/19/2021 with Dr. Mundo Terrell which revealed, "aortic stenosis and multi vessel coronary artery disease" . Pt reports worsening fatigability and pt noted "taking too many naps throughout the day" over the past 7 months.  Aortic stenosis severe and Coronary artery disease underwent JONN and AVR and CABG x5 on 06/29/2021.  Post op course complicated by  right sided weakness and slurred speech. MRI confirms embolic appearing strokes acute ischemic infarcts R P-O wedge shape, and L temporal and L ventral roopa.  Not IV tPA candidate with recent OHS. Not interventional candidate with no evidence clinically and on CTA of LVO. Started on Plavix on 7/10/21 for secondary stroke prevention.  Patient was evaluated by PT/OT who recommended acute rehab -- patient was admitted to [facility] for acute rehab on 7/10/21.     subjective: Patient seen and evaluated this afternoon No acute events overnight. Participating well in therapy. Pain is well controlled. Denies chest pain, SOB, nausea, vomiting, constipation or diarrhea. Slept well last night.     SUBJECTIVE / INTERVAL HPI: Patient seen and examined in Speech therapy. He is doing well. His      REVIEW OF SYSTEMS:    CONSTITUTIONAL: No weakness, fevers or chills  EYES/ENT: No visual changes;  No vertigo or throat pain   NECK: No pain or stiffness  RESPIRATORY: No cough, wheezing, or shortness of breath  CARDIOVASCULAR: No chest pain or palpitations  GASTROINTESTINAL: No abdominal or epigastric pain. No nausea or vomiting. No diarrhea or constipation.   GENITOURINARY: No dysuria, frequency or hematuria  NEUROLOGICAL: No numbness or weakness  SKIN: No itching, rashes      VITAL SIGNS:  Vital Signs Last 24 Hrs  T(C): 36.5 (19 Jul 2021 07:49), Max: 36.9 (19 Jul 2021 05:29)  T(F): 97.7 (19 Jul 2021 07:49), Max: 98.4 (19 Jul 2021 05:29)  HR: 79 (19 Jul 2021 07:49) (72 - 79)  BP: 123/62 (19 Jul 2021 08:19) (95/55 - 131/62)  BP(mean): --  RR: 16 (19 Jul 2021 07:49) (15 - 16)  SpO2: 97% (19 Jul 2021 07:49) (96% - 98%)    PHYSICAL EXAM:    General: NAD  HEENT: NC/AT; PERRL, anicteric sclera; MMM  Neck: supple  Cardiovascular: +S1/S2, RRR  Respiratory: CTA B/L; no W/R/R, no crackles  Gastrointestinal: soft, NT/ND; +BSx4  Extremities: warm, well perfused; no edema, clubbing or cyanosis  Vascular: 2+ radial, DP/PT pulses B/L  Neurological: AAOx3; no focal deficits    MEDICATIONS:  MEDICATIONS  (STANDING):  aspirin  chewable 81 milliGRAM(s) Oral daily  atorvastatin 40 milliGRAM(s) Oral at bedtime  cholecalciferol 1000 Unit(s) Oral daily  clopidogrel Tablet 75 milliGRAM(s) Oral daily  cyanocobalamin 1000 MICROGram(s) Oral at bedtime  dextrose 40% Gel 15 Gram(s) Oral once  dextrose 5%. 1000 milliLiter(s) (50 mL/Hr) IV Continuous <Continuous>  dextrose 5%. 1000 milliLiter(s) (100 mL/Hr) IV Continuous <Continuous>  dextrose 50% Injectable 25 Gram(s) IV Push once  dextrose 50% Injectable 12.5 Gram(s) IV Push once  dextrose 50% Injectable 25 Gram(s) IV Push once  famotidine    Tablet 20 milliGRAM(s) Oral daily  glucagon  Injectable 1 milliGRAM(s) IntraMuscular once  insulin lispro (ADMELOG) corrective regimen sliding scale   SubCutaneous two times a day before meals  losartan 25 milliGRAM(s) Oral daily  metoprolol succinate ER 50 milliGRAM(s) Oral daily  tamsulosin 0.4 milliGRAM(s) Oral at bedtime    MEDICATIONS  (PRN):  acetaminophen   Tablet .. 650 milliGRAM(s) Oral every 6 hours PRN Mild Pain (1 - 3)  magnesium hydroxide Suspension 30 milliLiter(s) Oral daily PRN Constipation  polyethylene glycol 3350 17 Gram(s) Oral daily PRN Constipation      ALLERGIES:  Allergies    No Known Allergies    Intolerances        LABS:                        9.3    4.95  )-----------( 377      ( 19 Jul 2021 06:39 )             28.4     07-19    140  |  107  |  32<H>  ----------------------------<  168<H>  4.3   |  25  |  1.56<H>    Ca    8.9      19 Jul 2021 06:39    TPro  6.5  /  Alb  2.8<L>  /  TBili  0.4  /  DBili  x   /  AST  15  /  ALT  20  /  AlkPhos  93  07-19        CAPILLARY BLOOD GLUCOSE      POCT Blood Glucose.: 168 mg/dL (19 Jul 2021 07:58)      RADIOLOGY & ADDITIONAL TESTS: Reviewed. HPI:  75 year old patient male w/ HTN, CAD, DM, HLD, h/o LLE Osteomyelitis/Cellulitis (November, 2020), Right shoulder replacement and severe aortic stenosis presented to [facility] on 6/29 for JONN, AVR and CABG x5.  Pt reports that he was hospitalized @ [facility] in November, 2020 for LLE osteomyelitis/sepsis at which time pt underwent Echocardiogram which revealed, "aortic stenosis". After the completion of antibiotic treatment for LLE cellulitis, pt underwent cardiac catheterization on 4/19/2021 with Dr. Mundo Terrell which revealed, "aortic stenosis and multi vessel coronary artery disease" . Pt reports worsening fatigability and pt noted "taking too many naps throughout the day" over the past 7 months.  Aortic stenosis severe and Coronary artery disease underwent JONN and AVR and CABG x5 on 06/29/2021.  Post op course complicated by  right sided weakness and slurred speech. MRI confirms embolic appearing strokes acute ischemic infarcts R P-O wedge shape, and L temporal and L ventral roopa.  Not IV tPA candidate with recent OHS. Not interventional candidate with no evidence clinically and on CTA of LVO. Started on Plavix on 7/10/21 for secondary stroke prevention.  Patient was evaluated by PT/OT who recommended acute rehab -- patient was admitted to [facility] for acute rehab on 7/10/21.     subjective: Patient seen and evaluated this afternoon No acute events overnight. Participating well in therapy. Pain is well controlled. Denies chest pain, SOB, nausea, vomiting, constipation or diarrhea. Slept well last night.     SUBJECTIVE / INTERVAL HPI: Patient seen and examined in Speech therapy. He is doing well. His right wrist pain is resolved and he is better able to use the hand for tasks. He denies pain, shortness of breath, or nausea     REVIEW OF SYSTEMS:    CONSTITUTIONAL: No fevers or chills  EYES/ENT: No visual changes;  No vertigo or throat pain   NECK: No pain or stiffness  RESPIRATORY: No cough, wheezing, or shortness of breath  CARDIOVASCULAR: No chest pain or palpitations  GASTROINTESTINAL: No abdominal or epigastric pain. No nausea or vomiting. No diarrhea or constipation.   GENITOURINARY: No dysuria, frequency or hematuria  NEUROLOGICAL: No numbness, +improving weakness  SKIN: No itching, rashes  MSK: +improved right wrist pain      VITAL SIGNS:  Vital Signs Last 24 Hrs  T(C): 36.5 (19 Jul 2021 07:49), Max: 36.9 (19 Jul 2021 05:29)  T(F): 97.7 (19 Jul 2021 07:49), Max: 98.4 (19 Jul 2021 05:29)  HR: 79 (19 Jul 2021 07:49) (72 - 79)  BP: 123/62 (19 Jul 2021 08:19) (95/55 - 131/62)  BP(mean): --  RR: 16 (19 Jul 2021 07:49) (15 - 16)  SpO2: 97% (19 Jul 2021 07:49) (96% - 98%)    PHYSICAL EXAM:    General: NAD, sitting in ST working with therapist  HEENT: NC/AT; PERRL, anicteric sclera; MMM  Neck: supple  Cardiovascular: +S1/S2, RRR, +sternal incision  Respiratory: no respiratory distress, no wheezing  Gastrointestinal: soft, NT/ND  Extremities: warm, well perfused; no edema, clubbing or cyanosis  Neurological: AAOx3; Motor strength improving on right side. Sensory exam intact    MEDICATIONS:  MEDICATIONS  (STANDING):  aspirin  chewable 81 milliGRAM(s) Oral daily  atorvastatin 40 milliGRAM(s) Oral at bedtime  cholecalciferol 1000 Unit(s) Oral daily  clopidogrel Tablet 75 milliGRAM(s) Oral daily  cyanocobalamin 1000 MICROGram(s) Oral at bedtime  dextrose 40% Gel 15 Gram(s) Oral once  dextrose 5%. 1000 milliLiter(s) (50 mL/Hr) IV Continuous <Continuous>  dextrose 5%. 1000 milliLiter(s) (100 mL/Hr) IV Continuous <Continuous>  dextrose 50% Injectable 25 Gram(s) IV Push once  dextrose 50% Injectable 12.5 Gram(s) IV Push once  dextrose 50% Injectable 25 Gram(s) IV Push once  famotidine    Tablet 20 milliGRAM(s) Oral daily  glucagon  Injectable 1 milliGRAM(s) IntraMuscular once  insulin lispro (ADMELOG) corrective regimen sliding scale   SubCutaneous two times a day before meals  losartan 25 milliGRAM(s) Oral daily  metoprolol succinate ER 50 milliGRAM(s) Oral daily  tamsulosin 0.4 milliGRAM(s) Oral at bedtime    MEDICATIONS  (PRN):  acetaminophen   Tablet .. 650 milliGRAM(s) Oral every 6 hours PRN Mild Pain (1 - 3)  magnesium hydroxide Suspension 30 milliLiter(s) Oral daily PRN Constipation  polyethylene glycol 3350 17 Gram(s) Oral daily PRN Constipation      ALLERGIES:  Allergies    No Known Allergies    Intolerances        LABS:                        9.3    4.95  )-----------( 377      ( 19 Jul 2021 06:39 )             28.4     07-19    140  |  107  |  32<H>  ----------------------------<  168<H>  4.3   |  25  |  1.56<H>    Ca    8.9      19 Jul 2021 06:39    TPro  6.5  /  Alb  2.8<L>  /  TBili  0.4  /  DBili  x   /  AST  15  /  ALT  20  /  AlkPhos  93  07-19        CAPILLARY BLOOD GLUCOSE      POCT Blood Glucose.: 168 mg/dL (19 Jul 2021 07:58)      RADIOLOGY & ADDITIONAL TESTS: Reviewed.

## 2021-07-19 NOTE — PROGRESS NOTE ADULT - SUBJECTIVE AND OBJECTIVE BOX
Patient is a 75y old  Male who presents with a chief complaint of Left CVA, right sided hemiparesis (19 Jul 2021 10:53)      Patient seen and examined at bedside. Patient sitting in chair eating breakfast, no acute complaints at this time. States he wishes his progress was faster but much improved motion of RUE since admission. Patient with no other acute complaints at this time.    ALLERGIES:  No Known Allergies    MEDICATIONS  (STANDING):  aspirin  chewable 81 milliGRAM(s) Oral daily  atorvastatin 40 milliGRAM(s) Oral at bedtime  cholecalciferol 1000 Unit(s) Oral daily  clopidogrel Tablet 75 milliGRAM(s) Oral daily  cyanocobalamin 1000 MICROGram(s) Oral at bedtime  dextrose 40% Gel 15 Gram(s) Oral once  dextrose 5%. 1000 milliLiter(s) (50 mL/Hr) IV Continuous <Continuous>  dextrose 5%. 1000 milliLiter(s) (100 mL/Hr) IV Continuous <Continuous>  dextrose 50% Injectable 25 Gram(s) IV Push once  dextrose 50% Injectable 12.5 Gram(s) IV Push once  dextrose 50% Injectable 25 Gram(s) IV Push once  famotidine    Tablet 20 milliGRAM(s) Oral daily  glucagon  Injectable 1 milliGRAM(s) IntraMuscular once  insulin lispro (ADMELOG) corrective regimen sliding scale   SubCutaneous two times a day before meals  losartan 25 milliGRAM(s) Oral daily  metoprolol succinate ER 50 milliGRAM(s) Oral daily  tamsulosin 0.4 milliGRAM(s) Oral at bedtime    MEDICATIONS  (PRN):  acetaminophen   Tablet .. 650 milliGRAM(s) Oral every 6 hours PRN Mild Pain (1 - 3)  magnesium hydroxide Suspension 30 milliLiter(s) Oral daily PRN Constipation  polyethylene glycol 3350 17 Gram(s) Oral daily PRN Constipation    Vital Signs Last 24 Hrs  T(F): 97.7 (19 Jul 2021 07:49), Max: 98.4 (19 Jul 2021 05:29)  HR: 79 (19 Jul 2021 07:49) (72 - 79)  BP: 123/62 (19 Jul 2021 08:19) (95/55 - 131/62)  RR: 16 (19 Jul 2021 07:49) (15 - 16)  SpO2: 97% (19 Jul 2021 07:49) (96% - 98%)  I&O's Summary      PHYSICAL EXAM:  General: NAD, A/O x 3  ENT: MMM, no scleral icterus  Neck: Supple, No JVD, no thyroidomegaly  Lungs: Clear to auscultation bilaterally, no wheezes, no rales, no rhonchi, good inspiratory effort  Cardio: RRR, S1/S2, No murmurs  Abdomen: Soft, Nontender, Nondistended; Bowel sounds present  Extremities: fingers warm, sensation intact,  No calf tenderness, trace pitting edema, no skin changes    LABS:                        9.3    4.95  )-----------( 377      ( 19 Jul 2021 06:39 )             28.4     07-19    140  |  107  |  32  ----------------------------<  168  4.3   |  25  |  1.56    Ca    8.9      19 Jul 2021 06:39    TPro  6.5  /  Alb  2.8  /  TBili  0.4  /  DBili  x   /  AST  15  /  ALT  20  /  AlkPhos  93  07-19    eGFR if Non African American: 42 mL/min/1.73M2 (07-19-21 @ 06:39)  eGFR if : 49 mL/min/1.73M2 (07-19-21 @ 06:39)                          POCT Blood Glucose.: 168 mg/dL (19 Jul 2021 07:58)  POCT Blood Glucose.: 256 mg/dL (18 Jul 2021 20:28)  POCT Blood Glucose.: 166 mg/dL (18 Jul 2021 17:08)              RADIOLOGY & ADDITIONAL TESTS: none    Care Discussed with Consultants/Other Providers: discussed with rehab team

## 2021-07-19 NOTE — PROGRESS NOTE ADULT - ASSESSMENT
75 year old patient male w/ HTN, CAD, DM2, HLD, h/o LLE Osteomyelitis/Cellulitis (November, 2020), Right shoulder replacement and severe aortic stenosis presented to [facility] on 6/29 for JONN, AVR and CABG x5.  Post op course complicated by  right sided weakness and slurred speech. MRI confirms embolic appearing strokes acute ischemic infarcts R P-O wedge shape, and L temporal and L ventral roopa.  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  #Right UE weakness, right wrist pain  -continue with Comprehensive Rehab Program of PT/OT/SLP  -Continue ASA/Plavix/Statin  -XRAY of right wrist obtained; no acute fracture, noted to have right humeral fracture from 2019    #HTN  #CAD  #Severe Aortic Stenosis   -s/p AVR + CABGx5  -c/w losartan 25mg po qd  -c/w metoprolol succinate ER 50mg po qd    #DM2  -Ha1c 6.9  -Continue SSI + Fingersticks two times a day    #Anemia  -Chronic   -Baseline Hb seems to be around 9-10  -Continue to monitor    #CKD Stage III  -Stable at baseline Cr 1.4-1.6, Cr trended upward today however still around baseline  -Avoid nephrotoxic agents  -Monitor renal function    #DVT ppx  SCD

## 2021-07-20 LAB
GLUCOSE BLDC GLUCOMTR-MCNC: 126 MG/DL — HIGH (ref 70–99)
GLUCOSE BLDC GLUCOMTR-MCNC: 160 MG/DL — HIGH (ref 70–99)
GLUCOSE BLDC GLUCOMTR-MCNC: 169 MG/DL — HIGH (ref 70–99)

## 2021-07-20 PROCEDURE — 99232 SBSQ HOSP IP/OBS MODERATE 35: CPT

## 2021-07-20 RX ORDER — LOSARTAN POTASSIUM 100 MG/1
12.5 TABLET, FILM COATED ORAL DAILY
Refills: 0 | Status: DISCONTINUED | OUTPATIENT
Start: 2021-07-20 | End: 2021-07-23

## 2021-07-20 RX ORDER — METOPROLOL TARTRATE 50 MG
50 TABLET ORAL DAILY
Refills: 0 | Status: DISCONTINUED | OUTPATIENT
Start: 2021-07-20 | End: 2021-07-23

## 2021-07-20 RX ADMIN — LOSARTAN POTASSIUM 25 MILLIGRAM(S): 100 TABLET, FILM COATED ORAL at 05:44

## 2021-07-20 RX ADMIN — Medication 1000 UNIT(S): at 11:58

## 2021-07-20 RX ADMIN — PREGABALIN 1000 MICROGRAM(S): 225 CAPSULE ORAL at 21:51

## 2021-07-20 RX ADMIN — Medication 2: at 08:10

## 2021-07-20 RX ADMIN — Medication 2: at 17:21

## 2021-07-20 RX ADMIN — CLOPIDOGREL BISULFATE 75 MILLIGRAM(S): 75 TABLET, FILM COATED ORAL at 11:58

## 2021-07-20 RX ADMIN — FAMOTIDINE 20 MILLIGRAM(S): 10 INJECTION INTRAVENOUS at 11:58

## 2021-07-20 RX ADMIN — TAMSULOSIN HYDROCHLORIDE 0.4 MILLIGRAM(S): 0.4 CAPSULE ORAL at 21:51

## 2021-07-20 RX ADMIN — Medication 81 MILLIGRAM(S): at 11:58

## 2021-07-20 RX ADMIN — Medication 50 MILLIGRAM(S): at 05:43

## 2021-07-20 RX ADMIN — ATORVASTATIN CALCIUM 40 MILLIGRAM(S): 80 TABLET, FILM COATED ORAL at 21:51

## 2021-07-20 NOTE — PROGRESS NOTE ADULT - ASSESSMENT
75 year old patient male w/ HTN, CAD, DM2, HLD, h/o LLE Osteomyelitis/Cellulitis (November, 2020), Right shoulder replacement and severe aortic stenosis presented to [facility] on 6/29 for JONN, AVR and CABG x5.  Post op course complicated by  right sided weakness and slurred speech. MRI confirms embolic appearing strokes acute ischemic infarcts R P-O wedge shape, and L temporal and L ventral roopa.  Not IV tPA candidate with recent OHS. Patient now admitted for a multidisciplinary rehab program. 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  - Gait Instability, ADL impairments and Functional impairments: on Comprehensive Rehab Program of PT/OT/SLP  - 3 hours a day, 5 days a week  - s/p CABG- embolic CVA  - c/w lipitor, ASA  - cleared for Plavix on 7/10    #CAD and Severe AS  - s/p AVR + CABGx5-incentive spirometry/deep breathing, pain control  - maintain sternal precautions-healing well  - BP control w/ losartan and metoprolol  - ASA/Plavix and Lipitor    #HTN   -losartan and metoprolol currently  -hospitalist zeke requested re/Cozaar in view of elevated cr-will follow recs    #DM2  - A1C 6.9%  -consistent carbs diet added  -FS ACHS  -BG not at goal-On metformin at home per 2020 records. Currently Cr elevated-hospitalist to follow up.    #pain  -right wrist pain/limited ROM  -uric acid wnl  -hospitalist f/up requested  -may use cool compress, splint in place-pain improved  -Xr right wrist completed    #Elevated cr  -?CKD vs MARINA  -labs are following  - Cr improved  -continue Cozaar for BP control-hospitalist requested for follow up    #Pain Mgmt   - Tylenol PRN    #GI/Bowel Mgmt   - Continent c/w Miralax       #Discharge planning  Team meeting DATE: 7/13  SW: PH with son who works during the day, 1 [place], 7+7 stairs to bedroom  OT: supervision eating and grooming, min A UBD, toilet transfers, mod A bathing and toileting, max A LBD  PT: mod A transfers, ambulating 70' with RW with min A and W/C follow, no stairs yet  ST: regular diet with thins, no language deficits  EDOD: 7/23

## 2021-07-20 NOTE — PROGRESS NOTE ADULT - SUBJECTIVE AND OBJECTIVE BOX
HPI:  75 year old patient male w/ HTN, CAD, DM, HLD, h/o LLE Osteomyelitis/Cellulitis (November, 2020), Right shoulder replacement and severe aortic stenosis presented to [facility] on 6/29 for JONN, AVR and CABG x5.  Pt reports that he was hospitalized @ [facility] in November, 2020 for LLE osteomyelitis/sepsis at which time pt underwent Echocardiogram which revealed, "aortic stenosis". After the completion of antibiotic treatment for LLE cellulitis, pt underwent cardiac catheterization on 4/19/2021 with Dr. Mundo Terrell which revealed, "aortic stenosis and multi vessel coronary artery disease" . Pt reports worsening fatigability and pt noted "taking too many naps throughout the day" over the past 7 months.  Aortic stenosis severe and Coronary artery disease underwent JONN and AVR and CABG x5 on 06/29/2021.  Post op course complicated by  right sided weakness and slurred speech. MRI confirms embolic appearing strokes acute ischemic infarcts R P-O wedge shape, and L temporal and L ventral roopa.  Not IV tPA candidate with recent OHS. Not interventional candidate with no evidence clinically and on CTA of LVO. Started on Plavix on 7/10/21 for secondary stroke prevention.  Patient was evaluated by PT/OT who recommended acute rehab -- patient was admitted to [facility] for acute rehab on 7/10/21.     subjective: Patient seen and evaluated this afternoon No acute events overnight. Participating well in therapy. Pain is well controlled. Denies chest pain, SOB, nausea, vomiting, constipation or diarrhea. Slept well last night.     SUBJECTIVE / INTERVAL HPI:  Patient seen and evaluated this morning. No acute events overnight. Participating well in therapy. Pain is well controlled. Denies chest pain, SOB, nausea, vomiting, constipation or diarrhea. Slept well last night.      REVIEW OF SYSTEMS:    CONSTITUTIONAL: No fevers or chills  EYES/ENT: No visual changes;  No vertigo or throat pain   NECK: No pain or stiffness  RESPIRATORY: No cough, wheezing, or shortness of breath  CARDIOVASCULAR: No chest pain or palpitations  GASTROINTESTINAL: No abdominal or epigastric pain. No nausea or vomiting. No diarrhea or constipation.   GENITOURINARY: No dysuria, frequency or hematuria  NEUROLOGICAL: No numbness, +improving weakness  SKIN: No itching, rashes  MSK: +improved right wrist pain      VITAL SIGNS: Vital Signs Last 24 Hrs  T(C): 36.7 (20 Jul 2021 07:46), Max: 36.7 (19 Jul 2021 21:50)  T(F): 98.1 (20 Jul 2021 07:46), Max: 98.1 (19 Jul 2021 21:50)  HR: 62 (20 Jul 2021 07:46) (62 - 92)  BP: 111/55 (20 Jul 2021 07:46) (111/55 - 134/77)  BP(mean): --  RR: 16 (20 Jul 2021 07:46) (16 - 16)  SpO2: 95% (20 Jul 2021 07:46) (94% - 95%)    PHYSICAL EXAM:    General: NAD, sitting in ST working with therapist  HEENT: NC/AT; PERRL, anicteric sclera; MMM  Neck: supple  Cardiovascular: +S1/S2, RRR, +sternal incision  Respiratory: no respiratory distress, no wheezing  Gastrointestinal: soft, NT/ND  Extremities: warm, well perfused; no edema, clubbing or cyanosis  Neurological: AAOx3; Motor strength improving on right side. Sensory exam intact    MEDICATIONS:  MEDICATIONS  (STANDING):  aspirin  chewable 81 milliGRAM(s) Oral daily  atorvastatin 40 milliGRAM(s) Oral at bedtime  cholecalciferol 1000 Unit(s) Oral daily  clopidogrel Tablet 75 milliGRAM(s) Oral daily  cyanocobalamin 1000 MICROGram(s) Oral at bedtime  dextrose 40% Gel 15 Gram(s) Oral once  dextrose 5%. 1000 milliLiter(s) (50 mL/Hr) IV Continuous <Continuous>  dextrose 5%. 1000 milliLiter(s) (100 mL/Hr) IV Continuous <Continuous>  dextrose 50% Injectable 25 Gram(s) IV Push once  dextrose 50% Injectable 12.5 Gram(s) IV Push once  dextrose 50% Injectable 25 Gram(s) IV Push once  famotidine    Tablet 20 milliGRAM(s) Oral daily  glucagon  Injectable 1 milliGRAM(s) IntraMuscular once  insulin lispro (ADMELOG) corrective regimen sliding scale   SubCutaneous two times a day before meals  losartan 25 milliGRAM(s) Oral daily  metoprolol succinate ER 50 milliGRAM(s) Oral daily  tamsulosin 0.4 milliGRAM(s) Oral at bedtime    MEDICATIONS  (PRN):  acetaminophen   Tablet .. 650 milliGRAM(s) Oral every 6 hours PRN Mild Pain (1 - 3)  magnesium hydroxide Suspension 30 milliLiter(s) Oral daily PRN Constipation  polyethylene glycol 3350 17 Gram(s) Oral daily PRN Constipation      ALLERGIES:  Allergies    No Known Allergies    Intolerances        LABS:                        9.3    4.95  )-----------( 377      ( 19 Jul 2021 06:39 )             28.4     07-19    140  |  107  |  32<H>  ----------------------------<  168<H>  4.3   |  25  |  1.56<H>    Ca    8.9      19 Jul 2021 06:39    TPro  6.5  /  Alb  2.8<L>  /  TBili  0.4  /  DBili  x   /  AST  15  /  ALT  20  /  AlkPhos  93  07-19        CAPILLARY BLOOD GLUCOSE      POCT Blood Glucose.: 168 mg/dL (19 Jul 2021 07:58)      RADIOLOGY & ADDITIONAL TESTS: Reviewed.

## 2021-07-20 NOTE — PROGRESS NOTE ADULT - ASSESSMENT
75 year old patient male w/ HTN, CAD, DM2, HLD, h/o LLE Osteomyelitis/Cellulitis (November, 2020), Right shoulder replacement and severe aortic stenosis presented to [facility] on 6/29 for JONN, AVR and CABG x5.  Post op course complicated by  right sided weakness and slurred speech. MRI confirms embolic appearing strokes acute ischemic infarcts R P-O wedge shape, and L temporal and L ventral roopa.  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  #Right UE weakness, right wrist pain  -continue with Comprehensive Rehab Program of PT/OT/SLP  -Continue ASA/Plavix/Statin  -XRAY of right wrist obtained; no acute fracture, noted to have right humeral fracture from 2019  -uric acid wnl, now in splint with improved pain    #HTN  #CAD  #Severe Aortic Stenosis   -s/p AVR + CABGx5  -will decrease dose of losartan to 12.5mg QD in setting of some low BP readings  -c/w metoprolol succinate ER 50mg po qd  -c/w asa and plavix/ statin    #DM2  -Ha1c 6.9  -Continue SSI + Fingersticks two times a day    #Anemia  -Chronic   -Baseline Hb seems to be around 9-10  -Continue to monitor    #CKD Stage III  -Stable, near around baseline Cr 1.4-1.6   -Avoid nephrotoxic agents  -Monitor renal function    #DVT ppx  SCD

## 2021-07-20 NOTE — CHART NOTE - NSCHARTNOTEFT_GEN_A_CORE
Nutrition Follow Up Note  Hospital Course   (Per Electronic Medical Record)    Source:  Patient [X]  Medical Record [X]      Diet:   Consistent Carbohydrate, Low Sodium Diet w/ Thin Liquids  Tolerates Diet Consistency Well  No Chewing/Swallowing Difficulties  No Recent Nausea, Vomiting, Diarrhea or Constipation (as Per Patient)  States Good PO Intake/Appetite   on Glucerna 8oz PO Daily (Provides 220kcal-10grams of Protein)  Patient Takes Nutrition Supplement Well  Education Provided on Proper Nutrition  Obtained Food Preferences from Patient    Enteral/Parenteral Nutrition: Not Applicable    Current Weight: 163.5lb on 7/17  Weights Currently Stable @This Time  Obtain Weights Weekly     Pertinent Medications: MEDICATIONS  (STANDING):  aspirin  chewable 81 milliGRAM(s) Oral daily  atorvastatin 40 milliGRAM(s) Oral at bedtime  cholecalciferol 1000 Unit(s) Oral daily  clopidogrel Tablet 75 milliGRAM(s) Oral daily  cyanocobalamin 1000 MICROGram(s) Oral at bedtime  dextrose 40% Gel 15 Gram(s) Oral once  dextrose 5%. 1000 milliLiter(s) (50 mL/Hr) IV Continuous <Continuous>  dextrose 5%. 1000 milliLiter(s) (100 mL/Hr) IV Continuous <Continuous>  dextrose 50% Injectable 25 Gram(s) IV Push once  dextrose 50% Injectable 12.5 Gram(s) IV Push once  dextrose 50% Injectable 25 Gram(s) IV Push once  famotidine    Tablet 20 milliGRAM(s) Oral daily  glucagon  Injectable 1 milliGRAM(s) IntraMuscular once  insulin lispro (ADMELOG) corrective regimen sliding scale   SubCutaneous two times a day before meals  losartan 25 milliGRAM(s) Oral daily  metoprolol succinate ER 50 milliGRAM(s) Oral daily  tamsulosin 0.4 milliGRAM(s) Oral at bedtime    MEDICATIONS  (PRN):  acetaminophen   Tablet .. 650 milliGRAM(s) Oral every 6 hours PRN Mild Pain (1 - 3)  magnesium hydroxide Suspension 30 milliLiter(s) Oral daily PRN Constipation  polyethylene glycol 3350 17 Gram(s) Oral daily PRN Constipation    Pertinent Labs:  07-19 Na140 mmol/L Glu 168 mg/dL<H> K+ 4.3 mmol/L Cr  1.56 mg/dL<H> BUN 32 mg/dL<H> 07-19 Alb 2.8 g/dL<L>    POCT (over Last 3 Days) - Ranging from 155-256    Skin: No Pressure Ulcers  Multiple Surgical Incisions  (as Per Nursing Flow Sheet)     Edema: None Noted     Last Bowel Movement: on 7/19    Estimated Needs:   [X] No Change Since Previous Assessment    Previous Nutrition Diagnosis: Severe Malnutrition     Nutrition Diagnosis is [X] Ongoing - OCS & Patient Takes Nutrition Supplement; Education Provided on Proper Nutrition     New Nutrition Diagnosis: [X] Not Applicable    Interventions:   1. Education Provided on Proper Nutrition    2. Recommend Continue Nutrition Plan of Care     Monitoring & Evaluation:   [X] Weights   [X] PO Intake   [X] Skin Integrity   [X] Follow Up (Per Protocol)  [X] Tolerance to Diet Prescription   [X] Other: Labs & POCT    Registered Dietitian/Nutritionist Remains Available.  Wallace Duarte RDN    Pager # [phone]  Phone# [phone]

## 2021-07-20 NOTE — PROGRESS NOTE ADULT - SUBJECTIVE AND OBJECTIVE BOX
Patient is a 75y old  Male who presents with a chief complaint of Left CVA, right sided hemiparesis (20 Jul 2021 11:19)      Patient seen and examined at bedside. Patient doing well today and in good spirits. Patient states that he was able to walk with PT today without walker for about 12 feet. Patient with no other acute complaints at this time, no acute events overnight.     ALLERGIES:  No Known Allergies    MEDICATIONS  (STANDING):  aspirin  chewable 81 milliGRAM(s) Oral daily  atorvastatin 40 milliGRAM(s) Oral at bedtime  cholecalciferol 1000 Unit(s) Oral daily  clopidogrel Tablet 75 milliGRAM(s) Oral daily  cyanocobalamin 1000 MICROGram(s) Oral at bedtime  dextrose 40% Gel 15 Gram(s) Oral once  dextrose 5%. 1000 milliLiter(s) (50 mL/Hr) IV Continuous <Continuous>  dextrose 5%. 1000 milliLiter(s) (100 mL/Hr) IV Continuous <Continuous>  dextrose 50% Injectable 25 Gram(s) IV Push once  dextrose 50% Injectable 12.5 Gram(s) IV Push once  dextrose 50% Injectable 25 Gram(s) IV Push once  famotidine    Tablet 20 milliGRAM(s) Oral daily  glucagon  Injectable 1 milliGRAM(s) IntraMuscular once  insulin lispro (ADMELOG) corrective regimen sliding scale   SubCutaneous two times a day before meals  losartan 25 milliGRAM(s) Oral daily  metoprolol succinate ER 50 milliGRAM(s) Oral daily  tamsulosin 0.4 milliGRAM(s) Oral at bedtime    MEDICATIONS  (PRN):  acetaminophen   Tablet .. 650 milliGRAM(s) Oral every 6 hours PRN Mild Pain (1 - 3)  magnesium hydroxide Suspension 30 milliLiter(s) Oral daily PRN Constipation  polyethylene glycol 3350 17 Gram(s) Oral daily PRN Constipation    Vital Signs Last 24 Hrs  T(F): 98.1 (20 Jul 2021 07:46), Max: 98.1 (19 Jul 2021 21:50)  HR: 62 (20 Jul 2021 07:46) (62 - 92)  BP: 111/55 (20 Jul 2021 07:46) (111/55 - 134/77)  RR: 16 (20 Jul 2021 07:46) (16 - 16)  SpO2: 95% (20 Jul 2021 07:46) (94% - 95%)  I&O's Summary      PHYSICAL EXAM:  General: NAD, A/O x 3  ENT: MMM, no scleral icterus  Neck: Supple, No JVD, no thyroidomegaly  Lungs: Clear to auscultation bilaterally, no wheezes, no rales, no rhonchi, good inspiratory effort  Cardio: RRR, S1/S2, No murmurs  Abdomen: Soft, Nontender, Nondistended; Bowel sounds present  Extremities: fingers warm, sensation intact,  No calf tenderness, trace pitting edema, no skin changes    LABS:                        9.3    4.95  )-----------( 377      ( 19 Jul 2021 06:39 )             28.4     07-19    140  |  107  |  32  ----------------------------<  168  4.3   |  25  |  1.56    Ca    8.9      19 Jul 2021 06:39    TPro  6.5  /  Alb  2.8  /  TBili  0.4  /  DBili  x   /  AST  15  /  ALT  20  /  AlkPhos  93  07-19    eGFR if Non African American: 42 mL/min/1.73M2 (07-19-21 @ 06:39)  eGFR if : 49 mL/min/1.73M2 (07-19-21 @ 06:39)            POCT Blood Glucose.: 169 mg/dL (20 Jul 2021 07:27)  POCT Blood Glucose.: 186 mg/dL (19 Jul 2021 17:10)              RADIOLOGY & ADDITIONAL TESTS: none    Care Discussed with Consultants/Other Providers: discussed with rehab team

## 2021-07-21 LAB
GLUCOSE BLDC GLUCOMTR-MCNC: 150 MG/DL — HIGH (ref 70–99)
GLUCOSE BLDC GLUCOMTR-MCNC: 169 MG/DL — HIGH (ref 70–99)
GLUCOSE BLDC GLUCOMTR-MCNC: 193 MG/DL — HIGH (ref 70–99)

## 2021-07-21 PROCEDURE — 99232 SBSQ HOSP IP/OBS MODERATE 35: CPT

## 2021-07-21 RX ADMIN — Medication 81 MILLIGRAM(S): at 11:32

## 2021-07-21 RX ADMIN — Medication 2: at 08:01

## 2021-07-21 RX ADMIN — TAMSULOSIN HYDROCHLORIDE 0.4 MILLIGRAM(S): 0.4 CAPSULE ORAL at 21:24

## 2021-07-21 RX ADMIN — FAMOTIDINE 20 MILLIGRAM(S): 10 INJECTION INTRAVENOUS at 11:32

## 2021-07-21 RX ADMIN — CLOPIDOGREL BISULFATE 75 MILLIGRAM(S): 75 TABLET, FILM COATED ORAL at 11:32

## 2021-07-21 RX ADMIN — PREGABALIN 1000 MICROGRAM(S): 225 CAPSULE ORAL at 21:24

## 2021-07-21 RX ADMIN — Medication 50 MILLIGRAM(S): at 05:38

## 2021-07-21 RX ADMIN — ATORVASTATIN CALCIUM 40 MILLIGRAM(S): 80 TABLET, FILM COATED ORAL at 21:24

## 2021-07-21 RX ADMIN — LOSARTAN POTASSIUM 12.5 MILLIGRAM(S): 100 TABLET, FILM COATED ORAL at 05:38

## 2021-07-21 RX ADMIN — Medication 1000 UNIT(S): at 11:32

## 2021-07-21 NOTE — PROGRESS NOTE ADULT - ASSESSMENT
75 year old patient male w/ HTN, CAD, DM2, HLD, h/o LLE Osteomyelitis/Cellulitis (November, 2020), Right shoulder replacement and severe aortic stenosis presented to [facility] on 6/29 for JONN, AVR and CABG x5.  Post op course complicated by  right sided weakness and slurred speech. MRI confirms embolic appearing strokes acute ischemic infarcts R P-O wedge shape, and L temporal and L ventral roopa.  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  #Right UE weakness, right wrist pain  -continue with Comprehensive Rehab Program of PT/OT/SLP  -Continue ASA/Plavix/Statin  -XRAY of right wrist obtained; no acute fracture, noted to have right humeral fracture from 2019  -uric acid wnl, now in splint with improved pain    #HTN  #CAD  #Severe Aortic Stenosis   -s/p AVR + CABGx5  -will decrease dose of losartan to 12.5mg QD in setting of some low BP readings  -c/w metoprolol succinate ER 50mg po qd  -c/w asa and plavix/ statin    #DM2  -Ha1c 6.9  -Continue SSI + Fingersticks two times a day    #Anemia  -Chronic   -Baseline Hb seems to be around 9-10  -Continue to monitor    #CKD Stage III  -Stable, near around baseline Cr 1.4-1.6   -Avoid nephrotoxic agents  -Monitor renal function    #DVT ppx  - SCD

## 2021-07-21 NOTE — PROGRESS NOTE ADULT - SUBJECTIVE AND OBJECTIVE BOX
Patient is a 75y old  Male who presents with a chief complaint of Left CVA, right sided hemiparesis (20 Jul 2021 13:58)      Patient seen and examined at bedside. Patient in very good spirits today, eating breakfast comfortably in chair. Patient admits to some LLE twinging near wound location but denies LE pain or calf pain. Denies SOB or chest pain. Patient with no other acute complaints at this time, looking forward to PT today.    ALLERGIES:  No Known Allergies    MEDICATIONS  (STANDING):  aspirin  chewable 81 milliGRAM(s) Oral daily  atorvastatin 40 milliGRAM(s) Oral at bedtime  cholecalciferol 1000 Unit(s) Oral daily  clopidogrel Tablet 75 milliGRAM(s) Oral daily  cyanocobalamin 1000 MICROGram(s) Oral at bedtime  dextrose 40% Gel 15 Gram(s) Oral once  dextrose 5%. 1000 milliLiter(s) (50 mL/Hr) IV Continuous <Continuous>  dextrose 5%. 1000 milliLiter(s) (100 mL/Hr) IV Continuous <Continuous>  dextrose 50% Injectable 25 Gram(s) IV Push once  dextrose 50% Injectable 12.5 Gram(s) IV Push once  dextrose 50% Injectable 25 Gram(s) IV Push once  famotidine    Tablet 20 milliGRAM(s) Oral daily  glucagon  Injectable 1 milliGRAM(s) IntraMuscular once  insulin lispro (ADMELOG) corrective regimen sliding scale   SubCutaneous two times a day before meals  losartan 12.5 milliGRAM(s) Oral daily  metoprolol succinate ER 50 milliGRAM(s) Oral daily  tamsulosin 0.4 milliGRAM(s) Oral at bedtime    MEDICATIONS  (PRN):  acetaminophen   Tablet .. 650 milliGRAM(s) Oral every 6 hours PRN Mild Pain (1 - 3)  magnesium hydroxide Suspension 30 milliLiter(s) Oral daily PRN Constipation  polyethylene glycol 3350 17 Gram(s) Oral daily PRN Constipation    Vital Signs Last 24 Hrs  T(F): 98 (21 Jul 2021 07:32), Max: 98.9 (20 Jul 2021 20:21)  HR: 67 (21 Jul 2021 07:32) (63 - 71)  BP: 111/68 (21 Jul 2021 07:32) (111/68 - 126/67)  RR: 16 (21 Jul 2021 07:32) (16 - 16)  SpO2: 98% (21 Jul 2021 07:32) (98% - 98%)  I&O's Summary      PHYSICAL EXAM:  General: NAD, A/O x 3  ENT: MMM, no scleral icterus  Neck: Supple, No JVD, no thyroidomegaly  Lungs: Clear to auscultation bilaterally, no wheezes, no rales, no rhonchi, good inspiratory effort  Cardio: RRR, S1/S2, No murmurs  Abdomen: Soft, Nontender, Nondistended; Bowel sounds present  Extremities: fingers warm, sensation intact,  No calf tenderness, trace pitting edema, noted to have healing wound on RLE medial side near patella       LABS:                        9.3    4.95  )-----------( 377      ( 19 Jul 2021 06:39 )             28.4     07-19    140  |  107  |  32  ----------------------------<  168  4.3   |  25  |  1.56    Ca    8.9      19 Jul 2021 06:39    TPro  6.5  /  Alb  2.8  /  TBili  0.4  /  DBili  x   /  AST  15  /  ALT  20  /  AlkPhos  93  07-19    eGFR if Non African American: 42 mL/min/1.73M2 (07-19-21 @ 06:39)  eGFR if : 49 mL/min/1.73M2 (07-19-21 @ 06:39)                          POCT Blood Glucose.: 169 mg/dL (21 Jul 2021 07:57)  POCT Blood Glucose.: 126 mg/dL (20 Jul 2021 21:54)  POCT Blood Glucose.: 160 mg/dL (20 Jul 2021 16:51)              RADIOLOGY & ADDITIONAL TESTS: none    Care Discussed with Consultants/Other Providers: discussed with rehab team

## 2021-07-22 ENCOUNTER — TRANSCRIPTION ENCOUNTER (OUTPATIENT)
Age: 76
End: 2021-07-22

## 2021-07-22 LAB
ALBUMIN SERPL ELPH-MCNC: 2.9 G/DL — LOW (ref 3.3–5)
ALP SERPL-CCNC: 101 U/L — SIGNIFICANT CHANGE UP (ref 40–120)
ALT FLD-CCNC: 24 U/L — SIGNIFICANT CHANGE UP (ref 10–45)
ANION GAP SERPL CALC-SCNC: 7 MMOL/L — SIGNIFICANT CHANGE UP (ref 5–17)
AST SERPL-CCNC: 21 U/L — SIGNIFICANT CHANGE UP (ref 10–40)
BASOPHILS # BLD AUTO: 0.02 K/UL — SIGNIFICANT CHANGE UP (ref 0–0.2)
BASOPHILS NFR BLD AUTO: 0.5 % — SIGNIFICANT CHANGE UP (ref 0–2)
BILIRUB SERPL-MCNC: 0.4 MG/DL — SIGNIFICANT CHANGE UP (ref 0.2–1.2)
BUN SERPL-MCNC: 36 MG/DL — HIGH (ref 7–23)
CALCIUM SERPL-MCNC: 8.9 MG/DL — SIGNIFICANT CHANGE UP (ref 8.4–10.5)
CHLORIDE SERPL-SCNC: 106 MMOL/L — SIGNIFICANT CHANGE UP (ref 96–108)
CO2 SERPL-SCNC: 27 MMOL/L — SIGNIFICANT CHANGE UP (ref 22–31)
CREAT SERPL-MCNC: 1.77 MG/DL — HIGH (ref 0.5–1.3)
EOSINOPHIL # BLD AUTO: 0.12 K/UL — SIGNIFICANT CHANGE UP (ref 0–0.5)
EOSINOPHIL NFR BLD AUTO: 2.9 % — SIGNIFICANT CHANGE UP (ref 0–6)
GLUCOSE BLDC GLUCOMTR-MCNC: 150 MG/DL — HIGH (ref 70–99)
GLUCOSE BLDC GLUCOMTR-MCNC: 168 MG/DL — HIGH (ref 70–99)
GLUCOSE BLDC GLUCOMTR-MCNC: 172 MG/DL — HIGH (ref 70–99)
GLUCOSE SERPL-MCNC: 152 MG/DL — HIGH (ref 70–99)
HCT VFR BLD CALC: 29.4 % — LOW (ref 39–50)
HGB BLD-MCNC: 9.5 G/DL — LOW (ref 13–17)
IMM GRANULOCYTES NFR BLD AUTO: 0.5 % — SIGNIFICANT CHANGE UP (ref 0–1.5)
LYMPHOCYTES # BLD AUTO: 1.12 K/UL — SIGNIFICANT CHANGE UP (ref 1–3.3)
LYMPHOCYTES # BLD AUTO: 27.4 % — SIGNIFICANT CHANGE UP (ref 13–44)
MCHC RBC-ENTMCNC: 29.7 PG — SIGNIFICANT CHANGE UP (ref 27–34)
MCHC RBC-ENTMCNC: 32.3 GM/DL — SIGNIFICANT CHANGE UP (ref 32–36)
MCV RBC AUTO: 91.9 FL — SIGNIFICANT CHANGE UP (ref 80–100)
MONOCYTES # BLD AUTO: 0.56 K/UL — SIGNIFICANT CHANGE UP (ref 0–0.9)
MONOCYTES NFR BLD AUTO: 13.7 % — SIGNIFICANT CHANGE UP (ref 2–14)
NEUTROPHILS # BLD AUTO: 2.25 K/UL — SIGNIFICANT CHANGE UP (ref 1.8–7.4)
NEUTROPHILS NFR BLD AUTO: 55 % — SIGNIFICANT CHANGE UP (ref 43–77)
NRBC # BLD: 0 /100 WBCS — SIGNIFICANT CHANGE UP (ref 0–0)
PLATELET # BLD AUTO: 314 K/UL — SIGNIFICANT CHANGE UP (ref 150–400)
POTASSIUM SERPL-MCNC: 4.6 MMOL/L — SIGNIFICANT CHANGE UP (ref 3.5–5.3)
POTASSIUM SERPL-SCNC: 4.6 MMOL/L — SIGNIFICANT CHANGE UP (ref 3.5–5.3)
PROT SERPL-MCNC: 6.3 G/DL — SIGNIFICANT CHANGE UP (ref 6–8.3)
RBC # BLD: 3.2 M/UL — LOW (ref 4.2–5.8)
RBC # FLD: 13.2 % — SIGNIFICANT CHANGE UP (ref 10.3–14.5)
SODIUM SERPL-SCNC: 140 MMOL/L — SIGNIFICANT CHANGE UP (ref 135–145)
WBC # BLD: 4.09 K/UL — SIGNIFICANT CHANGE UP (ref 3.8–10.5)
WBC # FLD AUTO: 4.09 K/UL — SIGNIFICANT CHANGE UP (ref 3.8–10.5)

## 2021-07-22 PROCEDURE — 99232 SBSQ HOSP IP/OBS MODERATE 35: CPT

## 2021-07-22 PROCEDURE — 93971 EXTREMITY STUDY: CPT | Mod: 26,RT

## 2021-07-22 RX ORDER — CLOPIDOGREL BISULFATE 75 MG/1
1 TABLET, FILM COATED ORAL
Qty: 30 | Refills: 0
Start: 2021-07-22 | End: 2021-08-20

## 2021-07-22 RX ORDER — ASPIRIN/CALCIUM CARB/MAGNESIUM 324 MG
1 TABLET ORAL
Qty: 0 | Refills: 0 | DISCHARGE

## 2021-07-22 RX ORDER — TAMSULOSIN HYDROCHLORIDE 0.4 MG/1
1 CAPSULE ORAL
Qty: 30 | Refills: 0
Start: 2021-07-22 | End: 2021-08-20

## 2021-07-22 RX ORDER — ATORVASTATIN CALCIUM 80 MG/1
1 TABLET, FILM COATED ORAL
Qty: 0 | Refills: 0 | DISCHARGE

## 2021-07-22 RX ORDER — FAMOTIDINE 10 MG/ML
1 INJECTION INTRAVENOUS
Qty: 30 | Refills: 0
Start: 2021-07-22 | End: 2021-08-20

## 2021-07-22 RX ORDER — ASPIRIN/CALCIUM CARB/MAGNESIUM 324 MG
1 TABLET ORAL
Qty: 30 | Refills: 0
Start: 2021-07-22 | End: 2021-08-20

## 2021-07-22 RX ORDER — LOSARTAN POTASSIUM 100 MG/1
0.5 TABLET, FILM COATED ORAL
Qty: 15 | Refills: 0
Start: 2021-07-22 | End: 2021-08-20

## 2021-07-22 RX ORDER — LOSARTAN POTASSIUM 100 MG/1
1 TABLET, FILM COATED ORAL
Qty: 15 | Refills: 0 | DISCHARGE
Start: 2021-07-22 | End: 2021-08-20

## 2021-07-22 RX ORDER — ATORVASTATIN CALCIUM 80 MG/1
1 TABLET, FILM COATED ORAL
Qty: 30 | Refills: 0
Start: 2021-07-22 | End: 2021-08-20

## 2021-07-22 RX ORDER — METOPROLOL TARTRATE 50 MG
1 TABLET ORAL
Qty: 30 | Refills: 0
Start: 2021-07-22 | End: 2021-08-20

## 2021-07-22 RX ADMIN — CLOPIDOGREL BISULFATE 75 MILLIGRAM(S): 75 TABLET, FILM COATED ORAL at 12:25

## 2021-07-22 RX ADMIN — TAMSULOSIN HYDROCHLORIDE 0.4 MILLIGRAM(S): 0.4 CAPSULE ORAL at 21:56

## 2021-07-22 RX ADMIN — Medication 81 MILLIGRAM(S): at 12:25

## 2021-07-22 RX ADMIN — FAMOTIDINE 20 MILLIGRAM(S): 10 INJECTION INTRAVENOUS at 12:25

## 2021-07-22 RX ADMIN — ATORVASTATIN CALCIUM 40 MILLIGRAM(S): 80 TABLET, FILM COATED ORAL at 21:56

## 2021-07-22 RX ADMIN — PREGABALIN 1000 MICROGRAM(S): 225 CAPSULE ORAL at 21:56

## 2021-07-22 RX ADMIN — Medication 1000 UNIT(S): at 12:25

## 2021-07-22 RX ADMIN — LOSARTAN POTASSIUM 12.5 MILLIGRAM(S): 100 TABLET, FILM COATED ORAL at 05:44

## 2021-07-22 RX ADMIN — Medication 2: at 07:56

## 2021-07-22 RX ADMIN — Medication 50 MILLIGRAM(S): at 05:44

## 2021-07-22 NOTE — DISCHARGE NOTE PROVIDER - HOSPITAL COURSE
75 year old patient male w/ HTN, CAD, DM, HLD, h/o LLE Osteomyelitis/Cellulitis (November, 2020), Right shoulder replacement and severe aortic stenosis presented to [facility] on 6/29 for JONN, AVR and CABG x5.  Pt reports that he was hospitalized @ [facility] in November, 2020 for LLE osteomyelitis/sepsis at which time pt underwent Echocardiogram which revealed, "aortic stenosis". After the completion of antibiotic treatment for LLE cellulitis, pt underwent cardiac catheterization on 4/19/2021 with Dr. Mundo Terrell which revealed, "aortic stenosis and multi vessel coronary artery disease" . Pt reports worsening fatigability and pt noted "taking too many naps throughout the day" over the past 7 months.  Aortic stenosis severe and Coronary artery disease underwent JONN and AVR and CABG x5 on 06/29/2021.  Post op course complicated by  right sided weakness and slurred speech. MRI confirms embolic appearing strokes acute ischemic infarcts R P-O wedge shape, and L temporal and L ventral roopa.  Not IV tPA candidate with recent OHS. Not interventional candidate with no evidence clinically and on CTA of LVO. Started on Plavix on 7/10/21 for secondary stroke prevention.  Patient was evaluated by PT/OT who recommended acute rehab -- patient was admitted to [facility] for acute rehab on 7/10/21.      #Left temporal and ventral roopa CVA w/ Right Hemiparesis  - Gait Instability, ADL impairments and Functional impairments: on Comprehensive Rehab Program of PT/OT/SLP  - 3 hours a day, 5 days a week  - s/p CABG- embolic CVA  - c/w lipitor, ASA  - cleared for Plavix on 7/10    #Swelling  - RLE swelling and mild tenderness  - Doppler performed - no DVT present    #CAD and Severe AS  - s/p AVR + CABGx5-incentive spirometry/deep breathing, pain control  - maintain sternal precautions-healing well  - BP control w/ losartan and metoprolol  - ASA/Plavix and Lipitor    #HTN   - losartan 12.5mg daily  - metoprolol 50mg daily    #DM2  - A1C 6.9%  -consistent carbs diet added  -FS ACHS  -BG not at goal-On metformin at home per 2020 records. Currently Cr elevated-hospitalist to follow up.    #pain  -right wrist pain/limited ROM  -uric acid wnl  -hospitalist f/up requested  -may use cool compress, splint in place-pain improved  -Xr right wrist completed    #Elevated cr  -?CKD vs MARINA  -labs are following  - Cr improved  -continue Cozaar for BP control-hospitalist requested for follow up    #Pain Mgmt   - Tylenol PRN    #GI/Bowel Mgmt   - Continent c/w Miralax       #Discharge planning  Team meeting DATE: 7/13  SW: PH with son who works during the day, 1 [place], 7+7 stairs to bedroom  OT: supervision eating and grooming, min A UBD, toilet transfers, mod A bathing and toileting, max A LBD  PT: mod A transfers, ambulating 70' with RW with min A and W/C follow, no stairs yet  ST: regular diet with thins, no language deficits  EDOD: 7/23

## 2021-07-22 NOTE — DISCHARGE NOTE PROVIDER - DETAILS OF MALNUTRITION DIAGNOSIS/DIAGNOSES
This patient has been assessed with a concern for Malnutrition and was treated during this hospitalization for the following Nutrition diagnosis/diagnoses:     -  07/12/2021: Severe protein-calorie malnutrition

## 2021-07-22 NOTE — PROGRESS NOTE ADULT - SUBJECTIVE AND OBJECTIVE BOX
HPI:  75 year old patient male w/ HTN, CAD, DM, HLD, h/o LLE Osteomyelitis/Cellulitis (November, 2020), Right shoulder replacement and severe aortic stenosis presented to [facility] on 6/29 for JONN, AVR and CABG x5.  Pt reports that he was hospitalized @ [facility] in November, 2020 for LLE osteomyelitis/sepsis at which time pt underwent Echocardiogram which revealed, "aortic stenosis". After the completion of antibiotic treatment for LLE cellulitis, pt underwent cardiac catheterization on 4/19/2021 with Dr. Mundo Terrell which revealed, "aortic stenosis and multi vessel coronary artery disease" . Pt reports worsening fatigability and pt noted "taking too many naps throughout the day" over the past 7 months.  Aortic stenosis severe and Coronary artery disease underwent JONN and AVR and CABG x5 on 06/29/2021.  Post op course complicated by  right sided weakness and slurred speech. MRI confirms embolic appearing strokes acute ischemic infarcts R P-O wedge shape, and L temporal and L ventral roopa.  Not IV tPA candidate with recent OHS. Not interventional candidate with no evidence clinically and on CTA of LVO. Started on Plavix on 7/10/21 for secondary stroke prevention.  Patient was evaluated by PT/OT who recommended acute rehab -- patient was admitted to [facility] for acute rehab on 7/10/21.     subjective: Patient seen and evaluated this afternoon No acute events overnight. Participating well in therapy. Pain is well controlled. Denies chest pain, SOB, nausea, vomiting, constipation or diarrhea. Slept well last night.     SUBJECTIVE / INTERVAL HPI: Patient seen and examined at bedside. He is complaining of right lower leg tightness and swelling. He otherwise has no complaints and is feeling stronger in therapy.     REVIEW OF SYSTEMS:    CONSTITUTIONAL: No fevers or chills  EYES/ENT: No visual changes;  No vertigo or throat pain   NECK: No pain or stiffness  RESPIRATORY: No cough, wheezing, or shortness of breath  CARDIOVASCULAR: No chest pain or palpitations  GASTROINTESTINAL: No abdominal or epigastric pain. No nausea or vomiting. No diarrhea or constipation.   GENITOURINARY: No dysuria, frequency or hematuria  NEUROLOGICAL: No numbness, +weakness improving  SKIN: No itching, rashes      VITAL SIGNS:  Vital Signs Last 24 Hrs  T(C): 36.8 (22 Jul 2021 08:59), Max: 36.8 (21 Jul 2021 20:19)  T(F): 98.2 (22 Jul 2021 08:59), Max: 98.2 (21 Jul 2021 20:19)  HR: 63 (22 Jul 2021 08:59) (63 - 71)  BP: 105/83 (22 Jul 2021 08:59) (105/83 - 122/71)  BP(mean): --  RR: 18 (22 Jul 2021 08:59) (16 - 18)  SpO2: 95% (22 Jul 2021 08:59) (95% - 98%)    PHYSICAL EXAM:    General: NAD, sitting in chair comfortably  HEENT: NC/AT; PERRL, anicteric sclera; MMM  Neck: supple  Cardiovascular: +S1/S2, RRR  Respiratory: breathing comfortably, no respiratory distress  Gastrointestinal: soft, NT/ND  Extremities: warm, well perfused; +mild edema RLE, mild tenderness to palpation  Neurological: AAOx3; Motor exam with improving right sided weakness, no sensory deficits    MEDICATIONS:  MEDICATIONS  (STANDING):  aspirin  chewable 81 milliGRAM(s) Oral daily  atorvastatin 40 milliGRAM(s) Oral at bedtime  cholecalciferol 1000 Unit(s) Oral daily  clopidogrel Tablet 75 milliGRAM(s) Oral daily  cyanocobalamin 1000 MICROGram(s) Oral at bedtime  dextrose 40% Gel 15 Gram(s) Oral once  dextrose 5%. 1000 milliLiter(s) (50 mL/Hr) IV Continuous <Continuous>  dextrose 5%. 1000 milliLiter(s) (100 mL/Hr) IV Continuous <Continuous>  dextrose 50% Injectable 25 Gram(s) IV Push once  dextrose 50% Injectable 12.5 Gram(s) IV Push once  dextrose 50% Injectable 25 Gram(s) IV Push once  famotidine    Tablet 20 milliGRAM(s) Oral daily  glucagon  Injectable 1 milliGRAM(s) IntraMuscular once  insulin lispro (ADMELOG) corrective regimen sliding scale   SubCutaneous two times a day before meals  losartan 12.5 milliGRAM(s) Oral daily  metoprolol succinate ER 50 milliGRAM(s) Oral daily  tamsulosin 0.4 milliGRAM(s) Oral at bedtime    MEDICATIONS  (PRN):  acetaminophen   Tablet .. 650 milliGRAM(s) Oral every 6 hours PRN Mild Pain (1 - 3)  magnesium hydroxide Suspension 30 milliLiter(s) Oral daily PRN Constipation  polyethylene glycol 3350 17 Gram(s) Oral daily PRN Constipation      ALLERGIES:  Allergies    No Known Allergies    Intolerances        LABS:                        9.5    4.09  )-----------( 314      ( 22 Jul 2021 05:00 )             29.4     07-22    140  |  106  |  36<H>  ----------------------------<  152<H>  4.6   |  27  |  1.77<H>    Ca    8.9      22 Jul 2021 05:00    TPro  6.3  /  Alb  2.9<L>  /  TBili  0.4  /  DBili  x   /  AST  21  /  ALT  24  /  AlkPhos  101  07-22        CAPILLARY BLOOD GLUCOSE      POCT Blood Glucose.: 172 mg/dL (22 Jul 2021 07:55)      RADIOLOGY & ADDITIONAL TESTS: Reviewed.

## 2021-07-22 NOTE — PROGRESS NOTE ADULT - ASSESSMENT
75 year old patient male w/ HTN, CAD, DM2, HLD, h/o LLE Osteomyelitis/Cellulitis (November, 2020), Right shoulder replacement and severe aortic stenosis presented to [facility] on 6/29 for JONN, AVR and CABG x5.  Post op course complicated by  right sided weakness and slurred speech. MRI confirms embolic appearing strokes acute ischemic infarcts R P-O wedge shape, and L temporal and L ventral roopa.  Not IV tPA candidate with recent OHS. Patient now admitted for a multidisciplinary rehab program. 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  - Gait Instability, ADL impairments and Functional impairments: on Comprehensive Rehab Program of PT/OT/SLP  - 3 hours a day, 5 days a week  - s/p CABG- embolic CVA  - c/w lipitor, ASA  - cleared for Plavix on 7/10    #Swelling  - RLE swelling and mild tenderness  - Doppler performed - no DVT present    #CAD and Severe AS  - s/p AVR + CABGx5-incentive spirometry/deep breathing, pain control  - maintain sternal precautions-healing well  - BP control w/ losartan and metoprolol  - ASA/Plavix and Lipitor    #HTN   - losartan 12.5mg daily  - metoprolol 50mg daily    #DM2  - A1C 6.9%  -consistent carbs diet added  -FS ACHS  -BG not at goal-On metformin at home per 2020 records. Currently Cr elevated-hospitalist to follow up.    #pain  -right wrist pain/limited ROM  -uric acid wnl  -hospitalist f/up requested  -may use cool compress, splint in place-pain improved  -Xr right wrist completed    #Elevated cr  -?CKD vs MARINA  -labs are following  - Cr improved  -continue Cozaar for BP control-hospitalist requested for follow up    #Pain Mgmt   - Tylenol PRN    #GI/Bowel Mgmt   - Continent c/w Miralax       #Discharge planning  Team meeting DATE: 7/13  SW: PH with son who works during the day, 1 [place], 7+7 stairs to bedroom  OT: supervision eating and grooming, min A UBD, toilet transfers, mod A bathing and toileting, max A LBD  PT: mod A transfers, ambulating 70' with RW with min A and W/C follow, no stairs yet  ST: regular diet with thins, no language deficits  EDOD: 7/23

## 2021-07-22 NOTE — DISCHARGE NOTE PROVIDER - CARE PROVIDER_API CALL
Jessica Tucker)  PhysicalRehab Medicine  [facility], 4th [address]  [place], [place] [id number]  Phone: [phone]  Fax: [phone]  Follow Up Time:

## 2021-07-22 NOTE — DISCHARGE NOTE PROVIDER - NSDCMRMEDTOKEN_GEN_ALL_CORE_FT
aspirin 81 mg oral delayed release tablet: 1 tab(s) orally once a day   atorvastatin 40 mg oral tablet: 1 tab(s) orally once a day (at bedtime)  Centrum Men&#x27;s oral tablet: 1 tab(s) orally once a day  clopidogrel 75 mg oral tablet: 1 tab(s) orally once a day  famotidine 20 mg oral tablet: 1 tab(s) orally once a day  Feosol 325 mg (65 mg elemental iron) oral tablet: 1 tab(s) orally once a day  losartan 25 mg oral tablet: 0.5 tab(s) orally once a day   metFORMIN 500 mg oral tablet: 2 tab(s) orally once a day (at bedtime)   as per patient    *Script written as 1T BID*  metoprolol succinate 50 mg oral tablet, extended release: 1 tab(s) orally once a day  tamsulosin 0.4 mg oral capsule: 1 cap(s) orally once a day (at bedtime)  Vitamin B-12 1000 mcg oral tablet: 1 tab(s) orally once a day  Vitamin D3 5000 intl units (125 mcg) oral tablet: 1 tab(s) orally once a day

## 2021-07-22 NOTE — DISCHARGE NOTE PROVIDER - NSDCCPCAREPLAN_GEN_ALL_CORE_FT
PRINCIPAL DISCHARGE DIAGNOSIS  Diagnosis: Cerebrovascular accident (CVA)  Assessment and Plan of Treatment: You developed right sided weakness while in the hospital and were found to have a stroke. You came to [facility] for rehab and did very well with therapy. You should follow up with your neurologist and continue taking all of the medications that you were taking in the hospital.      SECONDARY DISCHARGE DIAGNOSES  Diagnosis: CAD (coronary artery disease)  Assessment and Plan of Treatment: You had an AVR and CABG and are doing well. You should follow up with your cardiologist and continue taking your medications.    Diagnosis: HTN (hypertension)  Assessment and Plan of Treatment: You have high blood pressure and should continue on your medications and follow up with your primary care physician.    Diagnosis: DM (diabetes mellitus)  Assessment and Plan of Treatment: You hae Type 2 diabetes and should return back on your Metformin and follow up with your primary care physician.

## 2021-07-23 ENCOUNTER — TRANSCRIPTION ENCOUNTER (OUTPATIENT)
Age: 76
End: 2021-07-23

## 2021-07-23 VITALS
RESPIRATION RATE: 18 BRPM | OXYGEN SATURATION: 97 % | DIASTOLIC BLOOD PRESSURE: 71 MMHG | TEMPERATURE: 98 F | HEART RATE: 61 BPM | SYSTOLIC BLOOD PRESSURE: 119 MMHG

## 2021-07-23 LAB — GLUCOSE BLDC GLUCOMTR-MCNC: 154 MG/DL — HIGH (ref 70–99)

## 2021-07-23 PROCEDURE — 99232 SBSQ HOSP IP/OBS MODERATE 35: CPT

## 2021-07-23 PROCEDURE — 99238 HOSP IP/OBS DSCHRG MGMT 30/<: CPT

## 2021-07-23 RX ADMIN — Medication 1000 UNIT(S): at 11:19

## 2021-07-23 RX ADMIN — LOSARTAN POTASSIUM 12.5 MILLIGRAM(S): 100 TABLET, FILM COATED ORAL at 05:21

## 2021-07-23 RX ADMIN — Medication 81 MILLIGRAM(S): at 11:19

## 2021-07-23 RX ADMIN — FAMOTIDINE 20 MILLIGRAM(S): 10 INJECTION INTRAVENOUS at 11:20

## 2021-07-23 RX ADMIN — CLOPIDOGREL BISULFATE 75 MILLIGRAM(S): 75 TABLET, FILM COATED ORAL at 11:20

## 2021-07-23 RX ADMIN — Medication 50 MILLIGRAM(S): at 05:21

## 2021-07-23 RX ADMIN — Medication 2: at 08:25

## 2021-07-23 NOTE — PROGRESS NOTE ADULT - NUTRITIONAL ASSESSMENT
This patient has been assessed with a concern for Malnutrition and has been determined to have a diagnosis/diagnoses of Severe protein-calorie malnutrition.    This patient is being managed with:   Diet Consistent Carbohydrate/No Snacks-  Low Sodium  Supplement Feeding Modality:  Oral  Glucerna Shake Cans or Servings Per Day:  1       Frequency:  Daily  Entered: Jul 12 2021 10:44AM    

## 2021-07-23 NOTE — PROGRESS NOTE ADULT - ASSESSMENT
75 year old patient male w/ HTN, CAD, DM2, HLD, h/o LLE Osteomyelitis/Cellulitis (November, 2020), Right shoulder replacement and severe aortic stenosis presented to [facility] on 6/29 for JONN, AVR and CABG x5.  Post op course complicated by  right sided weakness and slurred speech. MRI confirms embolic appearing strokes acute ischemic infarcts R P-O wedge shape, and L temporal and L ventral roopa.  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  #Right UE weakness, right wrist pain  -continue with Comprehensive Rehab Program of PT/OT/SLP  -Continue ASA/Plavix/Statin  -XRAY of right wrist obtained; no acute fracture, noted to have right humeral fracture from 2019  -uric acid wnl, now in splint with improved pain    #HTN  #CAD  #Severe Aortic Stenosis   -s/p AVR + CABGx5  - c/w losartan 12.5mg once daily  -c/w metoprolol succinate ER 50mg po qd  -c/w asa and plavix/ statin  - watch for bleeding    #DM2  -Ha1c 6.9  -Continue SSI + Fingersticks two times a day    #Anemia  -Chronic   -Baseline Hb seems to be around 9-10  -Continue to monitor    #CKD Stage III  -Stable, near around baseline Cr 1.4-1.6   -Avoid nephrotoxic agents  -Monitor renal function    #  Severe protein-calorie malnutrition.  # hypoalbuminemia  - on Glucerna  - nutrition eval noted  -  on consistent carb regular diet    #DVT ppx  - SCD    will follow    d/w Dr. Edwards

## 2021-07-23 NOTE — PROGRESS NOTE ADULT - SUBJECTIVE AND OBJECTIVE BOX
Medicine Progress Note    Patient is a 75y old  Male who presents with a chief complaint of Left CVA, right sided hemiparesis (22 Jul 2021 14:44)      SUBJECTIVE / OVERNIGHT EVENTS:  seen and examined  Chart reviewed  No overnight events  Limb weakness improving with therapy  BM+  No pain  No complaints    ADDITIONAL REVIEW OF SYSTEMS:  no fever/chills/CP/sob/palpitation/dizziness/ abd pain/nausea/vomiting/diarrhea/constipation/headaches. all other ROS neg    MEDICATIONS  (STANDING):  aspirin  chewable 81 milliGRAM(s) Oral daily  atorvastatin 40 milliGRAM(s) Oral at bedtime  cholecalciferol 1000 Unit(s) Oral daily  clopidogrel Tablet 75 milliGRAM(s) Oral daily  cyanocobalamin 1000 MICROGram(s) Oral at bedtime  dextrose 40% Gel 15 Gram(s) Oral once  dextrose 5%. 1000 milliLiter(s) (50 mL/Hr) IV Continuous <Continuous>  dextrose 5%. 1000 milliLiter(s) (100 mL/Hr) IV Continuous <Continuous>  dextrose 50% Injectable 25 Gram(s) IV Push once  dextrose 50% Injectable 12.5 Gram(s) IV Push once  dextrose 50% Injectable 25 Gram(s) IV Push once  famotidine    Tablet 20 milliGRAM(s) Oral daily  glucagon  Injectable 1 milliGRAM(s) IntraMuscular once  insulin lispro (ADMELOG) corrective regimen sliding scale   SubCutaneous two times a day before meals  losartan 12.5 milliGRAM(s) Oral daily  metoprolol succinate ER 50 milliGRAM(s) Oral daily  tamsulosin 0.4 milliGRAM(s) Oral at bedtime    MEDICATIONS  (PRN):  acetaminophen   Tablet .. 650 milliGRAM(s) Oral every 6 hours PRN Mild Pain (1 - 3)  magnesium hydroxide Suspension 30 milliLiter(s) Oral daily PRN Constipation  polyethylene glycol 3350 17 Gram(s) Oral daily PRN Constipation    CAPILLARY BLOOD GLUCOSE      POCT Blood Glucose.: 154 mg/dL (23 Jul 2021 08:24)  POCT Blood Glucose.: 168 mg/dL (22 Jul 2021 21:12)  POCT Blood Glucose.: 150 mg/dL (22 Jul 2021 16:45)    I&O's Summary      PHYSICAL EXAM:  Vital Signs Last 24 Hrs  T(C): 36.6 (23 Jul 2021 08:32), Max: 36.6 (23 Jul 2021 08:32)  T(F): 97.9 (23 Jul 2021 08:32), Max: 97.9 (23 Jul 2021 08:32)  HR: 61 (23 Jul 2021 08:32) (61 - 80)  BP: 119/71 (23 Jul 2021 08:32) (119/71 - 147/63)  BP(mean): --  RR: 18 (23 Jul 2021 08:32) (16 - 18)  SpO2: 97% (23 Jul 2021 08:32) (96% - 97%)    GENERAL: Not in distress. Alert    HEENT: clear conjuctiva, MMM. no pallor or icterus  CARDIOVASCULAR: RRR S1, S2. No murmur/rubs/gallop  LUNGS: BLAE+, no rales, no wheezing, no rhonchi.    ABDOMEN: ND. Soft,  NT, no guarding / rebound / rigidity. BS normoactive  BACK: No spine tenderness.  EXTREMITIES: RLE edema. no leg or calf TP.  SKIN: warm and dry  PSYCHIATRIC: Calm.  No agitation.      LABS:                        9.5    4.09  )-----------( 314      ( 22 Jul 2021 05:00 )             29.4     07-22    140  |  106  |  36<H>  ----------------------------<  152<H>  4.6   |  27  |  1.77<H>    Ca    8.9      22 Jul 2021 05:00    TPro  6.3  /  Alb  2.9<L>  /  TBili  0.4  /  DBili  x   /  AST  21  /  ALT  24  /  AlkPhos  101  07-22                  RADIOLOGY & ADDITIONAL TESTS:  Imaging from Last 24 Hours:    Electrocardiogram/QTc Interval:    COORDINATION OF CARE:  Care Discussed with Consultants/Other Providers:

## 2021-07-23 NOTE — PROGRESS NOTE ADULT - PROVIDER SPECIALTY LIST ADULT
Hospitalist
Hospitalist
Rehab Medicine
Neurology
Neurology
Rehab Medicine
Hospitalist
Physiatry
Rehab Medicine
Hospitalist
Hospitalist
Rehab Medicine
Neurology

## 2021-07-23 NOTE — PROGRESS NOTE ADULT - NSICDXPILOT_GEN_ALL_CORE
[place]

## 2021-07-23 NOTE — DISCHARGE NOTE NURSING/CASE MANAGEMENT/SOCIAL WORK - PATIENT PORTAL LINK FT
You can access the FollowMyHealth Patient Portal offered by [facility] by registering at the following website: [email or web address]. By joining [employer]’s FollowMyHealth portal, you will also be able to view your health information using other applications (apps) compatible with our system.

## 2021-07-23 NOTE — PROGRESS NOTE ADULT - REASON FOR ADMISSION
Left CVA, right sided hemiparesis

## 2021-07-23 NOTE — DISCHARGE NOTE NURSING/CASE MANAGEMENT/SOCIAL WORK - NSDCPNINST_GEN_ALL_CORE
safety precautions as instructed by therapist, ambulate  with walker, incisions intact ,no drainage, any drainage to contact surgeon

## 2021-09-08 PROCEDURE — 86769 SARS-COV-2 COVID-19 ANTIBODY: CPT

## 2021-09-08 PROCEDURE — 97535 SELF CARE MNGMENT TRAINING: CPT

## 2021-09-08 PROCEDURE — 97116 GAIT TRAINING THERAPY: CPT

## 2021-09-08 PROCEDURE — 80053 COMPREHEN METABOLIC PANEL: CPT

## 2021-09-08 PROCEDURE — 97110 THERAPEUTIC EXERCISES: CPT

## 2021-09-08 PROCEDURE — 36415 COLL VENOUS BLD VENIPUNCTURE: CPT

## 2021-09-08 PROCEDURE — 97530 THERAPEUTIC ACTIVITIES: CPT

## 2021-09-08 PROCEDURE — 84550 ASSAY OF BLOOD/URIC ACID: CPT

## 2021-09-08 PROCEDURE — 85027 COMPLETE CBC AUTOMATED: CPT

## 2021-09-08 PROCEDURE — 92507 TX SP LANG VOICE COMM INDIV: CPT

## 2021-09-08 PROCEDURE — 73110 X-RAY EXAM OF WRIST: CPT

## 2021-09-08 PROCEDURE — 92610 EVALUATE SWALLOWING FUNCTION: CPT

## 2021-09-08 PROCEDURE — 97760 ORTHOTIC MGMT&TRAING 1ST ENC: CPT

## 2021-09-08 PROCEDURE — 97163 PT EVAL HIGH COMPLEX 45 MIN: CPT

## 2021-09-08 PROCEDURE — 97167 OT EVAL HIGH COMPLEX 60 MIN: CPT

## 2021-09-08 PROCEDURE — 82962 GLUCOSE BLOOD TEST: CPT

## 2021-09-08 PROCEDURE — 93971 EXTREMITY STUDY: CPT

## 2021-09-08 PROCEDURE — 92523 SPEECH SOUND LANG COMPREHEN: CPT

## 2021-09-08 PROCEDURE — 83036 HEMOGLOBIN GLYCOSYLATED A1C: CPT

## 2021-09-08 PROCEDURE — 85025 COMPLETE CBC W/AUTO DIFF WBC: CPT

## 2022-09-01 NOTE — H&P ADULT - PSYCHIATRIC
details… Affect and characteristics of appearance, verbalizations, behaviors are appropriate Consent 2/Introductory Paragraph: Mohs surgery was explained to the patient and consent was obtained. The risks, benefits and alternatives to therapy were discussed in detail. Specifically, the risks of infection, scarring, bleeding, prolonged wound healing, incomplete removal, allergy to anesthesia, nerve injury and recurrence were addressed. Prior to the procedure, the treatment site was clearly identified and confirmed by the patient. All components of Universal Protocol/PAUSE Rule completed.

## 2023-06-27 NOTE — PATIENT PROFILE ADULT - IS THERE A SUSPICION OF ABUSE/NEGLIGENCE?
What Is The Reason For Today's Visit?: Full Body Skin Examination What Is The Reason For Today's Visit? (Being Monitored For X): the development of a new lesion no

## 2023-07-17 NOTE — DIETITIAN INITIAL EVALUATION ADULT. - NUTRITIONGOAL OUTCOME1
Patient to Meet>75% of Nutrition Needs During Current Hospitalization Benzoyl Peroxide Counseling: Patient counseled that medicine may cause skin irritation and bleach clothing.  In the event of skin irritation, the patient was advised to reduce the amount of the drug applied or use it less frequently.   The patient verbalized understanding of the proper use and possible adverse effects of benzoyl peroxide.  All of the patient's questions and concerns were addressed.

## 2023-11-05 ENCOUNTER — INPATIENT (INPATIENT)
Facility: HOSPITAL | Age: 78
LOS: 2 days | Discharge: NURSING, REHAB OR LONG-TERM CARE | DRG: 177 | End: 2023-11-08
Attending: INTERNAL MEDICINE | Admitting: INTERNAL MEDICINE
Payer: MEDICARE

## 2023-11-05 ENCOUNTER — INPATIENT (INPATIENT)
Facility: HOSPITAL | Age: 78
LOS: 0 days | Discharge: ANOTHER HOSPITAL | DRG: 179 | End: 2023-11-05
Attending: INTERNAL MEDICINE | Admitting: INTERNAL MEDICINE
Payer: MEDICARE

## 2023-11-05 VITALS
DIASTOLIC BLOOD PRESSURE: 72 MMHG | SYSTOLIC BLOOD PRESSURE: 171 MMHG | RESPIRATION RATE: 16 BRPM | HEART RATE: 73 BPM | OXYGEN SATURATION: 97 %

## 2023-11-05 VITALS
DIASTOLIC BLOOD PRESSURE: 72 MMHG | RESPIRATION RATE: 18 BRPM | HEART RATE: 63 BPM | SYSTOLIC BLOOD PRESSURE: 154 MMHG | TEMPERATURE: 99 F | OXYGEN SATURATION: 93 %

## 2023-11-05 VITALS
OXYGEN SATURATION: 98 % | DIASTOLIC BLOOD PRESSURE: 84 MMHG | SYSTOLIC BLOOD PRESSURE: 201 MMHG | WEIGHT: 172.4 LBS | RESPIRATION RATE: 19 BRPM | HEIGHT: 65 IN | TEMPERATURE: 102 F | HEART RATE: 91 BPM

## 2023-11-05 DIAGNOSIS — I25.10 ATHEROSCLEROTIC HEART DISEASE OF NATIVE CORONARY ARTERY WITHOUT ANGINA PECTORIS: ICD-10-CM

## 2023-11-05 DIAGNOSIS — G93.41 METABOLIC ENCEPHALOPATHY: ICD-10-CM

## 2023-11-05 DIAGNOSIS — U07.1 COVID-19: ICD-10-CM

## 2023-11-05 DIAGNOSIS — E78.5 HYPERLIPIDEMIA, UNSPECIFIED: ICD-10-CM

## 2023-11-05 DIAGNOSIS — I10 ESSENTIAL (PRIMARY) HYPERTENSION: ICD-10-CM

## 2023-11-05 DIAGNOSIS — N18.30 CHRONIC KIDNEY DISEASE, STAGE 3 UNSPECIFIED: ICD-10-CM

## 2023-11-05 DIAGNOSIS — N40.0 BENIGN PROSTATIC HYPERPLASIA WITHOUT LOWER URINARY TRACT SYMPTOMS: ICD-10-CM

## 2023-11-05 LAB
ALBUMIN SERPL ELPH-MCNC: 4 G/DL — SIGNIFICANT CHANGE UP (ref 3.3–5)
ALBUMIN SERPL ELPH-MCNC: 4 G/DL — SIGNIFICANT CHANGE UP (ref 3.3–5)
ALP SERPL-CCNC: 98 U/L — SIGNIFICANT CHANGE UP (ref 30–120)
ALP SERPL-CCNC: 98 U/L — SIGNIFICANT CHANGE UP (ref 30–120)
ALT FLD-CCNC: 23 U/L — SIGNIFICANT CHANGE UP (ref 10–60)
ALT FLD-CCNC: 23 U/L — SIGNIFICANT CHANGE UP (ref 10–60)
ANION GAP SERPL CALC-SCNC: 9 MMOL/L — SIGNIFICANT CHANGE UP (ref 5–17)
ANION GAP SERPL CALC-SCNC: 9 MMOL/L — SIGNIFICANT CHANGE UP (ref 5–17)
APPEARANCE UR: CLEAR — SIGNIFICANT CHANGE UP
APPEARANCE UR: CLEAR — SIGNIFICANT CHANGE UP
APTT BLD: 30.5 SEC — SIGNIFICANT CHANGE UP (ref 24.5–35.6)
APTT BLD: 30.5 SEC — SIGNIFICANT CHANGE UP (ref 24.5–35.6)
AST SERPL-CCNC: 24 U/L — SIGNIFICANT CHANGE UP (ref 10–40)
AST SERPL-CCNC: 24 U/L — SIGNIFICANT CHANGE UP (ref 10–40)
BACTERIA # UR AUTO: ABNORMAL /HPF
BACTERIA # UR AUTO: ABNORMAL /HPF
BASOPHILS # BLD AUTO: 0.02 K/UL — SIGNIFICANT CHANGE UP (ref 0–0.2)
BASOPHILS # BLD AUTO: 0.02 K/UL — SIGNIFICANT CHANGE UP (ref 0–0.2)
BASOPHILS NFR BLD AUTO: 0.3 % — SIGNIFICANT CHANGE UP (ref 0–2)
BASOPHILS NFR BLD AUTO: 0.3 % — SIGNIFICANT CHANGE UP (ref 0–2)
BILIRUB SERPL-MCNC: 0.4 MG/DL — SIGNIFICANT CHANGE UP (ref 0.2–1.2)
BILIRUB SERPL-MCNC: 0.4 MG/DL — SIGNIFICANT CHANGE UP (ref 0.2–1.2)
BILIRUB UR-MCNC: NEGATIVE — SIGNIFICANT CHANGE UP
BILIRUB UR-MCNC: NEGATIVE — SIGNIFICANT CHANGE UP
BUN SERPL-MCNC: 32 MG/DL — HIGH (ref 7–23)
BUN SERPL-MCNC: 32 MG/DL — HIGH (ref 7–23)
CALCIUM SERPL-MCNC: 9.7 MG/DL — SIGNIFICANT CHANGE UP (ref 8.4–10.5)
CALCIUM SERPL-MCNC: 9.7 MG/DL — SIGNIFICANT CHANGE UP (ref 8.4–10.5)
CHLORIDE SERPL-SCNC: 102 MMOL/L — SIGNIFICANT CHANGE UP (ref 96–108)
CHLORIDE SERPL-SCNC: 102 MMOL/L — SIGNIFICANT CHANGE UP (ref 96–108)
CO2 SERPL-SCNC: 25 MMOL/L — SIGNIFICANT CHANGE UP (ref 22–31)
CO2 SERPL-SCNC: 25 MMOL/L — SIGNIFICANT CHANGE UP (ref 22–31)
COLOR SPEC: YELLOW — SIGNIFICANT CHANGE UP
COLOR SPEC: YELLOW — SIGNIFICANT CHANGE UP
CREAT SERPL-MCNC: 1.89 MG/DL — HIGH (ref 0.5–1.3)
CREAT SERPL-MCNC: 1.89 MG/DL — HIGH (ref 0.5–1.3)
DIFF PNL FLD: NEGATIVE — SIGNIFICANT CHANGE UP
DIFF PNL FLD: NEGATIVE — SIGNIFICANT CHANGE UP
EGFR: 36 ML/MIN/1.73M2 — LOW
EGFR: 36 ML/MIN/1.73M2 — LOW
EOSINOPHIL # BLD AUTO: 0 K/UL — SIGNIFICANT CHANGE UP (ref 0–0.5)
EOSINOPHIL # BLD AUTO: 0 K/UL — SIGNIFICANT CHANGE UP (ref 0–0.5)
EOSINOPHIL NFR BLD AUTO: 0 % — SIGNIFICANT CHANGE UP (ref 0–6)
EOSINOPHIL NFR BLD AUTO: 0 % — SIGNIFICANT CHANGE UP (ref 0–6)
EPI CELLS # UR: SIGNIFICANT CHANGE UP
EPI CELLS # UR: SIGNIFICANT CHANGE UP
GLUCOSE BLDC GLUCOMTR-MCNC: 114 MG/DL — HIGH (ref 70–99)
GLUCOSE BLDC GLUCOMTR-MCNC: 114 MG/DL — HIGH (ref 70–99)
GLUCOSE BLDC GLUCOMTR-MCNC: 165 MG/DL — HIGH (ref 70–99)
GLUCOSE BLDC GLUCOMTR-MCNC: 165 MG/DL — HIGH (ref 70–99)
GLUCOSE BLDC GLUCOMTR-MCNC: 91 MG/DL — SIGNIFICANT CHANGE UP (ref 70–99)
GLUCOSE SERPL-MCNC: 168 MG/DL — HIGH (ref 70–99)
GLUCOSE SERPL-MCNC: 168 MG/DL — HIGH (ref 70–99)
GLUCOSE UR QL: NEGATIVE MG/DL — SIGNIFICANT CHANGE UP
GLUCOSE UR QL: NEGATIVE MG/DL — SIGNIFICANT CHANGE UP
HCT VFR BLD CALC: 34.9 % — LOW (ref 39–50)
HCT VFR BLD CALC: 34.9 % — LOW (ref 39–50)
HGB BLD-MCNC: 11.7 G/DL — LOW (ref 13–17)
HGB BLD-MCNC: 11.7 G/DL — LOW (ref 13–17)
IMM GRANULOCYTES NFR BLD AUTO: 0.3 % — SIGNIFICANT CHANGE UP (ref 0–0.9)
IMM GRANULOCYTES NFR BLD AUTO: 0.3 % — SIGNIFICANT CHANGE UP (ref 0–0.9)
INR BLD: 1.14 RATIO — SIGNIFICANT CHANGE UP (ref 0.85–1.18)
INR BLD: 1.14 RATIO — SIGNIFICANT CHANGE UP (ref 0.85–1.18)
KETONES UR-MCNC: NEGATIVE MG/DL — SIGNIFICANT CHANGE UP
KETONES UR-MCNC: NEGATIVE MG/DL — SIGNIFICANT CHANGE UP
LACTATE SERPL-SCNC: 1.6 MMOL/L — SIGNIFICANT CHANGE UP (ref 0.7–2)
LACTATE SERPL-SCNC: 1.6 MMOL/L — SIGNIFICANT CHANGE UP (ref 0.7–2)
LEUKOCYTE ESTERASE UR-ACNC: NEGATIVE — SIGNIFICANT CHANGE UP
LEUKOCYTE ESTERASE UR-ACNC: NEGATIVE — SIGNIFICANT CHANGE UP
LYMPHOCYTES # BLD AUTO: 0.66 K/UL — LOW (ref 1–3.3)
LYMPHOCYTES # BLD AUTO: 0.66 K/UL — LOW (ref 1–3.3)
LYMPHOCYTES # BLD AUTO: 9.1 % — LOW (ref 13–44)
LYMPHOCYTES # BLD AUTO: 9.1 % — LOW (ref 13–44)
MCHC RBC-ENTMCNC: 29.9 PG — SIGNIFICANT CHANGE UP (ref 27–34)
MCHC RBC-ENTMCNC: 29.9 PG — SIGNIFICANT CHANGE UP (ref 27–34)
MCHC RBC-ENTMCNC: 33.5 GM/DL — SIGNIFICANT CHANGE UP (ref 32–36)
MCHC RBC-ENTMCNC: 33.5 GM/DL — SIGNIFICANT CHANGE UP (ref 32–36)
MCV RBC AUTO: 89.3 FL — SIGNIFICANT CHANGE UP (ref 80–100)
MCV RBC AUTO: 89.3 FL — SIGNIFICANT CHANGE UP (ref 80–100)
MONOCYTES # BLD AUTO: 0.84 K/UL — SIGNIFICANT CHANGE UP (ref 0–0.9)
MONOCYTES # BLD AUTO: 0.84 K/UL — SIGNIFICANT CHANGE UP (ref 0–0.9)
MONOCYTES NFR BLD AUTO: 11.6 % — SIGNIFICANT CHANGE UP (ref 2–14)
MONOCYTES NFR BLD AUTO: 11.6 % — SIGNIFICANT CHANGE UP (ref 2–14)
NEUTROPHILS # BLD AUTO: 5.72 K/UL — SIGNIFICANT CHANGE UP (ref 1.8–7.4)
NEUTROPHILS # BLD AUTO: 5.72 K/UL — SIGNIFICANT CHANGE UP (ref 1.8–7.4)
NEUTROPHILS NFR BLD AUTO: 78.7 % — HIGH (ref 43–77)
NEUTROPHILS NFR BLD AUTO: 78.7 % — HIGH (ref 43–77)
NITRITE UR-MCNC: NEGATIVE — SIGNIFICANT CHANGE UP
NITRITE UR-MCNC: NEGATIVE — SIGNIFICANT CHANGE UP
NRBC # BLD: 0 /100 WBCS — SIGNIFICANT CHANGE UP (ref 0–0)
NRBC # BLD: 0 /100 WBCS — SIGNIFICANT CHANGE UP (ref 0–0)
PH UR: 6 — SIGNIFICANT CHANGE UP (ref 5–8)
PH UR: 6 — SIGNIFICANT CHANGE UP (ref 5–8)
PLATELET # BLD AUTO: 231 K/UL — SIGNIFICANT CHANGE UP (ref 150–400)
PLATELET # BLD AUTO: 231 K/UL — SIGNIFICANT CHANGE UP (ref 150–400)
POTASSIUM SERPL-MCNC: 4.4 MMOL/L — SIGNIFICANT CHANGE UP (ref 3.5–5.3)
POTASSIUM SERPL-MCNC: 4.4 MMOL/L — SIGNIFICANT CHANGE UP (ref 3.5–5.3)
POTASSIUM SERPL-SCNC: 4.4 MMOL/L — SIGNIFICANT CHANGE UP (ref 3.5–5.3)
POTASSIUM SERPL-SCNC: 4.4 MMOL/L — SIGNIFICANT CHANGE UP (ref 3.5–5.3)
PROT SERPL-MCNC: 7.6 G/DL — SIGNIFICANT CHANGE UP (ref 6–8.3)
PROT SERPL-MCNC: 7.6 G/DL — SIGNIFICANT CHANGE UP (ref 6–8.3)
PROT UR-MCNC: 30 MG/DL
PROT UR-MCNC: 30 MG/DL
PROTHROM AB SERPL-ACNC: 12.7 SEC — SIGNIFICANT CHANGE UP (ref 9.5–13)
PROTHROM AB SERPL-ACNC: 12.7 SEC — SIGNIFICANT CHANGE UP (ref 9.5–13)
RAPID RVP RESULT: DETECTED
RAPID RVP RESULT: DETECTED
RBC # BLD: 3.91 M/UL — LOW (ref 4.2–5.8)
RBC # BLD: 3.91 M/UL — LOW (ref 4.2–5.8)
RBC # FLD: 12.9 % — SIGNIFICANT CHANGE UP (ref 10.3–14.5)
RBC # FLD: 12.9 % — SIGNIFICANT CHANGE UP (ref 10.3–14.5)
RBC CASTS # UR COMP ASSIST: 1 /HPF — SIGNIFICANT CHANGE UP (ref 0–4)
RBC CASTS # UR COMP ASSIST: 1 /HPF — SIGNIFICANT CHANGE UP (ref 0–4)
SARS-COV-2 RNA SPEC QL NAA+PROBE: DETECTED
SARS-COV-2 RNA SPEC QL NAA+PROBE: DETECTED
SODIUM SERPL-SCNC: 136 MMOL/L — SIGNIFICANT CHANGE UP (ref 135–145)
SODIUM SERPL-SCNC: 136 MMOL/L — SIGNIFICANT CHANGE UP (ref 135–145)
SP GR SPEC: 1.02 — SIGNIFICANT CHANGE UP (ref 1–1.03)
SP GR SPEC: 1.02 — SIGNIFICANT CHANGE UP (ref 1–1.03)
UROBILINOGEN FLD QL: 1 MG/DL — SIGNIFICANT CHANGE UP (ref 0.2–1)
UROBILINOGEN FLD QL: 1 MG/DL — SIGNIFICANT CHANGE UP (ref 0.2–1)
WBC # BLD: 7.26 K/UL — SIGNIFICANT CHANGE UP (ref 3.8–10.5)
WBC # BLD: 7.26 K/UL — SIGNIFICANT CHANGE UP (ref 3.8–10.5)
WBC # FLD AUTO: 7.26 K/UL — SIGNIFICANT CHANGE UP (ref 3.8–10.5)
WBC # FLD AUTO: 7.26 K/UL — SIGNIFICANT CHANGE UP (ref 3.8–10.5)
WBC UR QL: 1 /HPF — SIGNIFICANT CHANGE UP (ref 0–5)
WBC UR QL: 1 /HPF — SIGNIFICANT CHANGE UP (ref 0–5)

## 2023-11-05 PROCEDURE — 99285 EMERGENCY DEPT VISIT HI MDM: CPT

## 2023-11-05 PROCEDURE — 72125 CT NECK SPINE W/O DYE: CPT | Mod: 26,MA

## 2023-11-05 PROCEDURE — 93010 ELECTROCARDIOGRAM REPORT: CPT

## 2023-11-05 PROCEDURE — 70450 CT HEAD/BRAIN W/O DYE: CPT | Mod: 26,MA

## 2023-11-05 PROCEDURE — 71045 X-RAY EXAM CHEST 1 VIEW: CPT | Mod: 26

## 2023-11-05 RX ORDER — FAMOTIDINE 10 MG/ML
20 INJECTION INTRAVENOUS DAILY
Refills: 0 | Status: DISCONTINUED | OUTPATIENT
Start: 2023-11-06 | End: 2023-11-08

## 2023-11-05 RX ORDER — LOSARTAN POTASSIUM 100 MG/1
25 TABLET, FILM COATED ORAL DAILY
Refills: 0 | Status: DISCONTINUED | OUTPATIENT
Start: 2023-11-05 | End: 2023-11-08

## 2023-11-05 RX ORDER — METOPROLOL TARTRATE 50 MG
50 TABLET ORAL DAILY
Refills: 0 | Status: DISCONTINUED | OUTPATIENT
Start: 2023-11-05 | End: 2023-11-05

## 2023-11-05 RX ORDER — SODIUM CHLORIDE 9 MG/ML
1000 INJECTION, SOLUTION INTRAVENOUS
Refills: 0 | Status: DISCONTINUED | OUTPATIENT
Start: 2023-11-05 | End: 2023-11-05

## 2023-11-05 RX ORDER — ONDANSETRON 8 MG/1
4 TABLET, FILM COATED ORAL EVERY 6 HOURS
Refills: 0 | Status: DISCONTINUED | OUTPATIENT
Start: 2023-11-05 | End: 2023-11-08

## 2023-11-05 RX ORDER — DEXTROSE 50 % IN WATER 50 %
25 SYRINGE (ML) INTRAVENOUS ONCE
Refills: 0 | Status: DISCONTINUED | OUTPATIENT
Start: 2023-11-05 | End: 2023-11-08

## 2023-11-05 RX ORDER — DEXTROSE 50 % IN WATER 50 %
12.5 SYRINGE (ML) INTRAVENOUS ONCE
Refills: 0 | Status: DISCONTINUED | OUTPATIENT
Start: 2023-11-05 | End: 2023-11-05

## 2023-11-05 RX ORDER — LANOLIN ALCOHOL/MO/W.PET/CERES
3 CREAM (GRAM) TOPICAL AT BEDTIME
Refills: 0 | Status: DISCONTINUED | OUTPATIENT
Start: 2023-11-05 | End: 2023-11-05

## 2023-11-05 RX ORDER — INSULIN LISPRO 100/ML
VIAL (ML) SUBCUTANEOUS AT BEDTIME
Refills: 0 | Status: DISCONTINUED | OUTPATIENT
Start: 2023-11-05 | End: 2023-11-08

## 2023-11-05 RX ORDER — ASPIRIN/CALCIUM CARB/MAGNESIUM 324 MG
81 TABLET ORAL DAILY
Refills: 0 | Status: DISCONTINUED | OUTPATIENT
Start: 2023-11-05 | End: 2023-11-08

## 2023-11-05 RX ORDER — ATORVASTATIN CALCIUM 80 MG/1
40 TABLET, FILM COATED ORAL AT BEDTIME
Refills: 0 | Status: DISCONTINUED | OUTPATIENT
Start: 2023-11-05 | End: 2023-11-08

## 2023-11-05 RX ORDER — CLOPIDOGREL BISULFATE 75 MG/1
75 TABLET, FILM COATED ORAL DAILY
Refills: 0 | Status: DISCONTINUED | OUTPATIENT
Start: 2023-11-06 | End: 2023-11-08

## 2023-11-05 RX ORDER — METOPROLOL TARTRATE 50 MG
50 TABLET ORAL DAILY
Refills: 0 | Status: DISCONTINUED | OUTPATIENT
Start: 2023-11-06 | End: 2023-11-08

## 2023-11-05 RX ORDER — ENOXAPARIN SODIUM 100 MG/ML
40 INJECTION SUBCUTANEOUS EVERY 24 HOURS
Refills: 0 | Status: DISCONTINUED | OUTPATIENT
Start: 2023-11-06 | End: 2023-11-08

## 2023-11-05 RX ORDER — REMDESIVIR 5 MG/ML
INJECTION INTRAVENOUS
Refills: 0 | Status: DISCONTINUED | OUTPATIENT
Start: 2023-11-05 | End: 2023-11-05

## 2023-11-05 RX ORDER — GLUCAGON INJECTION, SOLUTION 0.5 MG/.1ML
1 INJECTION, SOLUTION SUBCUTANEOUS ONCE
Refills: 0 | Status: DISCONTINUED | OUTPATIENT
Start: 2023-11-05 | End: 2023-11-08

## 2023-11-05 RX ORDER — SODIUM CHLORIDE 9 MG/ML
1000 INJECTION INTRAMUSCULAR; INTRAVENOUS; SUBCUTANEOUS
Refills: 0 | Status: DISCONTINUED | OUTPATIENT
Start: 2023-11-05 | End: 2023-11-07

## 2023-11-05 RX ORDER — DEXTROSE 50 % IN WATER 50 %
15 SYRINGE (ML) INTRAVENOUS ONCE
Refills: 0 | Status: DISCONTINUED | OUTPATIENT
Start: 2023-11-05 | End: 2023-11-05

## 2023-11-05 RX ORDER — SODIUM CHLORIDE 9 MG/ML
2400 INJECTION, SOLUTION INTRAVENOUS ONCE
Refills: 0 | Status: COMPLETED | OUTPATIENT
Start: 2023-11-05 | End: 2023-11-05

## 2023-11-05 RX ORDER — CEFTRIAXONE 500 MG/1
1000 INJECTION, POWDER, FOR SOLUTION INTRAMUSCULAR; INTRAVENOUS ONCE
Refills: 0 | Status: COMPLETED | OUTPATIENT
Start: 2023-11-05 | End: 2023-11-05

## 2023-11-05 RX ORDER — SODIUM CHLORIDE 9 MG/ML
1000 INJECTION, SOLUTION INTRAVENOUS
Refills: 0 | Status: DISCONTINUED | OUTPATIENT
Start: 2023-11-05 | End: 2023-11-08

## 2023-11-05 RX ORDER — DEXTROSE 50 % IN WATER 50 %
25 SYRINGE (ML) INTRAVENOUS ONCE
Refills: 0 | Status: DISCONTINUED | OUTPATIENT
Start: 2023-11-05 | End: 2023-11-05

## 2023-11-05 RX ORDER — INSULIN LISPRO 100/ML
VIAL (ML) SUBCUTANEOUS
Refills: 0 | Status: DISCONTINUED | OUTPATIENT
Start: 2023-11-05 | End: 2023-11-08

## 2023-11-05 RX ORDER — ACETAMINOPHEN 500 MG
650 TABLET ORAL EVERY 6 HOURS
Refills: 0 | Status: DISCONTINUED | OUTPATIENT
Start: 2023-11-05 | End: 2023-11-08

## 2023-11-05 RX ORDER — LANOLIN ALCOHOL/MO/W.PET/CERES
3 CREAM (GRAM) TOPICAL AT BEDTIME
Refills: 0 | Status: DISCONTINUED | OUTPATIENT
Start: 2023-11-05 | End: 2023-11-08

## 2023-11-05 RX ORDER — AZITHROMYCIN 500 MG/1
500 TABLET, FILM COATED ORAL ONCE
Refills: 0 | Status: COMPLETED | OUTPATIENT
Start: 2023-11-05 | End: 2023-11-05

## 2023-11-05 RX ORDER — DEXAMETHASONE 0.5 MG/5ML
6 ELIXIR ORAL DAILY
Refills: 0 | Status: DISCONTINUED | OUTPATIENT
Start: 2023-11-05 | End: 2023-11-07

## 2023-11-05 RX ORDER — FERROUS SULFATE 325(65) MG
1 TABLET ORAL
Qty: 0 | Refills: 0 | DISCHARGE

## 2023-11-05 RX ORDER — REMDESIVIR 5 MG/ML
200 INJECTION INTRAVENOUS EVERY 24 HOURS
Refills: 0 | Status: COMPLETED | OUTPATIENT
Start: 2023-11-05 | End: 2023-11-05

## 2023-11-05 RX ORDER — GLUCAGON INJECTION, SOLUTION 0.5 MG/.1ML
1 INJECTION, SOLUTION SUBCUTANEOUS ONCE
Refills: 0 | Status: DISCONTINUED | OUTPATIENT
Start: 2023-11-05 | End: 2023-11-05

## 2023-11-05 RX ORDER — INSULIN LISPRO 100/ML
VIAL (ML) SUBCUTANEOUS
Refills: 0 | Status: DISCONTINUED | OUTPATIENT
Start: 2023-11-05 | End: 2023-11-05

## 2023-11-05 RX ORDER — FAMOTIDINE 10 MG/ML
20 INJECTION INTRAVENOUS DAILY
Refills: 0 | Status: DISCONTINUED | OUTPATIENT
Start: 2023-11-05 | End: 2023-11-05

## 2023-11-05 RX ORDER — ACETAMINOPHEN 500 MG
1000 TABLET ORAL ONCE
Refills: 0 | Status: COMPLETED | OUTPATIENT
Start: 2023-11-05 | End: 2023-11-05

## 2023-11-05 RX ORDER — INSULIN LISPRO 100/ML
VIAL (ML) SUBCUTANEOUS AT BEDTIME
Refills: 0 | Status: DISCONTINUED | OUTPATIENT
Start: 2023-11-05 | End: 2023-11-05

## 2023-11-05 RX ORDER — ONDANSETRON 8 MG/1
4 TABLET, FILM COATED ORAL EVERY 6 HOURS
Refills: 0 | Status: DISCONTINUED | OUTPATIENT
Start: 2023-11-05 | End: 2023-11-05

## 2023-11-05 RX ORDER — ASPIRIN/CALCIUM CARB/MAGNESIUM 324 MG
81 TABLET ORAL DAILY
Refills: 0 | Status: DISCONTINUED | OUTPATIENT
Start: 2023-11-05 | End: 2023-11-05

## 2023-11-05 RX ORDER — CLOPIDOGREL BISULFATE 75 MG/1
75 TABLET, FILM COATED ORAL DAILY
Refills: 0 | Status: DISCONTINUED | OUTPATIENT
Start: 2023-11-05 | End: 2023-11-05

## 2023-11-05 RX ORDER — TAMSULOSIN HYDROCHLORIDE 0.4 MG/1
0.4 CAPSULE ORAL AT BEDTIME
Refills: 0 | Status: DISCONTINUED | OUTPATIENT
Start: 2023-11-05 | End: 2023-11-05

## 2023-11-05 RX ORDER — DEXTROSE 50 % IN WATER 50 %
15 SYRINGE (ML) INTRAVENOUS ONCE
Refills: 0 | Status: DISCONTINUED | OUTPATIENT
Start: 2023-11-05 | End: 2023-11-08

## 2023-11-05 RX ORDER — DEXTROSE 50 % IN WATER 50 %
12.5 SYRINGE (ML) INTRAVENOUS ONCE
Refills: 0 | Status: DISCONTINUED | OUTPATIENT
Start: 2023-11-05 | End: 2023-11-08

## 2023-11-05 RX ORDER — REMDESIVIR 5 MG/ML
100 INJECTION INTRAVENOUS EVERY 24 HOURS
Refills: 0 | Status: COMPLETED | OUTPATIENT
Start: 2023-11-06 | End: 2023-11-07

## 2023-11-05 RX ORDER — LOSARTAN POTASSIUM 100 MG/1
25 TABLET, FILM COATED ORAL DAILY
Refills: 0 | Status: DISCONTINUED | OUTPATIENT
Start: 2023-11-05 | End: 2023-11-05

## 2023-11-05 RX ORDER — ENOXAPARIN SODIUM 100 MG/ML
40 INJECTION SUBCUTANEOUS EVERY 24 HOURS
Refills: 0 | Status: DISCONTINUED | OUTPATIENT
Start: 2023-11-05 | End: 2023-11-05

## 2023-11-05 RX ORDER — ACETAMINOPHEN 500 MG
650 TABLET ORAL EVERY 6 HOURS
Refills: 0 | Status: DISCONTINUED | OUTPATIENT
Start: 2023-11-05 | End: 2023-11-05

## 2023-11-05 RX ORDER — TAMSULOSIN HYDROCHLORIDE 0.4 MG/1
0.4 CAPSULE ORAL AT BEDTIME
Refills: 0 | Status: DISCONTINUED | OUTPATIENT
Start: 2023-11-05 | End: 2023-11-08

## 2023-11-05 RX ORDER — REMDESIVIR 5 MG/ML
100 INJECTION INTRAVENOUS EVERY 24 HOURS
Refills: 0 | Status: DISCONTINUED | OUTPATIENT
Start: 2023-11-06 | End: 2023-11-05

## 2023-11-05 RX ADMIN — FAMOTIDINE 20 MILLIGRAM(S): 10 INJECTION INTRAVENOUS at 12:49

## 2023-11-05 RX ADMIN — Medication 400 MILLIGRAM(S): at 01:53

## 2023-11-05 RX ADMIN — TAMSULOSIN HYDROCHLORIDE 0.4 MILLIGRAM(S): 0.4 CAPSULE ORAL at 21:55

## 2023-11-05 RX ADMIN — REMDESIVIR 200 MILLIGRAM(S): 5 INJECTION INTRAVENOUS at 13:39

## 2023-11-05 RX ADMIN — ENOXAPARIN SODIUM 40 MILLIGRAM(S): 100 INJECTION SUBCUTANEOUS at 12:48

## 2023-11-05 RX ADMIN — Medication 50 MILLIGRAM(S): at 12:48

## 2023-11-05 RX ADMIN — CEFTRIAXONE 100 MILLIGRAM(S): 500 INJECTION, POWDER, FOR SOLUTION INTRAMUSCULAR; INTRAVENOUS at 03:36

## 2023-11-05 RX ADMIN — SODIUM CHLORIDE 2400 MILLILITER(S): 9 INJECTION, SOLUTION INTRAVENOUS at 01:15

## 2023-11-05 RX ADMIN — Medication 1000 MILLIGRAM(S): at 02:15

## 2023-11-05 RX ADMIN — AZITHROMYCIN 255 MILLIGRAM(S): 500 TABLET, FILM COATED ORAL at 07:15

## 2023-11-05 RX ADMIN — SODIUM CHLORIDE 2400 MILLILITER(S): 9 INJECTION, SOLUTION INTRAVENOUS at 02:15

## 2023-11-05 RX ADMIN — ATORVASTATIN CALCIUM 40 MILLIGRAM(S): 80 TABLET, FILM COATED ORAL at 21:55

## 2023-11-05 RX ADMIN — CLOPIDOGREL BISULFATE 75 MILLIGRAM(S): 75 TABLET, FILM COATED ORAL at 12:48

## 2023-11-05 RX ADMIN — CEFTRIAXONE 1000 MILLIGRAM(S): 500 INJECTION, POWDER, FOR SOLUTION INTRAMUSCULAR; INTRAVENOUS at 04:00

## 2023-11-05 RX ADMIN — Medication 1000 MILLIGRAM(S): at 02:01

## 2023-11-05 NOTE — ED PROVIDER NOTE - OBJECTIVE STATEMENT
I do not know did not see the results patient brought by ambulance with change in mental status.  Son states that he noticed that his father was not acting himself approximately 10-1/2 hours ago.  Patient did not want to go to the hospital at that time.  The son went to work and on return approximately an hour and a half ago he noted the patient was on the floor and had urinated on himself.  No signs of injury noted.  Patient has been confused since.  Son states that he noted the patient was moving all extremities.  Patient peers confused and is unable to contribute to history at this time.  No recent travel.  Son states that he was sick with a cold about 4 days ago.  No other known sick contacts.

## 2023-11-05 NOTE — ED ADULT TRIAGE NOTE - STATUS:
[FreeTextEntry1] : B/L Dx Mammo & Sono - 11/01/2021:\par Breast composition: The breasts are heterogeneously dense, which may obscure small masses.\par \par Findings:\par \par Mammogram:\par \par No suspicious mass, microcalcifications or areas of architectural distortion seen in either breast.\par \par Ultrasound:\par \par Targeted unilateral left breast ultrasound was performed.\par \par At 12-1 o'clock N2 there is a stable hypoechoic mass measuring 0.7 x 0.6 x 0.4 cm. Short interval follow-up recommended.\par \par Impression: Stable left breast mass as above. Short interval follow-up recommended.\par \par Recommendation: Follow-up breast ultrasound in 6 months.\par \par BI-RADS category 3: Probably Benign\par  Applied

## 2023-11-05 NOTE — CONSULT NOTE ADULT - SUBJECTIVE AND OBJECTIVE BOX
Patient is a 77y old  Male who presents with a chief complaint of confusion.     HPI:  This is a 77 M with PMH of HTN DM CAD HLD BPH who was brought in by EMS after son found him on the floor, confused. Patient had urinated on himself -- around 11pm last night. Son had seen patient earlier in the day. He appeared weak and listless. Son reports patient had difficulty communicating last night. Patient c/o SOB. He denies fevers, chills, cough, n/v. Son and patient live in the same house. Son was sick last weekend. Patient is vaccinated and boosted for COVID. In ER he was found to have T 101.9. (2023 13:05)    Renal consult called for chronic kidney disease stage 3. Pt seen in [place] ER.       PAST MEDICAL HISTORY:  Hypertension    Diabetes mellitus    CAD (coronary artery disease)    BPH (benign prostatic hyperplasia)        PAST SURGICAL HISTORY:  No significant past surgical history        FAMILY HISTORY:      SOCIAL HISTORY: No smoking or alcohol use     Allergies    No Known Allergies    Intolerances      Home Medications:  Centrum Men&#x27;s oral tablet: 1 tab(s) orally once a day (2023 09:39)  losartan 25 mg oral tablet: 1 tab(s) orally once a day (2023 09:37)  metFORMIN 500 mg oral tablet: 2 tab(s) orally once a day (at bedtime) as per patient    *Script written as 1T BID* (2023 09:39)  Vitamin B-12 1000 mcg oral tablet: 1 tab(s) orally once a day (2023 09:39)  Vitamin D3 5000 intl units (125 mcg) oral tablet: 1 tab(s) orally once a day (2023 09:39)    MEDICATIONS  (STANDING):    MEDICATIONS  (PRN):      REVIEW OF SYSTEMS:  General: no distress  Respiratory: No cough, SOB  Cardiovascular: No CP or Palpitations	  Gastrointestinal: No nausea, Vomiting. No diarrhea  Genitourinary: No urinary complaints	  Musculoskeletal: No new rash or lesions		  all other systems negative    T(F): 99 (23 @ 06:30), Max: 101.9 (23 @ 00:25)  HR: 73 (23 @ 07:27) (67 - 98)  BP: 171/72 (23 @ 07:27) (145/69 - 201/84)  RR: 16 (23 @ 07:27) (16 - 19)  SpO2: 97% (23 @ 07:27) (96% - 98%)  Wt(kg): --    PHYSICAL EXAM:  General: NAD  Respiratory: b/l air entry  Cardiovascular: S1 S2  Gastrointestinal: soft  Extremities: no edema            136  |  102  |  32<H>  ----------------------------<  168<H>  4.4   |  25  |  1.89<H>    Ca    9.7      2023 01:00    TPro  7.6  /  Alb  4.0  /  TBili  0.4  /  DBili  x   /  AST  24  /  ALT  23  /  AlkPhos  98                            11.7   7.26  )-----------( 231      ( 2023 01:00 )             34.9       Hemoglobin: 11.7 g/dL ( @ 01:00)  Hematocrit: 34.9 % ( @ 01:00)  Blood Urea Nitrogen: 32 mg/dL ( @ 01:00)  Potassium: 4.4 mmol/L ( @ 01:00)      Creatinine, Serum: 1.89 ( @ 01:00)      Urinalysis Basic - ( 2023 12:35 )    Color: Yellow / Appearance: Clear / S.017 / pH: x  Gluc: x / Ketone: Negative mg/dL  / Bili: Negative / Urobili: 1.0 mg/dL   Blood: x / Protein: 30 mg/dL / Nitrite: Negative   Leuk Esterase: Negative / RBC: 1 /HPF / WBC 1 /HPF   Sq Epi: x / Non Sq Epi: x / Bacteria: Occasional /HPF      LIVER FUNCTIONS - ( 2023 01:00 )  Alb: 4.0 g/dL / Pro: 7.6 g/dL / ALK PHOS: 98 U/L / ALT: 23 U/L / AST: 24 U/L / GGT: x                   < from: Xray Chest 1 View AP/PA (23 @ 01:08) >    ACC: [id number] EXAM:  XR CHEST AP OR PA 1V   ORDERED BY: SARAVANAN MONAE     PROCEDURE DATE:  2023          INTERPRETATION:  AP chest on 2023 at 12:27 AM. Patient has   sepsis.    Heart magnified by technique. Sternotomy reverse right shoulder   replacement again noted.    Lungs remain clear.    Chest is similar to November 3, 2020. Old left rib fractures again noted.    IMPRESSION: No acute finding or change.    --- End of Report ---            JOSE JARRETT MD; Attending Radiologist  This document has been electronically signed. 2023 10:25AM    < end of copied text >

## 2023-11-05 NOTE — ED ADULT TRIAGE NOTE - CHIEF COMPLAINT QUOTE
PT BIB EMS from home c/o AMS since yesterday morning; pt found on floor by son when he got home from work tonight; last known well is unknown; EMS states son felt dad was "off" this am

## 2023-11-05 NOTE — PATIENT PROFILE ADULT - BILL PAYMENT
Patient is a 31 year old  with Di/Di at 36w0d by LMP c/w 1st trimester u/s here for return OB visit. Patient denies ctx, LOF or VB. Reports FM. 3rd tri labs unremarkable. GBS collected  precautions given. RTC 1 weeks.    Pregnancy complicated by:    Di/Di twins: Baby A breech, Baby B >20% growth discordance/ceph, Baby B on today u/s is presenting twin, discussed at this time would be reasonable to attempt vaginal delivery given presenting twin cephalic, discussed risk of breech extraction of baby A including head entrapment and need for heroic measures with risk of fetal death or permanent neurological dysfunction, discussed attempt at internal/external cephalic version after delivery of presenting twin as well as vaginal delivery of presenting twin followed by  section second twin in case of emergency, discussed provider preference at time of delivery, currently undecided on mode of delivery, scheduled for CD at 38 wks at this time, scheduled, will continue to address, NST reviewed and reactive x 2 after 20 minutes of monitoring, DVP appropriate x2   no

## 2023-11-05 NOTE — H&P ADULT - NSICDXPASTMEDICALHX_GEN_ALL_CORE_FT
PAST MEDICAL HISTORY:  BPH (benign prostatic hyperplasia)     CAD (coronary artery disease)     Diabetes mellitus     Hypertension

## 2023-11-05 NOTE — ED ADULT NURSE NOTE - NSFALLHARMRISKINTERV_ED_ALL_ED
Assistance OOB with selected safe patient handling equipment if applicable/Assistance with ambulation/Communicate risk of Fall with Harm to all staff, patient, and family/Monitor gait and stability/Monitor for mental status changes and reorient to person, place, and time, as needed/Move patient closer to nursing station/within visual sight of ED staff/Provide patient with walking aids/Provide visual cue: red socks, yellow wristband, yellow gown, etc/Reinforce activity limits and safety measures with patient and family/Toileting schedule using arm’s reach rule for commode and bathroom/Bed in lowest position, wheels locked, appropriate side rails in place/Call bell, personal items and telephone in reach/Instruct patient to call for assistance before getting out of bed/chair/stretcher/Non-slip footwear applied when patient is off stretcher/[place] to call system/Physically safe environment - no spills, clutter or unnecessary equipment/Purposeful Proactive Rounding/Room/bathroom lighting operational, light cord in reach

## 2023-11-05 NOTE — H&P ADULT - HISTORY OF PRESENT ILLNESS
This is a 77 M with PMH of HTN DM CAD HLD BPH who was brought in by EMS after son found him on the floor, confused. Patient had urinated on himself -- around 11pm last night. Son had seen patient earlier in the day. He appeared weak and listless. Son reports patient had difficulty communicating last night. Patient c/o SOB. He denies fevers, chills, cough, n/v. Son and patient live in the same house. Son was sick last weekend. Patient is vaccinated and boosted for COVID. In ER he was found to have T 101.9. This is a 77 M with PMH of HTN DM CAD HLD BPH CKD3 who was brought in by EMS after son found him on the floor, confused. Patient had urinated on himself -- around 11pm last night. Son had seen patient earlier in the day. He appeared weak and listless. Son reports patient had difficulty communicating last night. Patient c/o SOB. He denies fevers, chills, cough, n/v. Son and patient live in the same house. Son was sick last weekend. Patient is vaccinated and boosted for COVID. In ER he was found to have T 101.9.

## 2023-11-05 NOTE — CONSULT NOTE ADULT - SUBJECTIVE AND OBJECTIVE BOX
[facility], Division of Infectious Diseases  EAMON Gonzalez S. Shah, Y. Patel, G. [facility]  [phone]    MANJU GENE  77y, Male  [id number]    HPI--  HPI: 77M w/ PMHx of HTN, CAD, DM2 BIB son for AMS. Pt unable to provide hx, hx obtained from chart review  Per ED note, Son states that he noticed that his father was not acting himself approximately 10-1/2 hours PTA. The son went to work and on return approximately an hour and a half ago he noted the patient was on the floor and had urinated on himself.  No signs of injury noted.  Patient has been confused since.  Son states that he noted the patient was moving all extremities.  Patient peers confused and is unable to contribute to history at this time.  No recent travel.  Son states that he was sick with a cold about 4 days ago.  No other known sick contacts.  Pt w/o complaints, does not know where he is at time of evaluation  Febrile in the ED to 101.9F  Started on remdesivir    Active Medications--  acetaminophen     Tablet .. 650 milliGRAM(s) Oral every 6 hours PRN  aluminum hydroxide/magnesium hydroxide/simethicone Suspension 30 milliLiter(s) Oral every 4 hours PRN  aspirin enteric coated 81 milliGRAM(s) Oral daily  clopidogrel Tablet 75 milliGRAM(s) Oral daily  dextrose 5%. 1000 milliLiter(s) IV Continuous <Continuous>  dextrose 5%. 1000 milliLiter(s) IV Continuous <Continuous>  dextrose 50% Injectable 25 Gram(s) IV Push once  dextrose 50% Injectable 12.5 Gram(s) IV Push once  dextrose 50% Injectable 25 Gram(s) IV Push once  dextrose Oral Gel 15 Gram(s) Oral once PRN  enoxaparin Injectable 40 milliGRAM(s) SubCutaneous every 24 hours  famotidine    Tablet 20 milliGRAM(s) Oral daily  glucagon  Injectable 1 milliGRAM(s) IntraMuscular once  insulin lispro (ADMELOG) corrective regimen sliding scale   SubCutaneous three times a day before meals  insulin lispro (ADMELOG) corrective regimen sliding scale   SubCutaneous at bedtime  losartan 25 milliGRAM(s) Oral daily  melatonin 3 milliGRAM(s) Oral at bedtime PRN  metoprolol succinate ER 50 milliGRAM(s) Oral daily  ondansetron Injectable 4 milliGRAM(s) IV Push every 6 hours PRN  remdesivir  IVPB   IV Intermittent   remdesivir  IVPB 200 milliGRAM(s) IV Intermittent every 24 hours  tamsulosin 0.4 milliGRAM(s) Oral at bedtime    Antimicrobials:   remdesivir  IVPB   IV Intermittent   remdesivir  IVPB 200 milliGRAM(s) IV Intermittent every 24 hours    Immunologic:     ROS:  unable to obtain    Allergies: No Known Allergies    PMH -- Hypertension    Diabetes mellitus    CAD (coronary artery disease)    BPH (benign prostatic hyperplasia)      PSH -- No significant past surgical history      FH --   Social History --  EtOH: denies   Tobacco: denies   Drug Use: denies     Travel/Environmental/Occupational History:    Physical Exam--  Vital Signs Last 24 Hrs  T(F): 99 (2023 06:30), Max: 101.9 (2023 00:25)  HR: 73 (2023 07:27) (67 - 98)  BP: 171/72 (2023 07:27) (145/69 - 201/84)  RR: 16 (2023 07:27) (16 - 19)  SpO2: 97% (2023 07:27) (96% - 98%)  General: nontoxic-appearing, no acute distress  HEENT: NC/AT, EOMI, anicteric  Lungs: decreased breath sounds on NC  Heart: Regular rate and rhythm. No murmur, rub or gallop.  Abdomen: Soft. Nondistended. Nontender.   Extremities: No cyanosis or clubbing. No edema.   Skin: Warm. Dry. Good turgor.     Laboratory & Imaging Data:  CBC:                       11.7   7.26  )-----------( 231      ( 2023 01:00 )             34.9     CMP:     136  |  102  |  32<H>  ----------------------------<  168<H>  4.4   |  25  |  1.89<H>    Ca    9.7      2023 01:00    TPro  7.6  /  Alb  4.0  /  TBili  0.4  /  DBili  x   /  AST  24  /  ALT  23  /  AlkPhos  98  11    LIVER FUNCTIONS - ( 2023 01:00 )  Alb: 4.0 g/dL / Pro: 7.6 g/dL / ALK PHOS: 98 U/L / ALT: 23 U/L / AST: 24 U/L / GGT: x           Urinalysis Basic - ( 2023 12:35 )    Color: Yellow / Appearance: Clear / S.017 / pH: x  Gluc: x / Ketone: Negative mg/dL  / Bili: Negative / Urobili: 1.0 mg/dL   Blood: x / Protein: 30 mg/dL / Nitrite: Negative   Leuk Esterase: Negative / RBC: 1 /HPF / WBC 1 /HPF   Sq Epi: x / Non Sq Epi: x / Bacteria: Occasional /HPF        Microbiology: reviewed        Radiology: reviewed    < from: Xray Chest 1 View AP/PA (23 @ 01:08) >    ACC: [id number] EXAM:  XR CHEST AP OR PA 1V   ORDERED BY: SARAVANAN MONAE     PROCEDURE DATE:  2023          INTERPRETATION:  AP chest on 2023 at 12:27 AM. Patient has   sepsis.    Heart magnified by technique. Sternotomy reverse right shoulder   replacement again noted.    Lungs remain clear.    Chest is similar to November 3, 2020. Old left rib fractures again noted.    IMPRESSION: No acute finding or change.    --- End of Report ---            JOSE JARRETT MD; Attending Radiologist  This document has been electronically signed. 2023 10:25AM    < end of copied text >  < from: CT Head No Cont (23 @ 00:38) >    ACC: [id number] EXAM:  CT CERVICAL SPINE   ORDERED BY: SARAVANAN MONAE     ACC: [id number] EXAM:  CT BRAIN   ORDERED BY: SARAVANAN MONAE     PROCEDURE DATE:  2023          INTERPRETATION:  CLINICAL INFORMATION: Altered mental status. Found on   floor.    TECHNIQUE:  Axial CT images were acquired through the head and cervical spine.  Intravenous contrast: None  Two-dimensional reformats were generated.    COMPARISON STUDY: 11/3/2020    FINDINGS:  CT head:    There is no CT evidence of acute intracranial hemorrhage, mass effect,   midline shift, or acute territorial infarct.  Chronic cerebellar   infarcts. Chronic right occipital lobe infarct. The ventricles and sulci   are prominent consistent with age-related parenchymal volume loss. The   basal cisterns are patent. There are periventricular white matter   hypodensities that are nonspecific in nature but may reflect chronic   ischemic microvascular disease.    Mild mucosal thickening of the paranasal sinuses.    The mastoid air cells and middle ear cavities are grossly clear.    There is no depressed calvarial fracture.      CT cervical spine:    There is straightening of the cervical lordosis.  There is no CT evidence of an acute cervical spine fracture or traumatic   malalignment.  There is no suspicious lytic or blastic lesion.  The paraspinous soft tissues are unremarkable within limits of CT scan.    Degenerative changes:  There are multilevel degenerative changes characterized by disc   osteophyte complexes and facet anduncinate hypertrophy with resultant   moderate multilevel central canal and neural foraminal stenosis.    Incidental findings:  Atherosclerotic changes at the carotid bifurcations, better evaluated   with ultrasound.  Visualized lung apices are unremarkable.      IMPRESSION:    CT BRAIN:    No evidence of acute intracranial hemorrhage, midline shift or CT   evidence of acute territorial infarct.    If the patient's symptoms persist, consider short interval follow-up head   CT or brain MRI if there are no MRI contraindications.      CT CERVICAL SPINE:    No acute cervical fracture or traumatic malalignment.    MRI would be required to evaluate the ligamentous structures at higher   sensitivity as well as for better evaluation of the cervical canal and   its contents.    The findings were discussed with DR. MONAE on 2023 12:47 AM.  Hospital policies for call back including read back policies were   followed. The verbal communication call back supplements this written   report.    ---End of Report ---            DANITA ARZATE MD; Attending Radiologist  This document has been electronically signed. 2023 12:56AM    < end of copied text >

## 2023-11-05 NOTE — CONSULT NOTE ADULT - ASSESSMENT
Prerenal azotemia, CKD 3 (baseline creatinine 1.5-1.7)  Hypertension  COVID 19+    Renal indices close to baseline. Gentle IV hydration if PO intake poor. To continue current meds. Monitor BP trend. Titrate BP meds as needed. Salt restriction. Encourage PO intake as tolerated. Rx for COVID. Will follow electrolytes and renal function trend.     Further recommendations pending clinical course. Thank you for the courtesy of this referral.

## 2023-11-05 NOTE — PATIENT PROFILE ADULT - FALL HARM RISK - HARM RISK INTERVENTIONS

## 2023-11-05 NOTE — CONSULT NOTE ADULT - SUBJECTIVE AND OBJECTIVE BOX
Patient is a 77y old  Male who presents with a chief complaint of AMS/Found on floor    HPI: 77M w/ PMHx of HTN, CAD, DM2 BIB son for AMS. Pt unable to provide hx, hx obtained from chart review  Per ED note, Son states that he noticed that his father was not acting himself approximately 10-1/2 hours PTA. The son went to work and on return approximately an hour and a half ago he noted the patient was on the floor and had urinated on himself.  No signs of injury noted.  Patient has been confused since.  Son states that he noted the patient was moving all extremities.  Patient peers confused and is unable to contribute to history at this time.  No recent travel.  Son states that he was sick with a cold about 4 days ago.  No other known sick contacts.  Pt w/o complaints, does not know where he is at time of evaluation  Febrile in the ED to 101.9F  Started on remdesivir    PAST MEDICAL & SURGICAL HISTORY:    Hypertension    Diabetes mellitus    CAD (coronary artery disease)    BPH (benign prostatic hyperplasia)    No significant past surgical history    MEDICATIONS  (STANDING):    aspirin enteric coated 81 milliGRAM(s) Oral daily  clopidogrel Tablet 75 milliGRAM(s) Oral daily  dextrose 5%. 1000 milliLiter(s) (100 mL/Hr) IV Continuous <Continuous>  dextrose 5%. 1000 milliLiter(s) (50 mL/Hr) IV Continuous <Continuous>  dextrose 50% Injectable 25 Gram(s) IV Push once  dextrose 50% Injectable 12.5 Gram(s) IV Push once  dextrose 50% Injectable 25 Gram(s) IV Push once  enoxaparin Injectable 40 milliGRAM(s) SubCutaneous every 24 hours  famotidine    Tablet 20 milliGRAM(s) Oral daily  glucagon  Injectable 1 milliGRAM(s) IntraMuscular once  insulin lispro (ADMELOG) corrective regimen sliding scale   SubCutaneous three times a day before meals  insulin lispro (ADMELOG) corrective regimen sliding scale   SubCutaneous at bedtime  losartan 25 milliGRAM(s) Oral daily  metoprolol succinate ER 50 milliGRAM(s) Oral daily  remdesivir  IVPB   IV Intermittent   tamsulosin 0.4 milliGRAM(s) Oral at bedtime    MEDICATIONS  (PRN):    acetaminophen     Tablet .. 650 milliGRAM(s) Oral every 6 hours PRN Temp greater or equal to 38C (100.4F), Mild Pain (1 - 3)  aluminum hydroxide/magnesium hydroxide/simethicone Suspension 30 milliLiter(s) Oral every 4 hours PRN Dyspepsia  dextrose Oral Gel 15 Gram(s) Oral once PRN Blood Glucose LESS THAN 70 milliGRAM(s)/deciliter  melatonin 3 milliGRAM(s) Oral at bedtime PRN Insomnia  ondansetron Injectable 4 milliGRAM(s) IV Push every 6 hours PRN Nausea and/or Vomiting    Allergies    No Known Allergies    SOCIAL HISTORY:    No h/o Smoking.   No h/o alcohol use.    FAMILY HISTORY: N/C as per chart    REVIEW OF SYSTEMS:    CONSTITUTIONAL: No fever  EYES: No eye pain,   ENMT:  No sinus or throat pain  NECK: No pain or stiffness  RESPIRATORY: No cough, No hemoptysis; No shortness of breath  CARDIOVASCULAR: No acute chest pain, palpitations,  or leg swelling  GASTROINTESTINAL: No abdominal pain. No nausea, vomiting, or hematemesis;  No melena or hematochezia.  GENITOURINARY: No  hematuria, or incontinence  MUSCULOSKELETAL: No joint swelling; No extremity pain  SKIN: No itching, rashes, or lesions   LYMPH NODES: No enlarged glands  NEUROLOGICAL: No headaches, memory loss,   PSYCHIATRIC: No depression, anxiety, mood swings, or difficulty sleeping  ENDOCRINE: No heat or cold intolerance;   HEME/LYMPH: No easy bruising, or bleeding gums  Allergy/Immunology. No medication allergy. No seasonal allergies.    PHYSICAL EXAM:  Vital Signs Last 24 Hrs  T(F): 99 (23 @ 06:30)  HR: 73 (23 @ 07:27)  BP: 171/72 (23 @ 07:27)  RR: 16 (23 @ 07:27)    GENERAL: NAD, well-groomed, well-developed  HEAD:  Atraumatic, Normocephalic  EYES: EOMI, PERRLA, conjunctiva and sclera clear  NECK: Supple, No JVD,     On Neurological Examination:    Mental Status - Pt is alert, awake, poor cognition. Follows simple commands     Speech -  No aphasia.    Cranial Nerves - Pupils 3 mm equal and reactive to light, extraocular eye movements intact. Pt has no visual field deficit.  No facial asymmetry. Tongue - is in midline.    Motor Exam - 4/5 all over, No drift. No shaking or tremors.    Sensory Exam  Pt withdraws all extremities equally on stimulation. No asymmetry seen. No complaints of tingling, numbness.    Gait - Couldn't be tested currently.    Deep tendon Reflexes - 2 plus all over.    Coordination - Fine finger movements are normal on both sides. Finger to nose is also normal on both sides.       Romberg - Negative.    Neck Supple -  Yes.    LABS:                        11.7   7.26  )-----------( 231      ( 2023 01:00 )             34.9         136  |  102  |  32<H>  ----------------------------<  168<H>  4.4   |  25  |  1.89<H>    Ca    9.7      2023 01:00    TPro  7.6  /  Alb  4.0  /  TBili  0.4  /  DBili  x   /  AST  24  /  ALT  23  /  AlkPhos  98  11    PT/INR - ( 2023 01:00 )   PT: 12.7 sec;   INR: 1.14 ratio      PTT - ( 2023 01:00 )  PTT:30.5 sec  Urinalysis Basic - ( 2023 12:35 )    Color: Yellow / Appearance: Clear / S.017 / pH: x  Gluc: x / Ketone: Negative mg/dL  / Bili: Negative / Urobili: 1.0 mg/dL   Blood: x / Protein: 30 mg/dL / Nitrite: Negative   Leuk Esterase: Negative / RBC: 1 /HPF / WBC 1 /HPF   Sq Epi: x / Non Sq Epi: x / Bacteria: Occasional /HPF    RADIOLOGY & ADDITIONAL STUDIES:    < from: CT Head/Neck No Cont (23 @ 00:38) >    CT BRAIN:    No evidence of acute intracranial hemorrhage, midline shift or CT   evidence of acute territorial infarct.    CT CERVICAL SPINE:    No acute cervical fracture or traumatic malalignment.    < end of copied text >

## 2023-11-05 NOTE — ED PROVIDER NOTE - CLINICAL SUMMARY MEDICAL DECISION MAKING FREE TEXT BOX
Patient with confusion and fever.  Will get labs, CAT scan, and start antibiotics.  Will give IV fluids.  Patient will require admission to hospital.

## 2023-11-05 NOTE — PATIENT PROFILE ADULT - FALL HARM RISK - HARM RISK INTERVENTIONS
Communicate Risk of Fall with Harm to all staff/Reinforce activity limits and safety measures with patient and family/Tailored Fall Risk Interventions/Use of alarms - bed, chair and/or voice tab/Visual Cue: Yellow wristband and red socks/Bed in lowest position, wheels locked, appropriate side rails in place/Call bell, personal items and telephone in reach/Instruct patient to call for assistance before getting out of bed or chair/Non-slip footwear when patient is out of bed/[place] to call system/Physically safe environment - no spills, clutter or unnecessary equipment/Purposeful Proactive Rounding/Room/bathroom lighting operational, light cord in reach

## 2023-11-05 NOTE — H&P ADULT - PROBLEM SELECTOR PLAN 1
Admit  Isolation precautions  Start remdesivir x 3 days   Monitor pulse oximetry  Oxygen if needed for SaO2 < 90%  Start decadron if patient becomes hypoxic  Monitor COVID serologies  Tylenol prn for fever  ID consult  Further work-up/management pending clinical course.

## 2023-11-05 NOTE — CHART NOTE - NSCHARTNOTEFT_GEN_A_CORE
SaO2 documented as 82% on RA. Start decadron 6mg qd x 10days, 2L NC O2 titrate to > 90%  Pulmonary consult  Further work-up/management pending clinical course. SaO2 documented as 82% on RA. COVID+. Start decadron 6mg qd x 10days, 2L NC O2 titrate to > 90%  +fever likely 2/2 to COVID. F/U culture data.  Pulmonary consult  Further work-up/management pending clinical course.

## 2023-11-05 NOTE — CONSULT NOTE ADULT - ASSESSMENT
77M w/ PMHx of HTN, CAD, DM2 BIB son for AMS.  Febrile in the ED to 101.9F. COVID+.   Per ED note, son was sick few days earlier    COVID-19  AMS  -Remdesivir x3 day course  -low suspicion for superimposed bacterial infection, will hold antibiotics at this time  -can hold steroids for now  -trend temps/WBC/inflammatory biomarkers  -monitor LFTs while on remdesivir  -continue with supportive care, supplemental O2 as needed  -maintain aspiration precautions  -maintain fall precautions  -maintain isolation per infection control policy    D/w Dr. Beauchamp    Infectious Diseases will continue to follow. Please call with any questions.   Shiloh Savage M.D.  [facility] Division of Infectious Diseases [phone]  For after 5 P.M. and weekends, please call [phone]  
77M w/ PMHx of HTN, CAD, DM2 BIB son for AMS. Pt unable to provide hx, hx obtained from chart review  Per ED note, Son states that he noticed that his father was not acting himself approximately 10-1/2 hours PTA. The son went to work and on return approximately an hour and a half ago he noted the patient was on the floor and had urinated on himself.   Had Fever  Covid positive  No signs of meningitis.  Seen by ID - On Remdesivir  Dehydration.  Fluids  Limited but non focal exam.  CT Head - No acute pathology.  CT C spine  -No Fx.   No tongue bite  Seizure is not suspected.   No signs of CVA.  PT  Fall/safety precautions.   Would continue to follow.

## 2023-11-06 LAB
A1C WITH ESTIMATED AVERAGE GLUCOSE RESULT: 6.4 % — HIGH (ref 4–5.6)
A1C WITH ESTIMATED AVERAGE GLUCOSE RESULT: 6.4 % — HIGH (ref 4–5.6)
ANION GAP SERPL CALC-SCNC: 10 MMOL/L — SIGNIFICANT CHANGE UP (ref 5–17)
ANION GAP SERPL CALC-SCNC: 10 MMOL/L — SIGNIFICANT CHANGE UP (ref 5–17)
BUN SERPL-MCNC: 23 MG/DL — SIGNIFICANT CHANGE UP (ref 7–23)
BUN SERPL-MCNC: 23 MG/DL — SIGNIFICANT CHANGE UP (ref 7–23)
CALCIUM SERPL-MCNC: 8.5 MG/DL — SIGNIFICANT CHANGE UP (ref 8.5–10.1)
CALCIUM SERPL-MCNC: 8.5 MG/DL — SIGNIFICANT CHANGE UP (ref 8.5–10.1)
CHLORIDE SERPL-SCNC: 108 MMOL/L — SIGNIFICANT CHANGE UP (ref 96–108)
CHLORIDE SERPL-SCNC: 108 MMOL/L — SIGNIFICANT CHANGE UP (ref 96–108)
CO2 SERPL-SCNC: 22 MMOL/L — SIGNIFICANT CHANGE UP (ref 22–31)
CO2 SERPL-SCNC: 22 MMOL/L — SIGNIFICANT CHANGE UP (ref 22–31)
CREAT SERPL-MCNC: 1.7 MG/DL — HIGH (ref 0.5–1.3)
CREAT SERPL-MCNC: 1.7 MG/DL — HIGH (ref 0.5–1.3)
EGFR: 41 ML/MIN/1.73M2 — LOW
EGFR: 41 ML/MIN/1.73M2 — LOW
ESTIMATED AVERAGE GLUCOSE: 137 MG/DL — HIGH (ref 68–114)
ESTIMATED AVERAGE GLUCOSE: 137 MG/DL — HIGH (ref 68–114)
GLUCOSE BLDC GLUCOMTR-MCNC: 104 MG/DL — HIGH (ref 70–99)
GLUCOSE BLDC GLUCOMTR-MCNC: 104 MG/DL — HIGH (ref 70–99)
GLUCOSE BLDC GLUCOMTR-MCNC: 155 MG/DL — HIGH (ref 70–99)
GLUCOSE BLDC GLUCOMTR-MCNC: 155 MG/DL — HIGH (ref 70–99)
GLUCOSE BLDC GLUCOMTR-MCNC: 229 MG/DL — HIGH (ref 70–99)
GLUCOSE BLDC GLUCOMTR-MCNC: 229 MG/DL — HIGH (ref 70–99)
GLUCOSE BLDC GLUCOMTR-MCNC: 246 MG/DL — HIGH (ref 70–99)
GLUCOSE BLDC GLUCOMTR-MCNC: 246 MG/DL — HIGH (ref 70–99)
GLUCOSE SERPL-MCNC: 98 MG/DL — SIGNIFICANT CHANGE UP (ref 70–99)
GLUCOSE SERPL-MCNC: 98 MG/DL — SIGNIFICANT CHANGE UP (ref 70–99)
HCT VFR BLD CALC: 32.5 % — LOW (ref 39–50)
HCT VFR BLD CALC: 32.5 % — LOW (ref 39–50)
HGB BLD-MCNC: 11 G/DL — LOW (ref 13–17)
HGB BLD-MCNC: 11 G/DL — LOW (ref 13–17)
MAGNESIUM SERPL-MCNC: 1.7 MG/DL — SIGNIFICANT CHANGE UP (ref 1.6–2.6)
MAGNESIUM SERPL-MCNC: 1.7 MG/DL — SIGNIFICANT CHANGE UP (ref 1.6–2.6)
MCHC RBC-ENTMCNC: 30.2 PG — SIGNIFICANT CHANGE UP (ref 27–34)
MCHC RBC-ENTMCNC: 30.2 PG — SIGNIFICANT CHANGE UP (ref 27–34)
MCHC RBC-ENTMCNC: 33.8 GM/DL — SIGNIFICANT CHANGE UP (ref 32–36)
MCHC RBC-ENTMCNC: 33.8 GM/DL — SIGNIFICANT CHANGE UP (ref 32–36)
MCV RBC AUTO: 89.3 FL — SIGNIFICANT CHANGE UP (ref 80–100)
MCV RBC AUTO: 89.3 FL — SIGNIFICANT CHANGE UP (ref 80–100)
NRBC # BLD: 0 /100 WBCS — SIGNIFICANT CHANGE UP (ref 0–0)
NRBC # BLD: 0 /100 WBCS — SIGNIFICANT CHANGE UP (ref 0–0)
PLATELET # BLD AUTO: 185 K/UL — SIGNIFICANT CHANGE UP (ref 150–400)
PLATELET # BLD AUTO: 185 K/UL — SIGNIFICANT CHANGE UP (ref 150–400)
POTASSIUM SERPL-MCNC: 4.6 MMOL/L — SIGNIFICANT CHANGE UP (ref 3.5–5.3)
POTASSIUM SERPL-MCNC: 4.6 MMOL/L — SIGNIFICANT CHANGE UP (ref 3.5–5.3)
POTASSIUM SERPL-SCNC: 4.6 MMOL/L — SIGNIFICANT CHANGE UP (ref 3.5–5.3)
POTASSIUM SERPL-SCNC: 4.6 MMOL/L — SIGNIFICANT CHANGE UP (ref 3.5–5.3)
PROCALCITONIN SERPL-MCNC: 0.12 NG/ML — HIGH
PROCALCITONIN SERPL-MCNC: 0.12 NG/ML — HIGH
RBC # BLD: 3.64 M/UL — LOW (ref 4.2–5.8)
RBC # BLD: 3.64 M/UL — LOW (ref 4.2–5.8)
RBC # FLD: 12.8 % — SIGNIFICANT CHANGE UP (ref 10.3–14.5)
RBC # FLD: 12.8 % — SIGNIFICANT CHANGE UP (ref 10.3–14.5)
SODIUM SERPL-SCNC: 140 MMOL/L — SIGNIFICANT CHANGE UP (ref 135–145)
SODIUM SERPL-SCNC: 140 MMOL/L — SIGNIFICANT CHANGE UP (ref 135–145)
WBC # BLD: 4.83 K/UL — SIGNIFICANT CHANGE UP (ref 3.8–10.5)
WBC # BLD: 4.83 K/UL — SIGNIFICANT CHANGE UP (ref 3.8–10.5)
WBC # FLD AUTO: 4.83 K/UL — SIGNIFICANT CHANGE UP (ref 3.8–10.5)
WBC # FLD AUTO: 4.83 K/UL — SIGNIFICANT CHANGE UP (ref 3.8–10.5)

## 2023-11-06 RX ADMIN — LOSARTAN POTASSIUM 25 MILLIGRAM(S): 100 TABLET, FILM COATED ORAL at 06:11

## 2023-11-06 RX ADMIN — ENOXAPARIN SODIUM 40 MILLIGRAM(S): 100 INJECTION SUBCUTANEOUS at 12:28

## 2023-11-06 RX ADMIN — Medication 81 MILLIGRAM(S): at 11:50

## 2023-11-06 RX ADMIN — Medication 50 MILLIGRAM(S): at 06:08

## 2023-11-06 RX ADMIN — ATORVASTATIN CALCIUM 40 MILLIGRAM(S): 80 TABLET, FILM COATED ORAL at 21:32

## 2023-11-06 RX ADMIN — TAMSULOSIN HYDROCHLORIDE 0.4 MILLIGRAM(S): 0.4 CAPSULE ORAL at 21:33

## 2023-11-06 RX ADMIN — FAMOTIDINE 20 MILLIGRAM(S): 10 INJECTION INTRAVENOUS at 11:50

## 2023-11-06 RX ADMIN — Medication 1: at 12:28

## 2023-11-06 RX ADMIN — REMDESIVIR 200 MILLIGRAM(S): 5 INJECTION INTRAVENOUS at 13:10

## 2023-11-06 RX ADMIN — CLOPIDOGREL BISULFATE 75 MILLIGRAM(S): 75 TABLET, FILM COATED ORAL at 11:50

## 2023-11-06 RX ADMIN — SODIUM CHLORIDE 100 MILLILITER(S): 9 INJECTION INTRAMUSCULAR; INTRAVENOUS; SUBCUTANEOUS at 17:44

## 2023-11-06 RX ADMIN — Medication 6 MILLIGRAM(S): at 06:08

## 2023-11-06 RX ADMIN — Medication 2: at 17:43

## 2023-11-06 NOTE — CARE COORDINATION ASSESSMENT. - NSCAREPROVIDERS_GEN_ALL_CORE_FT
CARE PROVIDERS:  Administration: José Lopez  Administration: Gaudencio Badillo  Administration: Ana Rosa Monae  Admitting: Matt Beauchamp  Attending: Matt Beauchamp  Case Management: Selena Cortes  Case Management: Steven Herndon  Consultant: Omar Valle  Consultant: Richard Reyes  Consultant: Weil, Patricia  Consultant: Jemima Tavarez  Consultant: Shiloh Savage  Covering Team: Abelardo Kaplan  Infection Control: Zink, Corinne  Nurse: Marilou Dc  Nurse: Quita Mobley  Outpatient Provider: Omar Valle  Override: Jodie Martinez  Override: Sara Byrd  Override: ramila Power  PCA/Nursing Assistant: Ron Trevino  Physical Therapy: Malcom Hopper  Primary Team: Lucero Madrid  Registered Dietitian: Heather Lopez  : Anna Santana  : Shiela Miller

## 2023-11-06 NOTE — PHYSICAL THERAPY INITIAL EVALUATION ADULT - PERTINENT HX OF CURRENT PROBLEM, REHAB EVAL
This is a 77 M adm 11/5 with PMH of HTN DM CAD HLD BPH CKD3 who was brought in by EMS after son found him on the floor, confused. Patient had urinated on himself. + COVID 19. CT head: negative. CT Cspine: negative.

## 2023-11-06 NOTE — PHYSICAL THERAPY INITIAL EVALUATION ADULT - ADDITIONAL COMMENTS
Pt stating he lives w/ his son in a house, + steps to bedroom. Pt ambulates independently without device- has RW to use as needed and stating he is ind w/ ADLs.

## 2023-11-06 NOTE — CONSULT NOTE ADULT - SUBJECTIVE AND OBJECTIVE BOX
Date/Time Patient Seen:  		  Referring MD:   Data Reviewed	       Patient is a 77y old  Male who presents with a chief complaint of     Subjective/HPI   77 M with PMH of HTN DM CAD HLD BPH CKD3 who was brought in by EMS after son found him on the floor, confused.  PAST MEDICAL & SURGICAL HISTORY:  Hypertension    Diabetes mellitus    CAD (coronary artery disease)    BPH (benign prostatic hyperplasia)    No significant past surgical history    Diabetes mellitus     Hypertension.     PAST SURGICAL HISTORY:  No significant past surgical history.     Social History:  · Substance use	No     Tobacco Screening:  · Core Measure Site	Yes  · Has the patient used tobacco in the past 30 days?	No    Risk Assessment:    Present on Admission:  Deep Venous Thrombosis	no  Pulmonary Embolus	no     HIV Screening:  · In accordance with [place] State law, we offer every patient who comes to our ED an HIV test. Would you like to be tested today?	Unable to answer due to medical condition/unresponsive/etc...        Medication list         MEDICATIONS  (STANDING):  aspirin enteric coated 81 milliGRAM(s) Oral daily  atorvastatin 40 milliGRAM(s) Oral at bedtime  clopidogrel Tablet 75 milliGRAM(s) Oral daily  dexAMETHasone     Tablet 6 milliGRAM(s) Oral daily  dextrose 5%. 1000 milliLiter(s) (100 mL/Hr) IV Continuous <Continuous>  dextrose 5%. 1000 milliLiter(s) (50 mL/Hr) IV Continuous <Continuous>  dextrose 50% Injectable 25 Gram(s) IV Push once  dextrose 50% Injectable 12.5 Gram(s) IV Push once  dextrose 50% Injectable 25 Gram(s) IV Push once  enoxaparin Injectable 40 milliGRAM(s) SubCutaneous every 24 hours  famotidine    Tablet 20 milliGRAM(s) Oral daily  glucagon  Injectable 1 milliGRAM(s) IntraMuscular once  insulin lispro (ADMELOG) corrective regimen sliding scale   SubCutaneous three times a day before meals  insulin lispro (ADMELOG) corrective regimen sliding scale   SubCutaneous at bedtime  losartan 25 milliGRAM(s) Oral daily  metoprolol succinate ER 50 milliGRAM(s) Oral daily  remdesivir  IVPB 100 milliGRAM(s) IV Intermittent every 24 hours  sodium chloride 0.9%. 1000 milliLiter(s) (100 mL/Hr) IV Continuous <Continuous>  tamsulosin 0.4 milliGRAM(s) Oral at bedtime    MEDICATIONS  (PRN):  acetaminophen     Tablet .. 650 milliGRAM(s) Oral every 6 hours PRN Temp greater or equal to 38C (100.4F), Mild Pain (1 - 3)  aluminum hydroxide/magnesium hydroxide/simethicone Suspension 30 milliLiter(s) Oral every 4 hours PRN Dyspepsia  dextrose Oral Gel 15 Gram(s) Oral once PRN Blood Glucose LESS THAN 70 milliGRAM(s)/deciliter  melatonin 3 milliGRAM(s) Oral at bedtime PRN Insomnia  ondansetron Injectable 4 milliGRAM(s) IV Push every 6 hours PRN Nausea and/or Vomiting         Vitals log        ICU Vital Signs Last 24 Hrs  T(C): 39.4 (06 Nov 2023 04:57), Max: 39.4 (05 Nov 2023 21:32)  T(F): 102.9 (06 Nov 2023 04:57), Max: 103 (05 Nov 2023 21:32)  HR: 68 (06 Nov 2023 04:57) (63 - 73)  BP: 191/71 (06 Nov 2023 04:57) (145/69 - 191/71)  BP(mean): --  ABP: --  ABP(mean): --  RR: 18 (06 Nov 2023 04:57) (16 - 18)  SpO2: 95% (06 Nov 2023 04:57) (82% - 97%)    O2 Parameters below as of 06 Nov 2023 04:57  Patient On (Oxygen Delivery Method): room air                 Input and Output:  I&O's Detail      Lab Data                        11.7   7.26  )-----------( 231      ( 05 Nov 2023 01:00 )             34.9     11-05    136  |  102  |  32<H>  ----------------------------<  168<H>  4.4   |  25  |  1.89<H>    Ca    9.7      05 Nov 2023 01:00    TPro  7.6  /  Alb  4.0  /  TBili  0.4  /  DBili  x   /  AST  24  /  ALT  23  /  AlkPhos  98  11-05            Review of Systems	      Objective     Physical Examination        Pertinent Lab findings & Imaging      Contreras:  NO   Adequate UO     I&O's Detail           Discussed with:     Cultures:	        Radiology    ACC: [id number] EXAM:  XR CHEST AP OR PA 1V   ORDERED BY: SARAVANAN MONAE     PROCEDURE DATE:  11/05/2023          INTERPRETATION:  AP chest on November 5, 2023 at 12:27 AM. Patient has   sepsis.    Heart magnified by technique. Sternotomy reverse right shoulder   replacement again noted.    Lungs remain clear.    Chest is similar to November 3, 2020. Old left rib fractures again noted.    IMPRESSION: No acute finding or change.    --- End of Report ---            JOSE JARRETT MD; Attending Radiologist  This document has been electronically signed. Nov 5 2023 10:25AM                           Date/Time Patient Seen:  		  Referring MD:   Data Reviewed	       Patient is a 77y old  Male who presents with a chief complaint of     Subjective/HPI   77 M with PMH of HTN DM CAD HLD BPH CKD3 who was brought in by EMS after son found him on the floor, confused.  PAST MEDICAL & SURGICAL HISTORY:  Hypertension    Diabetes mellitus    CAD (coronary artery disease)    BPH (benign prostatic hyperplasia)    No significant past surgical history    Diabetes mellitus     Hypertension.     PAST SURGICAL HISTORY:  No significant past surgical history.     Social History:  · Substance use	No     Tobacco Screening:  · Core Measure Site	Yes  · Has the patient used tobacco in the past 30 days?	No    Risk Assessment:    Present on Admission:  Deep Venous Thrombosis	no  Pulmonary Embolus	no     HIV Screening:  · In accordance with [place] law, we offer every patient who comes to our ED an HIV test. Would you like to be tested today?	Unable to answer due to medical condition/unresponsive/etc...        Medication list         MEDICATIONS  (STANDING):  aspirin enteric coated 81 milliGRAM(s) Oral daily  atorvastatin 40 milliGRAM(s) Oral at bedtime  clopidogrel Tablet 75 milliGRAM(s) Oral daily  dexAMETHasone     Tablet 6 milliGRAM(s) Oral daily  dextrose 5%. 1000 milliLiter(s) (100 mL/Hr) IV Continuous <Continuous>  dextrose 5%. 1000 milliLiter(s) (50 mL/Hr) IV Continuous <Continuous>  dextrose 50% Injectable 25 Gram(s) IV Push once  dextrose 50% Injectable 12.5 Gram(s) IV Push once  dextrose 50% Injectable 25 Gram(s) IV Push once  enoxaparin Injectable 40 milliGRAM(s) SubCutaneous every 24 hours  famotidine    Tablet 20 milliGRAM(s) Oral daily  glucagon  Injectable 1 milliGRAM(s) IntraMuscular once  insulin lispro (ADMELOG) corrective regimen sliding scale   SubCutaneous three times a day before meals  insulin lispro (ADMELOG) corrective regimen sliding scale   SubCutaneous at bedtime  losartan 25 milliGRAM(s) Oral daily  metoprolol succinate ER 50 milliGRAM(s) Oral daily  remdesivir  IVPB 100 milliGRAM(s) IV Intermittent every 24 hours  sodium chloride 0.9%. 1000 milliLiter(s) (100 mL/Hr) IV Continuous <Continuous>  tamsulosin 0.4 milliGRAM(s) Oral at bedtime    MEDICATIONS  (PRN):  acetaminophen     Tablet .. 650 milliGRAM(s) Oral every 6 hours PRN Temp greater or equal to 38C (100.4F), Mild Pain (1 - 3)  aluminum hydroxide/magnesium hydroxide/simethicone Suspension 30 milliLiter(s) Oral every 4 hours PRN Dyspepsia  dextrose Oral Gel 15 Gram(s) Oral once PRN Blood Glucose LESS THAN 70 milliGRAM(s)/deciliter  melatonin 3 milliGRAM(s) Oral at bedtime PRN Insomnia  ondansetron Injectable 4 milliGRAM(s) IV Push every 6 hours PRN Nausea and/or Vomiting         Vitals log        ICU Vital Signs Last 24 Hrs  T(C): 39.4 (06 Nov 2023 04:57), Max: 39.4 (05 Nov 2023 21:32)  T(F): 102.9 (06 Nov 2023 04:57), Max: 103 (05 Nov 2023 21:32)  HR: 68 (06 Nov 2023 04:57) (63 - 73)  BP: 191/71 (06 Nov 2023 04:57) (145/69 - 191/71)  BP(mean): --  ABP: --  ABP(mean): --  RR: 18 (06 Nov 2023 04:57) (16 - 18)  SpO2: 95% (06 Nov 2023 04:57) (82% - 97%)    O2 Parameters below as of 06 Nov 2023 04:57  Patient On (Oxygen Delivery Method): room air                 Input and Output:  I&O's Detail      Lab Data                        11.7   7.26  )-----------( 231      ( 05 Nov 2023 01:00 )             34.9     11-05    136  |  102  |  32<H>  ----------------------------<  168<H>  4.4   |  25  |  1.89<H>    Ca    9.7      05 Nov 2023 01:00    TPro  7.6  /  Alb  4.0  /  TBili  0.4  /  DBili  x   /  AST  24  /  ALT  23  /  AlkPhos  98  11-05            Review of Systems	  weakness  cough      Objective     Physical Examination  heart s1s2  lung dc BS  head nc        Pertinent Lab findings & Imaging      Contreras:  NO   Adequate UO     I&O's Detail           Discussed with:     Cultures:	        Radiology    ACC: [id number] EXAM:  XR CHEST AP OR PA 1V   ORDERED BY: SARAVANAN MONAE     PROCEDURE DATE:  11/05/2023          INTERPRETATION:  AP chest on November 5, 2023 at 12:27 AM. Patient has   sepsis.    Heart magnified by technique. Sternotomy reverse right shoulder   replacement again noted.    Lungs remain clear.    Chest is similar to November 3, 2020. Old left rib fractures again noted.    IMPRESSION: No acute finding or change.    --- End of Report ---            JOSE JARRETT MD; Attending Radiologist  This document has been electronically signed. Nov 5 2023 10:25AM

## 2023-11-06 NOTE — CONSULT NOTE ADULT - ASSESSMENT
77 M with PMH of HTN DM CAD HLD BPH CKD3 who was brought in by EMS after son found him on the floor, confused.    covid  weakness  ams  BPH  HTN  CKD  HLD  HTN  DM   77 M with PMH of HTN DM CAD HLD BPH CKD3 who was brought in by EMS after son found him on the floor, confused.    covid  weakness  ams  BPH  HTN  CKD  HLD  HTN  DM    DM care  cvs rx regimen  covid isolation  remdesivir and decadron - covid with hypoxemia  monitor VS and Sat  dvt p  assist with needs  cxr NAPD  BPH rx regimen  monitor mentation  nutrition  GOC discussion  CKD stable

## 2023-11-06 NOTE — PHYSICAL THERAPY INITIAL EVALUATION ADULT - HEALTH SCREEN CRITERIA
Interval/Overnight Events:  Attempt to move ivfs from extravascular to intravascular space - given albumen 25% 50cc, lasix 40mg x1, bumex gtt with profuse UOP > 1L. Pt with soft BP, likely 2/2 aggressive diuresis, started on small dose of levo which was eventually weaned off during the day when bumex stopped. O/N, pt febrile 102, given tylenol. mildly hypotensive SBP 80, started on small dose levo.     52y Female PMHx HTN, T2DM, asthma, depression with recent diagnosis of invasive ampullary adenocarcinoma of the CBD admitted for cholangitis on 1/5, s/p ERCP 1/8, whipple 1/11, rapid response for GIB on floor 1/18 s/p MTP, RTOR s/p celiotomy, evacuation of hematoma, GDA bleed stopped and sutured, Abthera vac.   Closure of abdomen with strattice mest on 1/21, with inna drains and left lower JOURDAN drain in subcutaneous   Incisional bleed 1/23, bedside skin staples removed, controlled hemorrhage, remains intubated but off pressors, clinically declining.      24 HOUR EVENTS:    SUBJECTIVE/ROS:  [ ] A ten-point review of systems was otherwise negative except as noted.  [ ] Due to altered mental status/intubation, subjective information were not able to be obtained from the patient. History was obtained, to the extent possible, from review of the chart and collateral sources of information.      NEURO  RASS: -3 to -4  Exam: sedated, minimally arousable.  Awakens but agitated when off sedation  Meds: dexmedetomidine Infusion 0.5 MICROgram(s)/kG/Hr IV Continuous <Continuous>  fentaNYL   Infusion. 0.5 MICROgram(s)/kG/Hr IV Continuous <Continuous>    [x] Adequacy of sedation and pain control has been assessed and adjusted    HEENT  Exam: Normocephalic, atraumatic, EOMI.     RESPIRATORY  RR: 8 (01-25-19 @ 17:00) (8 - 24)  SpO2: 99% (01-25-19 @ 17:00) (93% - 100%)  Wt(kg): --  Exam: unlabored, clear to auscultation bilaterally  Mechanical Ventilation: Mode: CPAP with PS, RR (patient): 9, FiO2: 50, PEEP: 10, PS: 13, MAP: 12.6  ABG - ( 25 Jan 2019 00:20 )  pH: 7.40  /  pCO2: 41    /  pO2: 130   / HCO3: 25    / Base Excess: 0.2   /  SaO2: 99.1    Lactate: 2.2              [N/A] Extubation Readiness Assessed  Meds:       CARDIOVASCULAR  Exam: S1, S2.  Regular rate and rhythm. 3+ edema   HR: 62 (01-25-19 @ 17:00) (62 - 93)  BP: --  BP(mean): --  ABP: 112/60 (01-25-19 @ 17:00) (88/47 - 204/108)  ABP(mean): 80 (01-25-19 @ 17:00) (64 - 147)  Wt(kg): --  CVP(cm H2O): --      Exam: regular rate and rhythm  Cardiac Rhythm: sinus  Perfusion     [x]Adequate   [ ]Inadequate  Mentation   [x]Normal       [ ]Reduced  Extremities  [x]Warm         [ ]Cool  Volume Status [ ]Hypervolemic [x]Euvolemic [ ]Hypovolemic  Meds: buMETAnide Infusion 1 mG/Hr IV Continuous <Continuous>  norepinephrine Infusion 0.05 MICROgram(s)/kG/Min IV Continuous <Continuous>        GI/NUTRITION   Mildly distended, midline incision open with wet to dry dressing, JPx2  Diet: TPN  Meds: pantoprazole  Injectable 40 milliGRAM(s) IV Push daily      GENITOURINARY  I&O's Detail    01-24 @ 07:01  -  01-25 @ 07:00  --------------------------------------------------------  IN:    Albumin 25%: 50 mL    dexmedetomidine Infusion: 7.2 mL    dexmedetomidine Infusion: 24.4 mL    dextrose 5% + sodium chloride 0.9%: 675 mL    fat emulsion (Fish Oil and Plant Based) 20% Infusion: 75 mL    fentaNYL Infusion.: 207 mL    fentaNYL Infusion.: 11.5 mL    IV PiggyBack: 900 mL    norepinephrine Infusion: 7.1 mL    TPN (Total Parenteral Nutrition): 588 mL  Total IN: 2545.2 mL    OUT:    Bulb: 85 mL    Bulb: 345 mL    Indwelling Catheter - Urethral: 1525 mL    Nasoenteral Tube: 300 mL  Total OUT: 2255 mL    Total NET: 290.2 mL      01-25 @ 07:01  -  01-25 @ 18:26  --------------------------------------------------------  IN:    Albumin 25%: 50 mL    bumetanide Infusion: 20 mL    dexmedetomidine Infusion: 100 mL    fat emulsion (Fish Oil and Plant Based) 20% Infusion: 60 mL    fentaNYL Infusion.: 91.8 mL    IV PiggyBack: 200 mL    norepinephrine Infusion: 8.1 mL    TPN (Total Parenteral Nutrition): 502 mL  Total IN: 1031.9 mL    OUT:    Bulb: 150 mL    Bulb: 65 mL    Indwelling Catheter - Urethral: 1655 mL    Nasoenteral Tube: 100 mL  Total OUT: 1970 mL    Total NET: -938.1 mL          01-25    131<L>  |  99  |  10  ----------------------------<  156<H>  3.7   |  22  |  0.59    Ca    7.3<L>      25 Jan 2019 00:20  Phos  2.5     01-25  Mg     2.1     01-25    TPro  5.1<L>  /  Alb  2.0<L>  /  TBili  4.4<H>  /  DBili  x   /  AST  74<H>  /  ALT  23  /  AlkPhos  229<H>  01-25    [ ] Swanson catheter, indication: N/A  Meds: fat emulsion (Fish Oil and Plant Based) 20% Infusion 6.25 mL/Hr IV Continuous <Continuous>  fat emulsion (Fish Oil and Plant Based) 20% Infusion 12.5 mL/Hr IV Continuous <Continuous>  Parenteral Nutrition - Adult 1 Each TPN Continuous <Continuous>  Parenteral Nutrition - Adult 1 Each TPN Continuous <Continuous>        HEMATOLOGIC  Meds: enoxaparin Injectable 40 milliGRAM(s) SubCutaneous daily    [x] VTE Prophylaxis                        9.1    14.29 )-----------( 177      ( 25 Jan 2019 00:20 )             27.4     PT/INR - ( 25 Jan 2019 00:20 )   PT: 15.8 SEC;   INR: 1.41          PTT - ( 25 Jan 2019 00:20 )  PTT:34.3 SEC  Transfusion     [ ] PRBC   [ ] Platelets   [ ] FFP   [ ] Cryoprecipitate      INFECTIOUS DISEASES  WBC Count: 14.29 K/uL (01-25 @ 00:20)    RECENT CULTURES:  Specimen Source: URINE CATHETER  Date/Time: 01-21 @ 09:31  Culture Results: --  Gram Stain: --  Organism: --  Specimen Source: BLOOD  Date/Time: 01-21 @ 02:28  Culture Results: --  Gram Stain: --  Organism: --    Meds: meropenem  IVPB 1000 milliGRAM(s) IV Intermittent every 8 hours  meropenem  IVPB      micafungin IVPB      micafungin IVPB 100 milliGRAM(s) IV Intermittent every 24 hours  vancomycin  IVPB      vancomycin  IVPB 1000 milliGRAM(s) IV Intermittent every 12 hours        ENDOCRINE  CAPILLARY BLOOD GLUCOSE      POCT Blood Glucose.: 164 mg/dL (25 Jan 2019 12:09)  POCT Blood Glucose.: 156 mg/dL (25 Jan 2019 05:09)  POCT Blood Glucose.: 171 mg/dL (24 Jan 2019 23:12)  POCT Blood Glucose.: 155 mg/dL (24 Jan 2019 18:30)    Meds: insulin lispro (HumaLOG) corrective regimen sliding scale   SubCutaneous every 6 hours        ACCESS DEVICES:  [x ] Peripheral IV  [ x] Central Venous Line	[ ] R	[x ] L	[x ] IJ	[ ] Fem	[ ] SC	Placed:   [x ] Arterial Line		[x ] R	[ ] L	[ ] Fem	[ ] Rad	[x ] Ax	Placed:   [ ] PICC:					[ ] Mediport  [x ] Urinary Catheter, Date Placed:   [x] Necessity of urinary, arterial, and venous catheters discussed    OTHER MEDICATIONS:  artificial  tears Solution 1 Drop(s) Both EYES every 12 hours  chlorhexidine 0.12% Liquid 15 milliLiter(s) Oral Mucosa two times a day  chlorhexidine 4% Liquid 1 Application(s) Topical <User Schedule>      Assessment and Plan:   · Assessment		  Neuro:  - fentanyl gtt for sedative analgesia will decrease dose  - Precedex @ low dose  - intermittent requirement for levo, will continue PRN  - daily sedation vacation  - monitor mental status     Resp:  - Hypoxemia improving  - q8 ABG, adjust vent settings prn  - CPAP trial as tolerated if patient remains stable then consider extubation     CV:  - monitor SBP, intermittent vasopressor requirement  - fluid overloaded, will agressively diurese  - pending ECHO     GI:  - TPN Lipids started  - Continue protonix    Heme:   -monitor H/H  -lovenox vte ppx    Renal:  - Strict I&O, monitor lytes, replete as needed  - D5 1/2NS @ 100  - aggressive diuresis yesterday with net neg 3L urine with bumex gtt    ID:   - UA positive, will f/u urine cultures  - Meropenem/vancomycin for broadened coverage, no intra op cultures sent  - Continue micafungin for at least 10 days total  - blood culture 1/18 f/u- +coag negative staph  - f/u BCX from 1/19 and 1/20  - plan to d/c ronak/vanc today    Endo:  - diabetic, FS and low dose corrective regimen for hyperglycemia     Access: LIJ central line, 2 PIV, JOURDAN draining serosanguinous, ETT, R axillary arterial line     Dispo: SICU    SICU Dx: Malnutrition, invasive ampullary carcinoma s/p whipple, acute blood loss anemia, Fluid overload yes

## 2023-11-06 NOTE — CARE COORDINATION ASSESSMENT. - NSPASTMEDSURGHISTORY_GEN_ALL_CORE_FT
PAST MEDICAL & SURGICAL HISTORY:  Hypertension      Diabetes mellitus      No significant past surgical history      BPH (benign prostatic hyperplasia)      CAD (coronary artery disease)

## 2023-11-07 LAB
ANION GAP SERPL CALC-SCNC: 7 MMOL/L — SIGNIFICANT CHANGE UP (ref 5–17)
ANION GAP SERPL CALC-SCNC: 7 MMOL/L — SIGNIFICANT CHANGE UP (ref 5–17)
BUN SERPL-MCNC: 38 MG/DL — HIGH (ref 7–23)
BUN SERPL-MCNC: 38 MG/DL — HIGH (ref 7–23)
CALCIUM SERPL-MCNC: 8.1 MG/DL — LOW (ref 8.5–10.1)
CALCIUM SERPL-MCNC: 8.1 MG/DL — LOW (ref 8.5–10.1)
CHLORIDE SERPL-SCNC: 113 MMOL/L — HIGH (ref 96–108)
CHLORIDE SERPL-SCNC: 113 MMOL/L — HIGH (ref 96–108)
CO2 SERPL-SCNC: 22 MMOL/L — SIGNIFICANT CHANGE UP (ref 22–31)
CO2 SERPL-SCNC: 22 MMOL/L — SIGNIFICANT CHANGE UP (ref 22–31)
CREAT SERPL-MCNC: 1.6 MG/DL — HIGH (ref 0.5–1.3)
CREAT SERPL-MCNC: 1.6 MG/DL — HIGH (ref 0.5–1.3)
EGFR: 44 ML/MIN/1.73M2 — LOW
EGFR: 44 ML/MIN/1.73M2 — LOW
GLUCOSE BLDC GLUCOMTR-MCNC: 127 MG/DL — HIGH (ref 70–99)
GLUCOSE BLDC GLUCOMTR-MCNC: 127 MG/DL — HIGH (ref 70–99)
GLUCOSE BLDC GLUCOMTR-MCNC: 215 MG/DL — HIGH (ref 70–99)
GLUCOSE BLDC GLUCOMTR-MCNC: 215 MG/DL — HIGH (ref 70–99)
GLUCOSE BLDC GLUCOMTR-MCNC: 264 MG/DL — HIGH (ref 70–99)
GLUCOSE BLDC GLUCOMTR-MCNC: 264 MG/DL — HIGH (ref 70–99)
GLUCOSE BLDC GLUCOMTR-MCNC: 265 MG/DL — HIGH (ref 70–99)
GLUCOSE BLDC GLUCOMTR-MCNC: 265 MG/DL — HIGH (ref 70–99)
GLUCOSE SERPL-MCNC: 136 MG/DL — HIGH (ref 70–99)
GLUCOSE SERPL-MCNC: 136 MG/DL — HIGH (ref 70–99)
HCT VFR BLD CALC: 32.9 % — LOW (ref 39–50)
HCT VFR BLD CALC: 32.9 % — LOW (ref 39–50)
HGB BLD-MCNC: 11.3 G/DL — LOW (ref 13–17)
HGB BLD-MCNC: 11.3 G/DL — LOW (ref 13–17)
MAGNESIUM SERPL-MCNC: 2.2 MG/DL — SIGNIFICANT CHANGE UP (ref 1.6–2.6)
MAGNESIUM SERPL-MCNC: 2.2 MG/DL — SIGNIFICANT CHANGE UP (ref 1.6–2.6)
MCHC RBC-ENTMCNC: 30.2 PG — SIGNIFICANT CHANGE UP (ref 27–34)
MCHC RBC-ENTMCNC: 30.2 PG — SIGNIFICANT CHANGE UP (ref 27–34)
MCHC RBC-ENTMCNC: 34.3 GM/DL — SIGNIFICANT CHANGE UP (ref 32–36)
MCHC RBC-ENTMCNC: 34.3 GM/DL — SIGNIFICANT CHANGE UP (ref 32–36)
MCV RBC AUTO: 88 FL — SIGNIFICANT CHANGE UP (ref 80–100)
MCV RBC AUTO: 88 FL — SIGNIFICANT CHANGE UP (ref 80–100)
NRBC # BLD: 0 /100 WBCS — SIGNIFICANT CHANGE UP (ref 0–0)
NRBC # BLD: 0 /100 WBCS — SIGNIFICANT CHANGE UP (ref 0–0)
PLATELET # BLD AUTO: 183 K/UL — SIGNIFICANT CHANGE UP (ref 150–400)
PLATELET # BLD AUTO: 183 K/UL — SIGNIFICANT CHANGE UP (ref 150–400)
POTASSIUM SERPL-MCNC: 4.3 MMOL/L — SIGNIFICANT CHANGE UP (ref 3.5–5.3)
POTASSIUM SERPL-MCNC: 4.3 MMOL/L — SIGNIFICANT CHANGE UP (ref 3.5–5.3)
POTASSIUM SERPL-SCNC: 4.3 MMOL/L — SIGNIFICANT CHANGE UP (ref 3.5–5.3)
POTASSIUM SERPL-SCNC: 4.3 MMOL/L — SIGNIFICANT CHANGE UP (ref 3.5–5.3)
RBC # BLD: 3.74 M/UL — LOW (ref 4.2–5.8)
RBC # BLD: 3.74 M/UL — LOW (ref 4.2–5.8)
RBC # FLD: 12.8 % — SIGNIFICANT CHANGE UP (ref 10.3–14.5)
RBC # FLD: 12.8 % — SIGNIFICANT CHANGE UP (ref 10.3–14.5)
SODIUM SERPL-SCNC: 142 MMOL/L — SIGNIFICANT CHANGE UP (ref 135–145)
SODIUM SERPL-SCNC: 142 MMOL/L — SIGNIFICANT CHANGE UP (ref 135–145)
WBC # BLD: 6.22 K/UL — SIGNIFICANT CHANGE UP (ref 3.8–10.5)
WBC # BLD: 6.22 K/UL — SIGNIFICANT CHANGE UP (ref 3.8–10.5)
WBC # FLD AUTO: 6.22 K/UL — SIGNIFICANT CHANGE UP (ref 3.8–10.5)
WBC # FLD AUTO: 6.22 K/UL — SIGNIFICANT CHANGE UP (ref 3.8–10.5)

## 2023-11-07 RX ADMIN — CLOPIDOGREL BISULFATE 75 MILLIGRAM(S): 75 TABLET, FILM COATED ORAL at 11:32

## 2023-11-07 RX ADMIN — FAMOTIDINE 20 MILLIGRAM(S): 10 INJECTION INTRAVENOUS at 11:33

## 2023-11-07 RX ADMIN — Medication 3: at 13:09

## 2023-11-07 RX ADMIN — TAMSULOSIN HYDROCHLORIDE 0.4 MILLIGRAM(S): 0.4 CAPSULE ORAL at 21:43

## 2023-11-07 RX ADMIN — Medication 81 MILLIGRAM(S): at 11:32

## 2023-11-07 RX ADMIN — Medication 6 MILLIGRAM(S): at 06:34

## 2023-11-07 RX ADMIN — LOSARTAN POTASSIUM 25 MILLIGRAM(S): 100 TABLET, FILM COATED ORAL at 06:33

## 2023-11-07 RX ADMIN — ATORVASTATIN CALCIUM 40 MILLIGRAM(S): 80 TABLET, FILM COATED ORAL at 21:43

## 2023-11-07 RX ADMIN — Medication 50 MILLIGRAM(S): at 06:36

## 2023-11-07 RX ADMIN — ENOXAPARIN SODIUM 40 MILLIGRAM(S): 100 INJECTION SUBCUTANEOUS at 11:33

## 2023-11-07 RX ADMIN — REMDESIVIR 200 MILLIGRAM(S): 5 INJECTION INTRAVENOUS at 13:53

## 2023-11-07 RX ADMIN — Medication 1: at 22:40

## 2023-11-07 RX ADMIN — Medication 2: at 17:49

## 2023-11-07 NOTE — CAREGIVER ENGAGEMENT NOTE - CAREGIVER OUTREACH NOTES - FREE TEXT
SW spoke with pt dtr regarding plan for d/c. 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 Pt unable to live with dtr at this time as she is going to [place] for a  until 11/15. SW to follow up with pt for d/c planning.

## 2023-11-08 ENCOUNTER — TRANSCRIPTION ENCOUNTER (OUTPATIENT)
Age: 78
End: 2023-11-08

## 2023-11-08 VITALS
TEMPERATURE: 98 F | OXYGEN SATURATION: 94 % | SYSTOLIC BLOOD PRESSURE: 120 MMHG | HEART RATE: 49 BPM | DIASTOLIC BLOOD PRESSURE: 64 MMHG | RESPIRATION RATE: 16 BRPM

## 2023-11-08 LAB
-  AMPICILLIN: SIGNIFICANT CHANGE UP
-  AMPICILLIN: SIGNIFICANT CHANGE UP
-  CIPROFLOXACIN: SIGNIFICANT CHANGE UP
-  CIPROFLOXACIN: SIGNIFICANT CHANGE UP
-  LEVOFLOXACIN: SIGNIFICANT CHANGE UP
-  LEVOFLOXACIN: SIGNIFICANT CHANGE UP
-  NITROFURANTOIN: SIGNIFICANT CHANGE UP
-  NITROFURANTOIN: SIGNIFICANT CHANGE UP
-  TETRACYCLINE: SIGNIFICANT CHANGE UP
-  TETRACYCLINE: SIGNIFICANT CHANGE UP
-  VANCOMYCIN: SIGNIFICANT CHANGE UP
-  VANCOMYCIN: SIGNIFICANT CHANGE UP
CULTURE RESULTS: ABNORMAL
CULTURE RESULTS: ABNORMAL
GLUCOSE BLDC GLUCOMTR-MCNC: 148 MG/DL — HIGH (ref 70–99)
GLUCOSE BLDC GLUCOMTR-MCNC: 148 MG/DL — HIGH (ref 70–99)
GLUCOSE BLDC GLUCOMTR-MCNC: 152 MG/DL — HIGH (ref 70–99)
GLUCOSE BLDC GLUCOMTR-MCNC: 152 MG/DL — HIGH (ref 70–99)
GLUCOSE BLDC GLUCOMTR-MCNC: 167 MG/DL — HIGH (ref 70–99)
GLUCOSE BLDC GLUCOMTR-MCNC: 167 MG/DL — HIGH (ref 70–99)
METHOD TYPE: SIGNIFICANT CHANGE UP
METHOD TYPE: SIGNIFICANT CHANGE UP
ORGANISM # SPEC MICROSCOPIC CNT: ABNORMAL
SPECIMEN SOURCE: SIGNIFICANT CHANGE UP
SPECIMEN SOURCE: SIGNIFICANT CHANGE UP

## 2023-11-08 PROCEDURE — 80048 BASIC METABOLIC PNL TOTAL CA: CPT

## 2023-11-08 PROCEDURE — 85027 COMPLETE CBC AUTOMATED: CPT

## 2023-11-08 PROCEDURE — 82962 GLUCOSE BLOOD TEST: CPT

## 2023-11-08 PROCEDURE — 83735 ASSAY OF MAGNESIUM: CPT

## 2023-11-08 PROCEDURE — 83036 HEMOGLOBIN GLYCOSYLATED A1C: CPT

## 2023-11-08 PROCEDURE — 36415 COLL VENOUS BLD VENIPUNCTURE: CPT

## 2023-11-08 PROCEDURE — 84145 PROCALCITONIN (PCT): CPT

## 2023-11-08 PROCEDURE — 97161 PT EVAL LOW COMPLEX 20 MIN: CPT

## 2023-11-08 RX ADMIN — CLOPIDOGREL BISULFATE 75 MILLIGRAM(S): 75 TABLET, FILM COATED ORAL at 11:45

## 2023-11-08 RX ADMIN — FAMOTIDINE 20 MILLIGRAM(S): 10 INJECTION INTRAVENOUS at 11:45

## 2023-11-08 RX ADMIN — Medication 81 MILLIGRAM(S): at 11:45

## 2023-11-08 RX ADMIN — Medication 50 MILLIGRAM(S): at 06:13

## 2023-11-08 RX ADMIN — LOSARTAN POTASSIUM 25 MILLIGRAM(S): 100 TABLET, FILM COATED ORAL at 06:13

## 2023-11-08 RX ADMIN — ENOXAPARIN SODIUM 40 MILLIGRAM(S): 100 INJECTION SUBCUTANEOUS at 11:46

## 2023-11-08 RX ADMIN — Medication 1: at 08:39

## 2023-11-08 NOTE — PROGRESS NOTE ADULT - ASSESSMENT
Prerenal azotemia, CKD 3 (baseline creatinine 1.5-1.7)  Hypertension  COVID 19+    Renal indices stable at baseline. To continue current meds. Monitor BP trend. Titrate BP meds as needed.   Encourage PO intake as tolerated. Rx for COVID. Will follow electrolytes and renal function trend.   D/w patient regarding need for out patient nephrology follow up. 
77 M with PMH of HTN DM CAD HLD BPH CKD3 who was brought in by EMS after son found him on the floor, confused.    covid  weakness  ams  BPH  HTN  CKD  HLD  HTN  DM    DM care  cvs rx regimen  covid isolation  s/p remdesivir and decadron - covid with hypoxemia  monitor VS and Sat  dvt p  assist with needs  cxr NAPD  BPH rx regimen  monitor mentation  nutrition  GOC discussion  CKD stable
77M w/ PMHx of HTN, CAD, DM2 BIB son for AMS.  Febrile in the ED to 101.9F. COVID+.   Per ED note, son was sick few days earlier    COVID-19  AMS  -Remdesivir x3 day course  -low suspicion for superimposed bacterial infection, will hold antibiotics at this time  -can hold steroids for now  -trend temps/WBC/inflammatory biomarkers  --fevers likely in setting of acute infection, no other infectious etiology identified  -monitor LFTs while on remdesivir  -continue with supportive care, supplemental O2 as needed  -maintain aspiration precautions  -maintain fall precautions  -maintain isolation per infection control policy    Bacteruria, asymptomatic  - GPC in urine culture  - pt w/o sx  - monitor off Abx    D/w Dr. Beauchamp    Infectious Diseases will continue to follow. Please call with any questions.   Shiloh Savage M.D.  [facility] Division of Infectious Diseases [phone]  For after 5 P.M. and weekends, please call [phone]  
77M w/ PMHx of HTN, CAD, DM2 BIB son for AMS.  Febrile in the ED to 101.9F. COVID+.   Per ED note, son was sick few days earlier    COVID-19  AMS  -Remdesivir x3 day course  -low suspicion for superimposed bacterial infection, will hold antibiotics at this time  -can hold steroids for now  -trend temps/WBC/inflammatory biomarkers  --fevers likely in setting of acute infection, no other infectious etiology identified  -monitor LFTs while on remdesivir  -continue with supportive care, supplemental O2 as needed  -maintain aspiration precautions  -maintain fall precautions  -maintain isolation per infection control policy    D/w Dr. Beauchamp    Infectious Diseases will continue to follow. Please call with any questions.   Shiloh Savage M.D.  [facility] Division of Infectious Diseases [phone]  For after 5 P.M. and weekends, please call [phone]  
77M w/ PMHx of HTN, CAD, DM2 BIB son for AMS.  Febrile in the ED to 101.9F. COVID+.   Per ED note, son was sick few days earlier    COVID-19  AMS  -s/p Remdesivir x3 day course  -low suspicion for superimposed bacterial infection, will hold antibiotics at this time  -can hold steroids for now  -trend temps/WBC/inflammatory biomarkers--fevers likely in setting of acute infection, no other infectious etiology identified  -monitor LFTs while on remdesivir  -continue with supportive care, supplemental O2 as needed  -maintain aspiration precautions  -maintain fall precautions  -maintain isolation per infection control policy    Bacteruria, asymptomatic  - E. faecalis in urine culture  - pt w/o sx, UA negative  - monitor off Abx    Stable from ID standpoint  D/c planning per primary team    D/w Dr. Beauchamp    Infectious Diseases will continue to follow. Please call with any questions.   Shiloh Savage M.D.  [facility] Division of Infectious Diseases [phone]  For after 5 P.M. and weekends, please call [phone]  
77 M with PMH of HTN DM CAD HLD BPH CKD3 who was brought in by EMS after son found him on the floor, confused.    covid  weakness  ams  BPH  HTN  CKD  HLD  HTN  DM    DM care  cvs rx regimen  covid isolation  remdesivir and decadron - covid with hypoxemia  monitor VS and Sat  dvt p  assist with needs  cxr NAPD  BPH rx regimen  monitor mentation  nutrition  GOC discussion  CKD stable  
Prerenal azotemia, CKD 3 (baseline creatinine 1.5-1.7)  Hypertension  COVID 19+    Renal indices stable. To continue current meds. Monitor BP trend. Titrate BP meds as needed.   Encourage PO intake as tolerated. Rx for COVID. Will follow electrolytes and renal function trend. 
Prerenal azotemia, CKD 3 (baseline creatinine 1.5-1.7)  Hypertension  COVID 19+    Renal indices stable. To continue current meds. Monitor BP trend. Titrate BP meds as needed.   Encourage PO intake as tolerated. Rx for COVID. Will follow electrolytes and renal function trend.   D/w patient regarding need for out patient nephrology follow up.

## 2023-11-08 NOTE — PROGRESS NOTE ADULT - PROBLEM SELECTOR PLAN 1
Isolation precautions  Remdesivir x 3 days -- last day today  Tolerating RA -- d/c decadron  Monitor SaO2  Monitor COVID serologies  Blood cultures NTD  Tylenol prn for fever  ID f/u  Further work-up/management pending clinical course.
S/P iv Remdesivir x 3 days   Tolerating RA   Blood cultures NTD  Clinically improved  D/C to [facility]
Isolation precautions  Remdesivir x 3 days   SaO2 documented as 82% on RA last night, however, SaO2 95% on RA this 5AM. Started on decadron, received first dose at 6AM today.  Monitor SaO2  Monitor COVID serologies  Follow-up culture data sent from [place] [facility]  Tylenol prn for fever  ID f/u  Further work-up/management pending clinical course.

## 2023-11-08 NOTE — DISCHARGE NOTE PROVIDER - NSDCMRMEDTOKEN_GEN_ALL_CORE_FT
aspirin 81 mg oral delayed release tablet: 1 tab(s) orally once a day   atorvastatin 40 mg oral tablet: 1 tab(s) orally once a day (at bedtime)  Centrum Men&#x27;s oral tablet: 1 tab(s) orally once a day  clopidogrel 75 mg oral tablet: 1 tab(s) orally once a day  famotidine 20 mg oral tablet: 1 tab(s) orally once a day  losartan 25 mg oral tablet: 1 tab(s) orally once a day  metFORMIN 500 mg oral tablet: 2 tab(s) orally once a day (at bedtime) as per patient    *Script written as 1T BID*  metoprolol succinate 50 mg oral tablet, extended release: 1 tab(s) orally once a day  tamsulosin 0.4 mg oral capsule: 1 cap(s) orally once a day (at bedtime)  Vitamin B-12 1000 mcg oral tablet: 1 tab(s) orally once a day  Vitamin D3 5000 intl units (125 mcg) oral tablet: 1 tab(s) orally once a day   aspirin 81 mg oral delayed release tablet: 1 tab(s) orally once a day   atorvastatin 40 mg oral tablet: 1 tab(s) orally once a day (at bedtime)  Centrum Men&#x27;s oral tablet: 1 tab(s) orally once a day  clopidogrel 75 mg oral tablet: 1 tab(s) orally once a day  famotidine 20 mg oral tablet: 1 tab(s) orally once a day  losartan 25 mg oral tablet: 1 tab(s) orally once a day  metFORMIN 500 mg oral tablet: 2 tab(s) orally once a day  metoprolol succinate 50 mg oral tablet, extended release: 1 tab(s) orally once a day  tamsulosin 0.4 mg oral capsule: 1 cap(s) orally once a day (at bedtime)  Vitamin B-12 1000 mcg oral tablet: 1 tab(s) orally once a day  Vitamin D3 5000 intl units (125 mcg) oral tablet: 1 tab(s) orally once a day

## 2023-11-08 NOTE — DISCHARGE NOTE PROVIDER - NSDCCAREPROVSEEN_GEN_ALL_CORE_FT
[facility]cy, Lucero Kaplan, Abelardo Beauchamp, Matt Valle, Omar Reyes, Richard Tavarez, Birendra K Weil, Ana Rosa Savage, Shiloh CHRISTIANSON

## 2023-11-08 NOTE — SOCIAL WORK PROGRESS NOTE - NSSWPROGRESSNOTE_GEN_ALL_CORE
Per MD, pt stable for for d.c. Spoke with pt, he is aware of d/c on today at 1pm to [facility]. Spoke with son Ranjit and he will call for payment. [facility] has bed available today. SW to follow and remain available for any needs.

## 2023-11-08 NOTE — PROGRESS NOTE ADULT - PROBLEM SELECTOR PLAN 3
MARINA on CKD  Improved
MARINA on CKD  Improved  D/C IVF  Trend BMP  Renal f/u
MARINA on CKD  Continue IVF  Trend BMP  Renal f/u

## 2023-11-08 NOTE — DISCHARGE NOTE PROVIDER - HOSPITAL COURSE
HPI:  This is a 77 M with PMH of HTN DM CAD HLD BPH CKD3 who was brought in by EMS after son found him on the floor, confused. Patient had urinated on himself -- around 11pm last night. Son had seen patient earlier in the day. He appeared weak and listless. Son reports patient had difficulty communicating last night. Patient c/o SOB. He denies fevers, chills, cough, n/v. Son and patient live in the same house. Son was sick last weekend. Patient is vaccinated and boosted for COVID. In ER he was found to have T 101.9. (05 Nov 2023 13:05)      ---  HOSPITAL COURSE: Patient admitted to medicine floor for management of covid. Patient was seen by ID, completed 3 days of remdesivir. Presented with AMS, seen by neurology [facility] medical management of metabolic encephalopathy. Seen by PT [facility] CHERYLE. SW following for placement.     Pt seen and examined on day of discharge. Patient is medically optimized for discharge to home with close outpatient followup.    CONSULTANTS:   Dr. Leonard Holguin   ---  TIME SPENT:  I, the attending physician, was physically present for the key portions of the evaluation and management (E/M) service provided. The total amount of time spent reviewing the hospital notes, laboratory values, imaging findings, assessing/counseling the patient, discussing with consultant physicians, social work, nursing staff was -- minutes    ---  Primary care provider was made aware of plan for discharge:      [  ] NO     [  ] YES   HPI:  This is a 77 M with PMH of HTN DM CAD HLD BPH CKD3 who was brought in by EMS after son found him on the floor, confused. Patient had urinated on himself -- around 11pm last night. Son had seen patient earlier in the day. He appeared weak and listless. Son reports patient had difficulty communicating last night. Patient c/o SOB. He denies fevers, chills, cough, n/v. Son and patient live in the same house. Son was sick last weekend. Patient is vaccinated and boosted for COVID. In ER he was found to have T 101.9. (05 Nov 2023 13:05)      ---  HOSPITAL COURSE: Patient admitted to medicine floor for management of covid and AMS. Patient was seen by ID, completed 3 days of remdesivir. Presented with AMS, seen by neurology [facility] medical management of metabolic encephalopathy. Seen by PT [facility] CHERYLE. SW following for placement.     Pt seen and examined on day of discharge. Patient is medically optimized for discharge to home with close outpatient followup.    CONSULTANTS:   Dr. Leonard Reyes    ---  TIME SPENT:  I, the attending physician, was physically present for the key portions of the evaluation and management (E/M) service provided. The total amount of time spent reviewing the hospital notes, laboratory values, imaging findings, assessing/counseling the patient, discussing with consultant physicians, social work, nursing staff was -- minutes    ---  Primary care provider was made aware of plan for discharge:      [  ] NO     [  ] YES

## 2023-11-08 NOTE — PROGRESS NOTE ADULT - SUBJECTIVE AND OBJECTIVE BOX
Date/Time Patient Seen:  		  Referring MD:   Data Reviewed	       Patient is a 77y old  Male who presents with a chief complaint of AMS (06 Nov 2023 09:43)      Subjective/HPI     PAST MEDICAL & SURGICAL HISTORY:  Hypertension    Diabetes mellitus    CAD (coronary artery disease)    BPH (benign prostatic hyperplasia)    No significant past surgical history          Medication list         MEDICATIONS  (STANDING):  aspirin enteric coated 81 milliGRAM(s) Oral daily  atorvastatin 40 milliGRAM(s) Oral at bedtime  clopidogrel Tablet 75 milliGRAM(s) Oral daily  dexAMETHasone     Tablet 6 milliGRAM(s) Oral daily  dextrose 5%. 1000 milliLiter(s) (100 mL/Hr) IV Continuous <Continuous>  dextrose 5%. 1000 milliLiter(s) (50 mL/Hr) IV Continuous <Continuous>  dextrose 50% Injectable 25 Gram(s) IV Push once  dextrose 50% Injectable 12.5 Gram(s) IV Push once  dextrose 50% Injectable 25 Gram(s) IV Push once  enoxaparin Injectable 40 milliGRAM(s) SubCutaneous every 24 hours  famotidine    Tablet 20 milliGRAM(s) Oral daily  glucagon  Injectable 1 milliGRAM(s) IntraMuscular once  insulin lispro (ADMELOG) corrective regimen sliding scale   SubCutaneous three times a day before meals  insulin lispro (ADMELOG) corrective regimen sliding scale   SubCutaneous at bedtime  losartan 25 milliGRAM(s) Oral daily  metoprolol succinate ER 50 milliGRAM(s) Oral daily  remdesivir  IVPB 100 milliGRAM(s) IV Intermittent every 24 hours  sodium chloride 0.9%. 1000 milliLiter(s) (100 mL/Hr) IV Continuous <Continuous>  tamsulosin 0.4 milliGRAM(s) Oral at bedtime    MEDICATIONS  (PRN):  acetaminophen     Tablet .. 650 milliGRAM(s) Oral every 6 hours PRN Temp greater or equal to 38C (100.4F), Mild Pain (1 - 3)  aluminum hydroxide/magnesium hydroxide/simethicone Suspension 30 milliLiter(s) Oral every 4 hours PRN Dyspepsia  dextrose Oral Gel 15 Gram(s) Oral once PRN Blood Glucose LESS THAN 70 milliGRAM(s)/deciliter  guaiFENesin Oral Liquid (Sugar-Free) 200 milliGRAM(s) Oral every 6 hours PRN Cough  melatonin 3 milliGRAM(s) Oral at bedtime PRN Insomnia  ondansetron Injectable 4 milliGRAM(s) IV Push every 6 hours PRN Nausea and/or Vomiting         Vitals log        ICU Vital Signs Last 24 Hrs  T(C): 36.5 (06 Nov 2023 21:34), Max: 37.1 (06 Nov 2023 11:54)  T(F): 97.7 (06 Nov 2023 21:34), Max: 98.7 (06 Nov 2023 11:54)  HR: 55 (06 Nov 2023 21:34) (55 - 72)  BP: 136/74 (06 Nov 2023 21:34) (130/78 - 148/82)  BP(mean): --  ABP: --  ABP(mean): --  RR: 18 (06 Nov 2023 21:34) (18 - 18)  SpO2: 90% (06 Nov 2023 21:34) (90% - 92%)    O2 Parameters below as of 06 Nov 2023 21:34  Patient On (Oxygen Delivery Method): room air                 Input and Output:  I&O's Detail      Lab Data                        11.0   4.83  )-----------( 185      ( 06 Nov 2023 07:25 )             32.5     11-06    140  |  108  |  23  ----------------------------<  98  4.6   |  22  |  1.70<H>    Ca    8.5      06 Nov 2023 07:25  Mg     1.7     11-06              Review of Systems	      Objective     Physical Examination    heart s1s2  lung dc BS  head nc      Pertinent Lab findings & Imaging      Diane:  NO   Adequate UO     I&O's Detail           Discussed with:     Cultures:	        Radiology                            
Date/Time Patient Seen:  		  Referring MD:   Data Reviewed	       Patient is a 77y old  Male who presents with a chief complaint of AMS (07 Nov 2023 11:51)      Subjective/HPI     PAST MEDICAL & SURGICAL HISTORY:  Hypertension    Diabetes mellitus    CAD (coronary artery disease)    BPH (benign prostatic hyperplasia)    No significant past surgical history          Medication list         MEDICATIONS  (STANDING):  aspirin enteric coated 81 milliGRAM(s) Oral daily  atorvastatin 40 milliGRAM(s) Oral at bedtime  clopidogrel Tablet 75 milliGRAM(s) Oral daily  dextrose 5%. 1000 milliLiter(s) (100 mL/Hr) IV Continuous <Continuous>  dextrose 5%. 1000 milliLiter(s) (50 mL/Hr) IV Continuous <Continuous>  dextrose 50% Injectable 25 Gram(s) IV Push once  dextrose 50% Injectable 12.5 Gram(s) IV Push once  dextrose 50% Injectable 25 Gram(s) IV Push once  enoxaparin Injectable 40 milliGRAM(s) SubCutaneous every 24 hours  famotidine    Tablet 20 milliGRAM(s) Oral daily  glucagon  Injectable 1 milliGRAM(s) IntraMuscular once  insulin lispro (ADMELOG) corrective regimen sliding scale   SubCutaneous at bedtime  insulin lispro (ADMELOG) corrective regimen sliding scale   SubCutaneous three times a day before meals  losartan 25 milliGRAM(s) Oral daily  metoprolol succinate ER 50 milliGRAM(s) Oral daily  tamsulosin 0.4 milliGRAM(s) Oral at bedtime    MEDICATIONS  (PRN):  acetaminophen     Tablet .. 650 milliGRAM(s) Oral every 6 hours PRN Temp greater or equal to 38C (100.4F), Mild Pain (1 - 3)  aluminum hydroxide/magnesium hydroxide/simethicone Suspension 30 milliLiter(s) Oral every 4 hours PRN Dyspepsia  dextrose Oral Gel 15 Gram(s) Oral once PRN Blood Glucose LESS THAN 70 milliGRAM(s)/deciliter  guaiFENesin Oral Liquid (Sugar-Free) 200 milliGRAM(s) Oral every 6 hours PRN Cough  melatonin 3 milliGRAM(s) Oral at bedtime PRN Insomnia  ondansetron Injectable 4 milliGRAM(s) IV Push every 6 hours PRN Nausea and/or Vomiting         Vitals log        ICU Vital Signs Last 24 Hrs  T(C): 36.7 (07 Nov 2023 21:21), Max: 37 (07 Nov 2023 11:37)  T(F): 98.1 (07 Nov 2023 21:21), Max: 98.6 (07 Nov 2023 11:37)  HR: 53 (07 Nov 2023 21:21) (53 - 58)  BP: 156/77 (07 Nov 2023 21:21) (131/60 - 166/72)  BP(mean): --  ABP: --  ABP(mean): --  RR: 18 (07 Nov 2023 21:21) (18 - 18)  SpO2: 91% (07 Nov 2023 21:21) (91% - 93%)    O2 Parameters below as of 07 Nov 2023 21:21  Patient On (Oxygen Delivery Method): room air                 Input and Output:  I&O's Detail    06 Nov 2023 07:01  -  07 Nov 2023 07:00  --------------------------------------------------------  IN:  Total IN: 0 mL    OUT:    Voided (mL): 1212 mL  Total OUT: 1212 mL    Total NET: -1212 mL      07 Nov 2023 07:01  -  08 Nov 2023 05:14  --------------------------------------------------------  IN:  Total IN: 0 mL    OUT:    Incontinent per Condom Catheter (mL): 600 mL  Total OUT: 600 mL    Total NET: -600 mL          Lab Data                        11.3   6.22  )-----------( 183      ( 07 Nov 2023 06:55 )             32.9     11-07    142  |  113<H>  |  38<H>  ----------------------------<  136<H>  4.3   |  22  |  1.60<H>    Ca    8.1<L>      07 Nov 2023 06:55  Mg     2.2     11-07              Review of Systems	      Objective     Physical Examination    heart s1s2  lung dc BS  head nc      Pertinent Lab findings & Imaging      Contreras:  NO   Adequate UO     I&O's Detail    06 Nov 2023 07:01  -  07 Nov 2023 07:00  --------------------------------------------------------  IN:  Total IN: 0 mL    OUT:    Voided (mL): 1212 mL  Total OUT: 1212 mL    Total NET: -1212 mL      07 Nov 2023 07:01  -  08 Nov 2023 05:14  --------------------------------------------------------  IN:  Total IN: 0 mL    OUT:    Incontinent per Condom Catheter (mL): 600 mL  Total OUT: 600 mL    Total NET: -600 mL               Discussed with:     Cultures:	        Radiology                            
Neurology Follow up note    SUSIE [id number]    HPI:  This is a 77 M with PMH of HTN DM CAD HLD BPH CKD3 who was brought in by EMS after son found him on the floor, confused. Patient had urinated on himself -- around 11pm last night. Son had seen patient earlier in the day. He appeared weak and listless. Son reports patient had difficulty communicating last night. Patient c/o SOB. He denies fevers, chills, cough, n/v. Son and patient live in the same house. Son was sick last weekend. Patient is vaccinated and boosted for COVID. In ER he was found to have T 101.9. (05 Nov 2023 13:05)      Interval History -doing betty    Patient is seen, chart was reviewed and case was discussed with the treatment team.  Pt is not in any distress.   Lying on bed comfortably.       Vital Signs Last 24 Hrs  T(C): 37 (07 Nov 2023 11:37), Max: 37 (07 Nov 2023 11:37)  T(F): 98.6 (07 Nov 2023 11:37), Max: 98.6 (07 Nov 2023 11:37)  HR: 57 (07 Nov 2023 11:37) (55 - 58)  BP: 131/60 (07 Nov 2023 11:37) (131/60 - 166/72)  BP(mean): --  RR: 18 (07 Nov 2023 11:37) (18 - 18)  SpO2: 92% (07 Nov 2023 11:37) (90% - 93%)    Parameters below as of 07 Nov 2023 11:37  Patient On (Oxygen Delivery Method): room air            REVIEW OF SYSTEMS:    Constitutional: No fever, weight loss or fatigue  Eyes: No eye pain, visual disturbances, or discharge  ENT:  No difficulty hearing, tinnitus, vertigo; No sinus or throat pain  Neck: No pain or stiffness  Respiratory: No  wheezing, chills or hemoptysis  Cardiovascular: No chest pain, palpitations, , dizziness or leg swelling  Gastrointestinal: No abdominal or epigastric pain. No nausea, vomiting or hematemesis;.  Genitourinary: No dysuria, frequency, hematuria or incontinence  Neurological: No headaches,  loss of strength, numbness or tremors  Psychiatric: No depression, anxiety, mood swings or difficulty sleeping  Musculoskeletal: No joint pain or swelling;   Skin: No itching, burning, rashes or lesions   Lymph Nodes: No enlarged glands  Endocrine: No heat or cold intolerance; No hair loss,   Allergy and Immunologic: No hives or eczema    On Neurological Examination:    Mental Status - Pt is alert, awake, oriented X3.  Follows commands well and able to answer questions appropriately.Mood and affect  normal    Speech -  Normal.     Cranial Nerves - Pupils 3 mm equal and reactive to light, extraocular eye movements intact. Pt has no visual field deficit.  Pt has no  facial asymmetry. Facial sensation is intact.Tongue - is in midline.    Muscle tone - is normal all over. Moves all extremities equally. No asymmetry is seen.      Motor Exam - 5/5 of UE  LE 4/5  , No drift. No shaking or tremors.    Sensory Exam - . Pt withdraws all extremities equally on stimulation. No asymmetry seen. No complaints of tingling, numbness.      coordination:    Finger to nose: normal      Deep tendon Reflexes - 2 plus all over.            Neck Supple- yes     MEDICATIONS    acetaminophen     Tablet .. 650 milliGRAM(s) Oral every 6 hours PRN  aluminum hydroxide/magnesium hydroxide/simethicone Suspension 30 milliLiter(s) Oral every 4 hours PRN  aspirin enteric coated 81 milliGRAM(s) Oral daily  atorvastatin 40 milliGRAM(s) Oral at bedtime  clopidogrel Tablet 75 milliGRAM(s) Oral daily  dextrose 5%. 1000 milliLiter(s) IV Continuous <Continuous>  dextrose 5%. 1000 milliLiter(s) IV Continuous <Continuous>  dextrose 50% Injectable 25 Gram(s) IV Push once  dextrose 50% Injectable 12.5 Gram(s) IV Push once  dextrose 50% Injectable 25 Gram(s) IV Push once  dextrose Oral Gel 15 Gram(s) Oral once PRN  enoxaparin Injectable 40 milliGRAM(s) SubCutaneous every 24 hours  famotidine    Tablet 20 milliGRAM(s) Oral daily  glucagon  Injectable 1 milliGRAM(s) IntraMuscular once  guaiFENesin Oral Liquid (Sugar-Free) 200 milliGRAM(s) Oral every 6 hours PRN  insulin lispro (ADMELOG) corrective regimen sliding scale   SubCutaneous three times a day before meals  insulin lispro (ADMELOG) corrective regimen sliding scale   SubCutaneous at bedtime  losartan 25 milliGRAM(s) Oral daily  melatonin 3 milliGRAM(s) Oral at bedtime PRN  metoprolol succinate ER 50 milliGRAM(s) Oral daily  ondansetron Injectable 4 milliGRAM(s) IV Push every 6 hours PRN  tamsulosin 0.4 milliGRAM(s) Oral at bedtime      Allergies    No Known Allergies    Intolerances        LABS:  CBC Full  -  ( 07 Nov 2023 06:55 )  WBC Count : 6.22 K/uL  RBC Count : 3.74 M/uL  Hemoglobin : 11.3 g/dL  Hematocrit : 32.9 %  Platelet Count - Automated : 183 K/uL  Mean Cell Volume : 88.0 fl  Mean Cell Hemoglobin : 30.2 pg  Mean Cell Hemoglobin Concentration : 34.3 gm/dL  Auto Neutrophil # : x  Auto Lymphocyte # : x  Auto Monocyte # : x  Auto Eosinophil # : x  Auto Basophil # : x  Auto Neutrophil % : x  Auto Lymphocyte % : x  Auto Monocyte % : x  Auto Eosinophil % : x  Auto Basophil % : x    Urinalysis Basic - ( 07 Nov 2023 06:55 )    Color: x / Appearance: x / SG: x / pH: x  Gluc: 136 mg/dL / Ketone: x  / Bili: x / Urobili: x   Blood: x / Protein: x / Nitrite: x   Leuk Esterase: x / RBC: x / WBC x   Sq Epi: x / Non Sq Epi: x / Bacteria: x      11-07    142  |  113<H>  |  38<H>  ----------------------------<  136<H>  4.3   |  22  |  1.60<H>    Ca    8.1<L>      07 Nov 2023 06:55  Mg     2.2     11-07      Hemoglobin A1C:     Vitamin B12     RADIOLOGY    ASSESSMENT AND PLAN:      seen for ams  likely metabolic encephalopathy  covid-19     ID follow up  Physical therapy evaluation.  OOB to chair/ambulation with assistance only.  Pain is accessed and addessed.  Would continue to follow.              
Neurology Follow up note    SUSIE [id number]    HPI:  This is a 77 M with PMH of HTN DM CAD HLD BPH CKD3 who was brought in by EMS after son found him on the floor, confused. Patient had urinated on himself -- around 11pm last night. Son had seen patient earlier in the day. He appeared weak and listless. Son reports patient had difficulty communicating last night. Patient c/o SOB. He denies fevers, chills, cough, n/v. Son and patient live in the same house. Son was sick last weekend. Patient is vaccinated and boosted for COVID. In ER he was found to have T 101.9. (05 Nov 2023 13:05)      Interval History -no complaints    Patient is seen, chart was reviewed and case was discussed with the treatment team.  Pt is not in any distress.   Lying on bed comfortably.       Vital Signs Last 24 Hrs  T(C): 36.4 (08 Nov 2023 11:47), Max: 36.8 (08 Nov 2023 05:45)  T(F): 97.6 (08 Nov 2023 11:47), Max: 98.2 (08 Nov 2023 05:45)  HR: 49 (08 Nov 2023 11:47) (49 - 53)  BP: 120/64 (08 Nov 2023 11:47) (120/64 - 162/71)  BP(mean): --  RR: 16 (08 Nov 2023 11:47) (16 - 18)  SpO2: 94% (08 Nov 2023 11:47) (91% - 95%)    Parameters below as of 08 Nov 2023 11:47  Patient On (Oxygen Delivery Method): room air                REVIEW OF SYSTEMS:    Constitutional: No fever, weight loss or fatigue  Eyes: No eye pain, visual disturbances, or discharge  ENT:  No difficulty hearing, tinnitus, vertigo; No sinus or throat pain  Neck: No pain or stiffness  Respiratory: No  wheezing, chills or hemoptysis  Cardiovascular: No chest pain, palpitations, , dizziness or leg swelling  Gastrointestinal: No abdominal or epigastric pain. No nausea, vomiting or hematemesis;.  Genitourinary: No dysuria, frequency, hematuria or incontinence  Neurological: No headaches,  loss of strength, numbness or tremors  Psychiatric: No depression, anxiety, mood swings or difficulty sleeping  Musculoskeletal: No joint pain or swelling;   Skin: No itching, burning, rashes or lesions   Lymph Nodes: No enlarged glands  Endocrine: No heat or cold intolerance; No hair loss,   Allergy and Immunologic: No hives or eczema    On Neurological Examination:    Mental Status - Pt is alert, awake, oriented X3.  Follows commands well and able to answer questions appropriately.Mood and affect  normal    Speech -  Normal.     Cranial Nerves - Pupils 3 mm equal and reactive to light, extraocular eye movements intact. Pt has no visual field deficit.  Pt has no  facial asymmetry. Facial sensation is intact.Tongue - is in midline.    Muscle tone - is normal all over. Moves all extremities equally. No asymmetry is seen.      Motor Exam - 5/5 of UE  LE 4/5  , No drift. No shaking or tremors.    Sensory Exam - . Pt withdraws all extremities equally on stimulation. No asymmetry seen. No complaints of tingling, numbness.      coordination:    Finger to nose: normal      Deep tendon Reflexes - 2 plus all over.            Neck Supple- yes     MEDICATIONS    acetaminophen     Tablet .. 650 milliGRAM(s) Oral every 6 hours PRN  aluminum hydroxide/magnesium hydroxide/simethicone Suspension 30 milliLiter(s) Oral every 4 hours PRN  aspirin enteric coated 81 milliGRAM(s) Oral daily  atorvastatin 40 milliGRAM(s) Oral at bedtime  clopidogrel Tablet 75 milliGRAM(s) Oral daily  dextrose 5%. 1000 milliLiter(s) IV Continuous <Continuous>  dextrose 5%. 1000 milliLiter(s) IV Continuous <Continuous>  dextrose 50% Injectable 25 Gram(s) IV Push once  dextrose 50% Injectable 12.5 Gram(s) IV Push once  dextrose 50% Injectable 25 Gram(s) IV Push once  dextrose Oral Gel 15 Gram(s) Oral once PRN  enoxaparin Injectable 40 milliGRAM(s) SubCutaneous every 24 hours  famotidine    Tablet 20 milliGRAM(s) Oral daily  glucagon  Injectable 1 milliGRAM(s) IntraMuscular once  guaiFENesin Oral Liquid (Sugar-Free) 200 milliGRAM(s) Oral every 6 hours PRN  insulin lispro (ADMELOG) corrective regimen sliding scale   SubCutaneous three times a day before meals  insulin lispro (ADMELOG) corrective regimen sliding scale   SubCutaneous at bedtime  losartan 25 milliGRAM(s) Oral daily  melatonin 3 milliGRAM(s) Oral at bedtime PRN  metoprolol succinate ER 50 milliGRAM(s) Oral daily  ondansetron Injectable 4 milliGRAM(s) IV Push every 6 hours PRN  tamsulosin 0.4 milliGRAM(s) Oral at bedtime      Allergies    No Known Allergies    Intolerancei: x   11-07    142  |  113<H>  |  38<H>  ----------------------------<  136<H>  4.3   |  22  |  1.60<H>    Ca    8.1<L>      07 Nov 2023 06:55  Mg     2.2     11-07          1      Hemoglobin A1C:     Vitamin B12     RADIOLOGY    ASSESSMENT AND PLAN:      seen for ams  likely metabolic encephalopathy- improved  covid-19     dc home  Pain is accessed and addessed.                
[facility], Division of Infectious Diseases  EAMON Gonzalez Y. Patel, S. Shah, G. [facility]  [phone]    Name: SUSIE NUNES  Age: 77y  Gender: Male  MRN: [id number]    Interval History:  Patient seen and examined at bedside this morning  Febrile overnight x2, but no complaints  Notes reviewed    Antibiotics:  remdesivir  IVPB 100 milliGRAM(s) IV Intermittent every 24 hours      Medications:  acetaminophen     Tablet .. 650 milliGRAM(s) Oral every 6 hours PRN  aluminum hydroxide/magnesium hydroxide/simethicone Suspension 30 milliLiter(s) Oral every 4 hours PRN  aspirin enteric coated 81 milliGRAM(s) Oral daily  atorvastatin 40 milliGRAM(s) Oral at bedtime  clopidogrel Tablet 75 milliGRAM(s) Oral daily  dexAMETHasone     Tablet 6 milliGRAM(s) Oral daily  dextrose 5%. 1000 milliLiter(s) IV Continuous <Continuous>  dextrose 5%. 1000 milliLiter(s) IV Continuous <Continuous>  dextrose 50% Injectable 25 Gram(s) IV Push once  dextrose 50% Injectable 12.5 Gram(s) IV Push once  dextrose 50% Injectable 25 Gram(s) IV Push once  dextrose Oral Gel 15 Gram(s) Oral once PRN  enoxaparin Injectable 40 milliGRAM(s) SubCutaneous every 24 hours  famotidine    Tablet 20 milliGRAM(s) Oral daily  glucagon  Injectable 1 milliGRAM(s) IntraMuscular once  insulin lispro (ADMELOG) corrective regimen sliding scale   SubCutaneous three times a day before meals  insulin lispro (ADMELOG) corrective regimen sliding scale   SubCutaneous at bedtime  losartan 25 milliGRAM(s) Oral daily  melatonin 3 milliGRAM(s) Oral at bedtime PRN  metoprolol succinate ER 50 milliGRAM(s) Oral daily  ondansetron Injectable 4 milliGRAM(s) IV Push every 6 hours PRN  remdesivir  IVPB 100 milliGRAM(s) IV Intermittent every 24 hours  sodium chloride 0.9%. 1000 milliLiter(s) IV Continuous <Continuous>  tamsulosin 0.4 milliGRAM(s) Oral at bedtime      Review of Systems:  Review of systems otherwise negative except as previously noted.    Allergies: No Known Allergies    For details regarding the patient's past medical history, social history, family history, and other miscellaneous elements, please refer the initial infectious diseases consultation and/or the admitting history and physical examination for this admission.    Objective:  Vitals:   T(C): 37.1 (11-06-23 @ 11:54), Max: 39.4 (11-05-23 @ 21:32)  HR: 63 (11-06-23 @ 11:54) (63 - 72)  BP: 130/78 (11-06-23 @ 11:54) (130/78 - 191/71)  RR: 18 (11-06-23 @ 11:54) (18 - 18)  SpO2: 92% (11-06-23 @ 11:54) (82% - 95%)    Physical Examination:  General: no acute distress  HEENT: NC/AT, EOMI,  Cardio: S1, S2 heard, RRR, no murmurs  Resp: breath sounds heard bilaterally, no rales, wheezes or rhonchi  Abd: soft, NT, ND  Ext: no edema or cyanosis  Skin: warm, dry, no visible rash      Laboratory Studies:  CBC:                       11.0   4.83  )-----------( 185      ( 06 Nov 2023 07:25 )             32.5     CMP: 11-06    140  |  108  |  23  ----------------------------<  98  4.6   |  22  |  1.70<H>    Ca    8.5      06 Nov 2023 07:25  Mg     1.7     11-06    TPro  7.6  /  Alb  4.0  /  TBili  0.4  /  DBili  x   /  AST  24  /  ALT  23  /  AlkPhos  98  11-05    LIVER FUNCTIONS - ( 05 Nov 2023 01:00 )  Alb: 4.0 g/dL / Pro: 7.6 g/dL / ALK PHOS: 98 U/L / ALT: 23 U/L / AST: 24 U/L / GGT: x           Urinalysis Basic - ( 06 Nov 2023 07:25 )    Color: x / Appearance: x / SG: x / pH: x  Gluc: 98 mg/dL / Ketone: x  / Bili: x / Urobili: x   Blood: x / Protein: x / Nitrite: x   Leuk Esterase: x / RBC: x / WBC x   Sq Epi: x / Non Sq Epi: x / Bacteria: x        Microbiology: reviewed        Radiology: reviewed      
[facility], Division of Infectious Diseases  EAMON Gonzalez Y. Patel, S. Shah, G. [facility]  [phone]    Name: SUSIE NUNES  Age: 77y  Gender: Male  MRN: [id number]    Interval History:  Patient seen and examined at bedside this morning  No acute overnight events. Afebrile   on RA  No complaints  Notes reviewed    Antibiotics:  remdesivir  IVPB 100 milliGRAM(s) IV Intermittent every 24 hours      Medications:  acetaminophen     Tablet .. 650 milliGRAM(s) Oral every 6 hours PRN  aluminum hydroxide/magnesium hydroxide/simethicone Suspension 30 milliLiter(s) Oral every 4 hours PRN  aspirin enteric coated 81 milliGRAM(s) Oral daily  atorvastatin 40 milliGRAM(s) Oral at bedtime  clopidogrel Tablet 75 milliGRAM(s) Oral daily  dextrose 5%. 1000 milliLiter(s) IV Continuous <Continuous>  dextrose 5%. 1000 milliLiter(s) IV Continuous <Continuous>  dextrose 50% Injectable 25 Gram(s) IV Push once  dextrose 50% Injectable 12.5 Gram(s) IV Push once  dextrose 50% Injectable 25 Gram(s) IV Push once  dextrose Oral Gel 15 Gram(s) Oral once PRN  enoxaparin Injectable 40 milliGRAM(s) SubCutaneous every 24 hours  famotidine    Tablet 20 milliGRAM(s) Oral daily  glucagon  Injectable 1 milliGRAM(s) IntraMuscular once  guaiFENesin Oral Liquid (Sugar-Free) 200 milliGRAM(s) Oral every 6 hours PRN  insulin lispro (ADMELOG) corrective regimen sliding scale   SubCutaneous three times a day before meals  insulin lispro (ADMELOG) corrective regimen sliding scale   SubCutaneous at bedtime  losartan 25 milliGRAM(s) Oral daily  melatonin 3 milliGRAM(s) Oral at bedtime PRN  metoprolol succinate ER 50 milliGRAM(s) Oral daily  ondansetron Injectable 4 milliGRAM(s) IV Push every 6 hours PRN  remdesivir  IVPB 100 milliGRAM(s) IV Intermittent every 24 hours  sodium chloride 0.9%. 1000 milliLiter(s) IV Continuous <Continuous>  tamsulosin 0.4 milliGRAM(s) Oral at bedtime      Review of Systems:  A 10-point review of systems was obtained.  Review of systems otherwise negative except as previously noted.    Allergies: No Known Allergies    For details regarding the patient's past medical history, social history, family history, and other miscellaneous elements, please refer the initial infectious diseases consultation and/or the admitting history and physical examination for this admission.    Objective:  Vitals:   T(C): 36.4 (11-07-23 @ 05:35), Max: 37.1 (11-06-23 @ 11:54)  HR: 58 (11-07-23 @ 05:35) (55 - 63)  BP: 166/72 (11-07-23 @ 05:35) (130/78 - 166/72)  RR: 18 (11-07-23 @ 05:35) (18 - 18)  SpO2: 93% (11-07-23 @ 05:35) (90% - 93%)    Physical Examination:  General: no acute distress  HEENT: NC/AT, EOMI,  Cardio: S1, S2 heard, RRR, no murmurs  Resp: breath sounds heard bilaterally, no rales, wheezes or rhonchi  Abd: soft, NT, ND  Ext: no edema or cyanosis  Skin: warm, dry, no visible rash      Laboratory Studies:  CBC:                       11.3   6.22  )-----------( 183      ( 07 Nov 2023 06:55 )             32.9     CMP: 11-07    142  |  113<H>  |  38<H>  ----------------------------<  136<H>  4.3   |  22  |  1.60<H>    Ca    8.1<L>      07 Nov 2023 06:55  Mg     2.2     11-07        Urinalysis Basic - ( 07 Nov 2023 06:55 )    Color: x / Appearance: x / SG: x / pH: x  Gluc: 136 mg/dL / Ketone: x  / Bili: x / Urobili: x   Blood: x / Protein: x / Nitrite: x   Leuk Esterase: x / RBC: x / WBC x   Sq Epi: x / Non Sq Epi: x / Bacteria: x        Microbiology: reviewed    Culture - Urine (collected 11-05-23 @ 12:35)  Source: Clean Catch Clean Catch (Midstream)  Preliminary Report (11-06-23 @ 23:34):    10,000 - 49,000 CFU/mL Gram positive organisms    Culture - Blood (collected 11-05-23 @ 01:00)  Source: .Blood Blood-Peripheral  Preliminary Report (11-06-23 @ 14:02):    No growth at 24 hours    Culture - Blood (collected 11-05-23 @ 01:00)  Source: .Blood Blood-Peripheral  Preliminary Report (11-06-23 @ 14:02):    No growth at 24 hours          Radiology: reviewed      
Patient is a 77y old  Male who presents with a chief complaint of AMS (06 Nov 2023 09:43)    Patient seen in follow up for CKD 3.        PAST MEDICAL HISTORY:  Hypertension    Diabetes mellitus    CAD (coronary artery disease)    BPH (benign prostatic hyperplasia)        MEDICATIONS  (STANDING):  aspirin enteric coated 81 milliGRAM(s) Oral daily  atorvastatin 40 milliGRAM(s) Oral at bedtime  clopidogrel Tablet 75 milliGRAM(s) Oral daily  dextrose 5%. 1000 milliLiter(s) (100 mL/Hr) IV Continuous <Continuous>  dextrose 5%. 1000 milliLiter(s) (50 mL/Hr) IV Continuous <Continuous>  dextrose 50% Injectable 25 Gram(s) IV Push once  dextrose 50% Injectable 12.5 Gram(s) IV Push once  dextrose 50% Injectable 25 Gram(s) IV Push once  enoxaparin Injectable 40 milliGRAM(s) SubCutaneous every 24 hours  famotidine    Tablet 20 milliGRAM(s) Oral daily  glucagon  Injectable 1 milliGRAM(s) IntraMuscular once  insulin lispro (ADMELOG) corrective regimen sliding scale   SubCutaneous three times a day before meals  insulin lispro (ADMELOG) corrective regimen sliding scale   SubCutaneous at bedtime  losartan 25 milliGRAM(s) Oral daily  metoprolol succinate ER 50 milliGRAM(s) Oral daily  remdesivir  IVPB 100 milliGRAM(s) IV Intermittent every 24 hours  sodium chloride 0.9%. 1000 milliLiter(s) (100 mL/Hr) IV Continuous <Continuous>  tamsulosin 0.4 milliGRAM(s) Oral at bedtime    MEDICATIONS  (PRN):  acetaminophen     Tablet .. 650 milliGRAM(s) Oral every 6 hours PRN Temp greater or equal to 38C (100.4F), Mild Pain (1 - 3)  aluminum hydroxide/magnesium hydroxide/simethicone Suspension 30 milliLiter(s) Oral every 4 hours PRN Dyspepsia  dextrose Oral Gel 15 Gram(s) Oral once PRN Blood Glucose LESS THAN 70 milliGRAM(s)/deciliter  guaiFENesin Oral Liquid (Sugar-Free) 200 milliGRAM(s) Oral every 6 hours PRN Cough  melatonin 3 milliGRAM(s) Oral at bedtime PRN Insomnia  ondansetron Injectable 4 milliGRAM(s) IV Push every 6 hours PRN Nausea and/or Vomiting    T(C): 36.4 (11-07-23 @ 05:35), Max: 39.4 (11-05-23 @ 21:32)  HR: 58 (11-07-23 @ 05:35) (55 - 72)  BP: 166/72 (11-07-23 @ 05:35) (130/78 - 191/71)  RR: 18 (11-07-23 @ 05:35)  SpO2: 93% (11-07-23 @ 05:35)  Wt(kg): --  I&O's Detail    06 Nov 2023 07:01  -  07 Nov 2023 07:00  --------------------------------------------------------  IN:  Total IN: 0 mL    OUT:    Voided (mL): 1212 mL  Total OUT: 1212 mL    Total NET: -1212 mL            PHYSICAL EXAM:  General: No distress  Respiratory: b/l air entry  Cardiovascular: S1 S2  Gastrointestinal: soft  Extremities:  no edema                             LABORATORY:                        11.3   6.22  )-----------( 183      ( 07 Nov 2023 06:55 )             32.9     11-07    142  |  113<H>  |  38<H>  ----------------------------<  136<H>  4.3   |  22  |  1.60<H>    Ca    8.1<L>      07 Nov 2023 06:55  Mg     2.2     11-07      Sodium: 142 mmol/L (11-07 @ 06:55)  Sodium: 140 mmol/L (11-06 @ 07:25)    Potassium: 4.3 mmol/L (11-07 @ 06:55)  Potassium: 4.6 mmol/L (11-06 @ 07:25)    Hemoglobin: 11.3 g/dL (11-07 @ 06:55)  Hemoglobin: 11.0 g/dL (11-06 @ 07:25)  Hemoglobin: 11.7 g/dL (11-05 @ 01:00)    Creatinine, Serum 1.60 (11-07 @ 06:55)  Creatinine, Serum 1.70 (11-06 @ 07:25)  Creatinine, Serum 1.89 (11-05 @ 01:00)          Urinalysis Basic - ( 07 Nov 2023 06:55 )    Color: x / Appearance: x / SG: x / pH: x  Gluc: 136 mg/dL / Ketone: x  / Bili: x / Urobili: x   Blood: x / Protein: x / Nitrite: x   Leuk Esterase: x / RBC: x / WBC x   Sq Epi: x / Non Sq Epi: x / Bacteria: x      
Patient is a 77y old  Male who presents with a chief complaint of AMS (06 Nov 2023 09:43)  Patient seen in follow up for CKD 3.        PAST MEDICAL HISTORY:  Hypertension    Diabetes mellitus    CAD (coronary artery disease)    BPH (benign prostatic hyperplasia)        MEDICATIONS  (STANDING):  aspirin enteric coated 81 milliGRAM(s) Oral daily  atorvastatin 40 milliGRAM(s) Oral at bedtime  clopidogrel Tablet 75 milliGRAM(s) Oral daily  dextrose 5%. 1000 milliLiter(s) (100 mL/Hr) IV Continuous <Continuous>  dextrose 5%. 1000 milliLiter(s) (50 mL/Hr) IV Continuous <Continuous>  dextrose 50% Injectable 25 Gram(s) IV Push once  dextrose 50% Injectable 25 Gram(s) IV Push once  dextrose 50% Injectable 12.5 Gram(s) IV Push once  enoxaparin Injectable 40 milliGRAM(s) SubCutaneous every 24 hours  famotidine    Tablet 20 milliGRAM(s) Oral daily  glucagon  Injectable 1 milliGRAM(s) IntraMuscular once  insulin lispro (ADMELOG) corrective regimen sliding scale   SubCutaneous at bedtime  insulin lispro (ADMELOG) corrective regimen sliding scale   SubCutaneous three times a day before meals  losartan 25 milliGRAM(s) Oral daily  metoprolol succinate ER 50 milliGRAM(s) Oral daily  tamsulosin 0.4 milliGRAM(s) Oral at bedtime    MEDICATIONS  (PRN):  acetaminophen     Tablet .. 650 milliGRAM(s) Oral every 6 hours PRN Temp greater or equal to 38C (100.4F), Mild Pain (1 - 3)  aluminum hydroxide/magnesium hydroxide/simethicone Suspension 30 milliLiter(s) Oral every 4 hours PRN Dyspepsia  dextrose Oral Gel 15 Gram(s) Oral once PRN Blood Glucose LESS THAN 70 milliGRAM(s)/deciliter  guaiFENesin Oral Liquid (Sugar-Free) 200 milliGRAM(s) Oral every 6 hours PRN Cough  melatonin 3 milliGRAM(s) Oral at bedtime PRN Insomnia  ondansetron Injectable 4 milliGRAM(s) IV Push every 6 hours PRN Nausea and/or Vomiting    T(C): 36.8 (11-08-23 @ 05:45), Max: 37.1 (11-06-23 @ 11:54)  HR: 50 (11-08-23 @ 05:45) (50 - 63)  BP: 162/71 (11-08-23 @ 05:45) (130/78 - 166/72)  RR: 17 (11-08-23 @ 05:45)  SpO2: 95% (11-08-23 @ 05:45)  Wt(kg): --  I&O's Detail    07 Nov 2023 07:01  -  08 Nov 2023 07:00  --------------------------------------------------------  IN:  Total IN: 0 mL    OUT:    Incontinent per Condom Catheter (mL): 600 mL    Voided (mL): 800 mL  Total OUT: 1400 mL    Total NET: -1400 mL                PHYSICAL EXAM:  General: No distress  Respiratory: b/l air entry  Cardiovascular: S1 S2  Gastrointestinal: soft  Extremities:  no edema                               LABORATORY:                        11.3   6.22  )-----------( 183      ( 07 Nov 2023 06:55 )             32.9     11-07    142  |  113<H>  |  38<H>  ----------------------------<  136<H>  4.3   |  22  |  1.60<H>    Ca    8.1<L>      07 Nov 2023 06:55  Mg     2.2     11-07      Sodium: 142 mmol/L (11-07 @ 06:55)    Potassium: 4.3 mmol/L (11-07 @ 06:55)    Hemoglobin: 11.3 g/dL (11-07 @ 06:55)  Hemoglobin: 11.0 g/dL (11-06 @ 07:25)    Creatinine, Serum 1.60 (11-07 @ 06:55)  Creatinine, Serum 1.70 (11-06 @ 07:25)          Urinalysis Basic - ( 07 Nov 2023 06:55 )    Color: x / Appearance: x / SG: x / pH: x  Gluc: 136 mg/dL / Ketone: x  / Bili: x / Urobili: x   Blood: x / Protein: x / Nitrite: x   Leuk Esterase: x / RBC: x / WBC x   Sq Epi: x / Non Sq Epi: x / Bacteria: x        
[facility], Division of Infectious Diseases  EAMON Gonzalez Y. Patel, S. Shah, [facility]. [facility]  [phone]    Name: SUSIE NUNES  Age: 77y  Gender: Male  MRN: [id number]    Interval History:  Patient seen and examined at bedside this morning  No acute overnight events. Afebrile  No complaints  Notes reviewed    Antibiotics:      Medications:  acetaminophen     Tablet .. 650 milliGRAM(s) Oral every 6 hours PRN  aluminum hydroxide/magnesium hydroxide/simethicone Suspension 30 milliLiter(s) Oral every 4 hours PRN  aspirin enteric coated 81 milliGRAM(s) Oral daily  atorvastatin 40 milliGRAM(s) Oral at bedtime  clopidogrel Tablet 75 milliGRAM(s) Oral daily  dextrose 5%. 1000 milliLiter(s) IV Continuous <Continuous>  dextrose 5%. 1000 milliLiter(s) IV Continuous <Continuous>  dextrose 50% Injectable 25 Gram(s) IV Push once  dextrose 50% Injectable 12.5 Gram(s) IV Push once  dextrose 50% Injectable 25 Gram(s) IV Push once  dextrose Oral Gel 15 Gram(s) Oral once PRN  enoxaparin Injectable 40 milliGRAM(s) SubCutaneous every 24 hours  famotidine    Tablet 20 milliGRAM(s) Oral daily  glucagon  Injectable 1 milliGRAM(s) IntraMuscular once  guaiFENesin Oral Liquid (Sugar-Free) 200 milliGRAM(s) Oral every 6 hours PRN  insulin lispro (ADMELOG) corrective regimen sliding scale   SubCutaneous at bedtime  insulin lispro (ADMELOG) corrective regimen sliding scale   SubCutaneous three times a day before meals  losartan 25 milliGRAM(s) Oral daily  melatonin 3 milliGRAM(s) Oral at bedtime PRN  metoprolol succinate ER 50 milliGRAM(s) Oral daily  ondansetron Injectable 4 milliGRAM(s) IV Push every 6 hours PRN  tamsulosin 0.4 milliGRAM(s) Oral at bedtime      Review of Systems:  Review of systems otherwise negative except as previously noted.    Allergies: No Known Allergies    For details regarding the patient's past medical history, social history, family history, and other miscellaneous elements, please refer the initial infectious diseases consultation and/or the admitting history and physical examination for this admission.    Objective:  Vitals:   T(C): 36.8 (11-08-23 @ 05:45), Max: 37 (11-07-23 @ 11:37)  HR: 50 (11-08-23 @ 05:45) (50 - 57)  BP: 162/71 (11-08-23 @ 05:45) (131/60 - 162/71)  RR: 17 (11-08-23 @ 05:45) (17 - 18)  SpO2: 95% (11-08-23 @ 05:45) (91% - 95%)    Physical Examination:  General: no acute distress  HEENT: NC/AT, EOMI  Cardio: S1, S2 heard, RRR, no murmurs  Resp: decreased b/l breath sounds  Abd: soft, NT, ND,  Ext: no edema or cyanosis  Skin: warm, dry, no visible rash      Laboratory Studies:  CBC:                       11.3   6.22  )-----------( 183      ( 07 Nov 2023 06:55 )             32.9     CMP: 11-07    142  |  113<H>  |  38<H>  ----------------------------<  136<H>  4.3   |  22  |  1.60<H>    Ca    8.1<L>      07 Nov 2023 06:55  Mg     2.2     11-07        Urinalysis Basic - ( 07 Nov 2023 06:55 )    Color: x / Appearance: x / SG: x / pH: x  Gluc: 136 mg/dL / Ketone: x  / Bili: x / Urobili: x   Blood: x / Protein: x / Nitrite: x   Leuk Esterase: x / RBC: x / WBC x   Sq Epi: x / Non Sq Epi: x / Bacteria: x        Microbiology: reviewed    Culture - Urine (collected 11-05-23 @ 12:35)  Source: Clean Catch Clean Catch (Midstream)  Preliminary Report (11-07-23 @ 12:22):    10,000 - 49,000 CFU/mL Enterococcus faecalis    Culture - Blood (collected 11-05-23 @ 01:00)  Source: .Blood Blood-Peripheral  Preliminary Report (11-07-23 @ 14:02):    No growth at 48 Hours    Culture - Blood (collected 11-05-23 @ 01:00)  Source: .Blood Blood-Peripheral  Preliminary Report (11-07-23 @ 14:02):    No growth at 48 Hours          Radiology: reviewed      
Patient is a 77y old  Male who presents with a chief complaint of AMS (06 Nov 2023 09:43)    Patient seen in follow up for CKD 3.        PAST MEDICAL HISTORY:  Hypertension    Diabetes mellitus    CAD (coronary artery disease)    BPH (benign prostatic hyperplasia)      MEDICATIONS  (STANDING):  aspirin enteric coated 81 milliGRAM(s) Oral daily  atorvastatin 40 milliGRAM(s) Oral at bedtime  clopidogrel Tablet 75 milliGRAM(s) Oral daily  dexAMETHasone     Tablet 6 milliGRAM(s) Oral daily  dextrose 5%. 1000 milliLiter(s) (100 mL/Hr) IV Continuous <Continuous>  dextrose 5%. 1000 milliLiter(s) (50 mL/Hr) IV Continuous <Continuous>  dextrose 50% Injectable 25 Gram(s) IV Push once  dextrose 50% Injectable 12.5 Gram(s) IV Push once  dextrose 50% Injectable 25 Gram(s) IV Push once  enoxaparin Injectable 40 milliGRAM(s) SubCutaneous every 24 hours  famotidine    Tablet 20 milliGRAM(s) Oral daily  glucagon  Injectable 1 milliGRAM(s) IntraMuscular once  insulin lispro (ADMELOG) corrective regimen sliding scale   SubCutaneous three times a day before meals  insulin lispro (ADMELOG) corrective regimen sliding scale   SubCutaneous at bedtime  losartan 25 milliGRAM(s) Oral daily  metoprolol succinate ER 50 milliGRAM(s) Oral daily  remdesivir  IVPB 100 milliGRAM(s) IV Intermittent every 24 hours  sodium chloride 0.9%. 1000 milliLiter(s) (100 mL/Hr) IV Continuous <Continuous>  tamsulosin 0.4 milliGRAM(s) Oral at bedtime    MEDICATIONS  (PRN):  acetaminophen     Tablet .. 650 milliGRAM(s) Oral every 6 hours PRN Temp greater or equal to 38C (100.4F), Mild Pain (1 - 3)  aluminum hydroxide/magnesium hydroxide/simethicone Suspension 30 milliLiter(s) Oral every 4 hours PRN Dyspepsia  dextrose Oral Gel 15 Gram(s) Oral once PRN Blood Glucose LESS THAN 70 milliGRAM(s)/deciliter  melatonin 3 milliGRAM(s) Oral at bedtime PRN Insomnia  ondansetron Injectable 4 milliGRAM(s) IV Push every 6 hours PRN Nausea and/or Vomiting    T(C): 37.1 (11-06-23 @ 11:54), Max: 39.4 (11-05-23 @ 21:32)  HR: 63 (11-06-23 @ 11:54) (63 - 73)  BP: 130/78 (11-06-23 @ 11:54) (130/78 - 191/71)  RR: 18 (11-06-23 @ 11:54) (16 - 18)  SpO2: 92% (11-06-23 @ 11:54) (82% - 97%)  Wt(kg): --  I&O's Detail      PHYSICAL EXAM:  General: No distress  Respiratory: b/l air entry  Cardiovascular: S1 S2  Gastrointestinal: soft  Extremities:  no edema                              11.0   4.83  )-----------( 185      ( 06 Nov 2023 07:25 )             32.5     11-06    140  |  108  |  23  ----------------------------<  98  4.6   |  22  |  1.70<H>    Ca    8.5      06 Nov 2023 07:25  Mg     1.7     11-06    TPro  7.6  /  Alb  4.0  /  TBili  0.4  /  DBili  x   /  AST  24  /  ALT  23  /  AlkPhos  98  11-05        LIVER FUNCTIONS - ( 05 Nov 2023 01:00 )  Alb: 4.0 g/dL / Pro: 7.6 g/dL / ALK PHOS: 98 U/L / ALT: 23 U/L / AST: 24 U/L / GGT: x           Urinalysis Basic - ( 06 Nov 2023 07:25 )    Color: x / Appearance: x / SG: x / pH: x  Gluc: 98 mg/dL / Ketone: x  / Bili: x / Urobili: x   Blood: x / Protein: x / Nitrite: x   Leuk Esterase: x / RBC: x / WBC x   Sq Epi: x / Non Sq Epi: x / Bacteria: x        Sodium, Serum: 140 (11-06 @ 07:25)  Sodium, Serum: 136 (11-05 @ 01:00)    Creatinine, Serum: 1.70 (11-06 @ 07:25)  Creatinine, Serum: 1.89 (11-05 @ 01:00)    Potassium, Serum: 4.6 (11-06 @ 07:25)  Potassium, Serum: 4.4 (11-05 @ 01:00)    Hemoglobin: 11.0 (11-06 @ 07:25)  Hemoglobin: 11.7 (11-05 @ 01:00)    
Date of Service: 11-06-23 @ 09:44    Patient is a 77y old  Male who presents with a chief complaint of     INTERVAL HPI/OVERNIGHT EVENTS: Patient seen and examined. NAD. No complaints.    Vital Signs Last 24 Hrs  T(C): 39.4 (06 Nov 2023 04:57), Max: 39.4 (05 Nov 2023 21:32)  T(F): 102.9 (06 Nov 2023 04:57), Max: 103 (05 Nov 2023 21:32)  HR: 72 (06 Nov 2023 06:58) (63 - 72)  BP: 148/82 (06 Nov 2023 06:58) (148/82 - 191/71)  BP(mean): --  RR: 18 (06 Nov 2023 04:57) (18 - 18)  SpO2: 95% (06 Nov 2023 04:57) (82% - 95%)    Parameters below as of 06 Nov 2023 04:57  Patient On (Oxygen Delivery Method): room air        11-06    140  |  108  |  23  ----------------------------<  98  4.6   |  22  |  1.70<H>    Ca    8.5      06 Nov 2023 07:25  Mg     1.7     11-06    TPro  7.6  /  Alb  4.0  /  TBili  0.4  /  DBili  x   /  AST  24  /  ALT  23  /  AlkPhos  98  11-05                          11.0   4.83  )-----------( 185      ( 06 Nov 2023 07:25 )             32.5     PT/INR - ( 05 Nov 2023 01:00 )   PT: 12.7 sec;   INR: 1.14 ratio         PTT - ( 05 Nov 2023 01:00 )  PTT:30.5 sec  CAPILLARY BLOOD GLUCOSE      POCT Blood Glucose.: 104 mg/dL (06 Nov 2023 08:32)  POCT Blood Glucose.: 91 mg/dL (05 Nov 2023 21:21)  POCT Blood Glucose.: 91 mg/dL (05 Nov 2023 17:03)  POCT Blood Glucose.: 114 mg/dL (05 Nov 2023 12:28)    Urinalysis Basic - ( 06 Nov 2023 07:25 )    Color: x / Appearance: x / SG: x / pH: x  Gluc: 98 mg/dL / Ketone: x  / Bili: x / Urobili: x   Blood: x / Protein: x / Nitrite: x   Leuk Esterase: x / RBC: x / WBC x   Sq Epi: x / Non Sq Epi: x / Bacteria: x              acetaminophen     Tablet .. 650 milliGRAM(s) Oral every 6 hours PRN  aluminum hydroxide/magnesium hydroxide/simethicone Suspension 30 milliLiter(s) Oral every 4 hours PRN  aspirin enteric coated 81 milliGRAM(s) Oral daily  atorvastatin 40 milliGRAM(s) Oral at bedtime  clopidogrel Tablet 75 milliGRAM(s) Oral daily  dexAMETHasone     Tablet 6 milliGRAM(s) Oral daily  dextrose 5%. 1000 milliLiter(s) IV Continuous <Continuous>  dextrose 5%. 1000 milliLiter(s) IV Continuous <Continuous>  dextrose 50% Injectable 25 Gram(s) IV Push once  dextrose 50% Injectable 12.5 Gram(s) IV Push once  dextrose 50% Injectable 25 Gram(s) IV Push once  dextrose Oral Gel 15 Gram(s) Oral once PRN  enoxaparin Injectable 40 milliGRAM(s) SubCutaneous every 24 hours  famotidine    Tablet 20 milliGRAM(s) Oral daily  glucagon  Injectable 1 milliGRAM(s) IntraMuscular once  insulin lispro (ADMELOG) corrective regimen sliding scale   SubCutaneous three times a day before meals  insulin lispro (ADMELOG) corrective regimen sliding scale   SubCutaneous at bedtime  losartan 25 milliGRAM(s) Oral daily  melatonin 3 milliGRAM(s) Oral at bedtime PRN  metoprolol succinate ER 50 milliGRAM(s) Oral daily  ondansetron Injectable 4 milliGRAM(s) IV Push every 6 hours PRN  remdesivir  IVPB 100 milliGRAM(s) IV Intermittent every 24 hours  sodium chloride 0.9%. 1000 milliLiter(s) IV Continuous <Continuous>  tamsulosin 0.4 milliGRAM(s) Oral at bedtime              REVIEW OF SYSTEMS:  CONSTITUTIONAL: + fever, no weight loss, or no fatigue  NECK: No pain, no stiffness  RESPIRATORY: No cough, no wheezing, no chills, no hemoptysis, No shortness of breath  CARDIOVASCULAR: No chest pain, no palpitations, no dizziness, no leg swelling  GASTROINTESTINAL: No abdominal pain. No nausea, no vomiting, no hematemesis; No diarrhea, no constipation. No melena, no hematochezia.  GENITOURINARY: No dysuria, no frequency, no hematuria, no incontinence  NEUROLOGICAL: No headaches, no loss of strength, no numbness, no tremors  SKIN: No itching, no burning  MUSCULOSKELETAL: No joint pain, no swelling; No muscle, no back, no extremity pain  PSYCHIATRIC: No depression, no mood swings,   HEME/LYMPH: No easy bruising, no bleeding gums  ALLERY AND IMMUNOLOGIC: No hives       Consultant(s) Notes Reviewed:  [X] YES  [ ] NO    PHYSICAL EXAM:  GENERAL: NAD  HEAD:  Atraumatic, Normocephalic  EYES: EOMI, PERRLA, conjunctiva and sclera clear  ENMT: No tonsillar erythema, exudates, or enlargement; Moist mucous membranes  NECK: Supple, No JVD  NERVOUS SYSTEM:  Awake & alert  CHEST/LUNG: Clear to auscultation bilaterally; No rales, rhonchi, wheezing,  HEART: Regular rate and rhythm  ABDOMEN: Soft, Nontender, Nondistended; Bowel sounds present  EXTREMITIES:  No clubbing, cyanosis, or edema  LYMPH: No lymphadenopathy noted  SKIN: No rashes      Advanced care planning discussed with patient/family [X] YES   [ ] NO    Advanced care planning discussed with patient/family. Patient's health status was discussed. All appropriate changes have been made regarding patient's end-of-life care. Advanced care planning forms reviewed/discussed/completed.  20 minutes spent.   
Date of Service: 11-07-23 @ 11:51    Patient is a 77y old  Male who presents with a chief complaint of AMS (06 Nov 2023 09:43)      INTERVAL HPI/OVERNIGHT EVENTS: Patient seen and examined. NAD. No complaints.    Vital Signs Last 24 Hrs  T(C): 37 (07 Nov 2023 11:37), Max: 37.1 (06 Nov 2023 11:54)  T(F): 98.6 (07 Nov 2023 11:37), Max: 98.7 (06 Nov 2023 11:54)  HR: 57 (07 Nov 2023 11:37) (55 - 63)  BP: 131/60 (07 Nov 2023 11:37) (130/78 - 166/72)  BP(mean): --  RR: 18 (07 Nov 2023 11:37) (18 - 18)  SpO2: 92% (07 Nov 2023 11:37) (90% - 93%)    Parameters below as of 07 Nov 2023 11:37  Patient On (Oxygen Delivery Method): room air        11-07    142  |  113<H>  |  38<H>  ----------------------------<  136<H>  4.3   |  22  |  1.60<H>    Ca    8.1<L>      07 Nov 2023 06:55  Mg     2.2     11-07                            11.3   6.22  )-----------( 183      ( 07 Nov 2023 06:55 )             32.9       CAPILLARY BLOOD GLUCOSE      POCT Blood Glucose.: 127 mg/dL (07 Nov 2023 08:30)  POCT Blood Glucose.: 246 mg/dL (06 Nov 2023 21:16)  POCT Blood Glucose.: 229 mg/dL (06 Nov 2023 17:31)  POCT Blood Glucose.: 155 mg/dL (06 Nov 2023 11:53)    Urinalysis Basic - ( 07 Nov 2023 06:55 )    Color: x / Appearance: x / SG: x / pH: x  Gluc: 136 mg/dL / Ketone: x  / Bili: x / Urobili: x   Blood: x / Protein: x / Nitrite: x   Leuk Esterase: x / RBC: x / WBC x   Sq Epi: x / Non Sq Epi: x / Bacteria: x              acetaminophen     Tablet .. 650 milliGRAM(s) Oral every 6 hours PRN  aluminum hydroxide/magnesium hydroxide/simethicone Suspension 30 milliLiter(s) Oral every 4 hours PRN  aspirin enteric coated 81 milliGRAM(s) Oral daily  atorvastatin 40 milliGRAM(s) Oral at bedtime  clopidogrel Tablet 75 milliGRAM(s) Oral daily  dextrose 5%. 1000 milliLiter(s) IV Continuous <Continuous>  dextrose 5%. 1000 milliLiter(s) IV Continuous <Continuous>  dextrose 50% Injectable 25 Gram(s) IV Push once  dextrose 50% Injectable 12.5 Gram(s) IV Push once  dextrose 50% Injectable 25 Gram(s) IV Push once  dextrose Oral Gel 15 Gram(s) Oral once PRN  enoxaparin Injectable 40 milliGRAM(s) SubCutaneous every 24 hours  famotidine    Tablet 20 milliGRAM(s) Oral daily  glucagon  Injectable 1 milliGRAM(s) IntraMuscular once  guaiFENesin Oral Liquid (Sugar-Free) 200 milliGRAM(s) Oral every 6 hours PRN  insulin lispro (ADMELOG) corrective regimen sliding scale   SubCutaneous three times a day before meals  insulin lispro (ADMELOG) corrective regimen sliding scale   SubCutaneous at bedtime  losartan 25 milliGRAM(s) Oral daily  melatonin 3 milliGRAM(s) Oral at bedtime PRN  metoprolol succinate ER 50 milliGRAM(s) Oral daily  ondansetron Injectable 4 milliGRAM(s) IV Push every 6 hours PRN  remdesivir  IVPB 100 milliGRAM(s) IV Intermittent every 24 hours  sodium chloride 0.9%. 1000 milliLiter(s) IV Continuous <Continuous>  tamsulosin 0.4 milliGRAM(s) Oral at bedtime              REVIEW OF SYSTEMS:  CONSTITUTIONAL: No fever, no weight loss, or no fatigue  NECK: No pain, no stiffness  RESPIRATORY: No cough, no wheezing, no chills, no hemoptysis, No shortness of breath  CARDIOVASCULAR: No chest pain, no palpitations, no dizziness, no leg swelling  GASTROINTESTINAL: No abdominal pain. No nausea, no vomiting, no hematemesis; No diarrhea, no constipation. No melena, no hematochezia.  GENITOURINARY: No dysuria, no frequency, no hematuria, no incontinence  NEUROLOGICAL: No headaches, no loss of strength, no numbness, no tremors  SKIN: No itching, no burning  MUSCULOSKELETAL: No joint pain, no swelling; No muscle, no back, no extremity pain  PSYCHIATRIC: No depression, no mood swings,   HEME/LYMPH: No easy bruising, no bleeding gums  ALLERY AND IMMUNOLOGIC: No hives       Consultant(s) Notes Reviewed:  [X] YES  [ ] NO    PHYSICAL EXAM:  GENERAL: NAD  HEAD:  Atraumatic, Normocephalic  EYES: EOMI, PERRLA, conjunctiva and sclera clear  ENMT: No tonsillar erythema, exudates, or enlargement; Moist mucous membranes  NECK: Supple, No JVD  NERVOUS SYSTEM:  Awake & alert  CHEST/LUNG: Clear to auscultation bilaterally; No rales, rhonchi, wheezing,  HEART: Regular rate and rhythm  ABDOMEN: Soft, Nontender, Nondistended; Bowel sounds present  EXTREMITIES:  No clubbing, cyanosis, or edema  LYMPH: No lymphadenopathy noted  SKIN: No rashes      Advanced care planning discussed with patient/family [X] YES   [ ] NO    Advanced care planning discussed with patient/family. Patient's health status was discussed. All appropriate changes have been made regarding patient's end-of-life care. Advanced care planning forms reviewed/discussed/completed.  20 minutes spent.   
Date of Service: 11-08-23 @ 12:21    Patient is a 77y old  Male who presents with a chief complaint of AMS (07 Nov 2023 11:51)      INTERVAL HPI/OVERNIGHT EVENTS: Patient seen and examined. NAD. No complaints.    Vital Signs Last 24 Hrs  T(C): 36.4 (08 Nov 2023 11:47), Max: 36.8 (08 Nov 2023 05:45)  T(F): 97.6 (08 Nov 2023 11:47), Max: 98.2 (08 Nov 2023 05:45)  HR: 49 (08 Nov 2023 11:47) (49 - 53)  BP: 120/64 (08 Nov 2023 11:47) (120/64 - 162/71)  BP(mean): --  RR: 16 (08 Nov 2023 11:47) (16 - 18)  SpO2: 94% (08 Nov 2023 11:47) (91% - 95%)    Parameters below as of 08 Nov 2023 11:47  Patient On (Oxygen Delivery Method): room air        11-07    142  |  113<H>  |  38<H>  ----------------------------<  136<H>  4.3   |  22  |  1.60<H>    Ca    8.1<L>      07 Nov 2023 06:55  Mg     2.2     11-07                            11.3   6.22  )-----------( 183      ( 07 Nov 2023 06:55 )             32.9       CAPILLARY BLOOD GLUCOSE      POCT Blood Glucose.: 152 mg/dL (08 Nov 2023 08:19)  POCT Blood Glucose.: 264 mg/dL (07 Nov 2023 20:57)  POCT Blood Glucose.: 215 mg/dL (07 Nov 2023 17:22)  POCT Blood Glucose.: 265 mg/dL (07 Nov 2023 12:23)    Urinalysis Basic - ( 07 Nov 2023 06:55 )    Color: x / Appearance: x / SG: x / pH: x  Gluc: 136 mg/dL / Ketone: x  / Bili: x / Urobili: x   Blood: x / Protein: x / Nitrite: x   Leuk Esterase: x / RBC: x / WBC x   Sq Epi: x / Non Sq Epi: x / Bacteria: x              acetaminophen     Tablet .. 650 milliGRAM(s) Oral every 6 hours PRN  aluminum hydroxide/magnesium hydroxide/simethicone Suspension 30 milliLiter(s) Oral every 4 hours PRN  aspirin enteric coated 81 milliGRAM(s) Oral daily  atorvastatin 40 milliGRAM(s) Oral at bedtime  clopidogrel Tablet 75 milliGRAM(s) Oral daily  dextrose 5%. 1000 milliLiter(s) IV Continuous <Continuous>  dextrose 5%. 1000 milliLiter(s) IV Continuous <Continuous>  dextrose 50% Injectable 12.5 Gram(s) IV Push once  dextrose 50% Injectable 25 Gram(s) IV Push once  dextrose 50% Injectable 25 Gram(s) IV Push once  dextrose Oral Gel 15 Gram(s) Oral once PRN  enoxaparin Injectable 40 milliGRAM(s) SubCutaneous every 24 hours  famotidine    Tablet 20 milliGRAM(s) Oral daily  glucagon  Injectable 1 milliGRAM(s) IntraMuscular once  guaiFENesin Oral Liquid (Sugar-Free) 200 milliGRAM(s) Oral every 6 hours PRN  insulin lispro (ADMELOG) corrective regimen sliding scale   SubCutaneous three times a day before meals  insulin lispro (ADMELOG) corrective regimen sliding scale   SubCutaneous at bedtime  losartan 25 milliGRAM(s) Oral daily  melatonin 3 milliGRAM(s) Oral at bedtime PRN  metoprolol succinate ER 50 milliGRAM(s) Oral daily  ondansetron Injectable 4 milliGRAM(s) IV Push every 6 hours PRN  tamsulosin 0.4 milliGRAM(s) Oral at bedtime              REVIEW OF SYSTEMS:  CONSTITUTIONAL: No fever, no weight loss, or no fatigue  NECK: No pain, no stiffness  RESPIRATORY: No cough, no wheezing, no chills, no hemoptysis, No shortness of breath  CARDIOVASCULAR: No chest pain, no palpitations, no dizziness, no leg swelling  GASTROINTESTINAL: No abdominal pain. No nausea, no vomiting, no hematemesis; No diarrhea, no constipation. No melena, no hematochezia.  GENITOURINARY: No dysuria, no frequency, no hematuria, no incontinence  NEUROLOGICAL: No headaches, no loss of strength, no numbness, no tremors  SKIN: No itching, no burning  MUSCULOSKELETAL: No joint pain, no swelling; No muscle, no back, no extremity pain  PSYCHIATRIC: No depression, no mood swings,   HEME/LYMPH: No easy bruising, no bleeding gums  ALLERY AND IMMUNOLOGIC: No hives       Consultant(s) Notes Reviewed:  [X] YES  [ ] NO    PHYSICAL EXAM:  GENERAL: NAD  HEAD:  Atraumatic, Normocephalic  EYES: EOMI, PERRLA, conjunctiva and sclera clear  ENMT: No tonsillar erythema, exudates, or enlargement; Moist mucous membranes  NECK: Supple, No JVD  NERVOUS SYSTEM:  Awake & alert  CHEST/LUNG: Clear to auscultation bilaterally; No rales, rhonchi, wheezing,  HEART: Regular rate and rhythm  ABDOMEN: Soft, Nontender, Nondistended; Bowel sounds present  EXTREMITIES:  No clubbing, cyanosis, or edema  LYMPH: No lymphadenopathy noted  SKIN: No rashes      Advanced care planning discussed with patient/family [X] YES   [ ] NO    Advanced care planning discussed with patient/family. Patient's health status was discussed. All appropriate changes have been made regarding patient's end-of-life care. Advanced care planning forms reviewed/discussed/completed.  20 minutes spent.

## 2023-11-08 NOTE — DISCHARGE NOTE NURSING/CASE MANAGEMENT/SOCIAL WORK - PATIENT PORTAL LINK FT
You can access the FollowMyHealth Patient Portal offered by [facility] by registering at the following website: [email or web address]. By joining [facility]’s FollowMyHealth portal, you will also be able to view your health information using other applications (apps) compatible with our system.

## 2023-11-08 NOTE — DISCHARGE NOTE PROVIDER - NSDCCPCAREPLAN_GEN_ALL_CORE_FT
PRINCIPAL DISCHARGE DIAGNOSIS  Diagnosis: 2019 novel coronavirus disease (COVID-19)  Assessment and Plan of Treatment: You were admitted with weakness, cough and shortness of breath. You were found to have COVID 19. You were seen in consultation by infectious disease doctor and treated with intravenous Remdesivir with good response.  Your symptoms have improved. You are stable for discharge from the hospital and advised to continue  isolation precautions for COVID-19 at home for at least 5 days. Follow with your primary medical doctor within 1 week of discharge. The hospital course of this patient was uncomplicated and the patient was discharged in stable medical condition and advised to continue isolation precautions for at least 5 days post discharge.      SECONDARY DISCHARGE DIAGNOSES  Diagnosis: Metabolic encephalopathy  Assessment and Plan of Treatment: Your symptoms have improved.    Diagnosis: CAD (coronary artery disease)  Assessment and Plan of Treatment: Continue current medical management    Diagnosis: Essential hypertension  Assessment and Plan of Treatment: Continue current medical management     PRINCIPAL DISCHARGE DIAGNOSIS  Diagnosis: 2019 novel coronavirus disease (COVID-19)  Assessment and Plan of Treatment: You were admitted with weakness, cough and shortness of breath. You were found to have COVID 19. You were seen in consultation by infectious disease doctor and treated with intravenous Remdesivir with good response.  Your symptoms have improved. You are stable for discharge from the hospital and advised to continue  isolation precautions for COVID-19 at home for at least 5 days. Follow with your primary medical doctor within 1 week of discharge. The hospital course of this patient was uncomplicated and the patient was discharged in stable medical condition and advised to continue isolation precautions for at least 5 days post discharge.      SECONDARY DISCHARGE DIAGNOSES  Diagnosis: Metabolic encephalopathy  Assessment and Plan of Treatment: Your symptoms have improved.    Diagnosis: Essential hypertension  Assessment and Plan of Treatment: Continue current medical management    Diagnosis: CAD (coronary artery disease)  Assessment and Plan of Treatment: Continue current medical management

## 2023-11-08 NOTE — PROGRESS NOTE ADULT - PROVIDER SPECIALTY LIST ADULT
Infectious Disease
Nephrology
Neurology
Neurology
Pulmonology
Infectious Disease
Infectious Disease
Pulmonology
Internal Medicine

## 2023-11-08 NOTE — PROGRESS NOTE ADULT - PROBLEM SELECTOR PLAN 2
Likely 2/2 to fever, COVID  Neuro eval
Likely 2/2 to fever, COVID -- resolved  Neuro eval noted
Likely 2/2 to fever, COVID  Neuro eval noted

## 2023-11-10 LAB
CULTURE RESULTS: SIGNIFICANT CHANGE UP
SPECIMEN SOURCE: SIGNIFICANT CHANGE UP

## 2023-11-13 PROCEDURE — 96375 TX/PRO/DX INJ NEW DRUG ADDON: CPT

## 2023-11-13 PROCEDURE — 85610 PROTHROMBIN TIME: CPT

## 2023-11-13 PROCEDURE — 87077 CULTURE AEROBIC IDENTIFY: CPT

## 2023-11-13 PROCEDURE — 85025 COMPLETE CBC W/AUTO DIFF WBC: CPT

## 2023-11-13 PROCEDURE — 85730 THROMBOPLASTIN TIME PARTIAL: CPT

## 2023-11-13 PROCEDURE — 82962 GLUCOSE BLOOD TEST: CPT

## 2023-11-13 PROCEDURE — 36415 COLL VENOUS BLD VENIPUNCTURE: CPT

## 2023-11-13 PROCEDURE — 99285 EMERGENCY DEPT VISIT HI MDM: CPT | Mod: 25

## 2023-11-13 PROCEDURE — 70450 CT HEAD/BRAIN W/O DYE: CPT | Mod: MA

## 2023-11-13 PROCEDURE — 87040 BLOOD CULTURE FOR BACTERIA: CPT

## 2023-11-13 PROCEDURE — 96365 THER/PROPH/DIAG IV INF INIT: CPT

## 2023-11-13 PROCEDURE — 93005 ELECTROCARDIOGRAM TRACING: CPT

## 2023-11-13 PROCEDURE — 0225U NFCT DS DNA&RNA 21 SARSCOV2: CPT

## 2023-11-13 PROCEDURE — 87086 URINE CULTURE/COLONY COUNT: CPT

## 2023-11-13 PROCEDURE — 80053 COMPREHEN METABOLIC PANEL: CPT

## 2023-11-13 PROCEDURE — 81001 URINALYSIS AUTO W/SCOPE: CPT

## 2023-11-13 PROCEDURE — 72125 CT NECK SPINE W/O DYE: CPT | Mod: MA

## 2023-11-13 PROCEDURE — 83605 ASSAY OF LACTIC ACID: CPT

## 2023-11-13 PROCEDURE — 71045 X-RAY EXAM CHEST 1 VIEW: CPT

## 2023-11-13 PROCEDURE — 87186 SC STD MICRODIL/AGAR DIL: CPT

## 2023-11-24 ENCOUNTER — EMERGENCY (EMERGENCY)
Facility: HOSPITAL | Age: 78
LOS: 1 days | Discharge: HOME | End: 2023-11-24
Attending: EMERGENCY MEDICINE | Admitting: EMERGENCY MEDICINE
Payer: MEDICARE

## 2023-11-24 ENCOUNTER — INPATIENT (INPATIENT)
Facility: HOSPITAL | Age: 78
LOS: 3 days | Discharge: HOME HEALTH | DRG: 34 | End: 2023-11-28
Attending: STUDENT IN AN ORGANIZED HEALTH CARE EDUCATION/TRAINING PROGRAM | Admitting: INTERNAL MEDICINE
Payer: MEDICARE

## 2023-11-24 VITALS
SYSTOLIC BLOOD PRESSURE: 180 MMHG | HEART RATE: 55 BPM | RESPIRATION RATE: 18 BRPM | WEIGHT: 179.9 LBS | OXYGEN SATURATION: 95 % | TEMPERATURE: 98 F | HEIGHT: 65 IN | DIASTOLIC BLOOD PRESSURE: 75 MMHG

## 2023-11-24 VITALS
HEIGHT: 65 IN | HEART RATE: 58 BPM | OXYGEN SATURATION: 98 % | DIASTOLIC BLOOD PRESSURE: 62 MMHG | TEMPERATURE: 98 F | SYSTOLIC BLOOD PRESSURE: 106 MMHG | RESPIRATION RATE: 15 BRPM | WEIGHT: 177.25 LBS

## 2023-11-24 VITALS
RESPIRATION RATE: 16 BRPM | DIASTOLIC BLOOD PRESSURE: 74 MMHG | OXYGEN SATURATION: 99 % | SYSTOLIC BLOOD PRESSURE: 179 MMHG | HEART RATE: 51 BPM

## 2023-11-24 DIAGNOSIS — I65.29 OCCLUSION AND STENOSIS OF UNSPECIFIED CAROTID ARTERY: ICD-10-CM

## 2023-11-24 PROBLEM — N40.0 BENIGN PROSTATIC HYPERPLASIA WITHOUT LOWER URINARY TRACT SYMPTOMS: Chronic | Status: ACTIVE | Noted: 2023-11-05

## 2023-11-24 PROBLEM — I25.10 ATHEROSCLEROTIC HEART DISEASE OF NATIVE CORONARY ARTERY WITHOUT ANGINA PECTORIS: Chronic | Status: ACTIVE | Noted: 2023-11-05

## 2023-11-24 LAB
ALBUMIN SERPL ELPH-MCNC: 3.7 G/DL — SIGNIFICANT CHANGE UP (ref 3.3–5)
ALBUMIN SERPL ELPH-MCNC: 3.7 G/DL — SIGNIFICANT CHANGE UP (ref 3.3–5)
ALP SERPL-CCNC: 66 U/L — SIGNIFICANT CHANGE UP (ref 30–120)
ALP SERPL-CCNC: 66 U/L — SIGNIFICANT CHANGE UP (ref 30–120)
ALT FLD-CCNC: 25 U/L — SIGNIFICANT CHANGE UP (ref 10–60)
ALT FLD-CCNC: 25 U/L — SIGNIFICANT CHANGE UP (ref 10–60)
ANION GAP SERPL CALC-SCNC: 9 MMOL/L — SIGNIFICANT CHANGE UP (ref 5–17)
ANION GAP SERPL CALC-SCNC: 9 MMOL/L — SIGNIFICANT CHANGE UP (ref 5–17)
APTT BLD: 33.1 SEC — SIGNIFICANT CHANGE UP (ref 24.5–35.6)
APTT BLD: 33.1 SEC — SIGNIFICANT CHANGE UP (ref 24.5–35.6)
AST SERPL-CCNC: 39 U/L — SIGNIFICANT CHANGE UP (ref 10–40)
AST SERPL-CCNC: 39 U/L — SIGNIFICANT CHANGE UP (ref 10–40)
BASOPHILS # BLD AUTO: 0.01 K/UL — SIGNIFICANT CHANGE UP (ref 0–0.2)
BASOPHILS # BLD AUTO: 0.01 K/UL — SIGNIFICANT CHANGE UP (ref 0–0.2)
BASOPHILS NFR BLD AUTO: 0.2 % — SIGNIFICANT CHANGE UP (ref 0–2)
BASOPHILS NFR BLD AUTO: 0.2 % — SIGNIFICANT CHANGE UP (ref 0–2)
BILIRUB SERPL-MCNC: 0.8 MG/DL — SIGNIFICANT CHANGE UP (ref 0.2–1.2)
BILIRUB SERPL-MCNC: 0.8 MG/DL — SIGNIFICANT CHANGE UP (ref 0.2–1.2)
BUN SERPL-MCNC: 33 MG/DL — HIGH (ref 7–23)
BUN SERPL-MCNC: 33 MG/DL — HIGH (ref 7–23)
CALCIUM SERPL-MCNC: 9.4 MG/DL — SIGNIFICANT CHANGE UP (ref 8.4–10.5)
CALCIUM SERPL-MCNC: 9.4 MG/DL — SIGNIFICANT CHANGE UP (ref 8.4–10.5)
CHLORIDE SERPL-SCNC: 105 MMOL/L — SIGNIFICANT CHANGE UP (ref 96–108)
CHLORIDE SERPL-SCNC: 105 MMOL/L — SIGNIFICANT CHANGE UP (ref 96–108)
CO2 SERPL-SCNC: 24 MMOL/L — SIGNIFICANT CHANGE UP (ref 22–31)
CO2 SERPL-SCNC: 24 MMOL/L — SIGNIFICANT CHANGE UP (ref 22–31)
CREAT SERPL-MCNC: 1.96 MG/DL — HIGH (ref 0.5–1.3)
CREAT SERPL-MCNC: 1.96 MG/DL — HIGH (ref 0.5–1.3)
EGFR: 34 ML/MIN/1.73M2 — LOW
EGFR: 34 ML/MIN/1.73M2 — LOW
EOSINOPHIL # BLD AUTO: 0.04 K/UL — SIGNIFICANT CHANGE UP (ref 0–0.5)
EOSINOPHIL # BLD AUTO: 0.04 K/UL — SIGNIFICANT CHANGE UP (ref 0–0.5)
EOSINOPHIL NFR BLD AUTO: 0.8 % — SIGNIFICANT CHANGE UP (ref 0–6)
EOSINOPHIL NFR BLD AUTO: 0.8 % — SIGNIFICANT CHANGE UP (ref 0–6)
GLUCOSE BLDC GLUCOMTR-MCNC: 96 MG/DL — SIGNIFICANT CHANGE UP (ref 70–99)
GLUCOSE BLDC GLUCOMTR-MCNC: 96 MG/DL — SIGNIFICANT CHANGE UP (ref 70–99)
GLUCOSE SERPL-MCNC: 102 MG/DL — HIGH (ref 70–99)
GLUCOSE SERPL-MCNC: 102 MG/DL — HIGH (ref 70–99)
HCT VFR BLD CALC: 35.7 % — LOW (ref 39–50)
HCT VFR BLD CALC: 35.7 % — LOW (ref 39–50)
HGB BLD-MCNC: 11.7 G/DL — LOW (ref 13–17)
HGB BLD-MCNC: 11.7 G/DL — LOW (ref 13–17)
IMM GRANULOCYTES NFR BLD AUTO: 0.2 % — SIGNIFICANT CHANGE UP (ref 0–0.9)
IMM GRANULOCYTES NFR BLD AUTO: 0.2 % — SIGNIFICANT CHANGE UP (ref 0–0.9)
INR BLD: 0.98 RATIO — SIGNIFICANT CHANGE UP (ref 0.85–1.18)
INR BLD: 0.98 RATIO — SIGNIFICANT CHANGE UP (ref 0.85–1.18)
LYMPHOCYTES # BLD AUTO: 1.5 K/UL — SIGNIFICANT CHANGE UP (ref 1–3.3)
LYMPHOCYTES # BLD AUTO: 1.5 K/UL — SIGNIFICANT CHANGE UP (ref 1–3.3)
LYMPHOCYTES # BLD AUTO: 29.2 % — SIGNIFICANT CHANGE UP (ref 13–44)
LYMPHOCYTES # BLD AUTO: 29.2 % — SIGNIFICANT CHANGE UP (ref 13–44)
MCHC RBC-ENTMCNC: 29.8 PG — SIGNIFICANT CHANGE UP (ref 27–34)
MCHC RBC-ENTMCNC: 29.8 PG — SIGNIFICANT CHANGE UP (ref 27–34)
MCHC RBC-ENTMCNC: 32.8 GM/DL — SIGNIFICANT CHANGE UP (ref 32–36)
MCHC RBC-ENTMCNC: 32.8 GM/DL — SIGNIFICANT CHANGE UP (ref 32–36)
MCV RBC AUTO: 91.1 FL — SIGNIFICANT CHANGE UP (ref 80–100)
MCV RBC AUTO: 91.1 FL — SIGNIFICANT CHANGE UP (ref 80–100)
MONOCYTES # BLD AUTO: 0.58 K/UL — SIGNIFICANT CHANGE UP (ref 0–0.9)
MONOCYTES # BLD AUTO: 0.58 K/UL — SIGNIFICANT CHANGE UP (ref 0–0.9)
MONOCYTES NFR BLD AUTO: 11.3 % — SIGNIFICANT CHANGE UP (ref 2–14)
MONOCYTES NFR BLD AUTO: 11.3 % — SIGNIFICANT CHANGE UP (ref 2–14)
NEUTROPHILS # BLD AUTO: 3 K/UL — SIGNIFICANT CHANGE UP (ref 1.8–7.4)
NEUTROPHILS # BLD AUTO: 3 K/UL — SIGNIFICANT CHANGE UP (ref 1.8–7.4)
NEUTROPHILS NFR BLD AUTO: 58.3 % — SIGNIFICANT CHANGE UP (ref 43–77)
NEUTROPHILS NFR BLD AUTO: 58.3 % — SIGNIFICANT CHANGE UP (ref 43–77)
NRBC # BLD: 0 /100 WBCS — SIGNIFICANT CHANGE UP (ref 0–0)
NRBC # BLD: 0 /100 WBCS — SIGNIFICANT CHANGE UP (ref 0–0)
PLATELET # BLD AUTO: 206 K/UL — SIGNIFICANT CHANGE UP (ref 150–400)
PLATELET # BLD AUTO: 206 K/UL — SIGNIFICANT CHANGE UP (ref 150–400)
POTASSIUM SERPL-MCNC: 5.5 MMOL/L — HIGH (ref 3.5–5.3)
POTASSIUM SERPL-MCNC: 5.5 MMOL/L — HIGH (ref 3.5–5.3)
POTASSIUM SERPL-SCNC: 5.5 MMOL/L — HIGH (ref 3.5–5.3)
POTASSIUM SERPL-SCNC: 5.5 MMOL/L — HIGH (ref 3.5–5.3)
PROT SERPL-MCNC: 7.1 G/DL — SIGNIFICANT CHANGE UP (ref 6–8.3)
PROT SERPL-MCNC: 7.1 G/DL — SIGNIFICANT CHANGE UP (ref 6–8.3)
PROTHROM AB SERPL-ACNC: 11 SEC — SIGNIFICANT CHANGE UP (ref 9.5–13)
PROTHROM AB SERPL-ACNC: 11 SEC — SIGNIFICANT CHANGE UP (ref 9.5–13)
RBC # BLD: 3.92 M/UL — LOW (ref 4.2–5.8)
RBC # BLD: 3.92 M/UL — LOW (ref 4.2–5.8)
RBC # FLD: 13.3 % — SIGNIFICANT CHANGE UP (ref 10.3–14.5)
RBC # FLD: 13.3 % — SIGNIFICANT CHANGE UP (ref 10.3–14.5)
SARS-COV-2 RNA SPEC QL NAA+PROBE: DETECTED
SARS-COV-2 RNA SPEC QL NAA+PROBE: DETECTED
SODIUM SERPL-SCNC: 138 MMOL/L — SIGNIFICANT CHANGE UP (ref 135–145)
SODIUM SERPL-SCNC: 138 MMOL/L — SIGNIFICANT CHANGE UP (ref 135–145)
TROPONIN I, HIGH SENSITIVITY RESULT: 19.1 NG/L — SIGNIFICANT CHANGE UP
TROPONIN I, HIGH SENSITIVITY RESULT: 19.1 NG/L — SIGNIFICANT CHANGE UP
WBC # BLD: 5.14 K/UL — SIGNIFICANT CHANGE UP (ref 3.8–10.5)
WBC # BLD: 5.14 K/UL — SIGNIFICANT CHANGE UP (ref 3.8–10.5)
WBC # FLD AUTO: 5.14 K/UL — SIGNIFICANT CHANGE UP (ref 3.8–10.5)
WBC # FLD AUTO: 5.14 K/UL — SIGNIFICANT CHANGE UP (ref 3.8–10.5)

## 2023-11-24 PROCEDURE — 99285 EMERGENCY DEPT VISIT HI MDM: CPT

## 2023-11-24 PROCEDURE — 70496 CT ANGIOGRAPHY HEAD: CPT | Mod: 26,MA

## 2023-11-24 PROCEDURE — 70498 CT ANGIOGRAPHY NECK: CPT | Mod: 26,MA

## 2023-11-24 PROCEDURE — 36415 COLL VENOUS BLD VENIPUNCTURE: CPT

## 2023-11-24 PROCEDURE — 70450 CT HEAD/BRAIN W/O DYE: CPT | Mod: MA

## 2023-11-24 PROCEDURE — 87635 SARS-COV-2 COVID-19 AMP PRB: CPT

## 2023-11-24 PROCEDURE — 84484 ASSAY OF TROPONIN QUANT: CPT

## 2023-11-24 PROCEDURE — 70496 CT ANGIOGRAPHY HEAD: CPT | Mod: MA

## 2023-11-24 PROCEDURE — 85730 THROMBOPLASTIN TIME PARTIAL: CPT

## 2023-11-24 PROCEDURE — 70450 CT HEAD/BRAIN W/O DYE: CPT | Mod: 26,MA,XU

## 2023-11-24 PROCEDURE — 70498 CT ANGIOGRAPHY NECK: CPT | Mod: MA

## 2023-11-24 PROCEDURE — 99285 EMERGENCY DEPT VISIT HI MDM: CPT | Mod: 25

## 2023-11-24 PROCEDURE — 71045 X-RAY EXAM CHEST 1 VIEW: CPT | Mod: 26

## 2023-11-24 PROCEDURE — 82962 GLUCOSE BLOOD TEST: CPT

## 2023-11-24 PROCEDURE — 93005 ELECTROCARDIOGRAM TRACING: CPT

## 2023-11-24 PROCEDURE — 80053 COMPREHEN METABOLIC PANEL: CPT

## 2023-11-24 PROCEDURE — 85025 COMPLETE CBC W/AUTO DIFF WBC: CPT

## 2023-11-24 PROCEDURE — 93010 ELECTROCARDIOGRAM REPORT: CPT

## 2023-11-24 PROCEDURE — 93880 EXTRACRANIAL BILAT STUDY: CPT | Mod: 26

## 2023-11-24 PROCEDURE — 71045 X-RAY EXAM CHEST 1 VIEW: CPT

## 2023-11-24 PROCEDURE — 85610 PROTHROMBIN TIME: CPT

## 2023-11-24 RX ORDER — CLOPIDOGREL BISULFATE 75 MG/1
300 TABLET, FILM COATED ORAL ONCE
Refills: 0 | Status: COMPLETED | OUTPATIENT
Start: 2023-11-24 | End: 2023-11-24

## 2023-11-24 RX ORDER — ASPIRIN/CALCIUM CARB/MAGNESIUM 324 MG
325 TABLET ORAL ONCE
Refills: 0 | Status: COMPLETED | OUTPATIENT
Start: 2023-11-24 | End: 2023-11-24

## 2023-11-24 RX ADMIN — CLOPIDOGREL BISULFATE 300 MILLIGRAM(S): 75 TABLET, FILM COATED ORAL at 16:27

## 2023-11-24 RX ADMIN — Medication 325 MILLIGRAM(S): at 16:27

## 2023-11-24 NOTE — ED PROVIDER NOTE - OBJECTIVE STATEMENT
77-year-old male with history of prior CVA recent COVID infection hypertension BPH brought by ambulance from home for evaluation of sudden onset of facial droop and loss of speech around 10 AM.  Patient now appears to be back to baseline awake alert and answering questions denies any symptoms.  Unclear what patient's residual weakness was or baseline status was from prior CVA.  Was at [facility] until recently.  Denies headache visual disturbance nausea vomiting chest pain abdominal pain diarrhea dysuria hematuria or other symptom.

## 2023-11-24 NOTE — ED PROVIDER NOTE - PROGRESS NOTE DETAILS
vascular surgery who will come evaluate patient and recommends carotid artery duplex TEREML pt stable for med admission. signed out to dr ojeda

## 2023-11-24 NOTE — ED PROVIDER NOTE - OBJECTIVE STATEMENT
77-year-old male with history of prior CVA recent COVID infection hypertension BPH brought by ambulance from home for evaluation of sudden onset of facial droop and loss of speech around 10 AM. Patient seen at [facility] with code stroke activated by booster back to baseline.  Workup at outside hospital was noted to have severe right-sided carotid artery stenosis.  Patient seen by stroke team at that time and was suggested patient be transferred here for vascular surgery evaluation.  On arrival to this ED patient states he feels at his baseline but notes that he has some difficulty with his memory.  No focal weakness or numbness.  No difficulty speaking.  No chest pain or shortness of breath.

## 2023-11-24 NOTE — ED PROVIDER NOTE - CLINICAL SUMMARY MEDICAL DECISION MAKING FREE TEXT BOX
Patient presenting with evaluation for right-sided carotid artery stenosis.  Will obtain vascular surgery evaluation.  Patient currently back at baseline

## 2023-11-24 NOTE — ED ADULT TRIAGE NOTE - CHIEF COMPLAINT QUOTE
Pt A&Ox4 states "Nothings bothering me." BIBA [facility] c/o TIA/ carotid stenosis, left sided facial droop, and weakness has since resolved Patient tested positive 2 weeks ago at rehab

## 2023-11-24 NOTE — ED ADULT TRIAGE NOTE - NS ED NURSE BANDS TYPE
Per Dr Fontana, stress test was ok, so low risk for surgery. I faxed the cardiac clearance to Dr Ross office, confirmation received,  and left message on Vaishnavi's voicemail that I faxed this. I also called pt and let her know her stress test was ok per Dr Fontana and that I faxed the clearance to Dr Ross. Pt verbalized understanding.    Name band;

## 2023-11-24 NOTE — ED PROVIDER NOTE - CLINICAL SUMMARY MEDICAL DECISION MAKING FREE TEXT BOX
77-year-old male with history of prior CVA recent COVID infection hypertension BPH brought by ambulance from home for evaluation of sudden onset of facial droop and loss of speech around 10 AM.  Patient now appears to be back to baseline awake alert and answering questions denies any symptoms.  Unclear what patient's residual weakness was or baseline status was from prior CVA.  Was at [facility] until recently.  Denies headache visual disturbance nausea vomiting chest pain abdominal pain diarrhea dysuria hematuria or other symptom.    Physical exam is now nonfocal.  Symptoms seem to have improved.  NIHSS equals 0.  Patient is not a candidate for tenecteplase at this time.  Will get stroke workup and consult neurology for further guidance.

## 2023-11-24 NOTE — CONSULT NOTE ADULT - ASSESSMENT
A:      Plan:   - Recommend bilateral carotid duplex   - Recommend ASA/Statin  - Agree with medical an neurology evaluation   - Vascular Surgery will follow  A:      Plan:   - Recommend medical admission and evaluation   - Agree with NSGY consultation   - Recommend bilateral carotid duplex   - MRI brain to assess for infarcts   - Recommend antiplatelet tx, ASA/Statin  - Vascular Surgery will follow     Plan discussed with Vascular Surgery Attending.  A:  78 yo M with extensive cardiac hx including HTN , CAD s/p CABG (on ASA) and previous CVA on last year with no residual deficits,  transfer from [place] with one day of stroke-like symptoms - L sided facial droop and aphasia. Workup including CTA  demonstrates severe R ICA stenosis and L moderate stenosis.     Plan:   - Recommend medical admission and evaluation   - Agree with NSGY consultation   - Recommend bilateral carotid duplex   - MRI brain to assess for infarcts   - Recommend antiplatelet tx, ASA/Statin  - Vascular Surgery will follow     Plan discussed with Vascular Surgery Attending.

## 2023-11-24 NOTE — ED PROVIDER NOTE - CPE EDP EYES NORM
Spoke with pt states she is thinking of finding a new doctor.  Will call back if she decides to stay her.    Catina Rivera CMA     normal...

## 2023-11-24 NOTE — CONSULT NOTE ADULT - ASSESSMENT
77-year-old male with history of prior CVA recent COVID infection hypertension BPH brought by ambulance from home for evaluation of sudden onset of facial droop and loss of speech around 10 AM.  Patient now appears to be back to baseline awake alert and answering questions denies any symptoms.  Unclear what patient's residual weakness was or baseline status was from prior CVA.  Was at [facility] until recently.  Denies headache visual disturbance nausea vomiting chest pain abdominal pain diarrhea dysuria hematuria or other symptom.  Son at bedside says that he also had difficulty speaking 2 days ago which also resolved.   HEAD CT: No evidence of an acute intracranial hemorrhage midline shift or hydrocephalus. Multiple chronic small infarcts  NECK CTA: Severe stenosis right carotid bulb/proximal internal carotid artery. Moderate stenosis left carotid bulb.  BRAIN CTA: No proximal large vessel occlusion. Regions of atherosclerotic narrowing as described.  NIHSS -0  Not a TNK candidate  -Resolved symptoms.   Takes Ecotrin 81  Lipitor 40  Transfer pt to tertiary care facility for possible carotid arteries intervention bilaterally.  CVA w/up and also emergent Vascular Sx/Neurointerventionist consultation   Pt seems to have symptomatic Carotid stenoses - Had TIA today Likely involving Right MCA area distribution and had TIA 2 days ago in Left MCA area involvement.   Medications to be modified based on - if any surgical intervention is required.   D/w Pt's son at bedside. Questions answered. Stroke education is done.   D/w ED physician Dr. Rushing.

## 2023-11-24 NOTE — ED ADULT NURSE NOTE - NSFALLHARMRISKINTERV_ED_ALL_ED

## 2023-11-24 NOTE — ED PROVIDER NOTE - PROGRESS NOTE DETAILS
CTA revealed critical stenosis of carotid on right and moderate on the left.  Seen by Dr. Magaña neurology recommended vascular evaluation.  I spoke with Dr. Kaiser neurosurgical intervention at [place] who recommended transfer for further evaluation and treatment.  Patient and family at bedside in agreement with plan.  Will give dose of Plavix and aspirin as recommended.

## 2023-11-24 NOTE — CONSULT NOTE ADULT - SUBJECTIVE AND OBJECTIVE BOX
Surgery Consult    Consulting attending: Dr. Koenig       HPI: 78 yo M with a hx of HTN , CAD , DM, CVA transfer from OSH for one day of stroke-like symptoms with aphasia and        CTA demonstrates severe stenosis of the  right carotid bulb/proximal internal carotid artery, and moderate stenosis left carotid bulb . Vascular Surgery consulted for evaluation          PAST MEDICAL HISTORY:  Hypertension    Diabetes mellitus    CAD (coronary artery disease)    BPH (benign prostatic hyperplasia)          PAST SURGICAL HISTORY:  No significant past surgical history          MEDICATIONS:        ALLERGIES:  No Known Allergies        VITALS & I/Os:  Vital Signs Last 24 Hrs  T(C): 36.6 (24 Nov 2023 18:07), Max: 37.1 (24 Nov 2023 12:21)  T(F): 97.9 (24 Nov 2023 18:07), Max: 98.7 (24 Nov 2023 12:21)  HR: 55 (24 Nov 2023 18:07) (49 - 58)  BP: 180/75 (24 Nov 2023 18:07) (106/62 - 180/75)  BP(mean): --  RR: 18 (24 Nov 2023 18:07) (15 - 18)  SpO2: 95% (24 Nov 2023 18:07) (95% - 99%)    Parameters below as of 24 Nov 2023 18:07  Patient On (Oxygen Delivery Method): room air        I&O's Summary        PHYSICAL EXAM:  Constitutional: patient resting comfortably in bed, in no acute distress  HEENT: EOMI / PERRL b/l, no active drainage or redness  Neck: No JVD, full ROM without pain  Respiratory: respirations are unlabored, no accessory muscle use, no conversational dyspnea  Cardiovascular: regular rate & rhythm  Gastrointestinal: Abdomen soft, non-tender, non-distended, no rebound tenderness / guarding  Neurological: GCS: 15 (4/5/6). A&O x 3; no gross sensory / motor / coordination deficits  Psychiatric: Normal mood, normal affect  Musculoskeletal: No joint pain, swelling or deformity; no limitation of movement  Vascular:        LABS:                        11.7   5.14  )-----------( 206      ( 24 Nov 2023 11:26 )             35.7     11-24    138  |  105  |  33<H>  ----------------------------<  102<H>  5.5<H>   |  24  |  1.96<H>    Ca    9.4      24 Nov 2023 11:26    TPro  7.1  /  Alb  3.7  /  TBili  0.8  /  DBili  x   /  AST  39  /  ALT  25  /  AlkPhos  66  11-24    Lactate:    PT/INR - ( 24 Nov 2023 11:26 )   PT: 11.0 sec;   INR: 0.98 ratio         PTT - ( 24 Nov 2023 11:26 )  PTT:33.1 sec          Urinalysis Basic - ( 24 Nov 2023 11:26 )    Color: x / Appearance: x / SG: x / pH: x  Gluc: 102 mg/dL / Ketone: x  / Bili: x / Urobili: x   Blood: x / Protein: x / Nitrite: x   Leuk Esterase: x / RBC: x / WBC x   Sq Epi: x / Non Sq Epi: x / Bacteria: x          IMAGING:  < from: CT Angio Neck Stroke Protocol w/ IV Cont (11.24.23 @ 11:54) >  FINDINGS:  HEMORRHAGE:  No evidence of intracranial hemorrhage.  BRAIN PARENCHYMA:  Moderate atrophy. Chronic small bilateral cerebellar   infarcts. Chronic small left paramedian pontine infarct. Chronic small   right occipital and parietal infarcts. Moderate periventricular.  No mass   or mass effect.  VENTRICLES / SHIFT:  No hydrocephalus. No midline shift.  EXTRA-AXIAL / BASAL CISTERNS:  No extra-axial mass. Basal cisterns   preserved.  CALVARIUM AND EXTRACRANIAL SOFT TISSUES:  No depressed calvarial fracture.  SINUSES, ORBITS, MASTOIDS:  The visualized paranasal sinuses and mastoid   air cells are well aerated.  ----------------------------------------    CTA TECHNIQUE:  CT angiography of the neck and brain was performed during the dynamic   administration of intravenous contrast.  MIP reconstructions were   performed and reviewed. Multiplanar reformations were obtained.  Contrast administered: 90 cc Omipaque 350.  Contrast discarded: 10 cc.    CTA FINDINGS:  NECK  RIGHT CAROTID CIRCULATION:  Evaluation of the right carotid circulation demonstrates large amount of   calcified plaque carotid bulb and proximal internal carotid artery with   severe stenosis (greater than 90%). Good distal flow  LEFT CAROTID CIRCULATION:  Evaluation of the left carotid circulation demonstrates moderate   calcified plaque carotid bulb/bifurcation with approximate 60% narrowing   utilizing a distal reference.  VERTEBRAL ARTERIES:  Evaluation of the vertebral arteries reveals no evidence of a vertebral   artery occlusion or dissection. Right vertebral artery is dominant.   Probable direct origin of the left vertebral artery from the aortic arch,   however it is obscured by artifact from adjacent dense venous contrast.    BRAIN  ANTERIOR CIRCULATION:  Distal internal carotid arteries are patent.  Atherosclerotic   calcification of the cavernous and supraclinoid segments with mild   narrowing  Anterior cerebral arteries are patent.  Is aplastic A1 segment left   anterior cerebral artery. Both A2 segments fill from the right a normal   variant  Middle cerebral arteries are patent.  POSTERIOR CIRCULATION:  Distal vertebral arteries are patent.  Dominant distal right vertebral   artery. Calcified plaque bilaterally with moderate stenosis. Bilateral   posterior inferior cerebellar arteries are seen  Basilar artery is patent.  Proximal superior cerebellar arteries are   patent. Mild narrowing of the proximal and mid basilar artery  Posterior cerebral arteries are patent.  Moderate left posterior   communicating artery    IMPRESSION:  HEAD CT: No evidence of an acute intracranial hemorhage, midline shift or   hydrocephalus. Multiple chronic small infarcts  NECK CTA: Severe stenosis right carotid bulb/proximal internal carotid   artery. Moderate stenosis left carotid bulb.  BRAIN CTA: No proximal large vessel occlusion. Regions of atherosclerotic   narrowing as described.                                                                                    Surgery Consult    Consulting attending: Dr. Koenig       HPI: 78 yo M with a hx of HTN , CAD s/p CABF  , DM, CVA transfer from OSH for one day of stroke-like symptoms with aphasia. Per son , patient walked  to mailbox and was reaching for coffee when he had a "blank stare" and was unable to respond. Patient also noted to have a L sided facial droop. He denies any changes in vision, HA, N&V .Patient has had progressive memory loss.  CTA demonstrates severe stenosis of the  right carotid bulb/proximal internal carotid artery, and moderate stenosis left carotid bulb . Vascular Surgery consulted for evaluation.     PAST MEDICAL HISTORY:  Hypertension    Diabetes mellitus    CAD (coronary artery disease)    BPH (benign prostatic hyperplasia)          PAST SURGICAL HISTORY:  No significant past surgical history          MEDICATIONS:        ALLERGIES:  No Known Allergies        VITALS & I/Os:  Vital Signs Last 24 Hrs  T(C): 36.6 (24 Nov 2023 18:07), Max: 37.1 (24 Nov 2023 12:21)  T(F): 97.9 (24 Nov 2023 18:07), Max: 98.7 (24 Nov 2023 12:21)  HR: 55 (24 Nov 2023 18:07) (49 - 58)  BP: 180/75 (24 Nov 2023 18:07) (106/62 - 180/75)  BP(mean): --  RR: 18 (24 Nov 2023 18:07) (15 - 18)  SpO2: 95% (24 Nov 2023 18:07) (95% - 99%)    Parameters below as of 24 Nov 2023 18:07  Patient On (Oxygen Delivery Method): room air        I&O's Summary        PHYSICAL EXAM:  Constitutional: patient resting comfortably in bed, in no acute distress  HEENT: EOMI / PERRL b/l, no active drainage or redness  Neck: No JVD, full ROM without pain  Respiratory: respirations are unlabored, no accessory muscle use, no conversational dyspnea  Cardiovascular: regular rate & rhythm  Gastrointestinal: Abdomen soft, non-tender, non-distended, no rebound tenderness / guarding  Neurological: GCS: 15 (4/5/6). A&O x 3; no gross sensory / motor / coordination deficits  Psychiatric: Normal mood, normal affect  Musculoskeletal: No joint pain, swelling or deformity; no limitation of movement  Vascular:        LABS:                        11.7   5.14  )-----------( 206      ( 24 Nov 2023 11:26 )             35.7     11-24    138  |  105  |  33<H>  ----------------------------<  102<H>  5.5<H>   |  24  |  1.96<H>    Ca    9.4      24 Nov 2023 11:26    TPro  7.1  /  Alb  3.7  /  TBili  0.8  /  DBili  x   /  AST  39  /  ALT  25  /  AlkPhos  66  11-24    Lactate:    PT/INR - ( 24 Nov 2023 11:26 )   PT: 11.0 sec;   INR: 0.98 ratio         PTT - ( 24 Nov 2023 11:26 )  PTT:33.1 sec          Urinalysis Basic - ( 24 Nov 2023 11:26 )    Color: x / Appearance: x / SG: x / pH: x  Gluc: 102 mg/dL / Ketone: x  / Bili: x / Urobili: x   Blood: x / Protein: x / Nitrite: x   Leuk Esterase: x / RBC: x / WBC x   Sq Epi: x / Non Sq Epi: x / Bacteria: x          IMAGING:  < from: CT Angio Neck Stroke Protocol w/ IV Cont (11.24.23 @ 11:54) >  FINDINGS:  HEMORRHAGE:  No evidence of intracranial hemorrhage.  BRAIN PARENCHYMA:  Moderate atrophy. Chronic small bilateral cerebellar   infarcts. Chronic small left paramedian pontine infarct. Chronic small   right occipital and parietal infarcts. Moderate periventricular.  No mass   or mass effect.  VENTRICLES / SHIFT:  No hydrocephalus. No midline shift.  EXTRA-AXIAL / BASAL CISTERNS:  No extra-axial mass. Basal cisterns   preserved.  CALVARIUM AND EXTRACRANIAL SOFT TISSUES:  No depressed calvarial fracture.  SINUSES, ORBITS, MASTOIDS:  The visualized paranasal sinuses and mastoid   air cells are well aerated.  ----------------------------------------    CTA TECHNIQUE:  CT angiography of the neck and brain was performed during the dynamic   administration of intravenous contrast.  MIP reconstructions were   performed and reviewed. Multiplanar reformations were obtained.  Contrast administered: 90 cc Omipaque 350.  Contrast discarded: 10 cc.    CTA FINDINGS:  NECK  RIGHT CAROTID CIRCULATION:  Evaluation of the right carotid circulation demonstrates large amount of   calcified plaque carotid bulb and proximal internal carotid artery with   severe stenosis (greater than 90%). Good distal flow  LEFT CAROTID CIRCULATION:  Evaluation of the left carotid circulation demonstrates moderate   calcified plaque carotid bulb/bifurcation with approximate 60% narrowing   utilizing a distal reference.  VERTEBRAL ARTERIES:  Evaluation of the vertebral arteries reveals no evidence of a vertebral   artery occlusion or dissection. Right vertebral artery is dominant.   Probable direct origin of the left vertebral artery from the aortic arch,   however it is obscured by artifact from adjacent dense venous contrast.    BRAIN  ANTERIOR CIRCULATION:  Distal internal carotid arteries are patent.  Atherosclerotic   calcification of the cavernous and supraclinoid segments with mild   narrowing  Anterior cerebral arteries are patent.  Is aplastic A1 segment left   anterior cerebral artery. Both A2 segments fill from the right a normal   variant  Middle cerebral arteries are patent.  POSTERIOR CIRCULATION:  Distal vertebral arteries are patent.  Dominant distal right vertebral   artery. Calcified plaque bilaterally with moderate stenosis. Bilateral   posterior inferior cerebellar arteries are seen  Basilar artery is patent.  Proximal superior cerebellar arteries are   patent. Mild narrowing of the proximal and mid basilar artery  Posterior cerebral arteries are patent.  Moderate left posterior   communicating artery    IMPRESSION:  HEAD CT: No evidence of an acute intracranial hemorhage, midline shift or   hydrocephalus. Multiple chronic small infarcts  NECK CTA: Severe stenosis right carotid bulb/proximal internal carotid   artery. Moderate stenosis left carotid bulb.  BRAIN CTA: No proximal large vessel occlusion. Regions of atherosclerotic   narrowing as described.                                                                                    Surgery Consult    Consulting attending: Dr. Koenig       HPI: 78 yo M with a hx of HTN , CAD s/p CABG  , DM, CVA transfer from OSH for one day of stroke-like symptoms with aphasia. Per son , patient walked  to mailbox and was reaching for coffee when he had a "blank stare" and was unable to respond. Patient also noted to have a L sided facial droop. He denies any changes in vision, HA, N&V .Patient has had progressive memory loss.  CTA demonstrates severe stenosis of the  right carotid bulb/proximal internal carotid artery, and moderate stenosis left carotid bulb. No evidence of infarct on CT head. Patient is functional at baseline , able to perform most ADL independently.  Vascular Surgery consulted for evaluation.     PAST MEDICAL HISTORY:  Hypertension    Diabetes mellitus    CAD (coronary artery disease)    BPH (benign prostatic hyperplasia)          PAST SURGICAL HISTORY:  No significant past surgical history          MEDICATIONS:        ALLERGIES:  No Known Allergies        VITALS & I/Os:  Vital Signs Last 24 Hrs  T(C): 36.6 (24 Nov 2023 18:07), Max: 37.1 (24 Nov 2023 12:21)  T(F): 97.9 (24 Nov 2023 18:07), Max: 98.7 (24 Nov 2023 12:21)  HR: 55 (24 Nov 2023 18:07) (49 - 58)  BP: 180/75 (24 Nov 2023 18:07) (106/62 - 180/75)  BP(mean): --  RR: 18 (24 Nov 2023 18:07) (15 - 18)  SpO2: 95% (24 Nov 2023 18:07) (95% - 99%)    Parameters below as of 24 Nov 2023 18:07  Patient On (Oxygen Delivery Method): room air        I&O's Summary        PHYSICAL EXAM:  Constitutional: patient resting comfortably in bed, in no acute distress  HEENT: EOMI / PERRL b/l, no active drainage or redness, L sided lip droop   Respiratory: respirations are unlabored, no accessory muscle use, no conversational dyspnea  Cardiovascular: regular rate & rhythm  Gastrointestinal: Abdomen soft, non-tender, non-distended, no rebound tenderness / guarding  Neurological: GCS: 15 (4/5/6). A&O x 3; L sided lip/facial droop, speech slurred but appropriate, otherwise CN intact  Musculoskeletal: No joint pain, swelling or deformity; no limitation of movement, 5/5 strength in upper and lower ext         LABS:                        11.7   5.14  )-----------( 206      ( 24 Nov 2023 11:26 )             35.7     11-24    138  |  105  |  33<H>  ----------------------------<  102<H>  5.5<H>   |  24  |  1.96<H>    Ca    9.4      24 Nov 2023 11:26    TPro  7.1  /  Alb  3.7  /  TBili  0.8  /  DBili  x   /  AST  39  /  ALT  25  /  AlkPhos  66  11-24    Lactate:    PT/INR - ( 24 Nov 2023 11:26 )   PT: 11.0 sec;   INR: 0.98 ratio         PTT - ( 24 Nov 2023 11:26 )  PTT:33.1 sec          Urinalysis Basic - ( 24 Nov 2023 11:26 )    Color: x / Appearance: x / SG: x / pH: x  Gluc: 102 mg/dL / Ketone: x  / Bili: x / Urobili: x   Blood: x / Protein: x / Nitrite: x   Leuk Esterase: x / RBC: x / WBC x   Sq Epi: x / Non Sq Epi: x / Bacteria: x          IMAGING:  < from: CT Angio Neck Stroke Protocol w/ IV Cont (11.24.23 @ 11:54) >  FINDINGS:  HEMORRHAGE:  No evidence of intracranial hemorrhage.  BRAIN PARENCHYMA:  Moderate atrophy. Chronic small bilateral cerebellar   infarcts. Chronic small left paramedian pontine infarct. Chronic small   right occipital and parietal infarcts. Moderate periventricular.  No mass   or mass effect.  VENTRICLES / SHIFT:  No hydrocephalus. No midline shift.  EXTRA-AXIAL / BASAL CISTERNS:  No extra-axial mass. Basal cisterns   preserved.  CALVARIUM AND EXTRACRANIAL SOFT TISSUES:  No depressed calvarial fracture.  SINUSES, ORBITS, MASTOIDS:  The visualized paranasal sinuses and mastoid   air cells are well aerated.  ----------------------------------------    CTA TECHNIQUE:  CT angiography of the neck and brain was performed during the dynamic   administration of intravenous contrast.  MIP reconstructions were   performed and reviewed. Multiplanar reformations were obtained.  Contrast administered: 90 cc Omipaque 350.  Contrast discarded: 10 cc.    CTA FINDINGS:  NECK  RIGHT CAROTID CIRCULATION:  Evaluation of the right carotid circulation demonstrates large amount of   calcified plaque carotid bulb and proximal internal carotid artery with   severe stenosis (greater than 90%). Good distal flow  LEFT CAROTID CIRCULATION:  Evaluation of the left carotid circulation demonstrates moderate   calcified plaque carotid bulb/bifurcation with approximate 60% narrowing   utilizing a distal reference.  VERTEBRAL ARTERIES:  Evaluation of the vertebral arteries reveals no evidence of a vertebral   artery occlusion or dissection. Right vertebral artery is dominant.   Probable direct origin of the left vertebral artery from the aortic arch,   however it is obscured by artifact from adjacent dense venous contrast.    BRAIN  ANTERIOR CIRCULATION:  Distal internal carotid arteries are patent.  Atherosclerotic   calcification of the cavernous and supraclinoid segments with mild   narrowing  Anterior cerebral arteries are patent.  Is aplastic A1 segment left   anterior cerebral artery. Both A2 segments fill from the right a normal   variant  Middle cerebral arteries are patent.  POSTERIOR CIRCULATION:  Distal vertebral arteries are patent.  Dominant distal right vertebral   artery. Calcified plaque bilaterally with moderate stenosis. Bilateral   posterior inferior cerebellar arteries are seen  Basilar artery is patent.  Proximal superior cerebellar arteries are   patent. Mild narrowing of the proximal and mid basilar artery  Posterior cerebral arteries are patent.  Moderate left posterior   communicating artery    IMPRESSION:  HEAD CT: No evidence of an acute intracranial hemorhage, midline shift or   hydrocephalus. Multiple chronic small infarcts  NECK CTA: Severe stenosis right carotid bulb/proximal internal carotid   artery. Moderate stenosis left carotid bulb.  BRAIN CTA: No proximal large vessel occlusion. Regions of atherosclerotic   narrowing as described.

## 2023-11-24 NOTE — CONSULT NOTE ADULT - SUBJECTIVE AND OBJECTIVE BOX
Patient is a 77y old  Male who presents with a chief complaint of sudden onset of facial droop and loss of speech around 10 AM    HPI: 77-year-old male with history of prior CVA recent COVID infection hypertension BPH brought by ambulance from home for evaluation of sudden onset of facial droop and loss of speech around 10 AM.  Patient now appears to be back to baseline awake alert and answering questions denies any symptoms.  Unclear what patient's residual weakness was or baseline status was from prior CVA.  Was at [facility] until recently.  Denies headache visual disturbance nausea vomiting chest pain abdominal pain diarrhea dysuria hematuria or other symptom.  NIHSS - 0  CT Head  -  CTA Head and NECK -     PAST MEDICAL & SURGICAL HISTORY:  Hypertension      Diabetes mellitus      CAD (coronary artery disease)      BPH (benign prostatic hyperplasia)      No significant past surgical history          MEDICATIONS  (STANDING):    MEDICATIONS  (PRN):      Allergies    No Known Allergies    Intolerances        SOCIAL HISTORY:    No h/o Smoking.   No h/o alcohol use.  Ex Smoker. Quite in past.  Pt smokes.  Pt does drink socially.  Pt drinks alcohol heavily.    FAMILY HISTORY:      REVIEW OF SYSTEMS:    CONSTITUTIONAL: No fever  EYES: No eye pain,   ENMT:  No sinus or throat pain  NECK: No pain or stiffness  RESPIRATORY: No cough, No hemoptysis; No shortness of breath  CARDIOVASCULAR: No acute chest pain, palpitations,  or leg swelling  GASTROINTESTINAL: No abdominal pain. No nausea, vomiting, or hematemesis;  No melena or hematochezia.  GENITOURINARY: No  hematuria, or incontinence  MUSCULOSKELETAL: No joint swelling; No extremity pain  SKIN: No itching, rashes, or lesions   LYMPH NODES: No enlarged glands  NEUROLOGICAL: No headaches, memory loss,   PSYCHIATRIC: No depression, anxiety, mood swings, or difficulty sleeping  ENDOCRINE: No heat or cold intolerance;   HEME/LYMPH: No easy bruising, or bleeding gums  Allergy/Immunology. No medication allergy. No seasonal allergies.    PHYSICAL EXAM:  Vital Signs Last 24 Hrs  T(F): 98.7 (11-24-23 @ 12:21)  HR: 49 (11-24-23 @ 14:17)  BP: 144/66 (11-24-23 @ 14:17)  RR: 16 (11-24-23 @ 14:17)    GENERAL: NAD, well-groomed, well-developed  HEAD:  Atraumatic, Normocephalic  EYES: EOMI, PERRLA, conjunctiva and sclera clear  NECK: Supple, No JVD, thyroid non-palpable    On Neurological Examination:    Mental Status - Pt is alert, awake, oriented X3. Higher functions are intact. Pt. does have mild poor cognition. Follows commands well and able to answer questions appropriately.    Speech -  Normal. Slurred. Pt has no aphasia.    Cranial Nerves - Pupils 3 mm equal and reactive to light, extraocular eye movements intact. Pt has no visual field deficit.  Pt has no right left facial asymmetry. Tongue - is in midline.    Motor Exam - 4 plus/5 all over, No drift. No shaking or tremors.  Muscle tone - is normal all over. Moves all extremities equally. No asymmetry is seen.      Sensory Exam - Pin prick, temperature, joint position and vibration are intact on either side. Pt withdraws all extremities equally on stimulation. No asymmetry seen. No complaints of tingling, numbness.    Gait - Able to stand and walk unassisted. Pt is able to stand up with holding my hands and is able to walk for few feet around the bed. Not falling to either side.    Deep tendon Reflexes - 2 plus all over.    Coordination - Fine finger movements are normal on both sides. Finger to nose is also normal on both sides.       Romberg - Negative.    Neck Supple -  Yes.    LABS:                        11.7   5.14  )-----------( 206      ( 24 Nov 2023 11:26 )             35.7     11-24    138  |  105  |  33<H>  ----------------------------<  102<H>  5.5<H>   |  24  |  1.96<H>    Ca    9.4      24 Nov 2023 11:26    TPro  7.1  /  Alb  3.7  /  TBili  0.8  /  DBili  x   /  AST  39  /  ALT  25  /  AlkPhos  66  11-24    PT/INR - ( 24 Nov 2023 11:26 )   PT: 11.0 sec;   INR: 0.98 ratio         PTT - ( 24 Nov 2023 11:26 )  PTT:33.1 sec  Urinalysis Basic - ( 24 Nov 2023 11:26 )    Color: x / Appearance: x / SG: x / pH: x  Gluc: 102 mg/dL / Ketone: x  / Bili: x / Urobili: x   Blood: x / Protein: x / Nitrite: x   Leuk Esterase: x / RBC: x / WBC x   Sq Epi: x / Non Sq Epi: x / Bacteria: x        RADIOLOGY & ADDITIONAL STUDIES:       Patient is a 77y old  Male who presents with a chief complaint of Sudden onset of left facial droop and loss of speech around 10 AM    HPI: 77-year-old male with history of prior CVA recent COVID infection hypertension BPH brought by ambulance from home for evaluation of sudden onset of facial droop and loss of speech around 10 AM.  Patient now appears to be back to baseline awake alert and answering questions denies any symptoms.  Unclear what patient's residual weakness was or baseline status was from prior CVA.  Was at [facility] until recently.  Denies headache visual disturbance nausea vomiting chest pain abdominal pain diarrhea dysuria hematuria or other symptom.  Son at bedside says that he also had difficulty speaking 2 days ago which also resolved.   HEAD CT: No evidence of an acute intracranial hemorrhage midline shift or hydrocephalus. Multiple chronic small infarcts  NECK CTA: Severe stenosis right carotid bulb/proximal internal carotid artery. Moderate stenosis left carotid bulb.  BRAIN CTA: No proximal large vessel occlusion. Regions of atherosclerotic narrowing as described.  NIHSS -0  Not a TNK candidate  -Resolved symptoms.     PAST MEDICAL & SURGICAL HISTORY:    Hypertension    Diabetes mellitus    CAD (coronary artery disease)    BPH (benign prostatic hyperplasia)    No significant past surgical history    Home Medications:    Centrum Men&#x27;s oral tablet: 1 tab(s) orally once a day (05 Nov 2023 09:39)  losartan 25 mg oral tablet: 1 tab(s) orally once a day (05 Nov 2023 09:37)  metFORMIN 500 mg oral tablet: 2 tab(s) orally once a day (08 Nov 2023 12:34)  Vitamin B-12 1000 mcg oral tablet: 1 tab(s) orally once a day (05 Nov 2023 09:39)  Vitamin D3 5000 intl units (125 mcg) oral tablet: 1 tab(s) orally once a day (05 Nov 2023 09:39)    Allergies    No Known Allergies    SOCIAL HISTORY:    No h/o alcohol use.  Ex Smoker. Quite in past.    FAMILY HISTORY: Asked the son. N/C    REVIEW OF SYSTEMS:    CONSTITUTIONAL: No fever  EYES: No eye pain,   ENMT:  No sinus or throat pain  NECK: No pain or stiffness  RESPIRATORY: No cough, No hemoptysis; No shortness of breath  CARDIOVASCULAR: No acute chest pain, palpitations,  H/o Valve replacement and also Bypass Sx  GASTROINTESTINAL: No abdominal pain. No nausea, vomiting, or hematemesis;    GENITOURINARY: No  hematuria, or incontinence  MUSCULOSKELETAL: No joint swelling; No extremity pain  SKIN: No itching, rashes, or lesions   LYMPH NODES: No enlarged glands  NEUROLOGICAL: No headaches, h/O memory loss, H/o CVAs  PSYCHIATRIC: No depression, anxiety, mood swings, or difficulty sleeping  ENDOCRINE: No heat or cold intolerance;   HEME/LYMPH: No easy bruising, or bleeding gums  Allergy/Immunology. No medication allergy. No seasonal allergies.    PHYSICAL EXAM:  Vital Signs Last 24 Hrs  T(F): 98.7 (11-24-23 @ 12:21)  HR: 49 (11-24-23 @ 14:17)  BP: 144/66 (11-24-23 @ 14:17)  RR: 16 (11-24-23 @ 14:17)    GENERAL: NAD, well-groomed, well-developed  HEAD:  Atraumatic, Normocephalic  EYES: EOMI, PERRLA, conjunctiva and sclera clear  NECK: Supple, No JVD,     On Neurological Examination:    Mental Status - Pt is alert, awake, oriented X3. Poor calculations and poor recall. Follows commands.     Speech -  Normal. Pt has no aphasia.    Cranial Nerves - Pupils 3 mm equal and reactive to light, extraocular eye movements intact.  No facial asymmetry. Tongue - is in midline.    Motor Exam - 4 plus/5 all over, No drift. No shaking or tremors.     Sensory Exam - Pt withdraws all extremities equally on stimulation.     Gait - Pt is able to stand up with holding my hands.    Deep tendon Reflexes - 2 plus all over.    Coordination - No asymmtery.       Neck Supple -  Yes.    LABS:                        11.7   5.14  )-----------( 206      ( 24 Nov 2023 11:26 )             35.7     11-24    138  |  105  |  33<H>  ----------------------------<  102<H>  5.5<H>   |  24  |  1.96<H>    Ca    9.4      24 Nov 2023 11:26    TPro  7.1  /  Alb  3.7  /  TBili  0.8  /  DBili  x   /  AST  39  /  ALT  25  /  AlkPhos  66  11-24    PT/INR - ( 24 Nov 2023 11:26 )   PT: 11.0 sec;   INR: 0.98 ratio      PTT - ( 24 Nov 2023 11:26 )  PTT:33.1 sec  Urinalysis Basic - ( 24 Nov 2023 11:26 )    Color: x / Appearance: x / SG: x / pH: x  Gluc: 102 mg/dL / Ketone: x  / Bili: x / Urobili: x   Blood: x / Protein: x / Nitrite: x   Leuk Esterase: x / RBC: x / WBC x   Sq Epi: x / Non Sq Epi: x / Bacteria: x    RADIOLOGY & ADDITIONAL STUDIES:    < from: CT Brain Stroke Protocol (11.24.23 @ 11:42) >    CTA Head and Neck with contrast     HEAD CT: No evidence of an acute intracranial hemorrhage midline shift or hydrocephalus. Multiple chronic small infarcts  NECK CTA: Severe stenosis right carotid bulb/proximal internal carotid artery. Moderate stenosis left carotid bulb.  BRAIN CTA: No proximal large vessel occlusion. Regions of atherosclerotic narrowing as described.    < end of copied text >

## 2023-11-24 NOTE — ED PROVIDER NOTE - PHYSICAL EXAMINATION
Grace   General: Awake, alert, lying in bed in NAD  HEENT: Normocephalic, atraumatic. No scleral icterus or conjunctival injection. EOMI. Moist mucous membranes. Oropharynx clear.   Neck:. Soft and supple.  Cardiac: RRR, Peripheral pulses 2+ and symmetric. No LE edema.  Resp: Lungs CTAB. No accessory muscle use  Abd: Soft, non-tender, non-distended. No guarding, rebound, or rigidity.  Back: Spine midline and non-tender.   Skin: No rashes, abrasions, or lacerations.  Neuro: AO x 4. Moves all extremities symmetrically. Motor strength and sensation grossly intact.  Psych: Appropriate mood and affect

## 2023-11-25 LAB
A1C WITH ESTIMATED AVERAGE GLUCOSE RESULT: 6.3 % — HIGH (ref 4–5.6)
A1C WITH ESTIMATED AVERAGE GLUCOSE RESULT: 6.3 % — HIGH (ref 4–5.6)
ALBUMIN SERPL ELPH-MCNC: 4.4 G/DL — SIGNIFICANT CHANGE UP (ref 3.3–5.2)
ALBUMIN SERPL ELPH-MCNC: 4.4 G/DL — SIGNIFICANT CHANGE UP (ref 3.3–5.2)
ALP SERPL-CCNC: 78 U/L — SIGNIFICANT CHANGE UP (ref 40–120)
ALP SERPL-CCNC: 78 U/L — SIGNIFICANT CHANGE UP (ref 40–120)
ALT FLD-CCNC: 20 U/L — SIGNIFICANT CHANGE UP
ALT FLD-CCNC: 20 U/L — SIGNIFICANT CHANGE UP
ANION GAP SERPL CALC-SCNC: 16 MMOL/L — SIGNIFICANT CHANGE UP (ref 5–17)
ANION GAP SERPL CALC-SCNC: 16 MMOL/L — SIGNIFICANT CHANGE UP (ref 5–17)
AST SERPL-CCNC: 27 U/L — SIGNIFICANT CHANGE UP
AST SERPL-CCNC: 27 U/L — SIGNIFICANT CHANGE UP
BILIRUB SERPL-MCNC: 0.6 MG/DL — SIGNIFICANT CHANGE UP (ref 0.4–2)
BILIRUB SERPL-MCNC: 0.6 MG/DL — SIGNIFICANT CHANGE UP (ref 0.4–2)
BUN SERPL-MCNC: 27 MG/DL — HIGH (ref 8–20)
BUN SERPL-MCNC: 27 MG/DL — HIGH (ref 8–20)
CALCIUM SERPL-MCNC: 9.6 MG/DL — SIGNIFICANT CHANGE UP (ref 8.4–10.5)
CALCIUM SERPL-MCNC: 9.6 MG/DL — SIGNIFICANT CHANGE UP (ref 8.4–10.5)
CHLORIDE SERPL-SCNC: 102 MMOL/L — SIGNIFICANT CHANGE UP (ref 96–108)
CHLORIDE SERPL-SCNC: 102 MMOL/L — SIGNIFICANT CHANGE UP (ref 96–108)
CHOLEST SERPL-MCNC: 159 MG/DL — SIGNIFICANT CHANGE UP
CHOLEST SERPL-MCNC: 159 MG/DL — SIGNIFICANT CHANGE UP
CK SERPL-CCNC: 150 U/L — SIGNIFICANT CHANGE UP (ref 30–200)
CK SERPL-CCNC: 150 U/L — SIGNIFICANT CHANGE UP (ref 30–200)
CO2 SERPL-SCNC: 22 MMOL/L — SIGNIFICANT CHANGE UP (ref 22–29)
CO2 SERPL-SCNC: 22 MMOL/L — SIGNIFICANT CHANGE UP (ref 22–29)
CREAT SERPL-MCNC: 1.67 MG/DL — HIGH (ref 0.5–1.3)
CREAT SERPL-MCNC: 1.67 MG/DL — HIGH (ref 0.5–1.3)
EGFR: 42 ML/MIN/1.73M2 — LOW
EGFR: 42 ML/MIN/1.73M2 — LOW
ESTIMATED AVERAGE GLUCOSE: 134 MG/DL — HIGH (ref 68–114)
ESTIMATED AVERAGE GLUCOSE: 134 MG/DL — HIGH (ref 68–114)
GLUCOSE BLDC GLUCOMTR-MCNC: 112 MG/DL — HIGH (ref 70–99)
GLUCOSE BLDC GLUCOMTR-MCNC: 112 MG/DL — HIGH (ref 70–99)
GLUCOSE BLDC GLUCOMTR-MCNC: 157 MG/DL — HIGH (ref 70–99)
GLUCOSE BLDC GLUCOMTR-MCNC: 157 MG/DL — HIGH (ref 70–99)
GLUCOSE SERPL-MCNC: 105 MG/DL — HIGH (ref 70–99)
GLUCOSE SERPL-MCNC: 105 MG/DL — HIGH (ref 70–99)
HCT VFR BLD CALC: 36.5 % — LOW (ref 39–50)
HCT VFR BLD CALC: 36.5 % — LOW (ref 39–50)
HDLC SERPL-MCNC: 44 MG/DL — SIGNIFICANT CHANGE UP
HDLC SERPL-MCNC: 44 MG/DL — SIGNIFICANT CHANGE UP
HGB BLD-MCNC: 12.3 G/DL — LOW (ref 13–17)
HGB BLD-MCNC: 12.3 G/DL — LOW (ref 13–17)
LIPID PNL WITH DIRECT LDL SERPL: 93 MG/DL — SIGNIFICANT CHANGE UP
LIPID PNL WITH DIRECT LDL SERPL: 93 MG/DL — SIGNIFICANT CHANGE UP
MAGNESIUM SERPL-MCNC: 2 MG/DL — SIGNIFICANT CHANGE UP (ref 1.6–2.6)
MAGNESIUM SERPL-MCNC: 2 MG/DL — SIGNIFICANT CHANGE UP (ref 1.6–2.6)
MCHC RBC-ENTMCNC: 30.4 PG — SIGNIFICANT CHANGE UP (ref 27–34)
MCHC RBC-ENTMCNC: 30.4 PG — SIGNIFICANT CHANGE UP (ref 27–34)
MCHC RBC-ENTMCNC: 33.7 GM/DL — SIGNIFICANT CHANGE UP (ref 32–36)
MCHC RBC-ENTMCNC: 33.7 GM/DL — SIGNIFICANT CHANGE UP (ref 32–36)
MCV RBC AUTO: 90.1 FL — SIGNIFICANT CHANGE UP (ref 80–100)
MCV RBC AUTO: 90.1 FL — SIGNIFICANT CHANGE UP (ref 80–100)
NON HDL CHOLESTEROL: 115 MG/DL — SIGNIFICANT CHANGE UP
NON HDL CHOLESTEROL: 115 MG/DL — SIGNIFICANT CHANGE UP
PA ADP PRP-ACNC: 131 PRU — LOW (ref 180–376)
PA ADP PRP-ACNC: 131 PRU — LOW (ref 180–376)
PHOSPHATE SERPL-MCNC: 3.4 MG/DL — SIGNIFICANT CHANGE UP (ref 2.4–4.7)
PHOSPHATE SERPL-MCNC: 3.4 MG/DL — SIGNIFICANT CHANGE UP (ref 2.4–4.7)
PLATELET # BLD AUTO: 237 K/UL — SIGNIFICANT CHANGE UP (ref 150–400)
PLATELET # BLD AUTO: 237 K/UL — SIGNIFICANT CHANGE UP (ref 150–400)
POTASSIUM SERPL-MCNC: 4.3 MMOL/L — SIGNIFICANT CHANGE UP (ref 3.5–5.3)
POTASSIUM SERPL-MCNC: 4.3 MMOL/L — SIGNIFICANT CHANGE UP (ref 3.5–5.3)
POTASSIUM SERPL-SCNC: 4.3 MMOL/L — SIGNIFICANT CHANGE UP (ref 3.5–5.3)
POTASSIUM SERPL-SCNC: 4.3 MMOL/L — SIGNIFICANT CHANGE UP (ref 3.5–5.3)
PROT SERPL-MCNC: 7.5 G/DL — SIGNIFICANT CHANGE UP (ref 6.6–8.7)
PROT SERPL-MCNC: 7.5 G/DL — SIGNIFICANT CHANGE UP (ref 6.6–8.7)
RBC # BLD: 4.05 M/UL — LOW (ref 4.2–5.8)
RBC # BLD: 4.05 M/UL — LOW (ref 4.2–5.8)
RBC # FLD: 13.2 % — SIGNIFICANT CHANGE UP (ref 10.3–14.5)
RBC # FLD: 13.2 % — SIGNIFICANT CHANGE UP (ref 10.3–14.5)
SODIUM SERPL-SCNC: 140 MMOL/L — SIGNIFICANT CHANGE UP (ref 135–145)
SODIUM SERPL-SCNC: 140 MMOL/L — SIGNIFICANT CHANGE UP (ref 135–145)
TRIGL SERPL-MCNC: 112 MG/DL — SIGNIFICANT CHANGE UP
TRIGL SERPL-MCNC: 112 MG/DL — SIGNIFICANT CHANGE UP
TROPONIN T, HIGH SENSITIVITY RESULT: 54 NG/L — HIGH (ref 0–51)
TROPONIN T, HIGH SENSITIVITY RESULT: 54 NG/L — HIGH (ref 0–51)
WBC # BLD: 5.33 K/UL — SIGNIFICANT CHANGE UP (ref 3.8–10.5)
WBC # BLD: 5.33 K/UL — SIGNIFICANT CHANGE UP (ref 3.8–10.5)
WBC # FLD AUTO: 5.33 K/UL — SIGNIFICANT CHANGE UP (ref 3.8–10.5)
WBC # FLD AUTO: 5.33 K/UL — SIGNIFICANT CHANGE UP (ref 3.8–10.5)

## 2023-11-25 PROCEDURE — 99223 1ST HOSP IP/OBS HIGH 75: CPT

## 2023-11-25 PROCEDURE — 99223 1ST HOSP IP/OBS HIGH 75: CPT | Mod: GC,57

## 2023-11-25 PROCEDURE — 70551 MRI BRAIN STEM W/O DYE: CPT | Mod: 26

## 2023-11-25 RX ORDER — HYDRALAZINE HCL 50 MG
10 TABLET ORAL ONCE
Refills: 0 | Status: COMPLETED | OUTPATIENT
Start: 2023-11-25 | End: 2023-11-25

## 2023-11-25 RX ORDER — FAMOTIDINE 10 MG/ML
20 INJECTION INTRAVENOUS DAILY
Refills: 0 | Status: DISCONTINUED | OUTPATIENT
Start: 2023-11-25 | End: 2023-11-28

## 2023-11-25 RX ORDER — INSULIN LISPRO 100/ML
VIAL (ML) SUBCUTANEOUS
Refills: 0 | Status: DISCONTINUED | OUTPATIENT
Start: 2023-11-25 | End: 2023-11-27

## 2023-11-25 RX ORDER — GLUCAGON INJECTION, SOLUTION 0.5 MG/.1ML
1 INJECTION, SOLUTION SUBCUTANEOUS ONCE
Refills: 0 | Status: DISCONTINUED | OUTPATIENT
Start: 2023-11-25 | End: 2023-11-28

## 2023-11-25 RX ORDER — DEXTROSE 50 % IN WATER 50 %
15 SYRINGE (ML) INTRAVENOUS ONCE
Refills: 0 | Status: DISCONTINUED | OUTPATIENT
Start: 2023-11-25 | End: 2023-11-28

## 2023-11-25 RX ORDER — METOPROLOL TARTRATE 50 MG
50 TABLET ORAL DAILY
Refills: 0 | Status: DISCONTINUED | OUTPATIENT
Start: 2023-11-25 | End: 2023-11-28

## 2023-11-25 RX ORDER — LOSARTAN POTASSIUM 100 MG/1
25 TABLET, FILM COATED ORAL DAILY
Refills: 0 | Status: DISCONTINUED | OUTPATIENT
Start: 2023-11-25 | End: 2023-11-28

## 2023-11-25 RX ORDER — ACETAMINOPHEN 500 MG
650 TABLET ORAL EVERY 6 HOURS
Refills: 0 | Status: DISCONTINUED | OUTPATIENT
Start: 2023-11-25 | End: 2023-11-28

## 2023-11-25 RX ORDER — SODIUM CHLORIDE 9 MG/ML
1000 INJECTION, SOLUTION INTRAVENOUS
Refills: 0 | Status: DISCONTINUED | OUTPATIENT
Start: 2023-11-25 | End: 2023-11-28

## 2023-11-25 RX ORDER — DEXTROSE 50 % IN WATER 50 %
25 SYRINGE (ML) INTRAVENOUS ONCE
Refills: 0 | Status: DISCONTINUED | OUTPATIENT
Start: 2023-11-25 | End: 2023-11-28

## 2023-11-25 RX ORDER — HEPARIN SODIUM 5000 [USP'U]/ML
5000 INJECTION INTRAVENOUS; SUBCUTANEOUS EVERY 8 HOURS
Refills: 0 | Status: DISCONTINUED | OUTPATIENT
Start: 2023-11-25 | End: 2023-11-28

## 2023-11-25 RX ORDER — INSULIN LISPRO 100/ML
VIAL (ML) SUBCUTANEOUS
Refills: 0 | Status: DISCONTINUED | OUTPATIENT
Start: 2023-11-25 | End: 2023-11-28

## 2023-11-25 RX ORDER — ASPIRIN/CALCIUM CARB/MAGNESIUM 324 MG
81 TABLET ORAL DAILY
Refills: 0 | Status: DISCONTINUED | OUTPATIENT
Start: 2023-11-25 | End: 2023-11-28

## 2023-11-25 RX ORDER — ATORVASTATIN CALCIUM 80 MG/1
80 TABLET, FILM COATED ORAL AT BEDTIME
Refills: 0 | Status: DISCONTINUED | OUTPATIENT
Start: 2023-11-25 | End: 2023-11-28

## 2023-11-25 RX ORDER — DEXTROSE 50 % IN WATER 50 %
12.5 SYRINGE (ML) INTRAVENOUS ONCE
Refills: 0 | Status: DISCONTINUED | OUTPATIENT
Start: 2023-11-25 | End: 2023-11-28

## 2023-11-25 RX ORDER — CLOPIDOGREL BISULFATE 75 MG/1
75 TABLET, FILM COATED ORAL DAILY
Refills: 0 | Status: DISCONTINUED | OUTPATIENT
Start: 2023-11-25 | End: 2023-11-28

## 2023-11-25 RX ORDER — TAMSULOSIN HYDROCHLORIDE 0.4 MG/1
0.4 CAPSULE ORAL AT BEDTIME
Refills: 0 | Status: DISCONTINUED | OUTPATIENT
Start: 2023-11-25 | End: 2023-11-28

## 2023-11-25 RX ORDER — LANOLIN ALCOHOL/MO/W.PET/CERES
3 CREAM (GRAM) TOPICAL AT BEDTIME
Refills: 0 | Status: DISCONTINUED | OUTPATIENT
Start: 2023-11-25 | End: 2023-11-28

## 2023-11-25 RX ORDER — ONDANSETRON 8 MG/1
4 TABLET, FILM COATED ORAL EVERY 8 HOURS
Refills: 0 | Status: DISCONTINUED | OUTPATIENT
Start: 2023-11-25 | End: 2023-11-28

## 2023-11-25 RX ADMIN — FAMOTIDINE 20 MILLIGRAM(S): 10 INJECTION INTRAVENOUS at 11:31

## 2023-11-25 RX ADMIN — Medication 81 MILLIGRAM(S): at 11:31

## 2023-11-25 RX ADMIN — TAMSULOSIN HYDROCHLORIDE 0.4 MILLIGRAM(S): 0.4 CAPSULE ORAL at 22:21

## 2023-11-25 RX ADMIN — Medication 10 MILLIGRAM(S): at 23:52

## 2023-11-25 RX ADMIN — CLOPIDOGREL BISULFATE 75 MILLIGRAM(S): 75 TABLET, FILM COATED ORAL at 11:31

## 2023-11-25 RX ADMIN — ATORVASTATIN CALCIUM 80 MILLIGRAM(S): 80 TABLET, FILM COATED ORAL at 22:21

## 2023-11-25 RX ADMIN — Medication 2: at 23:32

## 2023-11-25 RX ADMIN — HEPARIN SODIUM 5000 UNIT(S): 5000 INJECTION INTRAVENOUS; SUBCUTANEOUS at 22:21

## 2023-11-25 NOTE — ED ADULT NURSE REASSESSMENT NOTE - NS ED NURSE REASSESS COMMENT FT1
PT is A&Ox4, PT resting comfortably in Ed stretcher with out complaints of chest pain or shortness of breath, skin warm dry and unremarkable. Updated pt on plan of care and pt expresses understanding.

## 2023-11-25 NOTE — CONSULT NOTE ADULT - SUBJECTIVE AND OBJECTIVE BOX
INCOMPLETE nOTE   Preliminary note, official note pending attending review/signature.                               [facility] Stroke Team    CC: Left facial droop   HPI:  77M with PMHx of CAD s/p CABG, CVA, HTN, HLD, DM2, BPH, recent COVID positive 10 days ago presented  to [facility] for Left sided facial droop, difficulty word finding and weakness approximately three days ago. At [place], patient had a NIHSS of 0 and no thrombolytic agent was administered. Upon neuro-imaging, patient was noted by a neurologist for carotid stenosis. Patient was then transferred to [facility] and then to [facility] ED on the 11/24/23. On admission here, CT brain was unrevealing, CTA H/N revealed severe stenosis right carotid bulb/proximal internal carotid artery. Moderate stenosis left carotid bulb. The stroke team was consulted for further management with possible cerebral angiogram.     Of note, patient tested positive for COVID       PAST MEDICAL & SURGICAL HISTORY:  Hypertension  Diabetes mellitus  CAD (coronary artery disease)  BPH (benign prostatic hyperplasia)  No significant past surgical history          MEDICATIONS  (STANDING):  aspirin enteric coated 81 milliGRAM(s) Oral daily  atorvastatin 80 milliGRAM(s) Oral at bedtime  clopidogrel Tablet 75 milliGRAM(s) Oral daily  dextrose 5%. 1000 milliLiter(s) (50 mL/Hr) IV Continuous <Continuous>  dextrose 5%. 1000 milliLiter(s) (100 mL/Hr) IV Continuous <Continuous>  dextrose 50% Injectable 25 Gram(s) IV Push once  dextrose 50% Injectable 25 Gram(s) IV Push once  dextrose 50% Injectable 12.5 Gram(s) IV Push once  famotidine    Tablet 20 milliGRAM(s) Oral daily  glucagon  Injectable 1 milliGRAM(s) IntraMuscular once  heparin   Injectable 5000 Unit(s) SubCutaneous every 8 hours  insulin lispro (ADMELOG) corrective regimen sliding scale   SubCutaneous three times a day before meals  insulin lispro (ADMELOG) corrective regimen sliding scale   SubCutaneous Before meals and at bedtime  losartan 25 milliGRAM(s) Oral daily  metoprolol succinate ER 50 milliGRAM(s) Oral daily  tamsulosin 0.4 milliGRAM(s) Oral at bedtime    MEDICATIONS  (PRN):  acetaminophen     Tablet .. 650 milliGRAM(s) Oral every 6 hours PRN Temp greater or equal to 38C (100.4F), Mild Pain (1 - 3)  aluminum hydroxide/magnesium hydroxide/simethicone Suspension 30 milliLiter(s) Oral every 4 hours PRN Dyspepsia  dextrose Oral Gel 15 Gram(s) Oral once PRN Blood Glucose LESS THAN 70 milliGRAM(s)/deciliter  melatonin 3 milliGRAM(s) Oral at bedtime PRN Insomnia  ondansetron Injectable 4 milliGRAM(s) IV Push every 8 hours PRN Nausea and/or Vomiting      Allergies  No Known Allergies  Intolerances        SOCIAL HISTORY:  no tob,   no alcohol   no drugs    FAMILY HISTORY:        ROS: 14 point ROS negative other than what is present in HPI or below    Vital Signs Last 24 Hrs  T(C): 36.8 (25 Nov 2023 11:54), Max: 36.8 (24 Nov 2023 20:24)  T(F): 98.2 (25 Nov 2023 11:54), Max: 98.2 (24 Nov 2023 20:24)  HR: 63 (25 Nov 2023 11:54) (51 - 74)  BP: 182/72 (25 Nov 2023 11:54) (164/71 - 189/50)  BP(mean): --  RR: 19 (25 Nov 2023 11:54) (16 - 19)  SpO2: 98% (25 Nov 2023 11:54) (95% - 99%)    Parameters below as of 25 Nov 2023 11:54  Patient On (Oxygen Delivery Method): room air          General: NAD    Detailed Neurologic Exam:    Mental status: The patient is awake and alert and has normal attention span.  The patient is fully oriented in 3 spheres. The patient is oriented to current events. The patient is able to name objects, follow commands, repeat sentences, with some mild expressive aphasia    Cranial nerves: Pupils equal and react symmetrically to light. There is no visual field deficit to confrontation. Extraocular motion is full with no nystagmus. There is no ptosis. Facial sensation is intact. mild left facial droop, Palate elevates symmetrically. Tongue is midline.    Motor: There is normal bulk and tone.  There is no tremor.  Strength is 5/5 in the right arm and leg.   Strength is 5/5 in the left arm and leg.    Sensation: Intact to light touch and pin in 4 extremities    Reflexes: deferred    Cerebellar: There is no dysmetria on finger to nose testing.    Gait : deferred      NIH SS:  DATE: 11/25/2023  TIME: 0945 am  1A: Level of consciousness (0-3): 0  1B: Questions (0-2): 0  1C: Commands (0-2): 0  2: Gaze (0-2): 0  3: Visual fields (0-3): 0  4: Facial palsy (0-3): 1  MOTOR:  5A: Left arm motor drift (0-4): 0  5B: Right arm motor drift (0-4): 0  6A: Left leg motor drift (0-4): 0  6B: Right leg motor drift (0-4): 0  7: Limb ataxia (0-2): 0  SENSORY:  8: Sensation (0-2): 0  SPEECH:  9: Language (0-3): 1  10: Dysarthria (0-2): 0  EXTINCTION:  11: Extinction/inattention (0-2): 0    TOTAL SCORE:     prehospital mRS=      LABS:                         12.3   5.33  )-----------( 237      ( 25 Nov 2023 09:39 )             36.5       11-25    140  |  102  |  27.0<H>  ----------------------------<  105<H>  4.3   |  22.0  |  1.67<H>    Ca    9.6      25 Nov 2023 09:39  Phos  3.4     11-25  Mg     2.0     11-25    TPro  7.5  /  Alb  4.4  /  TBili  0.6  /  DBili  x   /  AST  27  /  ALT  20  /  AlkPhos  78  11-25      PT/INR - ( 24 Nov 2023 11:26 )   PT: 11.0 sec;   INR: 0.98 ratio         PTT - ( 24 Nov 2023 11:26 )  PTT:33.1 sec    Lipid panel:    HgbA1c:      RADIOLOGY & ADDITIONAL STUDIES (independently reviewed unless otherwise noted):    MR Head No Cont (11.25.23 @ 08:29)     IMPRESSION:   No acute infarct. There are small chronic infarcts in the   right high posterior parietal lobe, right posterior temporal lobe, and   bilateral cerebelli. Moderate periventricular and subcortical white   matter chronic microvascular ischemic changes.    (11.24.23 @ 11:54)     IMPRESSION:  HEAD CT: No evidence of an acute intracranial hemorhage, midline shift or   hydrocephalus. Multiple chronic small infarcts  NECK CTA: Severe stenosis right carotid bulb/proximal internal carotid   artery. Moderate stenosis left carotid bulb.  BRAIN CTA: No proximal large vessel occlusion. Regions of atherosclerotic   narrowing as described.      US Duplex Carotid Arteries Complete, Bilateral (11.24.23 @ 22:49)     IMPRESSION:  1.  There is a moderate stenosis in the right carotid bulb measuring   50-69% using NASCET and a tandem severe stenosis in the mid right ICA   measuring greater than 70% using NASCET criteria.  There is downstream   tardus parvus waveform in the distal right internal carotid artery.  2.  There is a moderate stenosis in the distal left internal carotid   artery measuring 50-69% using NASCET criteria.  3.  There is a severe stenosis in the proximal right external carotid   artery.  4.  There is a mild to moderate stenosis in the proximal left external   carotid artery.    Measurement of carotid stenosis is based on velocity parameters that   correlate the residual internal carotid diameter with that of the more   distal vessel in accordance with a method such as the North American   Symptomatic Carotid Endarterectomy Trial (NASCET).        Xray Chest 1 View AP/PA (11.24.23 @ 12:12)     IMPRESSION: Mild central CHF at this time.   INCOMPLETE nOTE   Preliminary note, official note pending attending review/signature.                               [facility] Stroke Team    CC: Left facial droop   HPI:  77M with PMHx of CAD s/p CABG, CVA, HTN, HLD, DM2, BPH, recent COVID positive 10 days ago presented  to [facility] for Left sided facial droop, difficulty word finding and weakness approximately three days ago. At [place], patient had a NIHSS of 0 and no thrombolytic agent was administered. Upon neuro-imaging, patient was noted by a neurologist for carotid stenosis. Patient was then transferred to [facility] and then to [facility] ED on the 11/24/23. On admission here, CT brain was unrevealing, CTA H/N revealed severe stenosis right carotid bulb/proximal internal carotid artery. Moderate stenosis left carotid bulb. The stroke team was consulted for further management with possible cerebral angiogram.       PAST MEDICAL & SURGICAL HISTORY:  Hypertension  Diabetes mellitus  CAD (coronary artery disease)  BPH (benign prostatic hyperplasia)  No significant past surgical history          MEDICATIONS  (STANDING):  aspirin enteric coated 81 milliGRAM(s) Oral daily  atorvastatin 80 milliGRAM(s) Oral at bedtime  clopidogrel Tablet 75 milliGRAM(s) Oral daily  dextrose 5%. 1000 milliLiter(s) (50 mL/Hr) IV Continuous <Continuous>  dextrose 5%. 1000 milliLiter(s) (100 mL/Hr) IV Continuous <Continuous>  dextrose 50% Injectable 25 Gram(s) IV Push once  dextrose 50% Injectable 25 Gram(s) IV Push once  dextrose 50% Injectable 12.5 Gram(s) IV Push once  famotidine    Tablet 20 milliGRAM(s) Oral daily  glucagon  Injectable 1 milliGRAM(s) IntraMuscular once  heparin   Injectable 5000 Unit(s) SubCutaneous every 8 hours  insulin lispro (ADMELOG) corrective regimen sliding scale   SubCutaneous three times a day before meals  insulin lispro (ADMELOG) corrective regimen sliding scale   SubCutaneous Before meals and at bedtime  losartan 25 milliGRAM(s) Oral daily  metoprolol succinate ER 50 milliGRAM(s) Oral daily  tamsulosin 0.4 milliGRAM(s) Oral at bedtime    MEDICATIONS  (PRN):  acetaminophen     Tablet .. 650 milliGRAM(s) Oral every 6 hours PRN Temp greater or equal to 38C (100.4F), Mild Pain (1 - 3)  aluminum hydroxide/magnesium hydroxide/simethicone Suspension 30 milliLiter(s) Oral every 4 hours PRN Dyspepsia  dextrose Oral Gel 15 Gram(s) Oral once PRN Blood Glucose LESS THAN 70 milliGRAM(s)/deciliter  melatonin 3 milliGRAM(s) Oral at bedtime PRN Insomnia  ondansetron Injectable 4 milliGRAM(s) IV Push every 8 hours PRN Nausea and/or Vomiting      Allergies  No Known Allergies  Intolerances        SOCIAL HISTORY:  no tob,   no alcohol   no drugs    FAMILY HISTORY:        ROS: 14 point ROS negative other than what is present in HPI or below    Vital Signs Last 24 Hrs  T(C): 36.8 (25 Nov 2023 11:54), Max: 36.8 (24 Nov 2023 20:24)  T(F): 98.2 (25 Nov 2023 11:54), Max: 98.2 (24 Nov 2023 20:24)  HR: 63 (25 Nov 2023 11:54) (51 - 74)  BP: 182/72 (25 Nov 2023 11:54) (164/71 - 189/50)  BP(mean): --  RR: 19 (25 Nov 2023 11:54) (16 - 19)  SpO2: 98% (25 Nov 2023 11:54) (95% - 99%)    Parameters below as of 25 Nov 2023 11:54  Patient On (Oxygen Delivery Method): room air          General: NAD    Detailed Neurologic Exam:    Mental status: The patient is awake and alert and has normal attention span.  The patient is fully oriented in 3 spheres. The patient is oriented to current events. The patient is able to name objects, follow commands, repeat sentences, with some mild expressive aphasia    Cranial nerves: Pupils equal and react symmetrically to light. There is no visual field deficit to confrontation. Extraocular motion is full with no nystagmus. There is no ptosis. Facial sensation is intact. mild left facial droop, Palate elevates symmetrically. Tongue is midline.    Motor: There is normal bulk and tone.  There is no tremor.  Strength is 5/5 in the right arm and leg.   Strength is 5/5 in the left arm and leg.    Sensation: Intact to light touch and pin in 4 extremities    Reflexes: deferred    Cerebellar: There is no dysmetria on finger to nose testing.    Gait : deferred      NIH SS:  DATE: 11/25/2023  TIME: 0945 am  1A: Level of consciousness (0-3): 0  1B: Questions (0-2): 0  1C: Commands (0-2): 0  2: Gaze (0-2): 0  3: Visual fields (0-3): 0  4: Facial palsy (0-3): 1  MOTOR:  5A: Left arm motor drift (0-4): 0  5B: Right arm motor drift (0-4): 0  6A: Left leg motor drift (0-4): 0  6B: Right leg motor drift (0-4): 0  7: Limb ataxia (0-2): 0  SENSORY:  8: Sensation (0-2): 0  SPEECH:  9: Language (0-3): 1  10: Dysarthria (0-2): 0  EXTINCTION:  11: Extinction/inattention (0-2): 0    TOTAL SCORE: 2    prehospital mRS= 0      LABS:                         12.3   5.33  )-----------( 237      ( 25 Nov 2023 09:39 )             36.5       11-25    140  |  102  |  27.0<H>  ----------------------------<  105<H>  4.3   |  22.0  |  1.67<H>    Ca    9.6      25 Nov 2023 09:39  Phos  3.4     11-25  Mg     2.0     11-25    TPro  7.5  /  Alb  4.4  /  TBili  0.6  /  DBili  x   /  AST  27  /  ALT  20  /  AlkPhos  78  11-25      PT/INR - ( 24 Nov 2023 11:26 )   PT: 11.0 sec;   INR: 0.98 ratio         PTT - ( 24 Nov 2023 11:26 )  PTT:33.1 sec    Lipid panel: 93    HgbA1c: 6.3      RADIOLOGY & ADDITIONAL STUDIES (independently reviewed unless otherwise noted):    MR Head No Cont (11.25.23 @ 08:29)     IMPRESSION:   No acute infarct. There are small chronic infarcts in the   right high posterior parietal lobe, right posterior temporal lobe, and   bilateral cerebelli. Moderate periventricular and subcortical white   matter chronic microvascular ischemic changes.    (11.24.23 @ 11:54)     IMPRESSION:  HEAD CT: No evidence of an acute intracranial hemorhage, midline shift or   hydrocephalus. Multiple chronic small infarcts  NECK CTA: Severe stenosis right carotid bulb/proximal internal carotid   artery. Moderate stenosis left carotid bulb.  BRAIN CTA: No proximal large vessel occlusion. Regions of atherosclerotic   narrowing as described.      US Duplex Carotid Arteries Complete, Bilateral (11.24.23 @ 22:49)     IMPRESSION:  1.  There is a moderate stenosis in the right carotid bulb measuring   50-69% using NASCET and a tandem severe stenosis in the mid right ICA   measuring greater than 70% using NASCET criteria.  There is downstream   tardus parvus waveform in the distal right internal carotid artery.  2.  There is a moderate stenosis in the distal left internal carotid   artery measuring 50-69% using NASCET criteria.  3.  There is a severe stenosis in the proximal right external carotid   artery.  4.  There is a mild to moderate stenosis in the proximal left external   carotid artery.    Measurement of carotid stenosis is based on velocity parameters that   correlate the residual internal carotid diameter with that of the more   distal vessel in accordance with a method such as the North American   Symptomatic Carotid Endarterectomy Trial (NASCET).        Xray Chest 1 View AP/PA (11.24.23 @ 12:12)     IMPRESSION: Mild central CHF at this time.   [facility] Stroke Team  Consult Note    CC: Left facial droop   HPI:  77M with PMHx of CAD s/p CABG, CVA, HTN, HLD, DM2, BPH, recent COVID positive 10 days ago presented  to [facility] for Left sided facial droop, difficulty word finding and weakness approximately three days ago. At [place], patient had a NIHSS of 0 and no thrombolytic agent was administered. Upon neuro-imaging, patient was noted by a neurologist for carotid stenosis. Patient was then transferred to [facility] and then to [facility] ED on the 11/24/23. On admission here, CT brain was unrevealing, CTA H/N revealed severe stenosis right carotid bulb/proximal internal carotid artery. Moderate stenosis left carotid bulb. The stroke team was consulted for further management with possible cerebral angiogram.       PAST MEDICAL & SURGICAL HISTORY:  Hypertension  Diabetes mellitus  CAD (coronary artery disease)  BPH (benign prostatic hyperplasia)  No significant past surgical history    MEDICATIONS  (STANDING):  aspirin enteric coated 81 milliGRAM(s) Oral daily  atorvastatin 80 milliGRAM(s) Oral at bedtime  clopidogrel Tablet 75 milliGRAM(s) Oral daily  dextrose 5%. 1000 milliLiter(s) (50 mL/Hr) IV Continuous <Continuous>  dextrose 5%. 1000 milliLiter(s) (100 mL/Hr) IV Continuous <Continuous>  dextrose 50% Injectable 25 Gram(s) IV Push once  dextrose 50% Injectable 25 Gram(s) IV Push once  dextrose 50% Injectable 12.5 Gram(s) IV Push once  famotidine    Tablet 20 milliGRAM(s) Oral daily  glucagon  Injectable 1 milliGRAM(s) IntraMuscular once  heparin   Injectable 5000 Unit(s) SubCutaneous every 8 hours  insulin lispro (ADMELOG) corrective regimen sliding scale   SubCutaneous three times a day before meals  insulin lispro (ADMELOG) corrective regimen sliding scale   SubCutaneous Before meals and at bedtime  losartan 25 milliGRAM(s) Oral daily  metoprolol succinate ER 50 milliGRAM(s) Oral daily  tamsulosin 0.4 milliGRAM(s) Oral at bedtime    MEDICATIONS  (PRN):  acetaminophen     Tablet .. 650 milliGRAM(s) Oral every 6 hours PRN Temp greater or equal to 38C (100.4F), Mild Pain (1 - 3)  aluminum hydroxide/magnesium hydroxide/simethicone Suspension 30 milliLiter(s) Oral every 4 hours PRN Dyspepsia  dextrose Oral Gel 15 Gram(s) Oral once PRN Blood Glucose LESS THAN 70 milliGRAM(s)/deciliter  melatonin 3 milliGRAM(s) Oral at bedtime PRN Insomnia  ondansetron Injectable 4 milliGRAM(s) IV Push every 8 hours PRN Nausea and/or Vomiting      Allergies  No Known Allergies  Intolerances        SOCIAL HISTORY:  no tob,   no alcohol   no drugs    FAMILY HISTORY:  n/c      ROS: 14 point ROS negative other than what is present in HPI or below    Vital Signs Last 24 Hrs  T(C): 36.8 (25 Nov 2023 11:54), Max: 36.8 (24 Nov 2023 20:24)  T(F): 98.2 (25 Nov 2023 11:54), Max: 98.2 (24 Nov 2023 20:24)  HR: 63 (25 Nov 2023 11:54) (51 - 74)  BP: 182/72 (25 Nov 2023 11:54) (164/71 - 189/50)  BP(mean): --  RR: 19 (25 Nov 2023 11:54) (16 - 19)  SpO2: 98% (25 Nov 2023 11:54) (95% - 99%)    Parameters below as of 25 Nov 2023 11:54  Patient On (Oxygen Delivery Method): room air    General: NAD    Detailed Neurologic Exam:    Mental status: The patient is awake and alert and has normal attention span.  The patient is fully oriented in 3 spheres. The patient is oriented to current events. The patient is able to name objects, follow commands, repeat sentences, with some mild expressive aphasia    Cranial nerves: Pupils equal and react symmetrically to light. There is no visual field deficit to confrontation. Extraocular motion is full with no nystagmus. There is no ptosis. Facial sensation is intact. mild left facial droop, Palate elevates symmetrically. Tongue is midline.    Motor: There is normal bulk and tone.  There is no tremor.  Strength is 5/5 in the right arm and leg.   Strength is 5/5 in the left arm and leg.    Sensation: Intact to light touch and pin in 4 extremities    Reflexes: deferred    Cerebellar: There is no dysmetria on finger to nose testing.    Gait : deferred      NIH SS:  DATE: 11/25/2023  TIME: 0945 am  1A: Level of consciousness (0-3): 0  1B: Questions (0-2): 0  1C: Commands (0-2): 0  2: Gaze (0-2): 0  3: Visual fields (0-3): 0  4: Facial palsy (0-3): 1  MOTOR:  5A: Left arm motor drift (0-4): 0  5B: Right arm motor drift (0-4): 0  6A: Left leg motor drift (0-4): 0  6B: Right leg motor drift (0-4): 0  7: Limb ataxia (0-2): 0  SENSORY:  8: Sensation (0-2): 0  SPEECH:  9: Language (0-3): 1  10: Dysarthria (0-2): 0  EXTINCTION:  11: Extinction/inattention (0-2): 0    TOTAL SCORE: 2    prehospital mRS= 0      LABS:                         12.3   5.33  )-----------( 237      ( 25 Nov 2023 09:39 )             36.5       11-25    140  |  102  |  27.0<H>  ----------------------------<  105<H>  4.3   |  22.0  |  1.67<H>    Ca    9.6      25 Nov 2023 09:39  Phos  3.4     11-25  Mg     2.0     11-25    TPro  7.5  /  Alb  4.4  /  TBili  0.6  /  DBili  x   /  AST  27  /  ALT  20  /  AlkPhos  78  11-25      PT/INR - ( 24 Nov 2023 11:26 )   PT: 11.0 sec;   INR: 0.98 ratio         PTT - ( 24 Nov 2023 11:26 )  PTT:33.1 sec    Lipid panel:   Lipid Profile (11.25.23 @ 09:39)    Cholesterol: 159 mg/dL   Triglycerides, Serum: 112 mg/dL   HDL Cholesterol: 44 mg/dL   Non HDL Cholesterol: 115   LDL Cholesterol Calculated: 93 mg/dL    HgbA1c:   A1C with Estimated Average Glucose (11.25.23 @ 09:39)    A1C with Estimated Average Glucose Result: 6.3 %   Estimated Average Glucose: 134 mg/dL      RADIOLOGY & ADDITIONAL STUDIES (independently reviewed unless otherwise noted):    MR Head No Cont (11.25.23 @ 08:29)     IMPRESSION:   No acute infarct. There are small chronic infarcts in the   right high posterior parietal lobe, right posterior temporal lobe, and   bilateral cerebelli. Moderate periventricular and subcortical white   matter chronic microvascular ischemic changes.    (11.24.23 @ 11:54)     IMPRESSION:  HEAD CT: No evidence of an acute intracranial hemorhage, midline shift or   hydrocephalus. Multiple chronic small infarcts  NECK CTA: Severe stenosis right carotid bulb/proximal internal carotid   artery. Moderate stenosis left carotid bulb.  BRAIN CTA: No proximal large vessel occlusion. Regions of atherosclerotic   narrowing as described.      US Duplex Carotid Arteries Complete, Bilateral (11.24.23 @ 22:49)     IMPRESSION:  1.  There is a moderate stenosis in the right carotid bulb measuring   50-69% using NASCET and a tandem severe stenosis in the mid right ICA   measuring greater than 70% using NASCET criteria.  There is downstream   tardus parvus waveform in the distal right internal carotid artery.  2.  There is a moderate stenosis in the distal left internal carotid   artery measuring 50-69% using NASCET criteria.  3.  There is a severe stenosis in the proximal right external carotid   artery.  4.  There is a mild to moderate stenosis in the proximal left external   carotid artery.    Measurement of carotid stenosis is based on velocity parameters that   correlate the residual internal carotid diameter with that of the more   distal vessel in accordance with a method such as the North American   Symptomatic Carotid Endarterectomy Trial (NASCET).        Xray Chest 1 View AP/PA (11.24.23 @ 12:12)     IMPRESSION: Mild central CHF at this time.

## 2023-11-25 NOTE — H&P ADULT - HISTORY OF PRESENT ILLNESS
77M with PMHX CAD s/p CABG, CVA, HTN, HLD, DM2, BPH, Recent COVID presents to [facility] with L sided facial droop and weakness. NIHSS 0. Not a TNK candidate. Evaluated by Neurology at [facility]. Found to have Carotid Stenosis and transferred to [facility] for vascular surgery evaluated. Pt evaluated by [facility]. Recent COVID positive per [facility] documentation with repeat positive here. Pt seen/examined. Denies current complaints. ROS negative unless mentioned.    PMHX: CAD s/p CABG, CVA, HTN, HLD, DM2, BPH, Recent COVID  PSHX: CABG  FamHx: Denies fam hx HTN  Social Hx: Dneies active etoh/tobacco/drug abuse   NKDA

## 2023-11-25 NOTE — H&P ADULT - NSHPLABSRESULTS_GEN_ALL_CORE
CXR IMPRESSION: Mild central CHF at this time.    CTH/CTA Head/Neck IMPRESSION:  HEAD CT: No evidence of an acute intracranial hemorhage, midline shift or   hydrocephalus. Multiple chronic small infarcts  NECK CTA: Severe stenosis right carotid bulb/proximal internal carotid   artery. Moderate stenosis left carotid bulb.  BRAIN CTA: No proximal large vessel occlusion. Regions of atherosclerotic   narrowing as described.    BL Carotid Dopplers IMPRESSION:  1.  There is a moderate stenosis in the right carotid bulb measuring   50-69% using NASCET and a tandem severe stenosis in the mid right ICA   measuring greater than 70% using NASCET criteria.  There is downstream   tardus parvus waveform in the distal right internal carotid artery.  2.  There is a moderate stenosis in the distal left internal carotid   artery measuring 50-69% using NASCET criteria.  3.  There is a severe stenosis in the proximal right external carotid   artery.  4.  There is a mild to moderate stenosis in the proximal left external   carotid artery.

## 2023-11-25 NOTE — CHART NOTE - NSCHARTNOTEFT_GEN_A_CORE
Pt admitted for CVA vs TIA; per neurology Q2h neuochecks.    /75, P-56    hydralazine 10mg p.o. once ordered

## 2023-11-25 NOTE — ED ADULT NURSE NOTE - CHIEF COMPLAINT QUOTE
Pt A&Ox4 states "Nothings bothering me." BIBA [place] c/o TIA/ carotid stenosis, left sided facial droop, and weakness has since resolved Patient tested positive 2 weeks ago at rehab

## 2023-11-25 NOTE — ED ADULT NURSE NOTE - NS ED NURSE RECORD ANOTHER HT AND WT
Assessment:     Healthy 9 y.o. female child. Wt Readings from Last 1 Encounters:   08/18/23 21.8 kg (48 lb) (64 %, Z= 0.36)*     * Growth percentiles are based on CDC (Girls, 2-20 Years) data. Ht Readings from Last 1 Encounters:   08/18/23 3' 9" (1.143 m) (39 %, Z= -0.27)*     * Growth percentiles are based on CDC (Girls, 2-20 Years) data. Body mass index is 16.67 kg/m². Vitals:    08/18/23 1326   BP: (!) 100/49   Pulse: 84   Resp: 18   Temp: 97.6 °F (36.4 °C)       1. Health check for child over 34 days old        2. Encounter for hearing examination without abnormal findings        3. Visual testing        4. Body mass index, pediatric, 5th percentile to less than 85th percentile for age        11. Exercise counseling        6. Nutritional counseling             Plan:         1. Anticipatory guidance discussed. Specific topics reviewed: bicycle helmets, chores and other responsibilities, discipline issues: limit-setting, positive reinforcement, fluoride supplementation if unfluoridated water supply, importance of regular dental care, importance of regular exercise, importance of varied diet, minimize junk food, safe storage of any firearms in the home, seat belts; don't put in front seat and skim or lowfat milk best.    Nutrition and Exercise Counseling: The patient's Body mass index is 16.67 kg/m². This is 80 %ile (Z= 0.82) based on CDC (Girls, 2-20 Years) BMI-for-age based on BMI available as of 8/18/2023. Nutrition counseling provided:  Avoid juice/sugary drinks. Anticipatory guidance for nutrition given and counseled on healthy eating habits. 5 servings of fruits/vegetables. Exercise counseling provided:  Anticipatory guidance and counseling on exercise and physical activity given. Reduce screen time to less than 2 hours per day. 1 hour of aerobic exercise daily. 2. Development: appropriate for age.   Reviewed developmental milestone screening and growth charts with parent/guardian. Growing and developing well. 3. Immunizations today: per orders. None. UTD    4. Follow-up visit in 1 year for next well child visit, or sooner as needed. Subjective:     Franklin Kemp is a 9 y.o. female who is here for this well-child visit. Current Issues:  Child presents with dad for well visit. Denies any concerns at this time. Well Child Assessment:  History was provided by the father. Estephania Metz lives with her mother, father, brother and sister. Interval problems do not include caregiver depression, caregiver stress, chronic stress at home, lack of social support, marital discord, recent illness or recent injury. Nutrition  Types of intake include cereals, cow's milk, eggs, fruits, vegetables, meats and junk food. Junk food includes chips, desserts and soda. Dental  The patient has a dental home. The patient brushes teeth regularly. The patient flosses regularly. Last dental exam was less than 6 months ago. Elimination  Elimination problems do not include constipation, diarrhea or urinary symptoms. Toilet training is complete. There is no bed wetting. Behavioral  Behavioral issues do not include biting, hitting, lying frequently, misbehaving with peers, misbehaving with siblings or performing poorly at school. Disciplinary methods include consistency among caregivers. Sleep  Average sleep duration is 8 hours. The patient does not snore. There are no sleep problems. Safety  There is no smoking in the home. Home has working smoke alarms? yes. Home has working carbon monoxide alarms? yes. There is a gun in home (locked up). School  Current grade level is 1st. Current school district is [employer]. There are no signs of learning disabilities. Child is doing well in school. Screening  Immunizations are up-to-date. There are no risk factors for hearing loss. There are no risk factors for anemia. There are no risk factors for dyslipidemia.  There are no risk factors for tuberculosis. There are no risk factors for lead toxicity. Social  The caregiver enjoys the child. After school, the child is at home with a parent. Sibling interactions are good. The child spends 3 hours in front of a screen (tv or computer) per day. The following portions of the patient's history were reviewed and updated as appropriate:   She  has no past medical history on file. She   Patient Active Problem List    Diagnosis Date Noted   • Inadequate fluoride intake 01/06/2020     She  has a past surgical history that includes No past surgeries. Her family history includes No Known Problems in her brother, father, mother, and sister; Pancreatic cancer in her paternal grandfather. She  reports that she has never smoked. She has never used smokeless tobacco. No history on file for alcohol use and drug use. Current Outpatient Medications   Medication Sig Dispense Refill   • Pediatric Multivitamins-Fl (Multivitamin/Fluoride) 0.5 MG CHEW CHEW AND SWALLOW ONE TABLET BY MOUTH ONCE DAILY 90 tablet 3   • diphenhydrAMINE (BENADRYL) 12.5 mg/5 mL oral liquid Take 5 mL (12.5 mg total) by mouth every 8 (eight) hours as needed for itching or allergies for up to 7 days 236 mL 0     No current facility-administered medications for this visit. Current Outpatient Medications on File Prior to Visit   Medication Sig   • Pediatric Multivitamins-Fl (Multivitamin/Fluoride) 0.5 MG CHEW CHEW AND SWALLOW ONE TABLET BY MOUTH ONCE DAILY   • diphenhydrAMINE (BENADRYL) 12.5 mg/5 mL oral liquid Take 5 mL (12.5 mg total) by mouth every 8 (eight) hours as needed for itching or allergies for up to 7 days     No current facility-administered medications on file prior to visit. She has No Known Allergies. .    Developmental 5 Years Appropriate     Question Response Comments    Can appropriately answer the following questions: 'What do you do when you are cold? Hungry?  Tired?' Yes  Yes on 7/5/2022 (Age - 4yrs)    Can fasten some buttons Yes  Yes on 7/5/2022 (Age - 4yrs)    Can balance on one foot for 6 seconds given 3 chances Yes Yes on 7/6/2021 (Age - 3yrs)    Can identify the longer of 2 lines drawn on paper, and can continue to identify longer line when paper is turned 180 degrees Yes  Yes on 7/5/2022 (Age - 4yrs)    Can copy a picture of a cross (+) Yes  Yes on 7/5/2022 (Age - 4yrs)    Can follow the following verbal commands without gestures: 'Put this paper on the floor. ..under the chair. ..in front of you. ..behind you' Yes  Yes on 7/5/2022 (Age - 4yrs)    Stays calm when left with a stranger, e.g.  Yes  Yes on 7/5/2022 (Age - 4yrs)    Can identify objects by their colors Yes  Yes on 7/5/2022 (Age - 4yrs)    Can hop on one foot 2 or more times Yes  Yes on 7/5/2022 (Age - 4yrs)    Can get dressed completely without help Yes  Yes on 7/5/2022 (Age - 4yrs)      Developmental 6-8 Years Appropriate     Question Response Comments    Can draw picture of a person that includes at least 3 parts, counting paired parts, e.g. arms, as one Yes  Yes on 7/5/2022 (Age - 4yrs)    Had at least 6 parts on that same picture Yes  Yes on 7/5/2022 (Age - 4yrs)    Can appropriately complete 2 of the following sentences: 'If a horse is big, a mouse is. ..'; 'If fire is hot, ice is. ..'; 'If a cheetah is fast, a snail is. ..' Yes  Yes on 7/5/2022 (Age - 4yrs)    Can catch a small ball (e.g. tennis ball) using only hands Yes  Yes on 7/5/2022 (Age - 4yrs)    Can balance on one foot 11 seconds or more given 3 chances Yes  Yes on 7/5/2022 (Age - 4yrs)    Can copy a picture of a square Yes  Yes on 7/5/2022 (Age - 4yrs)    Can appropriately complete all of the following questions: 'What is a spoon made of?'; 'What is a shoe made of?'; 'What is a door made of?' Yes  Yes on 7/5/2022 (Age - 4yrs)                Objective:       Vitals:    08/18/23 1326   BP: (!) 100/49   BP Location: Left arm   Patient Position: Sitting   Pulse: 84   Resp: 18   Temp: 97.6 °F (36.4 °C)   TempSrc: Tympanic   Weight: 21.8 kg (48 lb)   Height: 3' 9" (1.143 m)     Growth parameters are noted and are appropriate for age. Hearing Screening    125Hz 250Hz 500Hz 1000Hz 2000Hz 3000Hz 4000Hz 6000Hz 8000Hz   Right ear 40 40 40 15 15 15 15 15 15   Left ear 25 25 30 15 15 15 15 15 15     Vision Screening    Right eye Left eye Both eyes   Without correction 20/25 20/25 20/25   With correction          Physical Exam  Vitals reviewed. Exam conducted with a chaperone present. Constitutional:       General: She is active. She is not in acute distress. Appearance: Normal appearance. She is well-developed and normal weight. Comments: Pleasant and cooperative. HENT:      Head: Normocephalic and atraumatic. Right Ear: Tympanic membrane, ear canal and external ear normal.      Left Ear: Tympanic membrane, ear canal and external ear normal.      Nose: Nose normal.      Mouth/Throat:      Mouth: Mucous membranes are moist.      Pharynx: Oropharynx is clear. Comments: Good dentition. Eyes:      General:         Right eye: No discharge. Left eye: No discharge. Extraocular Movements: Extraocular movements intact. Conjunctiva/sclera: Conjunctivae normal.      Pupils: Pupils are equal, round, and reactive to light. Cardiovascular:      Rate and Rhythm: Normal rate and regular rhythm. Pulses: Normal pulses. Heart sounds: Normal heart sounds. No murmur heard. Comments: Normal S1 and S2. No murmur sitting or lying down. Bilateral femoral pulses strong and symmetrical.  Pulmonary:      Effort: Pulmonary effort is normal. No respiratory distress. Breath sounds: Normal breath sounds. No decreased air movement. No wheezing, rhonchi or rales. Comments: Respirations even and unlabored. Abdominal:      General: Abdomen is flat. Bowel sounds are normal. There is no distension. Palpations: Abdomen is soft. There is no mass. Tenderness:  There is no abdominal tenderness. Hernia: No hernia is present. Comments: No organomegaly   Genitourinary:     General: Normal vulva. Comments: Normal external genitalia  Dawood stage 1  Musculoskeletal:         General: Normal range of motion. Cervical back: Normal range of motion and neck supple. Comments: Bilateral scapulae and hips even and symmetrical.  Spine straight with standing and bending forward. No scoliosis noted. Lymphadenopathy:      Cervical: No cervical adenopathy. Skin:     General: Skin is warm and dry. Capillary Refill: Capillary refill takes less than 2 seconds. Findings: No rash. Neurological:      General: No focal deficit present. Mental Status: She is alert and oriented for age. Motor: No weakness.    Psychiatric:         Mood and Affect: Mood normal.         Behavior: Behavior normal. Yes

## 2023-11-25 NOTE — PROGRESS NOTE ADULT - ASSESSMENT
77M with PMHX CAD s/p CABG, CVA, HTN, HLD, DM2, BPH, Recent COVID presents to [place] ER with L sided facial droop and weakness now resolved transferred to [facility] ER for TIA/CVA and bilateral Carotid Stenosis.     Severe Carotid Stenosis r/o TIA/CVA  - CTH/CTA Head/Neck Reviewed  - TTE pending  - MRI Brain: No acute infarct. There are small chronic infarcts in the   right high posterior parietal lobe, right posterior temporal lobe, and   bilateral cerebelli. Moderate periventricular and subcortical white   matter chronic microvascular ischemic changes.  - BL Carotid Dopplers reviewed  - Continue ASA/Plavix  - Lipitor 80mg nightly  - Vascular surgery consult appreciated  - Neurology consulted    CAD, HTN, HLD  - ASA 81mg q24 + Plavix 75mg q24  - Lipitor 80mg nightly   - Losartan 25mg q24  - Metoprolol Succinate 50mg q24    COVID Infection  - Repeat PCR positive  - Previously positive according to [place] records  - Isolation Precautions  - Hold Dexamethasone/Remdesivir for now pending clinical status  - No evidence of hypoxia at this time    Hyperkalemia  - Resolved    ARF v CKD  - Cr 1.67  - Appears at baseline    DM2  -Hold Metformin  - FS with ISS    BPH  -Flomax 0.4mg nightly    GERD  - Pepcid 20mg daily    DVT ppx  - Heparin SQ

## 2023-11-25 NOTE — H&P ADULT - ASSESSMENT
ASSESSMENT:  77M with PMHX CAD s/p CABG, CVA, HTN, HLD, DM2, BPH, Recent COVID presents to [place] ER with L sided facial droop and weakness now resolved transferred to [facility] ER for TIA/CVA and bilateral Carotid Stenosis.     PLAN:  Severe Carotid Stenosis r/o TIA/CVA  -CTH/CTA Head/Neck Reviewed  -Admit to Telemetry  -Repeat Labs  -TTE  -Check MRI  -BL Carotid Dopplers reviewed  -Continue ASA/Plavix  -High Intensity Statin  -PT/OT/Speech  -VTE PPX SQH  -Permissive HTN x24 hours  -Vascular Sx Consulted  -Neuro Consulted    CAD, HTN, HLD  -ASA 81mg q24 + Plavix 75mg q24  -Atorvastatin 40mg q24 -> Cont 80mg q24  -Losartan 25mg q24  -Metoprolol Succinate 50mg q24    COVID Infection  -Repeat PCR positive  -Previously positive according to [place] records  -Isolation Precautions  -Hold Dexamethasone/Remdesivir for now pending clinical status  -No evidence of hypoxia at this time    Hyperkalemia  -Repeat Labs. Tele. Check EKG.    ARF v CKD  -Unclear baseline. Trend MBP. Check UA.     DM2  -Hold Metformin. NPO. Accuchecks/ISS. Check A1C.    BPH  -Flomax 0.4mg qHS    GERD  -Continue H2 Blocker

## 2023-11-25 NOTE — CONSULT NOTE ADULT - ASSESSMENT
ASSESSMENT:   77M with PMHx of CAD s/p CABG, CVA, HTN, HLD, DM2, BPH, recent COVID positive 10 days ago presented  to [facility] for Left sided facial droop, difficulty word finding and weakness approximately three days ago. At [place], patient had a NIHSS of 0 and no thrombolytic agent was administered. Upon neuro-imaging, patient was noted by a neurologist for carotid stenosis. Patient was then transferred to [facility] and then to [facility] ED on the 11/24/23. On admission here, CT brain was unrevealing, CTA H/N revealed severe stenosis right carotid bulb/proximal internal carotid artery. Moderate stenosis left carotid bulb. Ultrasound of the carotid revealed moderate stenosis in the Right carotid bulb measuring approximately 50-69% and tandem mild Right ICA greater than 70% and moderate stenosis in the in the distal Left ICA measuring about 50-69%. MRI brain revealed no acute infarct, but small chronic infarcts in the right high posterior lobe, right posterior temporal lobe and bilateral cerebri with some subcortical white matter chronic microvascular changes  The stroke team was consulted for further management with possible cerebral angiogram. Etiology at this time concerning for large vessel atherosclerotic disease; symptomatic with no acute infarction.     NEURO:   -Neurologically intact with some mild left facial droop and some mild expressive aphasia     -Continue close monitoring for neurologic deterioration    - Stroke neuro checks q 2hr   - Permissive HTN for now, avoid hypotension and rapid fluctuations   -ANTITHROMBOTIC THERAPY: On ASA 81mg daily and Plavix 75 mg, Recommend a plavix load of 300 mg and then continue on maintenance dose, further recommendations pending further work up.   -titrate statin to LDL goal less than 70 - recommend high dose statin   -MRI Brain w/o noted   -Dysphagia screen: pass/fail   -Physical therapy/OT/Speech eval/treatment.       CARDIOVASCULAR:   check TTE, cardiac monitoring w/ telemetry for now, further evaluation pending findings of noted workup                              HEMATOLOGY:   H/H 12.3/36.5, Platelets 237, patient should have all age and risk appropriate malignancy screenings with PCP or sooner if clinically suspected   DVT ppx: Heparin s.c [x] LMWH []     PULMONARY:   CXR noted, On room air , protecting airway, saturating well   Can consider d-dimer     RENAL:   BUN/Cr 27.0/1.67, monitor urine output, maintain adequate hydration    Na Goal:  135-145      ID:   afebrile, no leukocytosis, monitor for si/sx of infection     OTHER:    condition and plan of care d/w patient, questions and concerns addressed.     DISPOSITION:   Rehab or home depending on PT eval once stable and workup is complete      CORE MEASURES:        Admission NIHSS: 2     Tenecteplase : [] YES [x] NO      LDL/HDL/A1C: 93/44/6.3     Depression Screen- if depression hx and/or present      Statin Therapy: recommend high dose statin      Dysphagia Screen: [] PASS [] FAIL     Smoking [] YES [] NO quit 25 yrs ago     Afib [] YES [] NO     Stroke Education [] YES [] NO    Obtain screening lower extremity venous ultrasound in patients who meet 1 or more of the following criteria as patient is high risk for DVT/PE on admission:   [] History of DVT/PE  []Hypercoagulable states (Factor V Leiden, Cancer, OCP, etc. )  []Prolonged immobility (hemiplegia/hemiparesis/post operative or any other extended immobilization)  [] Transferred from outside facility (Rehab or Long term care)  [] Age </= to 50 ASSESSMENT:   77M with PMHx of CAD s/p CABG, CVA, HTN, HLD, DM2, BPH, recent COVID positive 10 days ago presented  to [facility] for Left sided facial droop, difficulty word finding and weakness approximately three days ago. At [place], patient had a NIHSS of 0 and no thrombolytic agent was administered. Upon neuro-imaging, patient was noted by a neurologist for carotid stenosis. Patient was then transferred to [facility] and then to [facility] ED on the 11/24/23. On admission here, CT brain was unrevealing, CTA H/N revealed severe stenosis right carotid bulb/proximal internal carotid artery. Moderate stenosis left carotid bulb. Ultrasound of the carotid revealed moderate stenosis in the Right carotid bulb measuring approximately 50-69% and tandem mild Right ICA greater than 70% and moderate stenosis in the in the distal Left ICA measuring about 50-69%. MRI brain revealed no acute infarct, but small chronic infarcts in the right high posterior lobe, right posterior temporal lobe and bilateral cerebri with some subcortical white matter chronic microvascular changes  The stroke team was consulted for further management with possible cerebral angiogram. Etiology at this time concerning for large vessel atherosclerotic disease; symptomatic with no acute infarction.     NEURO:   -Neurologically intact with some mild left facial droop and some mild expressive aphasia     -Continue close monitoring for neurologic deterioration    - Stroke neuro checks q 2hr   - Permissive HTN for now, avoid hypotension and rapid fluctuations   -ANTITHROMBOTIC THERAPY: On ASA 81mg daily and Plavix 75 mg, Recommend a plavix load of 300 mg and then continue on maintenance dose of 75 mg daily, further recommendations pending further work up.   -titrate statin to LDL goal less than 70 - recommend high dose statin   -MRI Brain w/o noted, no acute stroke, but several old strokes including in right hemisphere  -Dysphagia screen: pass/fail   -Physical therapy/OT/Speech eval/treatment.    CARDIOVASCULAR:   check TTE, cardiac monitoring w/ telemetry for now, further evaluation pending findings of noted workup                              HEMATOLOGY:   H/H 12.3/36.5, Platelets 237, patient should have all age and risk appropriate malignancy screenings with PCP or sooner if clinically suspected   DVT ppx: Heparin s.c [x] LMWH []     PULMONARY:   CXR noted, On room air , protecting airway, saturating well   Can consider d-dimer     RENAL:   BUN/Cr 27.0/1.67, monitor urine output, maintain adequate hydration    Na Goal:  135-145      ID:   afebrile, no leukocytosis, monitor for si/sx of infection     OTHER:    condition and plan of care d/w patient, questions and concerns addressed.   Will need medical clearance for angiogram    DISPOSITION:   Rehab or home depending on PT eval once stable and workup is complete      CORE MEASURES:        Admission NIHSS: 2     Tenecteplase : [] YES [x] NO      LDL/HDL/A1C: 93/44/6.3     Depression Screen- if depression hx and/or present      Statin Therapy: recommend high dose statin      Dysphagia Screen: [] PASS [] FAIL     Smoking [] YES [] NO quit 25 yrs ago     Afib [] YES [] NO     Stroke Education [] YES [] NO    Obtain screening lower extremity venous ultrasound in patients who meet 1 or more of the following criteria as patient is high risk for DVT/PE on admission:   [] History of DVT/PE  []Hypercoagulable states (Factor V Leiden, Cancer, OCP, etc. )  []Prolonged immobility (hemiplegia/hemiparesis/post operative or any other extended immobilization)  [] Transferred from outside facility (Rehab or Long term care)  [] Age </= to 50

## 2023-11-25 NOTE — H&P ADULT - TIME BILLING
Labs/Imaging/Notes reviewed. Consults appreciated. Neuro and Vascular consulted. Discussed with ED Attending.

## 2023-11-25 NOTE — ED ADULT NURSE NOTE - OBJECTIVE STATEMENT
Assumed pt care 2000. Pt c/o sudden onset of facial droop and loss of speech. Pt was seen at [facility] with code stroke and transferred for vascular sx evaluation. Pt denies any CP, SOB, HA, dizziness, vision changes, abd pain, NVD, fever/chills, cough, and urinary symptoms at this time. Resp even and unlabored. NAD present. Pt made aware of plan of care and verbalized understanding.

## 2023-11-25 NOTE — H&P ADULT - NSHPPHYSICALEXAM_GEN_ALL_CORE
Vital Signs Last 24 Hrs  T(C): 36.7 (25 Nov 2023 03:32), Max: 37.1 (24 Nov 2023 12:21)  T(F): 98 (25 Nov 2023 03:32), Max: 98.7 (24 Nov 2023 12:21)  HR: 74 (25 Nov 2023 03:32) (49 - 74)  BP: 188/89 (25 Nov 2023 03:32) (106/62 - 189/50)  BP(mean): --  RR: 19 (25 Nov 2023 03:32) (15 - 19)  SpO2: 96% (25 Nov 2023 03:32) (95% - 99%)    Parameters below as of 25 Nov 2023 03:32  Patient On (Oxygen Delivery Method): room air    Constitutional: Well-appearing, NAD, VSS  Head: NC/AT  Eyes: PERRL, EOMI, anicteric sclera, conjunctiva WNL  ENT: Normal Pharynx, MMM, No tonsillar exudate/erythema  Neck: Supple, Non-tender  Chest: Non-tender, no rashes  Cardio: RRR, s1/s2, no appreciable murmurs/rubs/gallops  Resp: BS CTA bilaterally, no wheezing/rhonchi/rales  Abd: Soft, Non-tender, Non-distended, no rebound/guarding/rigidity  : not examined  Rectal: not examined  MSK: moving all extremities, no motor weakness, full ROM x4  Ext: palpable distal pulses, good capillary refill, no clubbing/cyanosis/edema  Psych: appropriate, cooperative  Neuro: CN II-XII grossly intact, no focal deficits  Skin: Warm/Dry. No rashes.

## 2023-11-25 NOTE — ED ADULT NURSE NOTE - NSFALLRISKINTERV_ED_ALL_ED

## 2023-11-25 NOTE — ED ADULT NURSE REASSESSMENT NOTE - NS ED NURSE REASSESS COMMENT FT1
pt moved into private room on CM+. pt yelling @ RN "I have been here for 8 plus hours and no one has given .me my medications. they are trying to screw you". RN asked pt what medications they take and pt answered "I gave them a list at [place] , I don't know". when RN asked who is trying to screw them, pt states" you just better be careful". denies visual or auditory hallucinations. MD GARNICA. contacted in regards to RN's interaction with pt. MD states they will come see pt.

## 2023-11-25 NOTE — CONSULT NOTE ADULT - ATTENDING COMMENTS
76 y/o male initially with weakness in his legs a couple of weeks ago reports mainly left leg this resolved . about 3 days ago pt reports son noticed left side facial droop  and dysarthria . This subsequently resolved . MRI showing no acute infarct , but presence of subacute to chronic on right . CTA and ultrasound showing high grade stenosis . In review of the imaging believe he is a candidate for TCAR procedure . will need cardiac eval prior to intervention . check p2y12 level
pt seen 11/25 symptomatic TU , required intervention d/w Neuro IR planned for right carotid stent .

## 2023-11-25 NOTE — CONSULT NOTE ADULT - NS ATTEND AMEND GEN_ALL_CORE FT
Seen and examined with the ACP team. Plan discussed with ACPs.    I agree with assessment and plan  as written with modifications made above.    will follow with you    Hany Franco MD PhD   [id number]

## 2023-11-25 NOTE — CONSULT NOTE ADULT - ASSESSMENT
76 yo M with extensive cardiac hx including HTN , CAD s/p CABG (on ASA) and previous CVA on last year with no residual deficits, transfer from [place] with one day of stroke-like symptoms - L sided facial droop and aphasia. Workup including CTA demonstrates severe R ICA stenosis and L moderate stenosis.     Plan:   - TCAR planned for 11/29 or 11/30  -  Cardiology consult for surgical optimization  - Medical optimization needed  - F/U NSGY consultation   - Recommend bilateral carotid duplex   - MRI brain to assess for infarcts   - Continue antiplatelet tx, ASA/Statin    Pt evaluated with attending on rounds

## 2023-11-26 LAB
ANION GAP SERPL CALC-SCNC: 14 MMOL/L — SIGNIFICANT CHANGE UP (ref 5–17)
ANION GAP SERPL CALC-SCNC: 14 MMOL/L — SIGNIFICANT CHANGE UP (ref 5–17)
BASOPHILS # BLD AUTO: 0.02 K/UL — SIGNIFICANT CHANGE UP (ref 0–0.2)
BASOPHILS # BLD AUTO: 0.02 K/UL — SIGNIFICANT CHANGE UP (ref 0–0.2)
BASOPHILS NFR BLD AUTO: 0.5 % — SIGNIFICANT CHANGE UP (ref 0–2)
BASOPHILS NFR BLD AUTO: 0.5 % — SIGNIFICANT CHANGE UP (ref 0–2)
BUN SERPL-MCNC: 27.4 MG/DL — HIGH (ref 8–20)
BUN SERPL-MCNC: 27.4 MG/DL — HIGH (ref 8–20)
CALCIUM SERPL-MCNC: 9 MG/DL — SIGNIFICANT CHANGE UP (ref 8.4–10.5)
CALCIUM SERPL-MCNC: 9 MG/DL — SIGNIFICANT CHANGE UP (ref 8.4–10.5)
CHLORIDE SERPL-SCNC: 106 MMOL/L — SIGNIFICANT CHANGE UP (ref 96–108)
CHLORIDE SERPL-SCNC: 106 MMOL/L — SIGNIFICANT CHANGE UP (ref 96–108)
CO2 SERPL-SCNC: 23 MMOL/L — SIGNIFICANT CHANGE UP (ref 22–29)
CO2 SERPL-SCNC: 23 MMOL/L — SIGNIFICANT CHANGE UP (ref 22–29)
CREAT SERPL-MCNC: 1.93 MG/DL — HIGH (ref 0.5–1.3)
CREAT SERPL-MCNC: 1.93 MG/DL — HIGH (ref 0.5–1.3)
EGFR: 35 ML/MIN/1.73M2 — LOW
EGFR: 35 ML/MIN/1.73M2 — LOW
EOSINOPHIL # BLD AUTO: 0.07 K/UL — SIGNIFICANT CHANGE UP (ref 0–0.5)
EOSINOPHIL # BLD AUTO: 0.07 K/UL — SIGNIFICANT CHANGE UP (ref 0–0.5)
EOSINOPHIL NFR BLD AUTO: 1.9 % — SIGNIFICANT CHANGE UP (ref 0–6)
EOSINOPHIL NFR BLD AUTO: 1.9 % — SIGNIFICANT CHANGE UP (ref 0–6)
GLUCOSE BLDC GLUCOMTR-MCNC: 100 MG/DL — HIGH (ref 70–99)
GLUCOSE BLDC GLUCOMTR-MCNC: 100 MG/DL — HIGH (ref 70–99)
GLUCOSE BLDC GLUCOMTR-MCNC: 111 MG/DL — HIGH (ref 70–99)
GLUCOSE BLDC GLUCOMTR-MCNC: 111 MG/DL — HIGH (ref 70–99)
GLUCOSE BLDC GLUCOMTR-MCNC: 129 MG/DL — HIGH (ref 70–99)
GLUCOSE BLDC GLUCOMTR-MCNC: 129 MG/DL — HIGH (ref 70–99)
GLUCOSE BLDC GLUCOMTR-MCNC: 150 MG/DL — HIGH (ref 70–99)
GLUCOSE BLDC GLUCOMTR-MCNC: 150 MG/DL — HIGH (ref 70–99)
GLUCOSE SERPL-MCNC: 86 MG/DL — SIGNIFICANT CHANGE UP (ref 70–99)
GLUCOSE SERPL-MCNC: 86 MG/DL — SIGNIFICANT CHANGE UP (ref 70–99)
HCT VFR BLD CALC: 32.6 % — LOW (ref 39–50)
HCT VFR BLD CALC: 32.6 % — LOW (ref 39–50)
HGB BLD-MCNC: 10.9 G/DL — LOW (ref 13–17)
HGB BLD-MCNC: 10.9 G/DL — LOW (ref 13–17)
IMM GRANULOCYTES NFR BLD AUTO: 0 % — SIGNIFICANT CHANGE UP (ref 0–0.9)
IMM GRANULOCYTES NFR BLD AUTO: 0 % — SIGNIFICANT CHANGE UP (ref 0–0.9)
LYMPHOCYTES # BLD AUTO: 1.43 K/UL — SIGNIFICANT CHANGE UP (ref 1–3.3)
LYMPHOCYTES # BLD AUTO: 1.43 K/UL — SIGNIFICANT CHANGE UP (ref 1–3.3)
LYMPHOCYTES # BLD AUTO: 38.1 % — SIGNIFICANT CHANGE UP (ref 13–44)
LYMPHOCYTES # BLD AUTO: 38.1 % — SIGNIFICANT CHANGE UP (ref 13–44)
MCHC RBC-ENTMCNC: 29.5 PG — SIGNIFICANT CHANGE UP (ref 27–34)
MCHC RBC-ENTMCNC: 29.5 PG — SIGNIFICANT CHANGE UP (ref 27–34)
MCHC RBC-ENTMCNC: 33.4 GM/DL — SIGNIFICANT CHANGE UP (ref 32–36)
MCHC RBC-ENTMCNC: 33.4 GM/DL — SIGNIFICANT CHANGE UP (ref 32–36)
MCV RBC AUTO: 88.3 FL — SIGNIFICANT CHANGE UP (ref 80–100)
MCV RBC AUTO: 88.3 FL — SIGNIFICANT CHANGE UP (ref 80–100)
MONOCYTES # BLD AUTO: 0.55 K/UL — SIGNIFICANT CHANGE UP (ref 0–0.9)
MONOCYTES # BLD AUTO: 0.55 K/UL — SIGNIFICANT CHANGE UP (ref 0–0.9)
MONOCYTES NFR BLD AUTO: 14.7 % — HIGH (ref 2–14)
MONOCYTES NFR BLD AUTO: 14.7 % — HIGH (ref 2–14)
NEUTROPHILS # BLD AUTO: 1.68 K/UL — LOW (ref 1.8–7.4)
NEUTROPHILS # BLD AUTO: 1.68 K/UL — LOW (ref 1.8–7.4)
NEUTROPHILS NFR BLD AUTO: 44.8 % — SIGNIFICANT CHANGE UP (ref 43–77)
NEUTROPHILS NFR BLD AUTO: 44.8 % — SIGNIFICANT CHANGE UP (ref 43–77)
PLATELET # BLD AUTO: 195 K/UL — SIGNIFICANT CHANGE UP (ref 150–400)
PLATELET # BLD AUTO: 195 K/UL — SIGNIFICANT CHANGE UP (ref 150–400)
POTASSIUM SERPL-MCNC: 4.1 MMOL/L — SIGNIFICANT CHANGE UP (ref 3.5–5.3)
POTASSIUM SERPL-MCNC: 4.1 MMOL/L — SIGNIFICANT CHANGE UP (ref 3.5–5.3)
POTASSIUM SERPL-SCNC: 4.1 MMOL/L — SIGNIFICANT CHANGE UP (ref 3.5–5.3)
POTASSIUM SERPL-SCNC: 4.1 MMOL/L — SIGNIFICANT CHANGE UP (ref 3.5–5.3)
RBC # BLD: 3.69 M/UL — LOW (ref 4.2–5.8)
RBC # BLD: 3.69 M/UL — LOW (ref 4.2–5.8)
RBC # FLD: 13.4 % — SIGNIFICANT CHANGE UP (ref 10.3–14.5)
RBC # FLD: 13.4 % — SIGNIFICANT CHANGE UP (ref 10.3–14.5)
SODIUM SERPL-SCNC: 143 MMOL/L — SIGNIFICANT CHANGE UP (ref 135–145)
SODIUM SERPL-SCNC: 143 MMOL/L — SIGNIFICANT CHANGE UP (ref 135–145)
WBC # BLD: 3.75 K/UL — LOW (ref 3.8–10.5)
WBC # BLD: 3.75 K/UL — LOW (ref 3.8–10.5)
WBC # FLD AUTO: 3.75 K/UL — LOW (ref 3.8–10.5)
WBC # FLD AUTO: 3.75 K/UL — LOW (ref 3.8–10.5)

## 2023-11-26 PROCEDURE — 99233 SBSQ HOSP IP/OBS HIGH 50: CPT

## 2023-11-26 PROCEDURE — 99232 SBSQ HOSP IP/OBS MODERATE 35: CPT | Mod: GC

## 2023-11-26 RX ORDER — INFLUENZA VIRUS VACCINE 15; 15; 15; 15 UG/.5ML; UG/.5ML; UG/.5ML; UG/.5ML
0.7 SUSPENSION INTRAMUSCULAR ONCE
Refills: 0 | Status: DISCONTINUED | OUTPATIENT
Start: 2023-11-26 | End: 2023-11-28

## 2023-11-26 RX ORDER — CLOPIDOGREL BISULFATE 75 MG/1
300 TABLET, FILM COATED ORAL ONCE
Refills: 0 | Status: COMPLETED | OUTPATIENT
Start: 2023-11-26 | End: 2023-11-26

## 2023-11-26 RX ADMIN — FAMOTIDINE 20 MILLIGRAM(S): 10 INJECTION INTRAVENOUS at 09:35

## 2023-11-26 RX ADMIN — Medication 50 MILLIGRAM(S): at 06:05

## 2023-11-26 RX ADMIN — LOSARTAN POTASSIUM 25 MILLIGRAM(S): 100 TABLET, FILM COATED ORAL at 06:05

## 2023-11-26 RX ADMIN — CLOPIDOGREL BISULFATE 75 MILLIGRAM(S): 75 TABLET, FILM COATED ORAL at 09:35

## 2023-11-26 RX ADMIN — ATORVASTATIN CALCIUM 80 MILLIGRAM(S): 80 TABLET, FILM COATED ORAL at 22:02

## 2023-11-26 RX ADMIN — Medication 81 MILLIGRAM(S): at 09:35

## 2023-11-26 RX ADMIN — HEPARIN SODIUM 5000 UNIT(S): 5000 INJECTION INTRAVENOUS; SUBCUTANEOUS at 06:06

## 2023-11-26 RX ADMIN — HEPARIN SODIUM 5000 UNIT(S): 5000 INJECTION INTRAVENOUS; SUBCUTANEOUS at 16:38

## 2023-11-26 RX ADMIN — TAMSULOSIN HYDROCHLORIDE 0.4 MILLIGRAM(S): 0.4 CAPSULE ORAL at 22:02

## 2023-11-26 RX ADMIN — CLOPIDOGREL BISULFATE 300 MILLIGRAM(S): 75 TABLET, FILM COATED ORAL at 10:36

## 2023-11-26 RX ADMIN — HEPARIN SODIUM 5000 UNIT(S): 5000 INJECTION INTRAVENOUS; SUBCUTANEOUS at 22:02

## 2023-11-26 NOTE — PROGRESS NOTE ADULT - SUBJECTIVE AND OBJECTIVE BOX
Patient seen and examined at bedside.     No acute events overnight. Patient is lying in bed with no complaints this morning. Denies headache, changes in vision, changes in hearing, weakness, or numbess    Vitals:    Labs:    Exam:  Gen: pt lying in bed, alert, in NAD  Resp: unlabored  CVS: RRR  Abd: soft, NT, ND  Ext: moving all extremities spontaneously, sensation intact, pulses 2+   Patient seen and examined at bedside.     No acute events overnight. Patient is lying in bed with no complaints this morning. Denies headache, changes in vision, changes in hearing, weakness, or numbess    Vitals:  ICU Vital Signs Last 24 Hrs  T(C): 36.5 (26 Nov 2023 08:13), Max: 36.8 (25 Nov 2023 11:54)  T(F): 97.7 (26 Nov 2023 08:13), Max: 98.2 (25 Nov 2023 11:54)  HR: 52 (26 Nov 2023 08:13) (52 - 64)  BP: 155/67 (26 Nov 2023 08:13) (143/62 - 206/76)  BP(mean): --  ABP: --  ABP(mean): --  RR: 18 (26 Nov 2023 08:13) (18 - 19)  SpO2: 99% (26 Nov 2023 08:13) (92% - 99%)    O2 Parameters below as of 26 Nov 2023 08:13  Patient On (Oxygen Delivery Method): room air            Labs:    Exam:  Gen: pt lying in bed, alert, in NAD  Resp: unlabored  CVS: RRR  Abd: soft, NT, ND  Ext: moving all extremities spontaneously, sensation intact, pulses 2+   Patient seen and examined at bedside.     No acute events overnight. Patient is lying in bed with no complaints this morning. Denies headache, changes in vision, changes in hearing, weakness, or numbess    Vitals:  ICU Vital Signs Last 24 Hrs  T(C): 36.5 (26 Nov 2023 08:13), Max: 36.8 (25 Nov 2023 11:54)  T(F): 97.7 (26 Nov 2023 08:13), Max: 98.2 (25 Nov 2023 11:54)  HR: 52 (26 Nov 2023 08:13) (52 - 64)  BP: 155/67 (26 Nov 2023 08:13) (143/62 - 206/76)  BP(mean): --  ABP: --  ABP(mean): --  RR: 18 (26 Nov 2023 08:13) (18 - 19)  SpO2: 99% (26 Nov 2023 08:13) (92% - 99%)    O2 Parameters below as of 26 Nov 2023 08:13  Patient On (Oxygen Delivery Method): room air      Labs:  11-26    143  |  106  |  27.4<H>  ----------------------------<  86  4.1   |  23.0  |  1.93<H>    Ca    9.0      26 Nov 2023 07:03  Phos  3.4     11-25  Mg     2.0     11-25    TPro  7.5  /  Alb  4.4  /  TBili  0.6  /  DBili  x   /  AST  27  /  ALT  20  /  AlkPhos  78  11-25                            10.9   3.75  )-----------( 195      ( 26 Nov 2023 07:03 )             32.6       Exam:  Gen: pt lying in bed, alert, in NAD  HEENT: palpable carotid pulses bilateral, EOMI  Resp: unlabored  CVS: RRR  Ext: moving all extremities spontaneously, sensation intact, palpable  radial pulses bialteral

## 2023-11-26 NOTE — PROGRESS NOTE ADULT - ASSESSMENT
77M with PMHX CAD s/p CABG, CVA, HTN, HLD, DM2, BPH, Recent COVID presents to [facility] ER with L sided facial droop and weakness now resolved transferred to [facility] ER for TIA/CVA and bilateral Carotid Stenosis.     Severe Carotid Stenosis r/o TIA/CVA  - CTH/CTA Head/Neck Reviewed  - TTE pending  - MRI Brain: No acute infarct. There are small chronic infarcts in the   right high posterior parietal lobe, right posterior temporal lobe, and   bilateral cerebelli. Moderate periventricular and subcortical white   matter chronic microvascular ischemic changes.  - BL Carotid Dopplers reviewed  - Continue ASA/Plavix  - Lipitor 80mg nightly  - Vascular surgery consult appreciated  - Neurology consult appreciated  - Loaded with Plavix 300 x 1  - Angiogram and carotid stent placement tomorrow with [facility]  - No absolute medical contraindication to proceed with carotid stent placement and cerebral angiogram    CAD, HTN, HLD  - ASA 81mg q24 + Plavix 75mg daily  - Lipitor 80mg nightly   - Losartan 25mg q24  - Metoprolol Succinate 50mg daily    COVID Infection  - Repeat PCR positive  - Previously positive according to [facility] records  - Isolation Precautions  - Hold Dexamethasone/Remdesivir for now pending clinical status  - No evidence of hypoxia at this time    ARF v CKD  - 1.93  - Monitor BMP    DM2  -Hold Metformin  - FS with ISS    BPH  -Flomax 0.4mg nightly    GERD  - Pepcid 20mg daily    DVT ppx  - Heparin SQ

## 2023-11-26 NOTE — PATIENT PROFILE ADULT - FUNCTIONAL ASSESSMENT - BASIC MOBILITY 6.
3-calculated by average/Not able to assess (calculate score using [facility] averaging method)

## 2023-11-26 NOTE — PATIENT PROFILE ADULT - DO YOU FEEL LIKE HURTING YOURSELF OR OTHERS?
[facility]    Home Care Following Hernia Repair    [facility], DO      Hernia Type:  Incisional    Pain meds:     600mg ibuprofen every 6hrs prn     650mg of acetaminophen every 6hrs prn    You can alternate these meds so that you take one every 3 hrs.      Make sure you do not go over 4000mg of acetaminophen every 24hrs, especially if you are taking Norco or percocet 5/325mg.    Care of the Incision:    If surgical glue was used, keep your incision dry for 24 hours.  Then you may shower, but don t submerge under water for at least 2 weeks.  Gently pat your incision dry with a freshly laundered towel.    Do not touch your incision with bare hands or pick at scabs.    Leave your incision open to air.  Cover it only if clothing rubs or irritates it.          Activity:    Gradually increase your activity.  Walk short distances several times each day and increase the distance as your strength allows.    To promote circulation, do not cross your legs while sitting.    No strenuous lifting or straining for 2-3 weeks.   Do not lift anything over 15 pounds until your doctor approves an increase.    Return to work will be determined by the type of work you do and should be discussed with your physician.    Do not drive or operate equipment while taking prescription pain medicines.  You may drive 1 week after surgery if you have stopped taking prescription pain medicines and are pain-free enough to react quickly and make an emergency stop if necessary.    Diet:    Return to the diet you were on before surgery.    Drink plenty of  water.    Avoid foods that cause constipation.      REMEMBER--most prescription pain pills cause constipation.  Walking, extra fluids, and increased fiber (fresh fruits and vegetables, etc.) are natural remedies for constipation.  You can also take mineral oil, 1-2 Tablespoons per day.  If still constipated you may try a stool softener such as Colace or Miralax.    Call Your  Physician if You Have:    Redness, increased swelling or cloudy drainage from your incision.    A temperature of more than 101 degrees F.    Worsening pain in your incision not relieved by your prescription pain pills and/or a short rest.    Any questions or concerns about your recovery, please call     Business hours [phone]    After hours [phone] Nurse Advice Line (24 hours a day)    Follow-up Care:    Make an appointment 2-3 weeks after your surgery.  Call [phone]    [facility]   Adult Discharge Orders & Instructions     For 24 hours after surgery    1. Get plenty of rest.  A responsible adult must stay with you for at least 24 hours after you leave the hospital.   2. Do not drive or use heavy equipment.  If you have weakness or tingling, don't drive or use heavy equipment until this feeling goes away.  3. Do not drink alcohol.  4. Avoid strenuous or risky activities.  Ask for help when climbing stairs.   5. You may feel lightheaded.  IF so, sit for a few minutes before standing.  Have someone help you get up.   6. If you have nausea (feel sick to your stomach): Drink only clear liquids such as apple juice, ginger ale, broth or 7-Up.  Rest may also help.  Be sure to drink enough fluids.  Move to a regular diet as you feel able.  7. You may have a slight fever. Call the doctor if your fever is over 100 F (37.7 C) (taken under the tongue) or lasts longer than 24 hours.  8. You may have a dry mouth, a sore throat, muscle aches or trouble sleeping.  These should go away after 24 hours.  9. Do not make important or legal decisions.   Call your doctor for any of the followin.  Signs of infection (fever, growing tenderness at the surgery site, a large amount of drainage or bleeding, severe pain, foul-smelling drainage, redness, swelling).    2. It has been over 8 to 10 hours since surgery and you are still not able to urinate (pass water).    3.  Headache for over 24 hours.    4.   Numbness, tingling or weakness the day after surgery (if you had spinal anesthesia).  Pt request medication for scopolamine patch. They will require treatment for {NUMBERS 1-12 (3):239325} days. They {HAVE:[id number]}used the patch before. The patient was advised to follow the instructions on use carefully and advised to wash hands after applying.        no

## 2023-11-26 NOTE — PATIENT PROFILE ADULT - ARRIVAL FROM
PT DAILY TREATMENT NOTE     Patient Name: Carol Rodriguez  Date:2022  : 2001  [x]  Patient  Verified  Payor: Katelin Ritterbarbra / Plan: [facility] / Product Type: Managed Care Medicaid /    In time: 140  Out time: 0329  Total Treatment Time (min): 53  Total Timed Codes (min): 43  1:1 Treatment Time (min): 53   Visit # 3      Treatment Area: Pain in left shoulder [M25.512]    SUBJECTIVE  Pt reports continued L shoulder pain. She reports compliance with HEP \"about every other day. \"  Pain Level (0-10 scale): 5/10  Any medication changes, allergies to medications, adverse drug reactions, diagnosis change, or new procedure performed?: [x] No    [] Yes (see summary sheet for update)        OBJECTIVE  Modality rationale: decrease edema, decrease inflammation, and decrease pain to improve the patients ability to reach overhead   Min Type Additional Details    [] Estim: []Att   []Unatt  []TENS instruct                 []IFC  []Premod []NMES                       []Other:  []w/US   []w/ice   []w/heat  Position:  Location:    []  Traction: [] Cervical       []Lumbar                       [] Prone          []Supine                       []Intermittent   []Continuous Lbs:  [] before manual  [] after manual    []  Ultrasound: []Continuous   [] Pulsed                           []1MHz   []3MHz Location:  W/cm2:         []  Ice     []  heat  []  Ice massage Position:  Location:   10 [x]  Vasopneumatic Device Pressure: [x] lo [] med [] hi   Temp: [x] lo [] med [] hi   [x] Skin assessment post-treatment:  [x]intact []redness- no adverse reaction       []redness - adverse reaction:       41 min Therapeutic Exercise:  [x] See flow sheet :   Rationale: increase ROM and increase strength to improve the patients ability to reach overhead             With TE Patient Education: [x] Review HEP    [] Progressed/Changed HEP based on:   [] positioning   [] body mechanics   [] transfers   [] heat/ice application          Pain Level (0-10 scale) post treatment: 3/10    ASSESSMENT/Changes in Function:   Session initiated on UBE followed by seated self stretching and PRE. Today's Tx session emphasized exercises per POC to increase L shoulder ROM and strength with Pt tolerating Tx with no adverse effects. Pt demonstrates increased activity tolerance when performing exercises during today's Tx session. Introduced rows, shoulder extensions, no monies, and open books and pt tolerates well. Pt agrees to ice following tx and reports reduced sx afterward. PT will continue per POC, progressing as tolerated. Patient will continue to benefit from skilled PT services to modify and progress therapeutic interventions, address functional mobility deficits, address ROM deficits, address strength deficits, analyze and address soft tissue restrictions, analyze and cue movement patterns, analyze and modify body mechanics/ergonomics, and assess and modify postural abnormalities to attain remaining goals.      []  See Plan of Care  []  See progress note/recertification  []  See Discharge Summary             PLAN  [x]  Upgrade activities as tolerated     [x]  Continue plan of care  [x]  Update interventions per flow sheet       []  Discharge due to:_  []  Other:_      Concha Woody, PT, DPT 8/9/2022  5:26 [facility]

## 2023-11-26 NOTE — PROGRESS NOTE ADULT - ASSESSMENT
ASSESSMENT:   77M with PMHx of CAD s/p CABG, CVA, HTN, HLD, DM2, BPH, recent COVID positive 10 days ago presented  to [facility] for Left sided facial droop, difficulty word finding and weakness approximately three days ago. At [place], patient had a NIHSS of 0 and no thrombolytic agent was administered. Upon neuro-imaging, patient was noted by a neurologist for carotid stenosis. Patient was then transferred to [facility] and then to [facility] ED on the 11/24/23. On admission here, CT brain was unrevealing, CTA H/N revealed severe stenosis right carotid bulb/proximal internal carotid artery. Moderate stenosis left carotid bulb. Ultrasound of the carotid revealed moderate stenosis in the Right carotid bulb measuring approximately 50-69% and tandem mild Right ICA greater than 70% and moderate stenosis in the in the distal Left ICA measuring about 50-69%. MRI brain revealed no acute infarct, but small chronic infarcts in the right high posterior lobe, right posterior temporal lobe and bilateral cerebri with some subcortical white matter chronic microvascular changes  The stroke team was consulted for further management with possible cerebral angiogram. Etiology at this time concerning for large vessel atherosclerotic disease; symptomatic with no acute infarction.     NEURO:   -Neurologically with improved expressive aphasia    -Continue close monitoring for neurologic deterioration    - Stroke neuro checks q 2hr   - Permissive HTN for now, avoid hypotension and rapid fluctuations   -ANTITHROMBOTIC THERAPY: On ASA 81mg daily and Plavix 75 mg, Recommend a plavix load of 300 mg and then continue on maintenance dose of 75 mg daily, further recommendations pending further work up.   - Plan for cerebral Angiogram tomorrow 11/27/23   -titrate statin to LDL goal less than 70 - recommend high dose statin   -MRI Brain w/o noted, no acute stroke, but several old strokes including in right hemisphere  -Dysphagia screen: pass/fail   -Physical therapy/OT/Speech eval/treatment.    CARDIOVASCULAR:   check TTE, cardiac monitoring w/ telemetry for now, further evaluation pending findings of noted workup                              HEMATOLOGY:   H/H 10.9/32.6, Platelets 195, patient should have all age and risk appropriate malignancy screenings with PCP or sooner if clinically suspected   DVT ppx: Heparin s.c [x] LMWH []     PULMONARY:   CXR noted, On room air, protecting airway, saturating well       RENAL:   BUN/Cr 27.4/1.93, monitor urine output, maintain adequate hydration    Na Goal:  135-145      ID:   afebrile, no leukocytosis, monitor for si/sx of infection     OTHER:    condition and plan of care d/w patient, questions and concerns addressed.   Will need medical clearance for angiogram    DISPOSITION:   Rehab or home depending on PT eval once stable and workup is complete      CORE MEASURES:        Admission NIHSS: 2     Tenecteplase : [] YES [x] NO      LDL/HDL/A1C: 93/44/6.3     Depression Screen- if depression hx and/or present      Statin Therapy: recommend high dose statin      Dysphagia Screen: [] PASS [] FAIL     Smoking [] YES [] NO quit 25 yrs ago     Afib [] YES [] NO     Stroke Education [] YES [] NO    Obtain screening lower extremity venous ultrasound in patients who meet 1 or more of the following criteria as patient is high risk for DVT/PE on admission:   [] History of DVT/PE  []Hypercoagulable states (Factor V Leiden, Cancer, OCP, etc. )  []Prolonged immobility (hemiplegia/hemiparesis/post operative or any other extended immobilization)  [] Transferred from outside facility (Rehab or Long term care)  [] Age </= to 50 ASSESSMENT:   77M with PMHx of CAD s/p CABG, CVA, HTN, HLD, DM2, BPH, recent COVID positive 10 days ago presented  to [facility] for Left sided facial droop, difficulty word finding and weakness approximately three days ago. At [place], patient had a NIHSS of 0 and no thrombolytic agent was administered. Upon neuro-imaging, patient was noted by a neurologist for carotid stenosis. Patient was then transferred to [facility] and then to [facility] ED on the 11/24/23. On admission here, CT brain was unrevealing, CTA H/N revealed severe stenosis right carotid bulb/proximal internal carotid artery. Moderate stenosis left carotid bulb. Ultrasound of the carotid revealed moderate stenosis in the Right carotid bulb measuring approximately 50-69% and tandem mild Right ICA greater than 70% and moderate stenosis in the in the distal Left ICA measuring about 50-69%. MRI brain revealed no acute infarct, but small chronic infarcts in the right high posterior lobe, right posterior temporal lobe and bilateral cerebri with some subcortical white matter chronic microvascular changes  The stroke team was consulted for further management with possible cerebral angiogram. Etiology at this time concerning for large vessel atherosclerotic disease; symptomatic with no acute infarction.     NEURO:   -Neurologically with improved expressive aphasia    -Continue close monitoring for neurologic deterioration    - Stroke neuro checks q 2hr   - Permissive HTN for now, avoid hypotension and rapid fluctuations   -ANTITHROMBOTIC THERAPY: On ASA 81mg daily and Plavix 75 mg, Recommend a plavix load of 300 mg and then continue on maintenance dose of 75 mg daily, further recommendations pending further work up.   - Plan for cerebral Angiogram tomorrow 11/27/23   -titrate statin to LDL goal less than 70 - recommend high dose statin   -MRI Brain w/o noted, no acute stroke, but several old strokes including in right hemisphere  -Dysphagia screen: pass/fail   -Physical therapy/OT/Speech eval/treatment.      CARDIOVASCULAR:   check TTE, cardiac monitoring w/ telemetry for now, further evaluation pending findings of noted workup                              HEMATOLOGY:   H/H 10.9/32.6, Platelets 195, patient should have all age and risk appropriate malignancy screenings with PCP or sooner if clinically suspected   DVT ppx: Heparin s.c [x] LMWH []     PULMONARY:   CXR noted, On room air, protecting airway, saturating well       RENAL:   BUN/Cr 27.4/1.93, monitor urine output, maintain adequate hydration    Na Goal:  135-145      ID:   afebrile, no leukocytosis, monitor for si/sx of infection     OTHER:    condition and plan of care d/w patient, questions and concerns addressed.   Will need medical clearance for angiogram    DISPOSITION:   Rehab or home depending on PT eval once stable and workup is complete      CORE MEASURES:        Admission NIHSS: 2     Tenecteplase : [] YES [x] NO      LDL/HDL/A1C: 93/44/6.3     Depression Screen- if depression hx and/or present      Statin Therapy: recommend high dose statin      Dysphagia Screen: [] PASS [] FAIL     Smoking [] YES [] NO quit 25 yrs ago     Afib [] YES [] NO     Stroke Education [] YES [] NO    Obtain screening lower extremity venous ultrasound in patients who meet 1 or more of the following criteria as patient is high risk for DVT/PE on admission:   [] History of DVT/PE  []Hypercoagulable states (Factor V Leiden, Cancer, OCP, etc. )  []Prolonged immobility (hemiplegia/hemiparesis/post operative or any other extended immobilization)  [] Transferred from outside facility (Rehab or Long term care)  [] Age </= to 50

## 2023-11-26 NOTE — PROGRESS NOTE ADULT - SUBJECTIVE AND OBJECTIVE BOX
Chief complaint: Carotid stenosis    Patient seen and examined at bedside. No acute overnight events reported. Patient states he is feeling well, no fever, chills, cough, nausea or vomiting.     Vital Signs Last 24 Hrs  T(F): 99 (26 Nov 2023 10:17), Max: 99 (26 Nov 2023 10:17)  HR: 59 (26 Nov 2023 10:17) (52 - 64)  BP: 146/53 (26 Nov 2023 10:17) (143/62 - 206/76)  RR: 18 (26 Nov 2023 10:17) (18 - 19)  SpO2: 98% (26 Nov 2023 10:17) (92% - 99%)    Physical Exam:  Constitutional: alert and oriented, in no acute distress   Neck: Soft and supple  Respiratory: Good air entry b/l  Cardiovascular: Regular rate and rhythm  Gastrointestinal: Soft, non-tender to palpation, +bs  Vascular: 2+ peripheral pulses  Neurological: A/O x 3  Musculoskeletal: 5/5 strength b/l upper and lower extremities, no lower extremity edema bilaterally    Labs:                        10.9   3.75  )-----------( 195      ( 26 Nov 2023 07:03 )             32.6   11-26    143  |  106  |  27.4<H>  ----------------------------<  86  4.1   |  23.0  |  1.93<H>    Ca    9.0      26 Nov 2023 07:03  Phos  3.4     11-25  Mg     2.0     11-25    TPro  7.5  /  Alb  4.4  /  TBili  0.6  /  DBili  x   /  AST  27  /  ALT  20  /  AlkPhos  78  11-25

## 2023-11-26 NOTE — PROGRESS NOTE ADULT - SUBJECTIVE AND OBJECTIVE BOX
Preliminary note, offical recommendations pending attending review/signature   [facility] Stroke Team  Progress Note     HPI:  77M with PMHx of CAD s/p CABG, CVA, HTN, HLD, DM2, BPH, recent COVID positive 10 days ago presented  to [facility] for Left sided facial droop, difficulty word finding and weakness approximately three days ago. At [place], patient had a NIHSS of 0 and no thrombolytic agent was administered. Upon neuro-imaging, patient was noted by a neurologist for carotid stenosis. Patient was then transferred to [facility] and then to [facility] ED on the 11/24/23. On admission here, CT brain was unrevealing, CTA H/N revealed severe stenosis right carotid bulb/proximal internal carotid artery. Moderate stenosis left carotid bulb. The stroke team was consulted for further management with possible cerebral angiogram.     SUBJECTIVE: No events overnight.  No new neurologic complaints.  ROS reported negative unless otherwise noted.    acetaminophen     Tablet .. 650 milliGRAM(s) Oral every 6 hours PRN  aluminum hydroxide/magnesium hydroxide/simethicone Suspension 30 milliLiter(s) Oral every 4 hours PRN  aspirin enteric coated 81 milliGRAM(s) Oral daily  atorvastatin 80 milliGRAM(s) Oral at bedtime  clopidogrel Tablet 75 milliGRAM(s) Oral daily  dextrose 5%. 1000 milliLiter(s) IV Continuous <Continuous>  dextrose 5%. 1000 milliLiter(s) IV Continuous <Continuous>  dextrose 50% Injectable 12.5 Gram(s) IV Push once  dextrose 50% Injectable 25 Gram(s) IV Push once  dextrose 50% Injectable 25 Gram(s) IV Push once  dextrose Oral Gel 15 Gram(s) Oral once PRN  famotidine    Tablet 20 milliGRAM(s) Oral daily  glucagon  Injectable 1 milliGRAM(s) IntraMuscular once  heparin   Injectable 5000 Unit(s) SubCutaneous every 8 hours  influenza  Vaccine (HIGH DOSE) 0.7 milliLiter(s) IntraMuscular once  insulin lispro (ADMELOG) corrective regimen sliding scale   SubCutaneous three times a day before meals  insulin lispro (ADMELOG) corrective regimen sliding scale   SubCutaneous Before meals and at bedtime  losartan 25 milliGRAM(s) Oral daily  melatonin 3 milliGRAM(s) Oral at bedtime PRN  metoprolol succinate ER 50 milliGRAM(s) Oral daily  ondansetron Injectable 4 milliGRAM(s) IV Push every 8 hours PRN  tamsulosin 0.4 milliGRAM(s) Oral at bedtime      PHYSICAL EXAM:   Vital Signs Last 24 Hrs  T(C): 36.7 (26 Nov 2023 12:43), Max: 37.2 (26 Nov 2023 10:17)  T(F): 98.1 (26 Nov 2023 12:43), Max: 99 (26 Nov 2023 10:17)  HR: 63 (26 Nov 2023 12:43) (52 - 64)  BP: 141/68 (26 Nov 2023 12:43) (141/68 - 206/76)  BP(mean): --  RR: 18 (26 Nov 2023 12:43) (18 - 19)  SpO2: 97% (26 Nov 2023 12:43) (92% - 99%)    Parameters below as of 26 Nov 2023 12:43  Patient On (Oxygen Delivery Method): room air        General: NAD, no longer under covid restrictions     Detailed Neurologic Exam:    Mental status: The patient is awake and alert and has normal attention span.  The patient is fully oriented in 3 spheres. The patient is oriented to current events. The patient is able to name objects, follow commands, repeat sentences, with some mild expressive aphasia (improved from previous exam )     Cranial nerves: Pupils equal and react symmetrically to light. There is no visual field deficit to confrontation. Extraocular motion is full with no nystagmus. There is no ptosis. Facial sensation is intact. mild left facial droop, Palate elevates symmetrically. Tongue is midline.    Motor: There is normal bulk and tone.  There is no tremor.  Strength is 5/5 in the right arm and leg   Strength is 5/5 in the left arm and leg.    Sensation: Intact to light touch and pin in 4 extremities    Reflexes: deferred    Cerebellar: There is slight Right dysmetria on finger to nose testing.    Gait : deferred      LABS:                        10.9   3.75  )-----------( 195      ( 26 Nov 2023 07:03 )             32.6    11-26    143  |  106  |  27.4<H>  ----------------------------<  86  4.1   |  23.0  |  1.93<H>    Ca    9.0      26 Nov 2023 07:03  Phos  3.4     11-25  Mg     2.0     11-25    TPro  7.5  /  Alb  4.4  /  TBili  0.6  /  DBili  x   /  AST  27  /  ALT  20  /  AlkPhos  78  11-25        IMAGING: Reviewed by me.     MR Head No Cont (11.25.23 @ 08:29)     IMPRESSION:   No acute infarct. There are small chronic infarcts in the   right high posterior parietal lobe, right posterior temporal lobe, and   bilateral cerebelli. Moderate periventricular and subcortical white   matter chronic microvascular ischemic changes.    (11.24.23 @ 11:54)     IMPRESSION:  HEAD CT: No evidence of an acute intracranial hemorhage, midline shift or   hydrocephalus. Multiple chronic small infarcts  NECK CTA: Severe stenosis right carotid bulb/proximal internal carotid   artery. Moderate stenosis left carotid bulb.  BRAIN CTA: No proximal large vessel occlusion. Regions of atherosclerotic   narrowing as described.      US Duplex Carotid Arteries Complete, Bilateral (11.24.23 @ 22:49)     IMPRESSION:  1.  There is a moderate stenosis in the right carotid bulb measuring   50-69% using NASCET and a tandem severe stenosis in the mid right ICA   measuring greater than 70% using NASCET criteria.  There is downstream   tardus parvus waveform in the distal right internal carotid artery.  2.  There is a moderate stenosis in the distal left internal carotid   artery measuring 50-69% using NASCET criteria.  3.  There is a severe stenosis in the proximal right external carotid   artery.  4.  There is a mild to moderate stenosis in the proximal left external   carotid artery.    Measurement of carotid stenosis is based on velocity parameters that   correlate the residual internal carotid diameter with that of the more   distal vessel in accordance with a method such as the North American   Symptomatic Carotid Endarterectomy Trial (NASCET).        Xray Chest 1 View AP/PA (11.24.23 @ 12:12)     IMPRESSION: Mild central CHF at this time. [facility] Stroke Team  Progress Note     HPI:  77M with PMHx of CAD s/p CABG, CVA, HTN, HLD, DM2, BPH, recent COVID positive 10 days ago presented  to [facility] for Left sided facial droop, difficulty word finding and weakness approximately three days ago. At [place], patient had a NIHSS of 0 and no thrombolytic agent was administered. Upon neuro-imaging, patient was noted by a neurologist for carotid stenosis. Patient was then transferred to [facility] and then to [facility] ED on the 11/24/23. On admission here, CT brain was unrevealing, CTA H/N revealed severe stenosis right carotid bulb/proximal internal carotid artery. Moderate stenosis left carotid bulb. The stroke team was consulted for further management with possible cerebral angiogram.     SUBJECTIVE: No events overnight.  No new neurologic complaints.  ROS reported negative unless otherwise noted.    acetaminophen     Tablet .. 650 milliGRAM(s) Oral every 6 hours PRN  aluminum hydroxide/magnesium hydroxide/simethicone Suspension 30 milliLiter(s) Oral every 4 hours PRN  aspirin enteric coated 81 milliGRAM(s) Oral daily  atorvastatin 80 milliGRAM(s) Oral at bedtime  clopidogrel Tablet 75 milliGRAM(s) Oral daily  dextrose 5%. 1000 milliLiter(s) IV Continuous <Continuous>  dextrose 5%. 1000 milliLiter(s) IV Continuous <Continuous>  dextrose 50% Injectable 12.5 Gram(s) IV Push once  dextrose 50% Injectable 25 Gram(s) IV Push once  dextrose 50% Injectable 25 Gram(s) IV Push once  dextrose Oral Gel 15 Gram(s) Oral once PRN  famotidine    Tablet 20 milliGRAM(s) Oral daily  glucagon  Injectable 1 milliGRAM(s) IntraMuscular once  heparin   Injectable 5000 Unit(s) SubCutaneous every 8 hours  influenza  Vaccine (HIGH DOSE) 0.7 milliLiter(s) IntraMuscular once  insulin lispro (ADMELOG) corrective regimen sliding scale   SubCutaneous three times a day before meals  insulin lispro (ADMELOG) corrective regimen sliding scale   SubCutaneous Before meals and at bedtime  losartan 25 milliGRAM(s) Oral daily  melatonin 3 milliGRAM(s) Oral at bedtime PRN  metoprolol succinate ER 50 milliGRAM(s) Oral daily  ondansetron Injectable 4 milliGRAM(s) IV Push every 8 hours PRN  tamsulosin 0.4 milliGRAM(s) Oral at bedtime      PHYSICAL EXAM:   Vital Signs Last 24 Hrs  T(C): 36.7 (26 Nov 2023 12:43), Max: 37.2 (26 Nov 2023 10:17)  T(F): 98.1 (26 Nov 2023 12:43), Max: 99 (26 Nov 2023 10:17)  HR: 63 (26 Nov 2023 12:43) (52 - 64)  BP: 141/68 (26 Nov 2023 12:43) (141/68 - 206/76)  BP(mean): --  RR: 18 (26 Nov 2023 12:43) (18 - 19)  SpO2: 97% (26 Nov 2023 12:43) (92% - 99%)    Parameters below as of 26 Nov 2023 12:43  Patient On (Oxygen Delivery Method): room air    General: NAD, no longer under covid restrictions     Detailed Neurologic Exam:    Mental status: The patient is awake and alert and has normal attention span.  The patient is fully oriented in 3 spheres. The patient is oriented to current events. The patient is able to name objects, follow commands, repeat sentences, with some mild expressive aphasia (improved from previous exam )     Cranial nerves: Pupils equal and react symmetrically to light. There is no visual field deficit to confrontation. Extraocular motion is full with no nystagmus. There is no ptosis. Facial sensation is intact. mild left facial droop, Palate elevates symmetrically. Tongue is midline.    Motor: There is normal bulk and tone.  There is no tremor.  Strength is 5/5 in the right arm and leg   Strength is 5/5 in the left arm and leg.    Sensation: Intact to light touch and pin in 4 extremities    Reflexes: deferred    Cerebellar: There is slight Right dysmetria on finger to nose testing.    Gait : deferred      LABS:                        10.9   3.75  )-----------( 195      ( 26 Nov 2023 07:03 )             32.6    11-26    143  |  106  |  27.4<H>  ----------------------------<  86  4.1   |  23.0  |  1.93<H>    Ca    9.0      26 Nov 2023 07:03  Phos  3.4     11-25  Mg     2.0     11-25    TPro  7.5  /  Alb  4.4  /  TBili  0.6  /  DBili  x   /  AST  27  /  ALT  20  /  AlkPhos  78  11-25    IMAGING: Reviewed by me.     MR Head No Cont (11.25.23 @ 08:29)     IMPRESSION:   No acute infarct. There are small chronic infarcts in the   right high posterior parietal lobe, right posterior temporal lobe, and   bilateral cerebelli. Moderate periventricular and subcortical white   matter chronic microvascular ischemic changes.    (11.24.23 @ 11:54)     IMPRESSION:  HEAD CT: No evidence of an acute intracranial hemorhage, midline shift or   hydrocephalus. Multiple chronic small infarcts  NECK CTA: Severe stenosis right carotid bulb/proximal internal carotid   artery. Moderate stenosis left carotid bulb.  BRAIN CTA: No proximal large vessel occlusion. Regions of atherosclerotic   narrowing as described.      US Duplex Carotid Arteries Complete, Bilateral (11.24.23 @ 22:49)     IMPRESSION:  1.  There is a moderate stenosis in the right carotid bulb measuring   50-69% using NASCET and a tandem severe stenosis in the mid right ICA   measuring greater than 70% using NASCET criteria.  There is downstream   tardus parvus waveform in the distal right internal carotid artery.  2.  There is a moderate stenosis in the distal left internal carotid   artery measuring 50-69% using NASCET criteria.  3.  There is a severe stenosis in the proximal right external carotid   artery.  4.  There is a mild to moderate stenosis in the proximal left external   carotid artery.    Measurement of carotid stenosis is based on velocity parameters that   correlate the residual internal carotid diameter with that of the more   distal vessel in accordance with a method such as the North American   Symptomatic Carotid Endarterectomy Trial (NASCET).      Xray Chest 1 View AP/PA (11.24.23 @ 12:12)     IMPRESSION: Mild central CHF at this time.

## 2023-11-26 NOTE — PATIENT PROFILE ADULT - FALL HARM RISK - HARM RISK INTERVENTIONS
Communicate Risk of Fall with Harm to all staff/Reinforce activity limits and safety measures with patient and family/Tailored Fall Risk Interventions/Use of alarms - bed, chair and/or voice tab/Visual Cue: Yellow wristband and red socks/Bed in lowest position, wheels locked, appropriate side rails in place/Call bell, personal items and telephone in reach/Instruct patient to call for assistance before getting out of bed or chair/Non-slip footwear when patient is out of bed/[place] to call system/Physically safe environment - no spills, clutter or unnecessary equipment/Purposeful Proactive Rounding/Room/bathroom lighting operational, light cord in reach

## 2023-11-26 NOTE — PROGRESS NOTE ADULT - ASSESSMENT
76 yo M with extensive cardiac hx including HTN , CAD s/p CABG (on ASA) and previous CVA on last year with no residual deficits, transfer from [place] with one day of stroke-like symptoms - L sided facial droop and aphasia. Workup including CTA demonstrates severe R ICA stenosis and L moderate stenosis.     On exam patient was afebrile and hemodynamically stable this morning with palpable distal pulses.     PLAN   76 yo M with extensive cardiac hx including HTN , CAD s/p CABG (on ASA) and previous CVA on last year with no residual deficits, transfer from [place] with one day of stroke-like symptoms - L sided facial droop and aphasia. Workup including CTA demonstrates severe R ICA stenosis and L moderate stenosis.     On exam patient was afebrile and hemodynamically stable this morning with palpable distal pulses.     PLAN  Continue ASA/ Plavix  Continue home medication    DVT prophylaxis: SCD and HSQ 76 yo M with extensive cardiac hx including HTN , CAD s/p CABG (on ASA) and previous CVA on last year with no residual deficits, transfer from [place] with one day of stroke-like symptoms - L sided facial droop and aphasia. Workup including CTA demonstrates severe R ICA stenosis and L moderate stenosis.     On exam patient was afebrile and hemodynamically stable this morning with palpable distal pulses.     PLAN  Continue ASA/ Plavix  Continue home medication  TCAR during this admission, will need cardiology clearance   Need P2Y12 level  Trend labs and replete PRN  DVT prophylaxis: SCD and HSQ

## 2023-11-27 ENCOUNTER — APPOINTMENT (OUTPATIENT)
Dept: NEUROLOGY | Facility: HOSPITAL | Age: 78
End: 2023-11-27

## 2023-11-27 LAB
ANION GAP SERPL CALC-SCNC: 12 MMOL/L — SIGNIFICANT CHANGE UP (ref 5–17)
ANION GAP SERPL CALC-SCNC: 12 MMOL/L — SIGNIFICANT CHANGE UP (ref 5–17)
BASOPHILS # BLD AUTO: 0.02 K/UL — SIGNIFICANT CHANGE UP (ref 0–0.2)
BASOPHILS # BLD AUTO: 0.02 K/UL — SIGNIFICANT CHANGE UP (ref 0–0.2)
BASOPHILS NFR BLD AUTO: 0.5 % — SIGNIFICANT CHANGE UP (ref 0–2)
BASOPHILS NFR BLD AUTO: 0.5 % — SIGNIFICANT CHANGE UP (ref 0–2)
BUN SERPL-MCNC: 23.9 MG/DL — HIGH (ref 8–20)
BUN SERPL-MCNC: 23.9 MG/DL — HIGH (ref 8–20)
CALCIUM SERPL-MCNC: 9 MG/DL — SIGNIFICANT CHANGE UP (ref 8.4–10.5)
CALCIUM SERPL-MCNC: 9 MG/DL — SIGNIFICANT CHANGE UP (ref 8.4–10.5)
CHLORIDE SERPL-SCNC: 110 MMOL/L — HIGH (ref 96–108)
CHLORIDE SERPL-SCNC: 110 MMOL/L — HIGH (ref 96–108)
CO2 SERPL-SCNC: 22 MMOL/L — SIGNIFICANT CHANGE UP (ref 22–29)
CO2 SERPL-SCNC: 22 MMOL/L — SIGNIFICANT CHANGE UP (ref 22–29)
CREAT SERPL-MCNC: 1.76 MG/DL — HIGH (ref 0.5–1.3)
CREAT SERPL-MCNC: 1.76 MG/DL — HIGH (ref 0.5–1.3)
EGFR: 39 ML/MIN/1.73M2 — LOW
EGFR: 39 ML/MIN/1.73M2 — LOW
EOSINOPHIL # BLD AUTO: 0.06 K/UL — SIGNIFICANT CHANGE UP (ref 0–0.5)
EOSINOPHIL # BLD AUTO: 0.06 K/UL — SIGNIFICANT CHANGE UP (ref 0–0.5)
EOSINOPHIL NFR BLD AUTO: 1.6 % — SIGNIFICANT CHANGE UP (ref 0–6)
EOSINOPHIL NFR BLD AUTO: 1.6 % — SIGNIFICANT CHANGE UP (ref 0–6)
GLUCOSE BLDC GLUCOMTR-MCNC: 100 MG/DL — HIGH (ref 70–99)
GLUCOSE BLDC GLUCOMTR-MCNC: 100 MG/DL — HIGH (ref 70–99)
GLUCOSE BLDC GLUCOMTR-MCNC: 131 MG/DL — HIGH (ref 70–99)
GLUCOSE BLDC GLUCOMTR-MCNC: 131 MG/DL — HIGH (ref 70–99)
GLUCOSE BLDC GLUCOMTR-MCNC: 145 MG/DL — HIGH (ref 70–99)
GLUCOSE BLDC GLUCOMTR-MCNC: 145 MG/DL — HIGH (ref 70–99)
GLUCOSE SERPL-MCNC: 93 MG/DL — SIGNIFICANT CHANGE UP (ref 70–99)
GLUCOSE SERPL-MCNC: 93 MG/DL — SIGNIFICANT CHANGE UP (ref 70–99)
HCT VFR BLD CALC: 31.4 % — LOW (ref 39–50)
HCT VFR BLD CALC: 31.4 % — LOW (ref 39–50)
HGB BLD-MCNC: 11.1 G/DL — LOW (ref 13–17)
HGB BLD-MCNC: 11.1 G/DL — LOW (ref 13–17)
IMM GRANULOCYTES NFR BLD AUTO: 0.5 % — SIGNIFICANT CHANGE UP (ref 0–0.9)
IMM GRANULOCYTES NFR BLD AUTO: 0.5 % — SIGNIFICANT CHANGE UP (ref 0–0.9)
LYMPHOCYTES # BLD AUTO: 1.54 K/UL — SIGNIFICANT CHANGE UP (ref 1–3.3)
LYMPHOCYTES # BLD AUTO: 1.54 K/UL — SIGNIFICANT CHANGE UP (ref 1–3.3)
LYMPHOCYTES # BLD AUTO: 40 % — SIGNIFICANT CHANGE UP (ref 13–44)
LYMPHOCYTES # BLD AUTO: 40 % — SIGNIFICANT CHANGE UP (ref 13–44)
MCHC RBC-ENTMCNC: 31.5 PG — SIGNIFICANT CHANGE UP (ref 27–34)
MCHC RBC-ENTMCNC: 31.5 PG — SIGNIFICANT CHANGE UP (ref 27–34)
MCHC RBC-ENTMCNC: 35.4 GM/DL — SIGNIFICANT CHANGE UP (ref 32–36)
MCHC RBC-ENTMCNC: 35.4 GM/DL — SIGNIFICANT CHANGE UP (ref 32–36)
MCV RBC AUTO: 89.2 FL — SIGNIFICANT CHANGE UP (ref 80–100)
MCV RBC AUTO: 89.2 FL — SIGNIFICANT CHANGE UP (ref 80–100)
MONOCYTES # BLD AUTO: 0.56 K/UL — SIGNIFICANT CHANGE UP (ref 0–0.9)
MONOCYTES # BLD AUTO: 0.56 K/UL — SIGNIFICANT CHANGE UP (ref 0–0.9)
MONOCYTES NFR BLD AUTO: 14.5 % — HIGH (ref 2–14)
MONOCYTES NFR BLD AUTO: 14.5 % — HIGH (ref 2–14)
NEUTROPHILS # BLD AUTO: 1.65 K/UL — LOW (ref 1.8–7.4)
NEUTROPHILS # BLD AUTO: 1.65 K/UL — LOW (ref 1.8–7.4)
NEUTROPHILS NFR BLD AUTO: 42.9 % — LOW (ref 43–77)
NEUTROPHILS NFR BLD AUTO: 42.9 % — LOW (ref 43–77)
PA ADP PRP-ACNC: 111 PRU — LOW (ref 180–376)
PA ADP PRP-ACNC: 111 PRU — LOW (ref 180–376)
PLATELET # BLD AUTO: 211 K/UL — SIGNIFICANT CHANGE UP (ref 150–400)
PLATELET # BLD AUTO: 211 K/UL — SIGNIFICANT CHANGE UP (ref 150–400)
POTASSIUM SERPL-MCNC: 4.3 MMOL/L — SIGNIFICANT CHANGE UP (ref 3.5–5.3)
POTASSIUM SERPL-MCNC: 4.3 MMOL/L — SIGNIFICANT CHANGE UP (ref 3.5–5.3)
POTASSIUM SERPL-SCNC: 4.3 MMOL/L — SIGNIFICANT CHANGE UP (ref 3.5–5.3)
POTASSIUM SERPL-SCNC: 4.3 MMOL/L — SIGNIFICANT CHANGE UP (ref 3.5–5.3)
RBC # BLD: 3.52 M/UL — LOW (ref 4.2–5.8)
RBC # BLD: 3.52 M/UL — LOW (ref 4.2–5.8)
RBC # FLD: 13.6 % — SIGNIFICANT CHANGE UP (ref 10.3–14.5)
RBC # FLD: 13.6 % — SIGNIFICANT CHANGE UP (ref 10.3–14.5)
SODIUM SERPL-SCNC: 144 MMOL/L — SIGNIFICANT CHANGE UP (ref 135–145)
SODIUM SERPL-SCNC: 144 MMOL/L — SIGNIFICANT CHANGE UP (ref 135–145)
WBC # BLD: 3.85 K/UL — SIGNIFICANT CHANGE UP (ref 3.8–10.5)
WBC # BLD: 3.85 K/UL — SIGNIFICANT CHANGE UP (ref 3.8–10.5)
WBC # FLD AUTO: 3.85 K/UL — SIGNIFICANT CHANGE UP (ref 3.8–10.5)
WBC # FLD AUTO: 3.85 K/UL — SIGNIFICANT CHANGE UP (ref 3.8–10.5)

## 2023-11-27 PROCEDURE — 36223 PLACE CATH CAROTID/INOM ART: CPT | Mod: 59,LT

## 2023-11-27 PROCEDURE — 37215 TRANSCATH STENT CCA W/EPS: CPT | Mod: RT

## 2023-11-27 PROCEDURE — 99232 SBSQ HOSP IP/OBS MODERATE 35: CPT

## 2023-11-27 PROCEDURE — 99291 CRITICAL CARE FIRST HOUR: CPT

## 2023-11-27 RX ORDER — METFORMIN HYDROCHLORIDE 850 MG/1
1 TABLET ORAL
Refills: 0 | DISCHARGE

## 2023-11-27 RX ORDER — PREGABALIN 225 MG/1
1 CAPSULE ORAL
Qty: 0 | Refills: 0 | DISCHARGE

## 2023-11-27 RX ORDER — HYDRALAZINE HCL 50 MG
10 TABLET ORAL
Refills: 0 | Status: DISCONTINUED | OUTPATIENT
Start: 2023-11-27 | End: 2023-11-28

## 2023-11-27 RX ORDER — MULTIVIT-MIN/FERROUS GLUCONATE 9 MG/15 ML
1 LIQUID (ML) ORAL
Qty: 0 | Refills: 0 | DISCHARGE

## 2023-11-27 RX ORDER — CHOLECALCIFEROL (VITAMIN D3) 125 MCG
1 CAPSULE ORAL
Qty: 0 | Refills: 0 | DISCHARGE

## 2023-11-27 RX ADMIN — TAMSULOSIN HYDROCHLORIDE 0.4 MILLIGRAM(S): 0.4 CAPSULE ORAL at 21:24

## 2023-11-27 RX ADMIN — LOSARTAN POTASSIUM 25 MILLIGRAM(S): 100 TABLET, FILM COATED ORAL at 05:22

## 2023-11-27 RX ADMIN — Medication 81 MILLIGRAM(S): at 12:52

## 2023-11-27 RX ADMIN — HEPARIN SODIUM 5000 UNIT(S): 5000 INJECTION INTRAVENOUS; SUBCUTANEOUS at 21:25

## 2023-11-27 RX ADMIN — Medication 50 MILLIGRAM(S): at 05:22

## 2023-11-27 RX ADMIN — HEPARIN SODIUM 5000 UNIT(S): 5000 INJECTION INTRAVENOUS; SUBCUTANEOUS at 05:23

## 2023-11-27 RX ADMIN — CLOPIDOGREL BISULFATE 75 MILLIGRAM(S): 75 TABLET, FILM COATED ORAL at 12:52

## 2023-11-27 RX ADMIN — ATORVASTATIN CALCIUM 80 MILLIGRAM(S): 80 TABLET, FILM COATED ORAL at 21:24

## 2023-11-27 NOTE — CHART NOTE - NSCHARTNOTEFT_GEN_A_CORE
Neurointerventional Surgery Post Procedure Note       Procedure: Cerebral Angiography          Pre- Procedure Diagnosis: Severe R ICA Stenosis    Post- Procedure Diagnosis: Severe R ICA Stenosis s/p Carotid Wall Stent        : Dr. Jaiden MD, Dr. Shaun BAUMANN   Fellow: Dr. Rayray DO  Nurse: JUANA Ness  Anesthesiologist: Dr Marroquin                                           Radiology Tech: Lazaro Wan          Sheath: 8 - [place] Sheath           I/Os: estimated blood loss less than 20cc,  IV fluids    250cc,   Contrast: Omnipaque 240    72cc, Fluoro Time: 57         Vitals :  BP  143/78 HR  58 Spo2  100%           Preliminary Report:     Under a 8 [place] short sheath via the right groin under MAC sedation  a selective cerebral angiography  was performed of the  left common carotid artery, right common carotid artery, and right internal carotid artery that revealed severe R ICA stenosis that was stented with a carotid wall stent    Right groin sheath was discontinued at 10:41    Hemostasis was obtained with approximately 14 minutes of manual compression.      No active bleeding, no hematoma, no ecchymosis was noted following manual compression and quick clot and safeguard balloon dressing applied at 10:55  Patient transferred to the NSCU in stable condition.   Patient tolerated procedure well.  Patient remains hemodynamically stable, no change in neurological status compared to baseline.   Results were discussed with patient and patient's family. Cheek-To-Nose Interpolation Flap Text: A decision was made to reconstruct the defect utilizing an interpolation axial flap and a staged reconstruction.  A telfa template was made of the defect.  This telfa template was then used to outline the Cheek-To-Nose Interpolation flap.  The donor area for the pedicle flap was then injected with anesthesia.  The flap was excised through the skin and subcutaneous tissue down to the layer of the underlying musculature.  The interpolation flap was carefully excised within this deep plane to maintain its blood supply.  The edges of the donor site were undermined.   The donor site was closed in a primary fashion.  The pedicle was then rotated into position and sutured.  Once the tube was sutured into place, adequate blood supply was confirmed with blanching and refill.  The pedicle was then wrapped with xeroform gauze and dressed appropriately with a telfa and gauze bandage to ensure continued blood supply and protect the attached pedicle.

## 2023-11-27 NOTE — CHART NOTE - NSCHARTNOTEFT_GEN_A_CORE
Neurointerventional Surgery  Pre-Procedure Note     HPI:  77M with PMHX CAD s/p CABG, CVA, HTN, HLD, DM2, BPH, Recent COVID presents to [place] ER with L sided facial droop and weakness. NIHSS 0. Not a TNK candidate. Evaluated by Neurology at [place]. Found to have Carotid Stenosis and transferred to [facility] ER for vascular surgery evaluated. Pt evaluated by Snehal NINAx. Recent COVID positive per [place] documentation with repeat positive here. Pt seen/examined and noted to have L facial droop and R FTN dysmetria. Denies current complaints. ROS negative unless mentioned. Patient loaded with Plavix, currently on DAPT, p2y12 this morning of 111. Patient to undergo R Proximal R ICA stenting with Dr Hwang on the morning of 11/27/23      Allergies: No Known Allergies      PMH/PSH:  PAST MEDICAL & SURGICAL HISTORY:  Hypertension      Diabetes mellitus      CAD (coronary artery disease)      BPH (benign prostatic hyperplasia)      No significant past surgical history          Social History:   Social History:    FAMILY HISTORY:      Current Medications:   acetaminophen     Tablet .. 650 milliGRAM(s) Oral every 6 hours PRN  aluminum hydroxide/magnesium hydroxide/simethicone Suspension 30 milliLiter(s) Oral every 4 hours PRN  aspirin enteric coated 81 milliGRAM(s) Oral daily  atorvastatin 80 milliGRAM(s) Oral at bedtime  clopidogrel Tablet 75 milliGRAM(s) Oral daily  dextrose 5%. 1000 milliLiter(s) IV Continuous <Continuous>  dextrose 5%. 1000 milliLiter(s) IV Continuous <Continuous>  dextrose 50% Injectable 25 Gram(s) IV Push once  dextrose 50% Injectable 25 Gram(s) IV Push once  dextrose 50% Injectable 12.5 Gram(s) IV Push once  dextrose Oral Gel 15 Gram(s) Oral once PRN  famotidine    Tablet 20 milliGRAM(s) Oral daily  glucagon  Injectable 1 milliGRAM(s) IntraMuscular once  heparin   Injectable 5000 Unit(s) SubCutaneous every 8 hours  influenza  Vaccine (HIGH DOSE) 0.7 milliLiter(s) IntraMuscular once  insulin lispro (ADMELOG) corrective regimen sliding scale   SubCutaneous three times a day before meals  insulin lispro (ADMELOG) corrective regimen sliding scale   SubCutaneous Before meals and at bedtime  losartan 25 milliGRAM(s) Oral daily  melatonin 3 milliGRAM(s) Oral at bedtime PRN  metoprolol succinate ER 50 milliGRAM(s) Oral daily  ondansetron Injectable 4 milliGRAM(s) IV Push every 8 hours PRN  tamsulosin 0.4 milliGRAM(s) Oral at bedtime      Physical Exam:  Constitutional: NAD    Neuro  * Mental Status:  GCS 15:  E(4), V(5), M(6).  Awake, alert, oriented to conversation.  * Cranial Nerves: L facial Droop. PERRL, EOMI, tongue midline, no gaze deviation  * Motor: RUE 5/5, LUE 5/5, RLE 5/5, LLE 5/5  * Sensory: Sensation intact to light touch  * Reflexes: Not assessed  * Gait: Not assessed    Cardiovascular:  S1, S2 no murmurs appreciated.  Regular rate and rhythm.  Eyes: See neurologic examination with detailed examination of eyes.  ENT: No JVD, Trachea Midline.  Respiratory: Clear to auscultation.  Gastrointestinal: Soft, nontender, nondistended.  Genitourinary: [ ] Contreras, [ x ] No Contreras.   Musculoskeletal: No muscle wasting noted, No pretibial edema appreciated, no appreciable calf tenderness.  Skin:  Wound inspected, no redness, bleeding or drainage noted.    Hematologic / Lymph / Immunologic: No bleeding from IV sites or wounds, No lymphadenopathy, No Hives or allergic type skin lesions      NIH SS: 2  DATE: 11/27/12  TIME:   1A: Level of consciousness (0-3): 0  1B: Questions (0-2): 0    1C: Commands (0-2): 0  2: Gaze (0-2): 0  3: Visual fields (0-3): 0  4: Facial palsy (0-3): 0  MOTOR:  5A: Left arm motor drift (0-4): 0  5B: Right arm motor drift (0-4): 0  6A: Left leg motor drift (0-4): 0  6B: Right leg motor drift (0-4): 0  7: Limb ataxia (0-2): 0  SENSORY:  8: Sensation (0-2): 0  SPEECH:  9: Language (0-3): 0  10: Dysarthria (0-2): 0  EXTINCTION:  11: Extinction/inattention (0-2): 0    TOTAL SCORE:     Labs:                         10.9   3.75  )-----------( 195      ( 26 Nov 2023 07:03 )             32.6       11-27    144  |  110<H>  |  23.9<H>  ----------------------------<  93  4.3   |  22.0  |  1.76<H>    Ca    9.0      27 Nov 2023 05:26  Phos  3.4     11-25  Mg     2.0     11-25    TPro  7.5  /  Alb  4.4  /  TBili  0.6  /  DBili  x   /  AST  27  /  ALT  20  /  AlkPhos  78  11-25                Assessment/Plan:   This is a 77y  year old right hand dominant Male  who presents to neuro-IR for selective cerebral angiography.     Procedure, goals, risks, benefits and alternatives  were discussed with patient.  All questions were answered.  Risks include but are not limited to stroke, vessel injury, hemorrhage, and or groin hematoma.  Patient demonstrates understanding  of all risks involved with this procedure and wishes to continue.   Appropriate consent was obtained from patient and consent is in the patient's chart. Neurointerventional Surgery  Pre-Procedure Note     HPI:  77M with PMHX CAD s/p CABG, CVA, HTN, HLD, DM2, BPH, Recent COVID presents to [place] ER with L sided facial droop and weakness. NIHSS 0. Not a TNK candidate. Evaluated by Neurology at [place]. Found to have Carotid Stenosis and transferred to [facility] ER for vascular surgery evaluated. Pt evaluated by Snehal Desir and Neurointerventional Team. Recent COVID positive per [place] documentation with repeat positive here. Pt seen/examined and noted to have L facial droop and R FTN dysmetria. Denies current complaints. ROS negative unless mentioned. Patient loaded with Plavix, currently on DAPT, p2y12 this morning of 111. Patient to undergo R Proximal R ICA stenting with Dr Hwang on the morning of 11/27/23      Allergies: No Known Allergies      PMH/PSH:  PAST MEDICAL & SURGICAL HISTORY:  Hypertension      Diabetes mellitus      CAD (coronary artery disease)      BPH (benign prostatic hyperplasia)      No significant past surgical history          Social History:   Social History:    FAMILY HISTORY:      Current Medications:   acetaminophen     Tablet .. 650 milliGRAM(s) Oral every 6 hours PRN  aluminum hydroxide/magnesium hydroxide/simethicone Suspension 30 milliLiter(s) Oral every 4 hours PRN  aspirin enteric coated 81 milliGRAM(s) Oral daily  atorvastatin 80 milliGRAM(s) Oral at bedtime  clopidogrel Tablet 75 milliGRAM(s) Oral daily  dextrose 5%. 1000 milliLiter(s) IV Continuous <Continuous>  dextrose 5%. 1000 milliLiter(s) IV Continuous <Continuous>  dextrose 50% Injectable 25 Gram(s) IV Push once  dextrose 50% Injectable 25 Gram(s) IV Push once  dextrose 50% Injectable 12.5 Gram(s) IV Push once  dextrose Oral Gel 15 Gram(s) Oral once PRN  famotidine    Tablet 20 milliGRAM(s) Oral daily  glucagon  Injectable 1 milliGRAM(s) IntraMuscular once  heparin   Injectable 5000 Unit(s) SubCutaneous every 8 hours  influenza  Vaccine (HIGH DOSE) 0.7 milliLiter(s) IntraMuscular once  insulin lispro (ADMELOG) corrective regimen sliding scale   SubCutaneous three times a day before meals  insulin lispro (ADMELOG) corrective regimen sliding scale   SubCutaneous Before meals and at bedtime  losartan 25 milliGRAM(s) Oral daily  melatonin 3 milliGRAM(s) Oral at bedtime PRN  metoprolol succinate ER 50 milliGRAM(s) Oral daily  ondansetron Injectable 4 milliGRAM(s) IV Push every 8 hours PRN  tamsulosin 0.4 milliGRAM(s) Oral at bedtime      Physical Exam:  Constitutional: NAD    Neuro  * Mental Status:  GCS 15:  E(4), V(5), M(6).  Awake, alert, oriented to conversation.  * Cranial Nerves: L facial Droop. PERRL, EOMI, tongue midline, no gaze deviation  * Motor: RUE 5/5, LUE 5/5, RLE 5/5, LLE 5/5  * Sensory: Sensation intact to light touch  * Reflexes: Not assessed  * Gait: Not assessed    Cardiovascular:  S1, S2 no murmurs appreciated.  Regular rate and rhythm.  Eyes: See neurologic examination with detailed examination of eyes.  ENT: No JVD, Trachea Midline.  Respiratory: Clear to auscultation.  Gastrointestinal: Soft, nontender, nondistended.  Genitourinary: [ ] Contreras, [ x ] No Contreras.   Musculoskeletal: No muscle wasting noted, No pretibial edema appreciated, no appreciable calf tenderness.  Skin:  Wound inspected, no redness, bleeding or drainage noted.    Hematologic / Lymph / Immunologic: No bleeding from IV sites or wounds, No lymphadenopathy, No Hives or allergic type skin lesions      NIH SS: 2  DATE: 11/27/12  TIME:   1A: Level of consciousness (0-3): 0  1B: Questions (0-2): 0    1C: Commands (0-2): 0  2: Gaze (0-2): 0  3: Visual fields (0-3): 0  4: Facial palsy (0-3): 0  MOTOR:  5A: Left arm motor drift (0-4): 0  5B: Right arm motor drift (0-4): 0  6A: Left leg motor drift (0-4): 0  6B: Right leg motor drift (0-4): 0  7: Limb ataxia (0-2): 0  SENSORY:  8: Sensation (0-2): 0  SPEECH:  9: Language (0-3): 0  10: Dysarthria (0-2): 0  EXTINCTION:  11: Extinction/inattention (0-2): 0    TOTAL SCORE:     Labs:                         10.9   3.75  )-----------( 195      ( 26 Nov 2023 07:03 )             32.6       11-27    144  |  110<H>  |  23.9<H>  ----------------------------<  93  4.3   |  22.0  |  1.76<H>    Ca    9.0      27 Nov 2023 05:26  Phos  3.4     11-25  Mg     2.0     11-25    TPro  7.5  /  Alb  4.4  /  TBili  0.6  /  DBili  x   /  AST  27  /  ALT  20  /  AlkPhos  78  11-25                Assessment/Plan:   This is a 77y  year old right hand dominant Male  who presents to neuro-IR for selective cerebral angiography.     Procedure, goals, risks, benefits and alternatives  were discussed with patient.  All questions were answered.  Risks include but are not limited to stroke, vessel injury, hemorrhage, and or groin hematoma.  Patient demonstrates understanding  of all risks involved with this procedure and wishes to continue.   Appropriate consent was obtained from patient and consent is in the patient's chart. Neurointerventional Surgery  Pre-Procedure Note     HPI:  77M with PMHX CAD s/p CABG, CVA, HTN, HLD, DM2, BPH, Recent COVID presents to [place] ER with L sided facial droop and weakness. NIHSS 0. Not a TNK candidate. Evaluated by Neurology at [place]. Found to have Carotid Stenosis and transferred to [facility] ER for vascular surgery evaluated. Pt evaluated by Snehal Desir and Neurointerventional Team. Recent COVID positive per [place] documentation with repeat positive here. Pt initally seen/examined and noted to have L facial droop and R FTN dysmetria. Denies current complaints. ROS negative unless mentioned. Patient loaded with Plavix, currently on DAPT, p2y12 this morning of 111. Patient to undergo R Proximal R ICA stenting with Dr Hwang on the morning of 11/27/23      Allergies: No Known Allergies      PMH/PSH:  PAST MEDICAL & SURGICAL HISTORY:  Hypertension      Diabetes mellitus      CAD (coronary artery disease)      BPH (benign prostatic hyperplasia)      No significant past surgical history          Social History:   Social History:    FAMILY HISTORY:      Current Medications:   acetaminophen     Tablet .. 650 milliGRAM(s) Oral every 6 hours PRN  aluminum hydroxide/magnesium hydroxide/simethicone Suspension 30 milliLiter(s) Oral every 4 hours PRN  aspirin enteric coated 81 milliGRAM(s) Oral daily  atorvastatin 80 milliGRAM(s) Oral at bedtime  clopidogrel Tablet 75 milliGRAM(s) Oral daily  dextrose 5%. 1000 milliLiter(s) IV Continuous <Continuous>  dextrose 5%. 1000 milliLiter(s) IV Continuous <Continuous>  dextrose 50% Injectable 25 Gram(s) IV Push once  dextrose 50% Injectable 25 Gram(s) IV Push once  dextrose 50% Injectable 12.5 Gram(s) IV Push once  dextrose Oral Gel 15 Gram(s) Oral once PRN  famotidine    Tablet 20 milliGRAM(s) Oral daily  glucagon  Injectable 1 milliGRAM(s) IntraMuscular once  heparin   Injectable 5000 Unit(s) SubCutaneous every 8 hours  influenza  Vaccine (HIGH DOSE) 0.7 milliLiter(s) IntraMuscular once  insulin lispro (ADMELOG) corrective regimen sliding scale   SubCutaneous three times a day before meals  insulin lispro (ADMELOG) corrective regimen sliding scale   SubCutaneous Before meals and at bedtime  losartan 25 milliGRAM(s) Oral daily  melatonin 3 milliGRAM(s) Oral at bedtime PRN  metoprolol succinate ER 50 milliGRAM(s) Oral daily  ondansetron Injectable 4 milliGRAM(s) IV Push every 8 hours PRN  tamsulosin 0.4 milliGRAM(s) Oral at bedtime      Physical Exam:  Constitutional: NAD    Neuro  * Mental Status:  GCS 15:  E(4), V(5), M(6).  Awake, alert, oriented to conversation.  * Cranial Nerves: L facial Droop. PERRL, EOMI, tongue midline, no gaze deviation  * Motor: RUE 5/5, LUE 5/5, RLE 5/5, LLE 5/5  * Sensory: Sensation intact to light touch  * Reflexes: Not assessed  * Gait: Not assessed    Cardiovascular:  S1, S2 no murmurs appreciated.  Regular rate and rhythm.  Eyes: See neurologic examination with detailed examination of eyes.  ENT: No JVD, Trachea Midline.  Respiratory: Clear to auscultation.  Gastrointestinal: Soft, nontender, nondistended.  Genitourinary: [ ] Contreras, [ x ] No Contreras.   Musculoskeletal: No muscle wasting noted, No pretibial edema appreciated, no appreciable calf tenderness.  Skin:  Wound inspected, no redness, bleeding or drainage noted.    Hematologic / Lymph / Immunologic: No bleeding from IV sites or wounds, No lymphadenopathy, No Hives or allergic type skin lesions      NIH SS: 0  DATE: 11/27/12  TIME: 8:25  1A: Level of consciousness (0-3): 0  1B: Questions (0-2): 0    1C: Commands (0-2): 0  2: Gaze (0-2): 0  3: Visual fields (0-3): 0  4: Facial palsy (0-3): 0  MOTOR:  5A: Left arm motor drift (0-4): 0  5B: Right arm motor drift (0-4): 0  6A: Left leg motor drift (0-4): 0  6B: Right leg motor drift (0-4): 0  7: Limb ataxia (0-2): 0  SENSORY:  8: Sensation (0-2): 0  SPEECH:  9: Language (0-3): 0  10: Dysarthria (0-2): 0  EXTINCTION:  11: Extinction/inattention (0-2): 0    TOTAL SCORE: 0    Labs:                         10.9   3.75  )-----------( 195      ( 26 Nov 2023 07:03 )             32.6       11-27    144  |  110<H>  |  23.9<H>  ----------------------------<  93  4.3   |  22.0  |  1.76<H>    Ca    9.0      27 Nov 2023 05:26  Phos  3.4     11-25  Mg     2.0     11-25    TPro  7.5  /  Alb  4.4  /  TBili  0.6  /  DBili  x   /  AST  27  /  ALT  20  /  AlkPhos  78  11-25                Assessment/Plan:   This is a 77y  year old right hand dominant Male  who presents to neuro-IR for selective cerebral angiography.     Procedure, goals, risks, benefits and alternatives  were discussed with patient.  All questions were answered.  Risks include but are not limited to stroke, vessel injury, hemorrhage, and or groin hematoma.  Patient demonstrates understanding  of all risks involved with this procedure and wishes to continue.   Appropriate consent was obtained from patient and consent is in the patient's chart.

## 2023-11-27 NOTE — CONSULT NOTE ADULT - ASSESSMENT
ASSESSMENT/PLAN:     NEURO:   INR following, appreciate recs  q1hr neurochecks   post-angiogram protocol  carotid doppler tomorrow 11/28  ASA 81/plavix 75  pain mgt: tylenol and oxy 5 prn  Activity: activity as tolerated    PULM:  SpO2>92%  incentive spirometry   OOB    CV:  -160  anti-HTN PRN    RENAL:   monitor I/O  replete lytes prn    GI:  Diet: advance diet as tolerated  zofran prn for nausea  Bowel regimen    ENDO:   Goal euglycemia (-180)    HEME/ONC:  SCDs    ID:   Monitor WBC and fever curve    Dispo: NSICU for post-stent care

## 2023-11-27 NOTE — CONSULT NOTE ADULT - CRITICAL CARE ATTENDING COMMENT
I have personally provided the above mentioned minutes of critical care time including review of laboratory values, imaging, interdisciplinary care coordination, and frequent monitoring for decompensation.    Based on my personal evaluation, this patient has a high probability of imminent or life-threatening deterioration due to the presence of: TIA, R carotid stenosis s/p stenting -   which required my direct attention, intervention, and personal management. Other billable procedures, if performed, are documented separately.

## 2023-11-27 NOTE — CHART NOTE - NSCHARTNOTESELECT_GEN_ALL_CORE
Event Note
Neurointerventional Post Procedure/Event Note
Neurointerventional Pre Procedure Note/Event Note
Event Note

## 2023-11-27 NOTE — PROGRESS NOTE ADULT - ASSESSMENT
INCOMPLETE- VISIT PENDING     ASSESSMENT:   77M with PMHx of CAD s/p CABG, CVA, HTN, HLD, DM2, BPH, recent COVID positive 10 days prior to presentation presented  to [facility] for Left sided facial droop, difficulty word finding and weakness approximately three days prior. At [place], patient had a NIHSS of 0 and no thrombolytic agent was administered. Upon neuro-imaging, patient was noted by a neurologist for carotid stenosis. Patient was then transferred to [facility] and then to [facility] ED on the 11/24/23. On admission here, CT brain was unrevealing, CTA H/N revealed severe stenosis right carotid bulb/proximal internal carotid artery. Moderate stenosis left carotid bulb. Ultrasound of the carotid revealed moderate stenosis in the Right carotid bulb measuring approximately 50-69% and tandem mild Right ICA greater than 70% and moderate stenosis in the in the distal Left ICA measuring about 50-69%. MRI brain revealed no acute infarct, but small chronic infarcts in the right high posterior lobe, right posterior temporal lobe and bilateral cerebri with some subcortical white matter chronic microvascular changes  The stroke team was consulted for further management with possible cerebral angiogram. Concern for symptomatic large vessel atherosclerotic disease;  with no acute infarction but chronic right hemispheric infarctions. Now s/p right ICA stent placement.     NEURO:   -Neurologically   -Continue close monitoring for neurologic deterioration    - Stroke neuro checks per post thrombectomy protocol   - SBP goal post intervention per IR team 120-160mmHg    -ANTITHROMBOTIC THERAPY: On ASA 81mg daily and Plavix 75 mg, P2Y12 111  -titrate statin to LDL goal less than 70 - recommend high dose statin   -MRI Brain w/o noted  -Dysphagia screen: passed, advance per protocol .    -Physical therapy/OT/Speech eval/treatment.      CARDIOVASCULAR:   check TTE, cardiac monitoring w/ telemetry for now, further evaluation pending findings of noted workup                              HEMATOLOGY:   H/H without acute change, monitor anemia closely, Platelets 211, patient should have all age and risk appropriate malignancy screenings with PCP or sooner if clinically suspected   DVT ppx: Heparin s.c [x] LMWH []     PULMONARY:   CXR noted, On room air, protecting airway, saturating well, follow up for noted mild CHF      RENAL:   BUN/Cr elevated but without acute change, monitor urine output, maintain adequate hydration as tolerated in setting of recent contrast administration and mild CHF.  Na Goal:  135-145      ID:   afebrile, no leukocytosis, monitor for si/sx of infection     OTHER:        DISPOSITION:   Rehab or home depending on PT eval once stable and workup is complete      CORE MEASURES:        Admission NIHSS: 2     Tenecteplase : [] YES [x] NO      LDL/HDL/A1C: 93/44/6.3     Depression Screen- if depression hx and/or present      Statin Therapy: recommend high dose statin      Dysphagia Screen: [] PASS [] FAIL     Smoking [] YES [] NO quit 25 yrs ago     Afib [] YES [] NO     Stroke Education [] YES [] NO    Obtain screening lower extremity venous ultrasound in patients who meet 1 or more of the following criteria as patient is high risk for DVT/PE on admission:   [] History of DVT/PE  []Hypercoagulable states (Factor V Leiden, Cancer, OCP, etc. )  []Prolonged immobility (hemiplegia/hemiparesis/post operative or any other extended immobilization)  [] Transferred from outside facility (Rehab or Long term care)  [] Age </= to 50 ASSESSMENT:   77M with PMHx of CAD s/p CABG, CVA, HTN, HLD, DM2, BPH, recent COVID positive 10 days prior to presentation presented  to [facility] for Left sided facial droop, difficulty word finding and weakness approximately three days prior. At [place], patient had a NIHSS of 0 and no thrombolytic agent was administered. Upon neuro-imaging, patient was noted by a neurologist for carotid stenosis. Patient was then transferred to [facility] and then to [facility] ED on the 11/24/23. On admission here, CT brain was unrevealing, CTA H/N revealed severe stenosis right carotid bulb/proximal internal carotid artery. Moderate stenosis left carotid bulb. Ultrasound of the carotid revealed moderate stenosis in the Right carotid bulb measuring approximately 50-69% and tandem mild Right ICA greater than 70% and moderate stenosis in the in the distal Left ICA measuring about 50-69%. MRI brain revealed no acute infarct, but small chronic infarcts in the right high posterior lobe, right posterior temporal lobe and bilateral cerebri with some subcortical white matter chronic microvascular changes  The stroke team was consulted for further management with possible cerebral angiogram. Concern for symptomatic large vessel atherosclerotic disease;  with no acute infarction but chronic right hemispheric infarctions. Now s/p right ICA stent placement.     NEURO:   -Neurologically doing well s/p right ICA stent placement today (11/27)  -Continue close monitoring for neurologic deterioration    - Stroke neuro checks per post thrombectomy protocol   - SBP goal post intervention per IR team 120-160mmHg    -ANTITHROMBOTIC THERAPY: On ASA 81mg daily and Plavix 75 mg, P2Y12 111  -titrate statin to LDL goal less than 70 - recommend high dose statin   -MRI Brain w/o noted  -Dysphagia screen: passed, advance per protocol .    -Physical therapy/OT/Speech eval/treatment.      CARDIOVASCULAR:   check TTE, cardiac monitoring w/ telemetry for now, further evaluation pending findings of noted workup                              HEMATOLOGY:   H/H without acute change, monitor anemia closely, Platelets 211, patient should have all age and risk appropriate malignancy screenings with PCP or sooner if clinically suspected   DVT ppx: Heparin s.c [x] LMWH []     PULMONARY:   CXR noted, On room air, protecting airway, saturating well, follow up for noted mild CHF    RENAL:   BUN/Cr elevated but without acute change, monitor urine output, maintain adequate hydration as tolerated in setting of recent contrast administration and mild CHF.  Na Goal:  135-145      ID:   afebrile, no leukocytosis, monitor for si/sx of infection     OTHER:        DISPOSITION: Likely d/c home next 24-48 hours if stable s/p ICA stent procedure      CORE MEASURES:        Admission NIHSS: 2     Tenecteplase : [] YES [x] NO      LDL/HDL/A1C: 93/44/6.3     Depression Screen- if depression hx and/or present      Statin Therapy: recommend high dose statin      Dysphagia Screen: [] PASS [] FAIL     Smoking [] YES [] NO quit 25 yrs ago     Afib [] YES [] NO     Stroke Education [] YES [] NO    Obtain screening lower extremity venous ultrasound in patients who meet 1 or more of the following criteria as patient is high risk for DVT/PE on admission:   [] History of DVT/PE  []Hypercoagulable states (Factor V Leiden, Cancer, OCP, etc. )  []Prolonged immobility (hemiplegia/hemiparesis/post operative or any other extended immobilization)  [] Transferred from outside facility (Rehab or Long term care)  [] Age </= to 50

## 2023-11-27 NOTE — CONSULT NOTE ADULT - SUBJECTIVE AND OBJECTIVE BOX
NSICU CONSULT/TRANSFER ACCEPTANCE NOTE    Historical Review:   77M with PMHx HTN, HLD, DM2, CAD (s/p CABG), BPH, recent admission to [facility] for COVID-19 (dx 11/5, d/c 11/8/23 to ?rehab -> then home), presented to [facility] 11/24 with L sided facial droop and weakness which self-resolved. NIHSS 0. Not a TNK candidate.     CTH - no acute hemorrhage or large infarct, multiple chronic infarcts  CTA - severe R ICA stenosis    Vascular surgery and Neurology consulted, patient admitted to medicine with plan for MRI and R ICA stenting.    PMHX: CAD s/p CABG, CVA, HTN, HLD, DM2, BPH, Recent COVID  PSHX: CABG  FamHx: Denies fam hx HTN  Social Hx: Denies active etoh/tobacco/drug abuse   NKDA (25 Nov 2023 07:17)    Hospital Course:  Carotid doppler 11/24 - R ICA >70% stenosis, L ICA moderate stenosis  MRI 11/25 - no acute infarcts, chronic small R parietal, temporal, and cerebellar infarcts    24hr EVENTS:  11/27 - s/p R ICA stenting under MAC anesthesia, 8Fr sheath in R groin, carotid wall stent deployed, safeguard placed 10:55. Procedure well-tolerated, brought to ICU in stable condition.    ROS: no complaints    ----------------------------------------------------------------------------------------------------  PHYSICAL EXAM:   General: laying flat in bed, comfortable  HEENT: MMM  Neuro:  -Mental status- Ox3, following commands, naming intact  -CN- PERRL 3mm, EOMI, tongue midline, face symmetric  -SensoriMotor- 5/5 UEs, deferred LE exam post-angio  -no sensory deficit, no dysmetria    CV: regular rate and rhythm  Pulm: no accessory muscle use, on room air  Abd: soft, nontender, nondistended  Skin: warm, dry    -----------------------------------------------------------------------------------------------------  ICU Vital Signs Last 24 Hrs  T(C): 36.4 (27 Nov 2023 11:39), Max: 36.7 (26 Nov 2023 23:42)  T(F): 97.5 (27 Nov 2023 11:39), Max: 98.1 (26 Nov 2023 23:42)  HR: 56 (27 Nov 2023 13:00) (52 - 70)  BP: 160/65 (27 Nov 2023 13:00) (127/70 - 174/80)  BP(mean): 92 (27 Nov 2023 13:00) (85 - 92)  RR: 15 (27 Nov 2023 13:00) (12 - 18)  SpO2: 100% (27 Nov 2023 13:00) (95% - 100%)    O2 Parameters below as of 27 Nov 2023 12:00  Patient On (Oxygen Delivery Method): room air      I&O's Summary    26 Nov 2023 07:01  -  27 Nov 2023 07:00  --------------------------------------------------------  IN: 0 mL / OUT: 725 mL / NET: -725 mL        MEDICATIONS  (STANDING):  aspirin enteric coated 81 milliGRAM(s) Oral daily  atorvastatin 80 milliGRAM(s) Oral at bedtime  clopidogrel Tablet 75 milliGRAM(s) Oral daily  dextrose 5%. 1000 milliLiter(s) (50 mL/Hr) IV Continuous <Continuous>  dextrose 5%. 1000 milliLiter(s) (100 mL/Hr) IV Continuous <Continuous>  dextrose 50% Injectable 12.5 Gram(s) IV Push once  dextrose 50% Injectable 25 Gram(s) IV Push once  dextrose 50% Injectable 25 Gram(s) IV Push once  famotidine    Tablet 20 milliGRAM(s) Oral daily  glucagon  Injectable 1 milliGRAM(s) IntraMuscular once  heparin   Injectable 5000 Unit(s) SubCutaneous every 8 hours  influenza  Vaccine (HIGH DOSE) 0.7 milliLiter(s) IntraMuscular once  insulin lispro (ADMELOG) corrective regimen sliding scale   SubCutaneous three times a day before meals  losartan 25 milliGRAM(s) Oral daily  metoprolol succinate ER 50 milliGRAM(s) Oral daily  tamsulosin 0.4 milliGRAM(s) Oral at bedtime    IMAGING:   Recent imaging studies were reviewed.    LAB RESULTS:               11.1   3.85  )-----------( 211      ( 27 Nov 2023 05:26 )             31.4     11-27    144  |  110<H>  |  23.9<H>  ----------------------------<  93  4.3   |  22.0  |  1.76<H>    Ca    9.0      27 Nov 2023 05:26      -----------------------------------------------------------------------------------------------------------------------------------------------------------------------------------

## 2023-11-27 NOTE — PROGRESS NOTE ADULT - ATTENDING COMMENTS
please see consult note
discussed case with Dr Hwang. depending on clearance he will perform transfemoral stent tomorrow if patient requires further time for clearance I will perform TCAR on thursday

## 2023-11-27 NOTE — CHART NOTE - NSCHARTNOTEFT_GEN_A_CORE
Patient taken to cath lab for cerebral angiography prior to provider evaluation. Patient is now s/p cerebral angiography revealing severe R ICA stenosis that was stented with a carotid wall stent. Patient transferred to NeuroICU post procedure. Further care per ICU team.

## 2023-11-27 NOTE — PROGRESS NOTE ADULT - ASSESSMENT
78 yo M with extensive cardiac hx including HTN , CAD s/p CABG (on ASA) and previous CVA on last year with no residual deficits, transfer from [place] with one day of stroke-like symptoms - L sided facial droop and aphasia. Workup including CTA demonstrates severe R ICA stenosis and L moderate stenosis. At this time patient is afebrile, HDS, no noted new neuro deficits.    PLAN  Continue ASA/Plavix  Continue home medication  Neuro IR today for cerebral angio, possible transfem stent  TCAR during this admission if no stent per neuro IR  P2Y12 level 111  Trend labs and replete PRN  DVT prophylaxis: SCD and HSQ

## 2023-11-27 NOTE — PROGRESS NOTE ADULT - SUBJECTIVE AND OBJECTIVE BOX
Preliminary note, offical recommendations pending attending review/signature   [facility] Stroke Team  Progress Note     HPI:  77M with PMHx of CAD s/p CABG, CVA, HTN, HLD, DM2, BPH, recent COVID positive 10 days ago presented  to [facility] for Left sided facial droop, difficulty word finding and weakness approximately three days prior to admission. At [place], patient had a NIHSS of 0 and no thrombolytic agent was administered. Upon neuro-imaging, patient was noted by a neurologist for carotid stenosis. Patient was then transferred to [facility] and then to [facility] ED on the 11/24/23. On admission here, CT brain was unrevealing, CTA H/N revealed severe stenosis right carotid bulb/proximal internal carotid artery. Moderate stenosis left carotid bulb. The stroke team was consulted for further management with possible cerebral angiogram.       SUBJECTIVE: No events overnight.  No new neurologic complaints.  ROS reported negative unless otherwise noted.    acetaminophen     Tablet .. 650 milliGRAM(s) Oral every 6 hours PRN  aluminum hydroxide/magnesium hydroxide/simethicone Suspension 30 milliLiter(s) Oral every 4 hours PRN  aspirin enteric coated 81 milliGRAM(s) Oral daily  atorvastatin 80 milliGRAM(s) Oral at bedtime  clopidogrel Tablet 75 milliGRAM(s) Oral daily  dextrose 5%. 1000 milliLiter(s) IV Continuous <Continuous>  dextrose 5%. 1000 milliLiter(s) IV Continuous <Continuous>  dextrose 50% Injectable 25 Gram(s) IV Push once  dextrose 50% Injectable 25 Gram(s) IV Push once  dextrose 50% Injectable 12.5 Gram(s) IV Push once  dextrose Oral Gel 15 Gram(s) Oral once PRN  famotidine    Tablet 20 milliGRAM(s) Oral daily  glucagon  Injectable 1 milliGRAM(s) IntraMuscular once  heparin   Injectable 5000 Unit(s) SubCutaneous every 8 hours  influenza  Vaccine (HIGH DOSE) 0.7 milliLiter(s) IntraMuscular once  insulin lispro (ADMELOG) corrective regimen sliding scale   SubCutaneous three times a day before meals  insulin lispro (ADMELOG) corrective regimen sliding scale   SubCutaneous Before meals and at bedtime  losartan 25 milliGRAM(s) Oral daily  melatonin 3 milliGRAM(s) Oral at bedtime PRN  metoprolol succinate ER 50 milliGRAM(s) Oral daily  ondansetron Injectable 4 milliGRAM(s) IV Push every 8 hours PRN  tamsulosin 0.4 milliGRAM(s) Oral at bedtime      PHYSICAL EXAM:   Vital Signs Last 24 Hrs  T(C): 36.4 (27 Nov 2023 08:17), Max: 36.7 (26 Nov 2023 12:43)  T(F): 97.5 (27 Nov 2023 08:17), Max: 98.1 (26 Nov 2023 12:43)  HR: 55 (27 Nov 2023 08:17) (55 - 70)  BP: 149/67 (27 Nov 2023 08:17) (141/68 - 174/80)  BP(mean): --  RR: 17 (27 Nov 2023 08:17) (17 - 18)  SpO2: 99% (27 Nov 2023 08:17) (95% - 99%)    Parameters below as of 27 Nov 2023 08:17  Patient On (Oxygen Delivery Method): room air      EXAM PENDING   General: NAD, no longer under covid restrictions     Detailed Neurologic Exam:    Mental status: The patient is awake and alert and has normal attention span.  The patient is fully oriented in 3 spheres. The patient is oriented to current events. The patient is able to name objects, follow commands, repeat sentences, with some mild expressive aphasia (improved from previous exam )     Cranial nerves: Pupils equal and react symmetrically to light. There is no visual field deficit to confrontation. Extraocular motion is full with no nystagmus. There is no ptosis. Facial sensation is intact. mild left facial droop, Palate elevates symmetrically. Tongue is midline.    Motor: There is normal bulk and tone.  There is no tremor.  Strength is 5/5 in the right arm and leg   Strength is 5/5 in the left arm and leg.    Sensation: Intact to light touch and pin in 4 extremities    Reflexes: deferred    Cerebellar: There is slight Right dysmetria on finger to nose testing.    Gait : deferred  LABS:                        11.1   3.85  )-----------( 211      ( 27 Nov 2023 05:26 )             31.4    11-27    144  |  110<H>  |  23.9<H>  ----------------------------<  93  4.3   |  22.0  |  1.76<H>    Ca    9.0      27 Nov 2023 05:26          IMAGING: Reviewed by me.   MR Head No Cont (11.25.23 @ 08:29)   IMPRESSION:   No acute infarct. There are small chronic infarcts in the   right high posterior parietal lobe, right posterior temporal lobe, and   bilateral cerebelli. Moderate periventricular and subcortical white   matter chronic microvascular ischemic changes.    (11.24.23 @ 11:54)   IMPRESSION:  HEAD CT: No evidence of an acute intracranial hemorhage, midline shift or   hydrocephalus. Multiple chronic small infarcts  NECK CTA: Severe stenosis right carotid bulb/proximal internal carotid   artery. Moderate stenosis left carotid bulb.  BRAIN CTA: No proximal large vessel occlusion. Regions of atherosclerotic   narrowing as described.      US Duplex Carotid Arteries Complete, Bilateral (11.24.23 @ 22:49)   IMPRESSION:  1.  There is a moderate stenosis in the right carotid bulb measuring   50-69% using NASCET and a tandem severe stenosis in the mid right ICA   measuring greater than 70% using NASCET criteria.  There is downstream   tardus parvus waveform in the distal right internal carotid artery.  2.  There is a moderate stenosis in the distal left internal carotid   artery measuring 50-69% using NASCET criteria.  3.  There is a severe stenosis in the proximal right external carotid   artery.  4.  There is a mild to moderate stenosis in the proximal left external   carotid artery.  Measurement of carotid stenosis is based on velocity parameters that   correlate the residual internal carotid diameter with that of the more   distal vessel in accordance with a method such as the North American   Symptomatic Carotid Endarterectomy Trial (NASCET).      Xray Chest 1 View AP/PA (11.24.23 @ 12:12)   IMPRESSION: Mild central CHF at this time.     [facility] Stroke Team  Progress Note     HPI:  77M with PMHx of CAD s/p CABG, CVA, HTN, HLD, DM2, BPH, recent COVID positive 10 days ago presented  to [facility] for Left sided facial droop, difficulty word finding and weakness approximately three days prior to admission. At [place], patient had a NIHSS of 0 and no thrombolytic agent was administered. Upon neuro-imaging, patient was noted by a neurologist for carotid stenosis. Patient was then transferred to [facility] and then to [facility] ED on the 11/24/23. On admission here, CT brain was unrevealing, CTA H/N revealed severe stenosis right carotid bulb/proximal internal carotid artery. Moderate stenosis left carotid bulb. The stroke team was consulted for further management with possible cerebral angiogram.       SUBJECTIVE: s/p angiogram and stent today. No new neurologic complaints.  ROS reported negative unless otherwise noted.    acetaminophen     Tablet .. 650 milliGRAM(s) Oral every 6 hours PRN  aluminum hydroxide/magnesium hydroxide/simethicone Suspension 30 milliLiter(s) Oral every 4 hours PRN  aspirin enteric coated 81 milliGRAM(s) Oral daily  atorvastatin 80 milliGRAM(s) Oral at bedtime  clopidogrel Tablet 75 milliGRAM(s) Oral daily  dextrose 5%. 1000 milliLiter(s) IV Continuous <Continuous>  dextrose 5%. 1000 milliLiter(s) IV Continuous <Continuous>  dextrose 50% Injectable 25 Gram(s) IV Push once  dextrose 50% Injectable 25 Gram(s) IV Push once  dextrose 50% Injectable 12.5 Gram(s) IV Push once  dextrose Oral Gel 15 Gram(s) Oral once PRN  famotidine    Tablet 20 milliGRAM(s) Oral daily  glucagon  Injectable 1 milliGRAM(s) IntraMuscular once  heparin   Injectable 5000 Unit(s) SubCutaneous every 8 hours  influenza  Vaccine (HIGH DOSE) 0.7 milliLiter(s) IntraMuscular once  insulin lispro (ADMELOG) corrective regimen sliding scale   SubCutaneous three times a day before meals  insulin lispro (ADMELOG) corrective regimen sliding scale   SubCutaneous Before meals and at bedtime  losartan 25 milliGRAM(s) Oral daily  melatonin 3 milliGRAM(s) Oral at bedtime PRN  metoprolol succinate ER 50 milliGRAM(s) Oral daily  ondansetron Injectable 4 milliGRAM(s) IV Push every 8 hours PRN  tamsulosin 0.4 milliGRAM(s) Oral at bedtime      PHYSICAL EXAM:   Vital Signs Last 24 Hrs  T(C): 36.4 (27 Nov 2023 08:17), Max: 36.7 (26 Nov 2023 12:43)  T(F): 97.5 (27 Nov 2023 08:17), Max: 98.1 (26 Nov 2023 12:43)  HR: 55 (27 Nov 2023 08:17) (55 - 70)  BP: 149/67 (27 Nov 2023 08:17) (141/68 - 174/80)  BP(mean): --  RR: 17 (27 Nov 2023 08:17) (17 - 18)  SpO2: 99% (27 Nov 2023 08:17) (95% - 99%)    Parameters below as of 27 Nov 2023 08:17  Patient On (Oxygen Delivery Method): room air    General: NAD,     Detailed Neurologic Exam:    Mental status: The patient is awake and alert and has normal attention span.  The patient is fully oriented in 3 spheres. The patient is oriented to current events. The patient is able to name objects, follow commands, repeat sentences,    Cranial nerves: Pupils equal and react symmetrically to light. There is no visual field deficit to confrontation. Extraocular motion is full with no nystagmus. There is no ptosis. Facial sensation is intact. facial weakness on left has improved significantly     Motor: There is normal bulk and tone.  There is no tremor.  Strength is 5/5 in the right arm and leg   Strength is 5/5 in the left arm and leg.    Sensation: Intact to light touch and pin in 4 extremities    Reflexes: deferred    Cerebellar: There is slight Right dysmetria on finger to nose testing.    Gait : deferred    Groin- no blood   LABS:                        11.1   3.85  )-----------( 211      ( 27 Nov 2023 05:26 )             31.4    11-27    144  |  110<H>  |  23.9<H>  ----------------------------<  93  4.3   |  22.0  |  1.76<H>    Ca    9.0      27 Nov 2023 05:26      IMAGING: Reviewed by me.   MR Head No Cont (11.25.23 @ 08:29)   IMPRESSION:   No acute infarct. There are small chronic infarcts in the   right high posterior parietal lobe, right posterior temporal lobe, and   bilateral cerebelli. Moderate periventricular and subcortical white   matter chronic microvascular ischemic changes.    (11.24.23 @ 11:54)   IMPRESSION:  HEAD CT: No evidence of an acute intracranial hemorrhage, midline shift or   hydrocephalus. Multiple chronic small infarcts  NECK CTA: Severe stenosis right carotid bulb/proximal internal carotid   artery. Moderate stenosis left carotid bulb.  BRAIN CTA: No proximal large vessel occlusion. Regions of atherosclerotic   narrowing as described.      US Duplex Carotid Arteries Complete, Bilateral (11.24.23 @ 22:49)   IMPRESSION:  1.  There is a moderate stenosis in the right carotid bulb measuring   50-69% using NASCET and a tandem severe stenosis in the mid right ICA   measuring greater than 70% using NASCET criteria.  There is downstream   tardus parvus waveform in the distal right internal carotid artery.  2.  There is a moderate stenosis in the distal left internal carotid   artery measuring 50-69% using NASCET criteria.  3.  There is a severe stenosis in the proximal right external carotid   artery.  4.  There is a mild to moderate stenosis in the proximal left external   carotid artery.  Measurement of carotid stenosis is based on velocity parameters that   correlate the residual internal carotid diameter with that of the more   distal vessel in accordance with a method such as the North American   Symptomatic Carotid Endarterectomy Trial (NASCET).      Xray Chest 1 View AP/PA (11.24.23 @ 12:12)   IMPRESSION: Mild central CHF at this time.

## 2023-11-27 NOTE — PROGRESS NOTE ADULT - SUBJECTIVE AND OBJECTIVE BOX
INTERVAL HPI/OVERNIGHT EVENTS:    Patient evaluated at bedside. NAOE. Patient denies N/V/CP/SOB, no subjective fevers or chills. Pain well controlled. No complaints this AM. Planned for cerebral angio with neuro IR today.    MEDICATIONS  (STANDING):  aspirin enteric coated 81 milliGRAM(s) Oral daily  atorvastatin 80 milliGRAM(s) Oral at bedtime  clopidogrel Tablet 75 milliGRAM(s) Oral daily  dextrose 5%. 1000 milliLiter(s) (50 mL/Hr) IV Continuous <Continuous>  dextrose 5%. 1000 milliLiter(s) (100 mL/Hr) IV Continuous <Continuous>  dextrose 50% Injectable 12.5 Gram(s) IV Push once  dextrose 50% Injectable 25 Gram(s) IV Push once  dextrose 50% Injectable 25 Gram(s) IV Push once  famotidine    Tablet 20 milliGRAM(s) Oral daily  glucagon  Injectable 1 milliGRAM(s) IntraMuscular once  heparin   Injectable 5000 Unit(s) SubCutaneous every 8 hours  influenza  Vaccine (HIGH DOSE) 0.7 milliLiter(s) IntraMuscular once  insulin lispro (ADMELOG) corrective regimen sliding scale   SubCutaneous three times a day before meals  insulin lispro (ADMELOG) corrective regimen sliding scale   SubCutaneous Before meals and at bedtime  losartan 25 milliGRAM(s) Oral daily  metoprolol succinate ER 50 milliGRAM(s) Oral daily  tamsulosin 0.4 milliGRAM(s) Oral at bedtime    MEDICATIONS  (PRN):  acetaminophen     Tablet .. 650 milliGRAM(s) Oral every 6 hours PRN Temp greater or equal to 38C (100.4F), Mild Pain (1 - 3)  aluminum hydroxide/magnesium hydroxide/simethicone Suspension 30 milliLiter(s) Oral every 4 hours PRN Dyspepsia  dextrose Oral Gel 15 Gram(s) Oral once PRN Blood Glucose LESS THAN 70 milliGRAM(s)/deciliter  melatonin 3 milliGRAM(s) Oral at bedtime PRN Insomnia  ondansetron Injectable 4 milliGRAM(s) IV Push every 8 hours PRN Nausea and/or Vomiting      Vital Signs Last 24 Hrs  T(C): 36.7 (27 Nov 2023 04:53), Max: 37.2 (26 Nov 2023 10:17)  T(F): 98.1 (27 Nov 2023 04:53), Max: 99 (26 Nov 2023 10:17)  HR: 70 (27 Nov 2023 04:53) (56 - 70)  BP: 143/61 (27 Nov 2023 04:53) (141/68 - 174/80)  BP(mean): --  RR: 18 (27 Nov 2023 04:53) (18 - 18)  SpO2: 95% (27 Nov 2023 04:53) (95% - 98%)    Parameters below as of 27 Nov 2023 04:53  Patient On (Oxygen Delivery Method): room air      Constitutional: NAD  HEENT: PERRLA, EOMI, no drainage or redness  Respiratory: respirations even, unlabored on room air  Cardiovascular: RRR  Gastrointestinal: Soft, non-tender, non-distended  Neurological: A&O x 3; no gross deficits on exam, strength symmetric  Psychiatric: Normal mood, normal affect  Musculoskeletal: No joint pain, swelling or deformity; no limitation of movement  Skin: No rashes      I&O's Detail    26 Nov 2023 07:01  -  27 Nov 2023 07:00  --------------------------------------------------------  IN:  Total IN: 0 mL    OUT:    Voided (mL): 725 mL  Total OUT: 725 mL    Total NET: -725 mL          LABS:                        10.9   3.75  )-----------( 195      ( 26 Nov 2023 07:03 )             32.6     11-27    144  |  110<H>  |  23.9<H>  ----------------------------<  93  4.3   |  22.0  |  1.76<H>    Ca    9.0      27 Nov 2023 05:26  Phos  3.4     11-25  Mg     2.0     11-25    TPro  7.5  /  Alb  4.4  /  TBili  0.6  /  DBili  x   /  AST  27  /  ALT  20  /  AlkPhos  78  11-25      Urinalysis Basic - ( 27 Nov 2023 05:26 )    Color: x / Appearance: x / SG: x / pH: x  Gluc: 93 mg/dL / Ketone: x  / Bili: x / Urobili: x   Blood: x / Protein: x / Nitrite: x   Leuk Esterase: x / RBC: x / WBC x   Sq Epi: x / Non Sq Epi: x / Bacteria: x        RADIOLOGY & ADDITIONAL STUDIES:  < from: US Duplex Carotid Arteries Complete, Bilateral (11.24.23 @ 22:49) >  INTERPRETATION:  CLINICAL INFORMATION: Episode of aphasia and facial   droop.  Carotid stenoses on CTA.    COMPARISON: CT angiography neck from 11/24/2023 at 11:46 AM.    TECHNIQUE: Grayscale, color and spectral Doppler examination of both   carotid arteries was performed.    FINDINGS:  There is extensive atherosclerotic plaque bilaterally.    Elevated peak systolic velocity right carotid bulb is compatible with a   moderate stenosis measuring 50-69% using NASCET criteria.    Elevated velocity in the mid right internal carotid artery is compatible   with a severe stenosis measuring greater than 70% stenosis using NASCET   criteria.  There is a down stream tardus parvus waveform in the distal   right internal carotid artery.    Elevated velocity in the distal left internal carotid artery is   compatible with a moderate stenosis, measuring 50-69% using NASCET   criteria.    Elevated velocity in the proximal right external carotid artery is   compatible with a severe stenosis.  Elevated velocity in the proximal   left internal carotid artery is compatible with a mild to moderate   stenosis.    No carotid occlusion is identified sonographically.    Peak systolic velocities are as follows:    RIGHT:  PROX CCA = 55 cm/s  DIST CCA = 49 cm/s  BULB = 221 cm/s  PROX ICA = 98 cm/s  MID ICA = 411 cm/s  DIST ICA = 118 cm/s  ECA = 323 cm/s    LEFT:  PROX CCA = 90 cm/s  DIST CCA = 79 cm/s  BULB = 114 cm/s  PROX ICA = 82 cm/s  MID ICA = 125 cm/s  DIST ICA = 149 cm/s  ECA = 181 cm/s    Antegrade flow is noted within both vertebral arteries.    IMPRESSION:  1.  There is a moderate stenosis in the right carotid bulb measuring   50-69% using NASCET and a tandem severe stenosis in the mid right ICA   measuring greater than 70% using NASCET criteria.  There is downstream   tardus parvus waveform in the distal right internal carotid artery.  2.  There is a moderate stenosis in the distal left internal carotid   artery measuring 50-69% using NASCET criteria.  3.  There is a severe stenosis in the proximal right external carotid   artery.  4.  There is a mild to moderate stenosis in the proximal left external   carotidartery.    Measurement of carotid stenosis is based on velocity parameters that   correlate the residual internal carotid diameter with that of the more   distal vessel in accordance with a method such as the North American   Symptomatic Carotid Endarterectomy Trial (NASCET).    < end of copied text >

## 2023-11-28 ENCOUNTER — TRANSCRIPTION ENCOUNTER (OUTPATIENT)
Age: 78
End: 2023-11-28

## 2023-11-28 VITALS
DIASTOLIC BLOOD PRESSURE: 45 MMHG | RESPIRATION RATE: 20 BRPM | SYSTOLIC BLOOD PRESSURE: 124 MMHG | OXYGEN SATURATION: 97 % | HEART RATE: 65 BPM

## 2023-11-28 LAB
ANION GAP SERPL CALC-SCNC: 10 MMOL/L — SIGNIFICANT CHANGE UP (ref 5–17)
ANION GAP SERPL CALC-SCNC: 10 MMOL/L — SIGNIFICANT CHANGE UP (ref 5–17)
BUN SERPL-MCNC: 25.3 MG/DL — HIGH (ref 8–20)
BUN SERPL-MCNC: 25.3 MG/DL — HIGH (ref 8–20)
CALCIUM SERPL-MCNC: 8.5 MG/DL — SIGNIFICANT CHANGE UP (ref 8.4–10.5)
CALCIUM SERPL-MCNC: 8.5 MG/DL — SIGNIFICANT CHANGE UP (ref 8.4–10.5)
CHLORIDE SERPL-SCNC: 109 MMOL/L — HIGH (ref 96–108)
CHLORIDE SERPL-SCNC: 109 MMOL/L — HIGH (ref 96–108)
CO2 SERPL-SCNC: 21 MMOL/L — LOW (ref 22–29)
CO2 SERPL-SCNC: 21 MMOL/L — LOW (ref 22–29)
CREAT SERPL-MCNC: 1.92 MG/DL — HIGH (ref 0.5–1.3)
CREAT SERPL-MCNC: 1.92 MG/DL — HIGH (ref 0.5–1.3)
EGFR: 35 ML/MIN/1.73M2 — LOW
EGFR: 35 ML/MIN/1.73M2 — LOW
GLUCOSE BLDC GLUCOMTR-MCNC: 101 MG/DL — HIGH (ref 70–99)
GLUCOSE BLDC GLUCOMTR-MCNC: 101 MG/DL — HIGH (ref 70–99)
GLUCOSE BLDC GLUCOMTR-MCNC: 117 MG/DL — HIGH (ref 70–99)
GLUCOSE BLDC GLUCOMTR-MCNC: 117 MG/DL — HIGH (ref 70–99)
GLUCOSE BLDC GLUCOMTR-MCNC: 138 MG/DL — HIGH (ref 70–99)
GLUCOSE BLDC GLUCOMTR-MCNC: 138 MG/DL — HIGH (ref 70–99)
GLUCOSE SERPL-MCNC: 97 MG/DL — SIGNIFICANT CHANGE UP (ref 70–99)
GLUCOSE SERPL-MCNC: 97 MG/DL — SIGNIFICANT CHANGE UP (ref 70–99)
HCT VFR BLD CALC: 26.9 % — LOW (ref 39–50)
HCT VFR BLD CALC: 26.9 % — LOW (ref 39–50)
HGB BLD-MCNC: 9.3 G/DL — LOW (ref 13–17)
HGB BLD-MCNC: 9.3 G/DL — LOW (ref 13–17)
MAGNESIUM SERPL-MCNC: 1.9 MG/DL — SIGNIFICANT CHANGE UP (ref 1.8–2.6)
MAGNESIUM SERPL-MCNC: 1.9 MG/DL — SIGNIFICANT CHANGE UP (ref 1.8–2.6)
MCHC RBC-ENTMCNC: 30.6 PG — SIGNIFICANT CHANGE UP (ref 27–34)
MCHC RBC-ENTMCNC: 30.6 PG — SIGNIFICANT CHANGE UP (ref 27–34)
MCHC RBC-ENTMCNC: 34.6 GM/DL — SIGNIFICANT CHANGE UP (ref 32–36)
MCHC RBC-ENTMCNC: 34.6 GM/DL — SIGNIFICANT CHANGE UP (ref 32–36)
MCV RBC AUTO: 88.5 FL — SIGNIFICANT CHANGE UP (ref 80–100)
MCV RBC AUTO: 88.5 FL — SIGNIFICANT CHANGE UP (ref 80–100)
PHOSPHATE SERPL-MCNC: 4 MG/DL — SIGNIFICANT CHANGE UP (ref 2.4–4.7)
PHOSPHATE SERPL-MCNC: 4 MG/DL — SIGNIFICANT CHANGE UP (ref 2.4–4.7)
PLATELET # BLD AUTO: 179 K/UL — SIGNIFICANT CHANGE UP (ref 150–400)
PLATELET # BLD AUTO: 179 K/UL — SIGNIFICANT CHANGE UP (ref 150–400)
POTASSIUM SERPL-MCNC: 4.1 MMOL/L — SIGNIFICANT CHANGE UP (ref 3.5–5.3)
POTASSIUM SERPL-MCNC: 4.1 MMOL/L — SIGNIFICANT CHANGE UP (ref 3.5–5.3)
POTASSIUM SERPL-SCNC: 4.1 MMOL/L — SIGNIFICANT CHANGE UP (ref 3.5–5.3)
POTASSIUM SERPL-SCNC: 4.1 MMOL/L — SIGNIFICANT CHANGE UP (ref 3.5–5.3)
RBC # BLD: 3.04 M/UL — LOW (ref 4.2–5.8)
RBC # BLD: 3.04 M/UL — LOW (ref 4.2–5.8)
RBC # FLD: 13.8 % — SIGNIFICANT CHANGE UP (ref 10.3–14.5)
RBC # FLD: 13.8 % — SIGNIFICANT CHANGE UP (ref 10.3–14.5)
SODIUM SERPL-SCNC: 140 MMOL/L — SIGNIFICANT CHANGE UP (ref 135–145)
SODIUM SERPL-SCNC: 140 MMOL/L — SIGNIFICANT CHANGE UP (ref 135–145)
WBC # BLD: 4.76 K/UL — SIGNIFICANT CHANGE UP (ref 3.8–10.5)
WBC # BLD: 4.76 K/UL — SIGNIFICANT CHANGE UP (ref 3.8–10.5)
WBC # FLD AUTO: 4.76 K/UL — SIGNIFICANT CHANGE UP (ref 3.8–10.5)
WBC # FLD AUTO: 4.76 K/UL — SIGNIFICANT CHANGE UP (ref 3.8–10.5)

## 2023-11-28 PROCEDURE — 70450 CT HEAD/BRAIN W/O DYE: CPT | Mod: 26

## 2023-11-28 PROCEDURE — 83036 HEMOGLOBIN GLYCOSYLATED A1C: CPT

## 2023-11-28 PROCEDURE — 85027 COMPLETE CBC AUTOMATED: CPT

## 2023-11-28 PROCEDURE — 84100 ASSAY OF PHOSPHORUS: CPT

## 2023-11-28 PROCEDURE — C1769: CPT

## 2023-11-28 PROCEDURE — 36415 COLL VENOUS BLD VENIPUNCTURE: CPT

## 2023-11-28 PROCEDURE — 70551 MRI BRAIN STEM W/O DYE: CPT

## 2023-11-28 PROCEDURE — 93880 EXTRACRANIAL BILAT STUDY: CPT | Mod: 26

## 2023-11-28 PROCEDURE — 85025 COMPLETE CBC W/AUTO DIFF WBC: CPT

## 2023-11-28 PROCEDURE — 70450 CT HEAD/BRAIN W/O DYE: CPT

## 2023-11-28 PROCEDURE — C1725: CPT

## 2023-11-28 PROCEDURE — 83735 ASSAY OF MAGNESIUM: CPT

## 2023-11-28 PROCEDURE — C1760: CPT

## 2023-11-28 PROCEDURE — 84484 ASSAY OF TROPONIN QUANT: CPT

## 2023-11-28 PROCEDURE — 93306 TTE W/DOPPLER COMPLETE: CPT | Mod: 26

## 2023-11-28 PROCEDURE — C1887: CPT

## 2023-11-28 PROCEDURE — 99232 SBSQ HOSP IP/OBS MODERATE 35: CPT

## 2023-11-28 PROCEDURE — 97163 PT EVAL HIGH COMPLEX 45 MIN: CPT

## 2023-11-28 PROCEDURE — 80048 BASIC METABOLIC PNL TOTAL CA: CPT

## 2023-11-28 PROCEDURE — 80053 COMPREHEN METABOLIC PANEL: CPT

## 2023-11-28 PROCEDURE — C1884: CPT

## 2023-11-28 PROCEDURE — C1894: CPT

## 2023-11-28 PROCEDURE — 36223 PLACE CATH CAROTID/INOM ART: CPT

## 2023-11-28 PROCEDURE — 82550 ASSAY OF CK (CPK): CPT

## 2023-11-28 PROCEDURE — 93880 EXTRACRANIAL BILAT STUDY: CPT

## 2023-11-28 PROCEDURE — C1876: CPT

## 2023-11-28 PROCEDURE — 97167 OT EVAL HIGH COMPLEX 60 MIN: CPT

## 2023-11-28 PROCEDURE — 80061 LIPID PANEL: CPT

## 2023-11-28 PROCEDURE — 85576 BLOOD PLATELET AGGREGATION: CPT

## 2023-11-28 PROCEDURE — 82962 GLUCOSE BLOOD TEST: CPT

## 2023-11-28 PROCEDURE — 37215 TRANSCATH STENT CCA W/EPS: CPT

## 2023-11-28 PROCEDURE — C8929: CPT

## 2023-11-28 RX ORDER — CHLORHEXIDINE GLUCONATE 213 G/1000ML
1 SOLUTION TOPICAL DAILY
Refills: 0 | Status: DISCONTINUED | OUTPATIENT
Start: 2023-11-28 | End: 2023-11-28

## 2023-11-28 RX ORDER — SODIUM CHLORIDE 9 MG/ML
500 INJECTION INTRAMUSCULAR; INTRAVENOUS; SUBCUTANEOUS ONCE
Refills: 0 | Status: COMPLETED | OUTPATIENT
Start: 2023-11-28 | End: 2023-11-28

## 2023-11-28 RX ORDER — CLOPIDOGREL BISULFATE 75 MG/1
1 TABLET, FILM COATED ORAL
Qty: 30 | Refills: 0
Start: 2023-11-28 | End: 2023-12-27

## 2023-11-28 RX ADMIN — Medication 10 MILLIGRAM(S): at 07:07

## 2023-11-28 RX ADMIN — Medication 50 MILLIGRAM(S): at 06:11

## 2023-11-28 RX ADMIN — HEPARIN SODIUM 5000 UNIT(S): 5000 INJECTION INTRAVENOUS; SUBCUTANEOUS at 05:14

## 2023-11-28 RX ADMIN — ATORVASTATIN CALCIUM 80 MILLIGRAM(S): 80 TABLET, FILM COATED ORAL at 19:47

## 2023-11-28 RX ADMIN — FAMOTIDINE 20 MILLIGRAM(S): 10 INJECTION INTRAVENOUS at 11:13

## 2023-11-28 RX ADMIN — CLOPIDOGREL BISULFATE 75 MILLIGRAM(S): 75 TABLET, FILM COATED ORAL at 11:13

## 2023-11-28 RX ADMIN — SODIUM CHLORIDE 250 MILLILITER(S): 9 INJECTION INTRAMUSCULAR; INTRAVENOUS; SUBCUTANEOUS at 04:22

## 2023-11-28 RX ADMIN — LOSARTAN POTASSIUM 25 MILLIGRAM(S): 100 TABLET, FILM COATED ORAL at 06:11

## 2023-11-28 RX ADMIN — TAMSULOSIN HYDROCHLORIDE 0.4 MILLIGRAM(S): 0.4 CAPSULE ORAL at 19:47

## 2023-11-28 RX ADMIN — CHLORHEXIDINE GLUCONATE 1 APPLICATION(S): 213 SOLUTION TOPICAL at 13:10

## 2023-11-28 RX ADMIN — HEPARIN SODIUM 5000 UNIT(S): 5000 INJECTION INTRAVENOUS; SUBCUTANEOUS at 14:02

## 2023-11-28 RX ADMIN — Medication 81 MILLIGRAM(S): at 11:13

## 2023-11-28 NOTE — OCCUPATIONAL THERAPY INITIAL EVALUATION ADULT - PRECAUTIONS/LIMITATIONS, REHAB EVAL
Possible duo to sedentary life style,will check lower leg DVT and will continue with Lovenox.     fall precautions

## 2023-11-28 NOTE — OCCUPATIONAL THERAPY INITIAL EVALUATION ADULT - PERTINENT HX OF CURRENT PROBLEM, REHAB EVAL
As per MD note: 77M with PMHx of CAD s/p CABG, CVA, HTN, HLD, DM2, BPH, recent COVID positive 10 days ago presented  to [facility] for Left sided facial droop, difficulty word finding and weakness approximately three days prior to admission. At [place], patient had a NIHSS of 0 and no thrombolytic agent was administered. Upon neuro-imaging, patient was noted by a neurologist for carotid stenosis. Patient was then transferred to [facility] and then to [facility] ED on the 11/24/23. On admission here, CT brain was unrevealing, CTA H/N revealed severe stenosis right carotid bulb/proximal internal carotid artery. Moderate stenosis left carotid bulb. The stroke team was consulted for further management with possible cerebral angiogram.

## 2023-11-28 NOTE — PHYSICAL THERAPY INITIAL EVALUATION ADULT - CRITERIA FOR SKILLED THERAPEUTIC INTERVENTIONS
no functional impairments noted.
Home with RW, assist PRN from family, Home PT, supervision for safety 2*2 decreased safety awareness/anticipated discharge recommendation

## 2023-11-28 NOTE — PHYSICAL THERAPY INITIAL EVALUATION ADULT - LEVEL OF INDEPENDENCE: STAIR NEGOTIATION, REHAB EVAL
independent
decreased balance and decreased safety awareness, multiple verbal cues needed for step to pattern 2*2 LLE weakness/minimum assist (75% patients effort)

## 2023-11-28 NOTE — OCCUPATIONAL THERAPY INITIAL EVALUATION ADULT - ADDITIONAL COMMENTS
right handed right handed  Pt has a RW, cane, shower chair  Pt uses a shower chair  Pt stated son can provide assistance during the day, however son works at different hours of the day. Pt also mentioned he was currently pursuing home aide services

## 2023-11-28 NOTE — PHYSICAL THERAPY INITIAL EVALUATION ADULT - GAIT DISTANCE, PT EVAL
25 feet
additional 10 feet ambulated without assist device. Min Assist needed 2*2 impaired balance and decreased safety awareness./150 feet

## 2023-11-28 NOTE — PROGRESS NOTE ADULT - SUBJECTIVE AND OBJECTIVE BOX
Preliminary note, offical recommendations pending attending review/signature   [facility] Stroke Team  Progress Note     HPI:  77M with PMHx of CAD s/p CABG, CVA, HTN, HLD, DM2, BPH, recent COVID positive 10 days ago presented  to [facility] for Left sided facial droop, difficulty word finding and weakness approximately three days prior to admission. At [place], patient had a NIHSS of 0 and no thrombolytic agent was administered. Upon neuro-imaging, patient was noted by a neurologist for carotid stenosis. Patient was then transferred to [facility] and then to [facility] ED on the 11/24/23. On admission here, CT brain was unrevealing, CTA H/N revealed severe stenosis right carotid bulb/proximal internal carotid artery. Moderate stenosis left carotid bulb. The stroke team was consulted for further management with possible cerebral angiogram.       SUBJECTIVE: No events overnight.  No new neurologic complaints.  ROS reported negative unless otherwise noted.    acetaminophen     Tablet .. 650 milliGRAM(s) Oral every 6 hours PRN  aluminum hydroxide/magnesium hydroxide/simethicone Suspension 30 milliLiter(s) Oral every 4 hours PRN  aspirin enteric coated 81 milliGRAM(s) Oral daily  atorvastatin 80 milliGRAM(s) Oral at bedtime  chlorhexidine 2% Cloths 1 Application(s) Topical daily  clopidogrel Tablet 75 milliGRAM(s) Oral daily  dextrose 5%. 1000 milliLiter(s) IV Continuous <Continuous>  dextrose 5%. 1000 milliLiter(s) IV Continuous <Continuous>  dextrose 50% Injectable 12.5 Gram(s) IV Push once  dextrose 50% Injectable 25 Gram(s) IV Push once  dextrose 50% Injectable 25 Gram(s) IV Push once  dextrose Oral Gel 15 Gram(s) Oral once PRN  famotidine    Tablet 20 milliGRAM(s) Oral daily  glucagon  Injectable 1 milliGRAM(s) IntraMuscular once  heparin   Injectable 5000 Unit(s) SubCutaneous every 8 hours  hydrALAZINE Injectable 10 milliGRAM(s) IV Push every 2 hours PRN  influenza  Vaccine (HIGH DOSE) 0.7 milliLiter(s) IntraMuscular once  insulin lispro (ADMELOG) corrective regimen sliding scale   SubCutaneous three times a day before meals  losartan 25 milliGRAM(s) Oral daily  melatonin 3 milliGRAM(s) Oral at bedtime PRN  metoprolol succinate ER 50 milliGRAM(s) Oral daily  ondansetron Injectable 4 milliGRAM(s) IV Push every 8 hours PRN  tamsulosin 0.4 milliGRAM(s) Oral at bedtime      PHYSICAL EXAM:   Vital Signs Last 24 Hrs  T(C): 37 (28 Nov 2023 08:00), Max: 37.1 (28 Nov 2023 04:02)  T(F): 98.6 (28 Nov 2023 08:00), Max: 98.8 (28 Nov 2023 04:02)  HR: 61 (28 Nov 2023 09:00) (48 - 80)  BP: 122/62 (28 Nov 2023 09:00) (107/86 - 166/71)  BP(mean): 74 (28 Nov 2023 09:00) (74 - 118)  RR: 18 (28 Nov 2023 09:00) (11 - 21)  SpO2: 99% (28 Nov 2023 09:00) (95% - 100%)    Parameters below as of 28 Nov 2023 08:00  Patient On (Oxygen Delivery Method): room air    General: NAD,     Detailed Neurologic Exam:    Mental status: The patient is awake and alert and has normal attention span.  The patient is fully oriented in 3 spheres. The patient is oriented to current events. The patient is able to name objects, follow commands, repeat sentences,    Cranial nerves: Pupils equal and react symmetrically to light. There is no visual field deficit to confrontation. Extraocular motion is full with no nystagmus. There is no ptosis. Facial sensation is intact. facial weakness on left has improved significantly     Motor: There is normal bulk and tone.  There is no tremor.  Strength is 5/5 in the right arm and leg   Strength is 5/5 in the left arm and leg.    Sensation: Intact to light touch and pin in 4 extremities    Reflexes: deferred    Cerebellar: There is slight Right dysmetria on finger to nose testing.    Gait : deferred    Groin-      LABS:                        9.3    4.76  )-----------( 179      ( 28 Nov 2023 02:29 )             26.9    11-28    140  |  109<H>  |  25.3<H>  ----------------------------<  97  4.1   |  21.0<L>  |  1.92<H>    Ca    8.5      28 Nov 2023 02:29  Phos  4.0     11-28  Mg     1.9     11-28          IMAGING: Reviewed by me.     MR Head No Cont (11.25.23 @ 08:29)   IMPRESSION:   No acute infarct. There are small chronic infarcts in the   right high posterior parietal lobe, right posterior temporal lobe, and   bilateral cerebelli. Moderate periventricular and subcortical white   matter chronic microvascular ischemic changes.    (11.24.23 @ 11:54)   IMPRESSION:  HEAD CT: No evidence of an acute intracranial hemorrhage, midline shift or   hydrocephalus. Multiple chronic small infarcts  NECK CTA: Severe stenosis right carotid bulb/proximal internal carotid   artery. Moderate stenosis left carotid bulb.  BRAIN CTA: No proximal large vessel occlusion. Regions of atherosclerotic   narrowing as described.      US Duplex Carotid Arteries Complete, Bilateral (11.24.23 @ 22:49)   IMPRESSION:  1.  There is a moderate stenosis in the right carotid bulb measuring   50-69% using NASCET and a tandem severe stenosis in the mid right ICA   measuring greater than 70% using NASCET criteria.  There is downstream   tardus parvus waveform in the distal right internal carotid artery.  2.  There is a moderate stenosis in the distal left internal carotid   artery measuring 50-69% using NASCET criteria.  3.  There is a severe stenosis in the proximal right external carotid   artery.  4.  There is a mild to moderate stenosis in the proximal left external   carotid artery.  Measurement of carotid stenosis is based on velocity parameters that   correlate the residual internal carotid diameter with that of the more   distal vessel in accordance with a method such as the North American   Symptomatic Carotid Endarterectomy Trial (NASCET).      Xray Chest 1 View AP/PA (11.24.23 @ 12:12)   IMPRESSION: Mild central CHF at this time.         Preliminary note, offical recommendations pending attending review/signature   [facility] Stroke Team  Progress Note     HPI:  77M with PMHx of CAD s/p CABG, CVA, HTN, HLD, DM2, BPH, recent COVID positive 10 days ago presented  to [facility] for Left sided facial droop, difficulty word finding and weakness approximately three days prior to admission. At [place], patient had a NIHSS of 0 and no thrombolytic agent was administered. Upon neuro-imaging, patient was noted by a neurologist for carotid stenosis. Patient was then transferred to [facility] and then to [facility] ED on the 11/24/23. On admission here, CT brain was unrevealing, CTA H/N revealed severe stenosis right carotid bulb/proximal internal carotid artery. Moderate stenosis left carotid bulb. The stroke team was consulted for further management with possible cerebral angiogram.     SUBJECTIVE: No events overnight.  No new neurologic complaints.  Reports blurred vision in distance in left side when looking out of both eyes. RN notes patient reported blurry vision yesterday evening. Also reports at times blurred vision on nasal field of right eye. Blurred temporal field of left eye.  ROS reported negative unless otherwise noted.    acetaminophen     Tablet .. 650 milliGRAM(s) Oral every 6 hours PRN  aluminum hydroxide/magnesium hydroxide/simethicone Suspension 30 milliLiter(s) Oral every 4 hours PRN  aspirin enteric coated 81 milliGRAM(s) Oral daily  atorvastatin 80 milliGRAM(s) Oral at bedtime  chlorhexidine 2% Cloths 1 Application(s) Topical daily  clopidogrel Tablet 75 milliGRAM(s) Oral daily  dextrose 5%. 1000 milliLiter(s) IV Continuous <Continuous>  dextrose 5%. 1000 milliLiter(s) IV Continuous <Continuous>  dextrose 50% Injectable 12.5 Gram(s) IV Push once  dextrose 50% Injectable 25 Gram(s) IV Push once  dextrose 50% Injectable 25 Gram(s) IV Push once  dextrose Oral Gel 15 Gram(s) Oral once PRN  famotidine    Tablet 20 milliGRAM(s) Oral daily  glucagon  Injectable 1 milliGRAM(s) IntraMuscular once  heparin   Injectable 5000 Unit(s) SubCutaneous every 8 hours  hydrALAZINE Injectable 10 milliGRAM(s) IV Push every 2 hours PRN  influenza  Vaccine (HIGH DOSE) 0.7 milliLiter(s) IntraMuscular once  insulin lispro (ADMELOG) corrective regimen sliding scale   SubCutaneous three times a day before meals  losartan 25 milliGRAM(s) Oral daily  melatonin 3 milliGRAM(s) Oral at bedtime PRN  metoprolol succinate ER 50 milliGRAM(s) Oral daily  ondansetron Injectable 4 milliGRAM(s) IV Push every 8 hours PRN  tamsulosin 0.4 milliGRAM(s) Oral at bedtime      PHYSICAL EXAM:   Vital Signs Last 24 Hrs  T(C): 37 (28 Nov 2023 08:00), Max: 37.1 (28 Nov 2023 04:02)  T(F): 98.6 (28 Nov 2023 08:00), Max: 98.8 (28 Nov 2023 04:02)  HR: 61 (28 Nov 2023 09:00) (48 - 80)  BP: 122/62 (28 Nov 2023 09:00) (107/86 - 166/71)  BP(mean): 74 (28 Nov 2023 09:00) (74 - 118)  RR: 18 (28 Nov 2023 09:00) (11 - 21)  SpO2: 99% (28 Nov 2023 09:00) (95% - 100%)    Parameters below as of 28 Nov 2023 08:00  Patient On (Oxygen Delivery Method): room air    General: NAD,     Detailed Neurologic Exam:    Mental status: The patient is awake and alert and has normal attention span.  The patient is fully oriented in 3 spheres. The patient is oriented to current events. The patient is able to name objects, follow commands, repeat sentences,    Cranial nerves: slight left lip depression. Pupils equal and react symmetrically to light. There is no visual field deficit to confrontation. Extraocular motion is full with no nystagmus. There is no ptosis. Facial sensation is intact.     Motor: There is normal bulk and tone.  There is no tremor.  Strength is 5/5 in the right arm and leg   Strength is 5/5 in the left arm and leg.    Sensation: Intact to light touch and pin in 4 extremities    Reflexes: deferred    Cerebellar: There is slight Right dysmetria on finger to nose testing.    Gait : deferred    Groin-      LABS:                        9.3    4.76  )-----------( 179      ( 28 Nov 2023 02:29 )             26.9    11-28    140  |  109<H>  |  25.3<H>  ----------------------------<  97  4.1   |  21.0<L>  |  1.92<H>    Ca    8.5      28 Nov 2023 02:29  Phos  4.0     11-28  Mg     1.9     11-28          IMAGING: Reviewed by me.     MR Head No Cont (11.25.23 @ 08:29)   IMPRESSION:   No acute infarct. There are small chronic infarcts in the   right high posterior parietal lobe, right posterior temporal lobe, and   bilateral cerebelli. Moderate periventricular and subcortical white   matter chronic microvascular ischemic changes.    (11.24.23 @ 11:54)   IMPRESSION:  HEAD CT: No evidence of an acute intracranial hemorrhage, midline shift or   hydrocephalus. Multiple chronic small infarcts  NECK CTA: Severe stenosis right carotid bulb/proximal internal carotid   artery. Moderate stenosis left carotid bulb.  BRAIN CTA: No proximal large vessel occlusion. Regions of atherosclerotic   narrowing as described.      US Duplex Carotid Arteries Complete, Bilateral (11.24.23 @ 22:49)   IMPRESSION:  1.  There is a moderate stenosis in the right carotid bulb measuring   50-69% using NASCET and a tandem severe stenosis in the mid right ICA   measuring greater than 70% using NASCET criteria.  There is downstream   tardus parvus waveform in the distal right internal carotid artery.  2.  There is a moderate stenosis in the distal left internal carotid   artery measuring 50-69% using NASCET criteria.  3.  There is a severe stenosis in the proximal right external carotid   artery.  4.  There is a mild to moderate stenosis in the proximal left external   carotid artery.  Measurement of carotid stenosis is based on velocity parameters that   correlate the residual internal carotid diameter with that of the more   distal vessel in accordance with a method such as the North American   Symptomatic Carotid Endarterectomy Trial (NASCET).      Xray Chest 1 View AP/PA (11.24.23 @ 12:12)   IMPRESSION: Mild central CHF at this time.         Preliminary note, offical recommendations pending attending review/signature   [facility] Stroke Team  Progress Note     HPI:  77M with PMHx of CAD s/p CABG, CVA, HTN, HLD, DM2, BPH, recent COVID positive 10 days ago presented  to [facility] for Left sided facial droop, difficulty word finding and weakness approximately three days prior to admission. At [place], patient had a NIHSS of 0 and no thrombolytic agent was administered. Upon neuro-imaging, patient was noted by a neurologist for carotid stenosis. Patient was then transferred to [facility] and then to [facility] ED on the 11/24/23. On admission here, CT brain was unrevealing, CTA H/N revealed severe stenosis right carotid bulb/proximal internal carotid artery. Moderate stenosis left carotid bulb. The stroke team was consulted for further management with possible cerebral angiogram.     SUBJECTIVE: No events overnight.  No new neurologic complaints.  Reports blurred vision in distance in left side when looking out of both eyes. RN notes patient reported blurry vision yesterday evening. Also reports at times blurred vision on nasal field of right eye. Blurred temporal field of left eye. Variable throughout report.   ROS reported negative unless otherwise noted.    acetaminophen     Tablet .. 650 milliGRAM(s) Oral every 6 hours PRN  aluminum hydroxide/magnesium hydroxide/simethicone Suspension 30 milliLiter(s) Oral every 4 hours PRN  aspirin enteric coated 81 milliGRAM(s) Oral daily  atorvastatin 80 milliGRAM(s) Oral at bedtime  chlorhexidine 2% Cloths 1 Application(s) Topical daily  clopidogrel Tablet 75 milliGRAM(s) Oral daily  dextrose 5%. 1000 milliLiter(s) IV Continuous <Continuous>  dextrose 5%. 1000 milliLiter(s) IV Continuous <Continuous>  dextrose 50% Injectable 12.5 Gram(s) IV Push once  dextrose 50% Injectable 25 Gram(s) IV Push once  dextrose 50% Injectable 25 Gram(s) IV Push once  dextrose Oral Gel 15 Gram(s) Oral once PRN  famotidine    Tablet 20 milliGRAM(s) Oral daily  glucagon  Injectable 1 milliGRAM(s) IntraMuscular once  heparin   Injectable 5000 Unit(s) SubCutaneous every 8 hours  hydrALAZINE Injectable 10 milliGRAM(s) IV Push every 2 hours PRN  influenza  Vaccine (HIGH DOSE) 0.7 milliLiter(s) IntraMuscular once  insulin lispro (ADMELOG) corrective regimen sliding scale   SubCutaneous three times a day before meals  losartan 25 milliGRAM(s) Oral daily  melatonin 3 milliGRAM(s) Oral at bedtime PRN  metoprolol succinate ER 50 milliGRAM(s) Oral daily  ondansetron Injectable 4 milliGRAM(s) IV Push every 8 hours PRN  tamsulosin 0.4 milliGRAM(s) Oral at bedtime      PHYSICAL EXAM:   Vital Signs Last 24 Hrs  T(C): 37 (28 Nov 2023 08:00), Max: 37.1 (28 Nov 2023 04:02)  T(F): 98.6 (28 Nov 2023 08:00), Max: 98.8 (28 Nov 2023 04:02)  HR: 61 (28 Nov 2023 09:00) (48 - 80)  BP: 122/62 (28 Nov 2023 09:00) (107/86 - 166/71)  BP(mean): 74 (28 Nov 2023 09:00) (74 - 118)  RR: 18 (28 Nov 2023 09:00) (11 - 21)  SpO2: 99% (28 Nov 2023 09:00) (95% - 100%)    Parameters below as of 28 Nov 2023 08:00  Patient On (Oxygen Delivery Method): room air    General: NAD,     Detailed Neurologic Exam:    Mental status: The patient is awake and alert and has normal attention span.  The patient is fully oriented in 3 spheres. The patient is oriented to current events. The patient is able to name objects, follow commands, repeat sentences,    Cranial nerves: slight left lip depression. reported blurring of left visual field of b/l eyes.  Pupils equal and react symmetrically to light. There is no visual field deficit to confrontation. Extraocular motion is full with no nystagmus. There is no ptosis. Facial sensation is intact.     Motor: There is normal bulk and tone.  There is no tremor.  Strength is 5/5 in the right arm and leg   Strength is 5/5 in the left arm and leg.    Sensation: Intact to light touch and pin in 4 extremities    Reflexes: deferred    Cerebellar: There is slight Right dysmetria on finger to nose testing.    Gait : deferred    Groin-      LABS:                        9.3    4.76  )-----------( 179      ( 28 Nov 2023 02:29 )             26.9    11-28    140  |  109<H>  |  25.3<H>  ----------------------------<  97  4.1   |  21.0<L>  |  1.92<H>    Ca    8.5      28 Nov 2023 02:29  Phos  4.0     11-28  Mg     1.9     11-28          IMAGING: Reviewed by me.     US Duplex Carotid Arteries Complete, Bilateral (11.28.23 @ 11:18)   There is a stented right carotid artery.  IN-STENT velocities measured 81, 49, 151, 174 cm/s. This may indicate a   degree of in-stent stenosis.  There are flow-limiting stenoses of both external carotid arteries.  There is no flow-limiting left internal carotid artery stenosis.  Measurement of carotid stenosis is based on velocity parameters that   correlate the residual internal carotid diameter with that of the more   distal vessel in accordance with a method such as the North American   Symptomatic CarotidEndarterectomy Trial (NASCET).       MR Head No Cont (11.25.23 @ 08:29)   IMPRESSION:   No acute infarct. There are small chronic infarcts in the   right high posterior parietal lobe, right posterior temporal lobe, and   bilateral cerebelli. Moderate periventricular and subcortical white   matter chronic microvascular ischemic changes.    (11.24.23 @ 11:54)   IMPRESSION:  HEAD CT: No evidence of an acute intracranial hemorrhage, midline shift or   hydrocephalus. Multiple chronic small infarcts  NECK CTA: Severe stenosis right carotid bulb/proximal internal carotid   artery. Moderate stenosis left carotid bulb.  BRAIN CTA: No proximal large vessel occlusion. Regions of atherosclerotic   narrowing as described.      US Duplex Carotid Arteries Complete, Bilateral (11.24.23 @ 22:49)   IMPRESSION:  1.  There is a moderate stenosis in the right carotid bulb measuring   50-69% using NASCET and a tandem severe stenosis in the mid right ICA   measuring greater than 70% using NASCET criteria.  There is downstream   tardus parvus waveform in the distal right internal carotid artery.  2.  There is a moderate stenosis in the distal left internal carotid   artery measuring 50-69% using NASCET criteria.  3.  There is a severe stenosis in the proximal right external carotid   artery.  4.  There is a mild to moderate stenosis in the proximal left external   carotid artery.  Measurement of carotid stenosis is based on velocity parameters that   correlate the residual internal carotid diameter with that of the more   distal vessel in accordance with a method such as the North American   Symptomatic Carotid Endarterectomy Trial (NASCET).      Xray Chest 1 View AP/PA (11.24.23 @ 12:12)   IMPRESSION: Mild central CHF at this time.         [facility] Stroke Team  Progress Note     HPI:  77M with PMHx of CAD s/p CABG, CVA, HTN, HLD, DM2, BPH, recent COVID positive 10 days ago presented  to [facility] for Left sided facial droop, difficulty word finding and weakness approximately three days prior to admission. At [place], patient had a NIHSS of 0 and no thrombolytic agent was administered. Upon neuro-imaging, patient was noted by a neurologist for carotid stenosis. Patient was then transferred to [facility] and then to [facility] ED on the 11/24/23. On admission here, CT brain was unrevealing, CTA H/N revealed severe stenosis right carotid bulb/proximal internal carotid artery. Moderate stenosis left carotid bulb. The stroke team was consulted for further management with possible cerebral angiogram.     SUBJECTIVE: No events overnight.  No new neurologic complaints.  Reports blurred vision in distance in left side when looking out of both eyes. RN notes patient reported blurry vision yesterday evening. Also reports at times blurred vision on nasal field of right eye. Blurred temporal field of left eye. Variable throughout report.   ROS reported negative unless otherwise noted.    acetaminophen     Tablet .. 650 milliGRAM(s) Oral every 6 hours PRN  aluminum hydroxide/magnesium hydroxide/simethicone Suspension 30 milliLiter(s) Oral every 4 hours PRN  aspirin enteric coated 81 milliGRAM(s) Oral daily  atorvastatin 80 milliGRAM(s) Oral at bedtime  chlorhexidine 2% Cloths 1 Application(s) Topical daily  clopidogrel Tablet 75 milliGRAM(s) Oral daily  dextrose 5%. 1000 milliLiter(s) IV Continuous <Continuous>  dextrose 5%. 1000 milliLiter(s) IV Continuous <Continuous>  dextrose 50% Injectable 12.5 Gram(s) IV Push once  dextrose 50% Injectable 25 Gram(s) IV Push once  dextrose 50% Injectable 25 Gram(s) IV Push once  dextrose Oral Gel 15 Gram(s) Oral once PRN  famotidine    Tablet 20 milliGRAM(s) Oral daily  glucagon  Injectable 1 milliGRAM(s) IntraMuscular once  heparin   Injectable 5000 Unit(s) SubCutaneous every 8 hours  hydrALAZINE Injectable 10 milliGRAM(s) IV Push every 2 hours PRN  influenza  Vaccine (HIGH DOSE) 0.7 milliLiter(s) IntraMuscular once  insulin lispro (ADMELOG) corrective regimen sliding scale   SubCutaneous three times a day before meals  losartan 25 milliGRAM(s) Oral daily  melatonin 3 milliGRAM(s) Oral at bedtime PRN  metoprolol succinate ER 50 milliGRAM(s) Oral daily  ondansetron Injectable 4 milliGRAM(s) IV Push every 8 hours PRN  tamsulosin 0.4 milliGRAM(s) Oral at bedtime      PHYSICAL EXAM:   Vital Signs Last 24 Hrs  T(C): 37 (28 Nov 2023 08:00), Max: 37.1 (28 Nov 2023 04:02)  T(F): 98.6 (28 Nov 2023 08:00), Max: 98.8 (28 Nov 2023 04:02)  HR: 61 (28 Nov 2023 09:00) (48 - 80)  BP: 122/62 (28 Nov 2023 09:00) (107/86 - 166/71)  BP(mean): 74 (28 Nov 2023 09:00) (74 - 118)  RR: 18 (28 Nov 2023 09:00) (11 - 21)  SpO2: 99% (28 Nov 2023 09:00) (95% - 100%)    Parameters below as of 28 Nov 2023 08:00  Patient On (Oxygen Delivery Method): room air    General: NAD,     Detailed Neurologic Exam:    Mental status: The patient is awake and alert and has normal attention span.  The patient is fully oriented in 3 spheres. The patient is oriented to current events. The patient is able to name objects, follow commands, repeat sentences,    Cranial nerves: slight left lip depression. reported blurring of left visual field of b/l eyes.  Pupils equal and react symmetrically to light. There is no visual field deficit to confrontation. Extraocular motion is full with no nystagmus. There is no ptosis. Facial sensation is intact.     Motor: There is normal bulk and tone.  There is no tremor.  Strength is 5/5 in the right arm and leg   Strength is 5/5 in the left arm and leg.    Sensation: Intact to light touch and pin in 4 extremities    Reflexes: deferred    Cerebellar: There is slight Right dysmetria on finger to nose testing.    Gait : deferred    Groin-      LABS:                        9.3    4.76  )-----------( 179      ( 28 Nov 2023 02:29 )             26.9    11-28    140  |  109<H>  |  25.3<H>  ----------------------------<  97  4.1   |  21.0<L>  |  1.92<H>    Ca    8.5      28 Nov 2023 02:29  Phos  4.0     11-28  Mg     1.9     11-28          IMAGING: Reviewed by me.     US Duplex Carotid Arteries Complete, Bilateral (11.28.23 @ 11:18)   There is a stented right carotid artery.  IN-STENT velocities measured 81, 49, 151, 174 cm/s. This may indicate a   degree of in-stent stenosis.  There are flow-limiting stenoses of both external carotid arteries.  There is no flow-limiting left internal carotid artery stenosis.  Measurement of carotid stenosis is based on velocity parameters that   correlate the residual internal carotid diameter with that of the more   distal vessel in accordance with a method such as the North American   Symptomatic CarotidEndarterectomy Trial (NASCET).       MR Head No Cont (11.25.23 @ 08:29)   IMPRESSION:   No acute infarct. There are small chronic infarcts in the   right high posterior parietal lobe, right posterior temporal lobe, and   bilateral cerebelli. Moderate periventricular and subcortical white   matter chronic microvascular ischemic changes.    (11.24.23 @ 11:54)   IMPRESSION:  HEAD CT: No evidence of an acute intracranial hemorrhage, midline shift or   hydrocephalus. Multiple chronic small infarcts  NECK CTA: Severe stenosis right carotid bulb/proximal internal carotid   artery. Moderate stenosis left carotid bulb.  BRAIN CTA: No proximal large vessel occlusion. Regions of atherosclerotic   narrowing as described.      US Duplex Carotid Arteries Complete, Bilateral (11.24.23 @ 22:49)   IMPRESSION:  1.  There is a moderate stenosis in the right carotid bulb measuring   50-69% using NASCET and a tandem severe stenosis in the mid right ICA   measuring greater than 70% using NASCET criteria.  There is downstream   tardus parvus waveform in the distal right internal carotid artery.  2.  There is a moderate stenosis in the distal left internal carotid   artery measuring 50-69% using NASCET criteria.  3.  There is a severe stenosis in the proximal right external carotid   artery.  4.  There is a mild to moderate stenosis in the proximal left external   carotid artery.  Measurement of carotid stenosis is based on velocity parameters that   correlate the residual internal carotid diameter with that of the more   distal vessel in accordance with a method such as the North American   Symptomatic Carotid Endarterectomy Trial (NASCET).      Xray Chest 1 View AP/PA (11.24.23 @ 12:12)   IMPRESSION: Mild central CHF at this time.

## 2023-11-28 NOTE — PHYSICAL THERAPY INITIAL EVALUATION ADULT - ADDITIONAL COMMENTS
Pt states he lives in a house with no [place], few steps inside. Son lives there. He has a cane and RW but doesn't use them.
Pt lives in a private home with his son (works odd hours, can assist when home) 6 steps to enter with handrails, 6 steps inside with handrails. Pt was independent PTA without an assist device. Pt owns RW, SAC, and Shower chair.

## 2023-11-28 NOTE — DISCHARGE NOTE NURSING/CASE MANAGEMENT/SOCIAL WORK - PATIENT PORTAL LINK FT
You can access the FollowMyHealth Patient Portal offered by [facility] by registering at the following website: [email or web address]. By joining "[employer]’s FollowMyHealth portal, you will also be able to view your health information using other applications (apps) compatible with our system.

## 2023-11-28 NOTE — PROGRESS NOTE ADULT - ASSESSMENT
INCOMPLETE- VISIT PENDING   ASSESSMENT:   77M with PMHx of CAD s/p CABG, CVA, HTN, HLD, DM2, BPH, recent COVID positive 10 days prior to presentation presented  to [facility] for Left sided facial droop, difficulty word finding and weakness approximately three days prior. At [place], patient had a NIHSS of 0 and no thrombolytic agent was administered. Upon neuro-imaging, patient was noted by a neurologist for carotid stenosis. Patient was then transferred to [facility] and then to [facility] ED on the 11/24/23. On admission here, CT brain was unrevealing, CTA H/N revealed severe stenosis right carotid bulb/proximal internal carotid artery. Moderate stenosis left carotid bulb. Ultrasound of the carotid revealed moderate stenosis in the Right carotid bulb measuring approximately 50-69% and tandem mild Right ICA greater than 70% and moderate stenosis in the in the distal Left ICA measuring about 50-69%. MRI brain revealed no acute infarct, but small chronic infarcts in the right high posterior lobe, right posterior temporal lobe and bilateral cerebri with some subcortical white matter chronic microvascular changes  The stroke team was consulted for further management with possible cerebral angiogram. Concern for symptomatic large vessel atherosclerotic disease;  with no acute infarction but chronic right hemispheric infarctions. Now s/p right ICA stent placement.     NEURO:   -Neurologically.....   -Continue close monitoring for neurologic deterioration    - Stroke neuro checks per post thrombectomy protocol   - SBP goal post intervention per IR team 120-160mmHg    -ANTITHROMBOTIC THERAPY: On ASA 81mg daily and Plavix 75 mg, P2Y12 111  -titrate statin to LDL goal less than 70 - recommend high dose statin   -MRI Brain w/o noted  -Carotid US pending for evaluation of new flow velocities post intervention   -Dysphagia screen: passed, advance per protocol .    -Physical therapy/OT/Speech eval/treatment.      CARDIOVASCULAR:   check TTE, cardiac monitoring w/ telemetry for now, further evaluation pending findings of noted workup                              HEMATOLOGY:   H/H with downtrend, repeat CBC to ensure stability today, monitor anemia closely, Platelets 179, patient should have all age and risk appropriate malignancy screenings with PCP or sooner if clinically suspected   DVT ppx: Heparin s.c [x] LMWH []     PULMONARY:   CXR noted, On room air, protecting airway, saturating well, follow up for noted mild CHF    RENAL:   BUN/Cr elevated but without acute change, monitor urine output, maintain adequate hydration as tolerated in setting of recent contrast administration and mild CHF.  Na Goal:  135-145      ID:   afebrile, no leukocytosis, monitor for si/sx of infection     OTHER:      DISPOSITION:       CORE MEASURES:        Admission NIHSS: 2     Tenecteplase : [] YES [x] NO      LDL/HDL/A1C: 93/44/6.3     Depression Screen- if depression hx and/or present      Statin Therapy: recommend high dose statin      Dysphagia Screen: [] PASS [] FAIL     Smoking [] YES [] NO quit 25 yrs ago     Afib [] YES [] NO     Stroke Education [] YES [] NO    Obtain screening lower extremity venous ultrasound in patients who meet 1 or more of the following criteria as patient is high risk for DVT/PE on admission:   [] History of DVT/PE  []Hypercoagulable states (Factor V Leiden, Cancer, OCP, etc. )  []Prolonged immobility (hemiplegia/hemiparesis/post operative or any other extended immobilization)  [] Transferred from outside facility (Rehab or Long term care)  [] Age </= to 50    ASSESSMENT:   77M with PMHx of CAD s/p CABG, CVA, HTN, HLD, DM2, BPH, recent COVID positive 10 days prior to presentation presented  to [facility] for Left sided facial droop, difficulty word finding and weakness approximately three days prior. At [place], patient had a NIHSS of 0 and no thrombolytic agent was administered. Upon neuro-imaging, patient was noted by a neurologist for carotid stenosis. Patient was then transferred to [facility] and then to [facility] ED on the 11/24/23. On admission here, CT brain was unrevealing, CTA H/N revealed severe stenosis right carotid bulb/proximal internal carotid artery. Moderate stenosis left carotid bulb. Ultrasound of the carotid revealed moderate stenosis in the Right carotid bulb measuring approximately 50-69% and tandem mild Right ICA greater than 70% and moderate stenosis in the in the distal Left ICA measuring about 50-69%. MRI brain revealed no acute infarct, but small chronic infarcts in the right high posterior lobe, right posterior temporal lobe and bilateral cerebri with some subcortical white matter chronic microvascular changes  The stroke team was consulted for further management with possible cerebral angiogram. Concern for symptomatic large vessel atherosclerotic disease;  with no acute infarction but chronic right hemispheric infarctions. Now s/p right ICA stent placement.     NEURO:   -Neurologically reported left VF blurring.   -Reviewed w/ - repeat CT head pending, carotid US reviewed as well- noted to be appropriate.   -Continue close monitoring for neurologic deterioration    - Stroke neuro checks q1 hr   - SBP goal post intervention per IR team 120-160mmHg    -ANTITHROMBOTIC THERAPY: On ASA 81mg daily and Plavix 75 mg, P2Y12 111  -titrate statin to LDL goal less than 70 - recommend high dose statin   -MRI Brain w/o noted  -Carotid US as noted and reviewed with Neuro IR MD.   -Dysphagia screen: passed, advance per protocol .    -Physical therapy/OT/Speech eval/treatment.      CARDIOVASCULAR:   check TTE, cardiac monitoring w/ telemetry for now, further evaluation pending findings of noted workup                              HEMATOLOGY:   H/H with downtrend, repeat CBC to ensure stability today, monitor anemia closely, Platelets 179, patient should have all age and risk appropriate malignancy screenings with PCP or sooner if clinically suspected   DVT ppx: Heparin s.c [x] LMWH []     PULMONARY:   CXR noted, On room air, protecting airway, saturating well, follow up for noted mild CHF    RENAL:   BUN/Cr elevated but without acute change, monitor urine output, maintain adequate hydration as tolerated in setting of recent contrast administration and mild CHF.  Na Goal:  135-145      ID:   afebrile, no leukocytosis, monitor for si/sx of infection     OTHER: d/w covering neuro ICU ACPs and IR.     DISPOSITION:       CORE MEASURES:        Admission NIHSS: 2     Tenecteplase : [] YES [x] NO      LDL/HDL/A1C: 93/44/6.3     Depression Screen- if depression hx and/or present      Statin Therapy: recommend high dose statin      Dysphagia Screen: [x] PASS [] FAIL     Smoking [] YES [] NO quit 25 yrs ago     Afib [] YES [x] NO     Stroke Education [] YES [] NO    Obtain screening lower extremity venous ultrasound in patients who meet 1 or more of the following criteria as patient is high risk for DVT/PE on admission:   [] History of DVT/PE  []Hypercoagulable states (Factor V Leiden, Cancer, OCP, etc. )  []Prolonged immobility (hemiplegia/hemiparesis/post operative or any other extended immobilization)  [] Transferred from outside facility (Rehab or Long term care)  [] Age </= to 50    ASSESSMENT:   77M with PMHx of CAD s/p CABG, CVA, HTN, HLD, DM2, BPH, recent COVID positive 10 days prior to presentation presented  to [facility] for Left sided facial droop, difficulty word finding and weakness approximately three days prior. At [place], patient had a NIHSS of 0 and no thrombolytic agent was administered. Upon neuro-imaging, patient was noted by a neurologist for carotid stenosis. Patient was then transferred to [facility] and then to [facility] ED on the 11/24/23. On admission here, CT brain was unrevealing, CTA H/N revealed severe stenosis right carotid bulb/proximal internal carotid artery. Moderate stenosis left carotid bulb. Ultrasound of the carotid revealed moderate stenosis in the Right carotid bulb measuring approximately 50-69% and tandem mild Right ICA greater than 70% and moderate stenosis in the in the distal Left ICA measuring about 50-69%. MRI brain revealed no acute infarct, but small chronic infarcts in the right high posterior lobe, right posterior temporal lobe and bilateral cerebri with some subcortical white matter chronic microvascular changes  The stroke team was consulted for further management with possible cerebral angiogram. Concern for symptomatic large vessel atherosclerotic disease;  with no acute infarction but chronic right hemispheric infarctions. Now s/p right ICA stent placement.     NEURO:   -Neurologically reported left VF blurring.   -Reviewed w/ - repeat CT head pending given left visual changes, carotid US reviewed as well- noted to be appropriate.   -Continue close monitoring for neurologic deterioration    - Stroke neuro checks q1 hr   - SBP goal post intervention per IR team 120-160mmHg    -ANTITHROMBOTIC THERAPY: On ASA 81mg daily and Plavix 75 mg, P2Y12 111  -titrate statin to LDL goal less than 70 - recommend high dose statin   -MRI Brain w/o noted  -Carotid US as noted and reviewed with Neuro IR MD. Velocities acceptable given stent  -Dysphagia screen: passed, advance per protocol .    -Physical therapy/OT/Speech eval/treatment.      CARDIOVASCULAR:   check TTE, cardiac monitoring w/ telemetry for now, further evaluation pending findings of noted workup                              HEMATOLOGY:   H/H with downtrend, repeat CBC to ensure stability today, monitor anemia closely, Platelets 179, patient should have all age and risk appropriate malignancy screenings with PCP or sooner if clinically suspected   DVT ppx: Heparin s.c [x] LMWH []     PULMONARY:   CXR noted, On room air, protecting airway, saturating well, follow up for noted mild CHF    RENAL:   BUN/Cr elevated but without acute change, monitor urine output, maintain adequate hydration as tolerated in setting of recent contrast administration and mild CHF.  Na Goal:  135-145      ID:   afebrile, no leukocytosis, monitor for si/sx of infection     OTHER: d/w covering neuro ICU ACPs and IR.     DISPOSITION:       CORE MEASURES:        Admission NIHSS: 2     Tenecteplase : [] YES [x] NO      LDL/HDL/A1C: 93/44/6.3     Depression Screen- if depression hx and/or present      Statin Therapy: recommend high dose statin      Dysphagia Screen: [x] PASS [] FAIL     Smoking [] YES [] NO quit 25 yrs ago     Afib [] YES [x] NO     Stroke Education [] YES [] NO    Obtain screening lower extremity venous ultrasound in patients who meet 1 or more of the following criteria as patient is high risk for DVT/PE on admission:   [] History of DVT/PE  []Hypercoagulable states (Factor V Leiden, Cancer, OCP, etc. )  []Prolonged immobility (hemiplegia/hemiparesis/post operative or any other extended immobilization)  [] Transferred from outside facility (Rehab or Long term care)  [] Age </= to 50

## 2023-11-28 NOTE — CONSULT NOTE ADULT - ASSESSMENT
77M with PMHx of CAD s/p CABG, CVA, HTN, HLD, DM2, BPH, recent COVID positive 10 days prior to presentation presented  to [facility] for Left sided facial droop, difficulty word finding and weakness and transferred to [facility] for further management. Concern for symptomatic large vessel atherosclerotic disease;  with no acute infarction but chronic right hemispheric infarctions and elevated Carotid doppler velocities. Now POD 1 right ICA stent placement. Exam stable.      Recommendations:    Continue with Aspirin 81mg qd po and Plavix 75mg qd po  Titrate statin to LDL goal less than 70 - recommend high dose statin   Carotid US pending for evaluation of new flow velocities post intervention   Dysphagia screen: passed, advance per protocol .    Physical therapy/OT/Speech eval/treatment  SBP goal post intervention per IR team 120-160mmHg   If cleared by PT/OT can be discharged from Neuro IR standpoint  Follow up with Dr Hwang within 1-2 weeks of DC

## 2023-11-28 NOTE — CONSULT NOTE ADULT - CONSULT REASON
Left facial droop
S/P R ICA Stent
Carotid Stenosis/TIA/CVA
R carotid artery stenosis
s/p carotid stent placement

## 2023-11-28 NOTE — DISCHARGE NOTE PROVIDER - NSDCMRMEDTOKEN_GEN_ALL_CORE_FT
aspirin 81 mg oral delayed release tablet: 1 tab(s) orally once a day   atorvastatin 40 mg oral tablet: 1 tab(s) orally once a day (at bedtime)  Centrum Men&#x27;s oral tablet: 1 tab(s) orally once a day  clopidogrel 75 mg oral tablet: 1 tab(s) orally once a day  famotidine 20 mg oral tablet: 1 tab(s) orally once a day  losartan 25 mg oral tablet: 1 tab(s) orally once a day  metFORMIN 500 mg oral tablet: 1 tab(s) orally 2 times a day  metoprolol succinate 50 mg oral tablet, extended release: 1 tab(s) orally once a day  tamsulosin 0.4 mg oral capsule: 1 cap(s) orally once a day (at bedtime)  Vitamin D3 5000 intl units (125 mcg) oral tablet: 1 tab(s) orally once a day   aspirin 81 mg oral delayed release tablet: 1 tab(s) orally once a day   atorvastatin 40 mg oral tablet: 1 tab(s) orally once a day (at bedtime)  Centrum Men&#x27;s oral tablet: 1 tab(s) orally once a day  clopidogrel 75 mg oral tablet: 1 tab(s) orally once a day Use as directed MDD: 1  famotidine 20 mg oral tablet: 1 tab(s) orally once a day  losartan 25 mg oral tablet: 1 tab(s) orally once a day  metFORMIN 500 mg oral tablet: 1 tab(s) orally 2 times a day  metoprolol succinate 50 mg oral tablet, extended release: 1 tab(s) orally once a day  tamsulosin 0.4 mg oral capsule: 1 cap(s) orally once a day (at bedtime)  Vitamin D3 5000 intl units (125 mcg) oral tablet: 1 tab(s) orally once a day

## 2023-11-28 NOTE — PHYSICAL THERAPY INITIAL EVALUATION ADULT - PERTINENT HX OF CURRENT PROBLEM, REHAB EVAL
77M with PMHx of CAD s/p CABG, CVA, HTN, HLD, DM2, BPH, recent COVID positive 10 days ago presented  to [facility] for Left sided facial droop, difficulty word finding and weakness approximately three days prior to admission. At [place], patient had a NIHSS of 0 and no thrombolytic agent was administered. Upon neuro-imaging, patient was noted by a neurologist for carotid stenosis. Patient was then transferred to [facility] and then to [facility] ED on the 11/24/23. On admission here, CT brain was unrevealing, CTA H/N revealed severe stenosis right carotid bulb/proximal internal carotid artery. Moderate stenosis left carotid bulb. The stroke team was consulted for further management with possible cerebral angiogram.  Patient is now s/p cerebral angiography revealing severe Right ICA stenosis. Stent was placed in Right ICA. Pt remains in NSICU.
Pt is a 78 y/o male who presented from home with left facial droop. (+) carotid stenosis r/o TIA/CVA.

## 2023-11-28 NOTE — DISCHARGE NOTE PROVIDER - NSDCCPCAREPLAN_GEN_ALL_CORE_FT
PRINCIPAL DISCHARGE DIAGNOSIS  Diagnosis: Carotid stenosis  Assessment and Plan of Treatment:      PRINCIPAL DISCHARGE DIAGNOSIS  Diagnosis: Carotid stenosis  Assessment and Plan of Treatment: You had a Cerebral Angiogram with Stenting of Right Carotid Artery on 11/27 w/ Dr. Hwang. Please follow up with your Interventionalist within 1 week of discharge   YOU HAVE BEEN STARTED ON ASPIRIN 81MG DAILY AND PLAVIX 75 MG DAILY. PLEASE FILL THESE MEDICATIONS AND TAKE AS DIRECTED. THESE MEDICATIONS ARE IMPORTANT FOR STENT PATENCY. DO NOT STOP TAKING THESE MEDCIATIONS   ** You are started on aspirin. Aspirin helps thin your blood.   Side effects of aspirin: brusing, easy bleeding abdominal pain  ** You are on plavix. Plavix helps thin the blood. It is important to continue this.   Side effects: bleeding, nosebleeds, easy bruising   Take OTC stool softener during recovery to help decrease intracranial pressure.   You have a skin puncture on your groin where the procedure was done, please monitor for excessive bruising, bleeding, palpable mass under the site and seek medical advice if present.   Please make all necessary appointments and follow up. You may take Tylenol (acetaminophen) as needed for pain control. Please DO NOT take any other NSAIDs (Advil, Aleve, Motrin, Ibuprofen) until cleared by your Neurosurgeon.   Please DO NOT do any heavy lifting, bending, twisting and straining.   You may shower, but NO SOAP / NO SHAMPOO. DO NOT do any scrubbing. Pat dry only.   Please come to the emergency room for any of the following: altered mental status, seizures, pain uncontrolled by pain medications, fevers, leaking / bleeding from groin puncture site chest pain and shortness of breath.  Please make an appointment for follow up with your primary care physician after discharge.   Please continue home medications.  Follow-up with your primary care doctor as needed.

## 2023-11-28 NOTE — PROGRESS NOTE ADULT - REASON FOR ADMISSION
Carotid Stenosis/TIA/CVA

## 2023-11-28 NOTE — DISCHARGE NOTE PROVIDER - HOSPITAL COURSE
77 year old male with PMHx of CAD s/p CABG, CVA, HTN, HLD, DM2, BPH, presented  to [facility] on 11/24 for Left sided facial droop, difficulty word finding and weakness. Patient was noted by a neurologist to have carotid stenosis. Patient was then transferred to [facility] and then to [facility] ED on the 11/24/23. On admission here, CT brain was unrevealing, CTA head and neck revealed severe stenosis right carotid bulb/proximal internal carotid artery. Moderate stenosis left carotid bulb. The stroke team was consulted for further management with possible cerebral angiogram. Patient was went to angiography where he received a right ICA stent. Patient is now POD1 and is alert and oriented x4 and has no complaints at this time. Patient was evaluated by PT/OT and was not found to have any needs suitable for rehabilitation. Patient is agreeable to go home.   77 year old male with PMHx of CAD s/p CABG, CVA, HTN, HLD, DM2, BPH, presented to [facility] on 11/24 for Left sided facial droop, difficulty word finding and weakness. Patient was noted by a neurologist to have carotid stenosis. Patient was then transferred to [facility] and then to [facility] ED on the 11/24/23. On admission here CTA head and neck revealed severe stenosis right carotid bulb/proximal internal carotid artery. The stroke team was consulted for further management, the patient was taken to angio and  received a right ICA stent on 11/27/23 w/ Dr. Hwang. The patients post-op course was unremarkable. Physical Therapy and Occupational therapy evaluated patient and recommended home pt w/ rolling walker. On the day of discharge patient is hemodynamically stable and medically cleared for discharge.

## 2023-11-28 NOTE — DISCHARGE NOTE NURSING/CASE MANAGEMENT/SOCIAL WORK - NSDCPEFALRISK_GEN_ALL_CORE
For information on Fall & Injury Prevention, visit: [email or web address] OR  [email or web address] OR  https://www.cdc.gov/steadi/patient.html

## 2023-11-28 NOTE — DISCHARGE NOTE PROVIDER - CARE PROVIDER_API CALL
Brian Hwang  Interventional Neuroradiology  [address] [place] [id number]  Phone: [phone]  Fax: [phone]  Follow Up Time:

## 2023-11-28 NOTE — CONSULT NOTE ADULT - SUBJECTIVE AND OBJECTIVE BOX
HPI:  77M with PMHx of CAD s/p CABG, CVA, HTN, HLD, DM2, BPH, recent COVID positive 10 days ago presented  to [facility] for Left sided facial droop, difficulty word finding and weakness approximately three days prior to admission. At [facility], patient had a NIHSS of 0 and no thrombolytic agent was administered. Upon neuro-imaging, patient was noted by a neurologist for carotid stenosis. Patient was then transferred to [facility] and then to [facility] ED on the 11/24/23. On admission here, CT brain was unrevealing, CTA H/N revealed severe stenosis right carotid bulb/proximal internal carotid artery. Moderate stenosis left carotid bulb. Vascular surgery and Interventional Neurology consulted, patient admitted to medicine and obtained MRI Brain which did not demonstrate any acute infarction but did show chronic small R parietal, temporal, and cerebellar infarcts. Carotid doppler 11/24 - R ICA >70% stenosis, L ICA moderate stenosis. Patient is now POD 1 of R ICA stenting with Carotid Wall stent under MAC anesthesia, 8Fr sheath in R groin, carotid wall stent deployed. Procedure well-tolerated, brought to ICU in stable condition. Patient remains well today following procedure without any acute complaints or overnight events.         PMHX: CAD s/p CABG, CVA, HTN, HLD, DM2, BPH, Recent COVID  PSHX: CABG  FamHx: Denies fam hx HTN  Social Hx: Dneies active etoh/tobacco/drug abuse   NKDA (25 Nov 2023 07:17)        PAST MEDICAL & SURGICAL HISTORY:  Hypertension      Diabetes mellitus      CAD (coronary artery disease)      BPH (benign prostatic hyperplasia)      No significant past surgical history        FAMILY HISTORY:    SOCIAL HISTORY:   T/E/D:   Occupation:   Lives with:     MEDICATIONS (HOME):  Home Medications:  Centrum Men&#x27;s oral tablet: 1 tab(s) orally once a day (27 Nov 2023 13:06)  losartan 25 mg oral tablet: 1 tab(s) orally once a day (27 Nov 2023 13:06)  metFORMIN 500 mg oral tablet: 1 tab(s) orally 2 times a day (27 Nov 2023 13:06)  Vitamin D3 5000 intl units (125 mcg) oral tablet: 1 tab(s) orally once a day (27 Nov 2023 13:06)    MEDICATIONS  (STANDING):  aspirin enteric coated 81 milliGRAM(s) Oral daily  atorvastatin 80 milliGRAM(s) Oral at bedtime  chlorhexidine 2% Cloths 1 Application(s) Topical daily  clopidogrel Tablet 75 milliGRAM(s) Oral daily  dextrose 5%. 1000 milliLiter(s) (50 mL/Hr) IV Continuous <Continuous>  dextrose 5%. 1000 milliLiter(s) (100 mL/Hr) IV Continuous <Continuous>  dextrose 50% Injectable 25 Gram(s) IV Push once  dextrose 50% Injectable 25 Gram(s) IV Push once  dextrose 50% Injectable 12.5 Gram(s) IV Push once  famotidine    Tablet 20 milliGRAM(s) Oral daily  glucagon  Injectable 1 milliGRAM(s) IntraMuscular once  heparin   Injectable 5000 Unit(s) SubCutaneous every 8 hours  influenza  Vaccine (HIGH DOSE) 0.7 milliLiter(s) IntraMuscular once  insulin lispro (ADMELOG) corrective regimen sliding scale   SubCutaneous three times a day before meals  losartan 25 milliGRAM(s) Oral daily  metoprolol succinate ER 50 milliGRAM(s) Oral daily  tamsulosin 0.4 milliGRAM(s) Oral at bedtime    MEDICATIONS  (PRN):  acetaminophen     Tablet .. 650 milliGRAM(s) Oral every 6 hours PRN Temp greater or equal to 38C (100.4F), Mild Pain (1 - 3)  aluminum hydroxide/magnesium hydroxide/simethicone Suspension 30 milliLiter(s) Oral every 4 hours PRN Dyspepsia  dextrose Oral Gel 15 Gram(s) Oral once PRN Blood Glucose LESS THAN 70 milliGRAM(s)/deciliter  hydrALAZINE Injectable 10 milliGRAM(s) IV Push every 2 hours PRN SBP >160  melatonin 3 milliGRAM(s) Oral at bedtime PRN Insomnia  ondansetron Injectable 4 milliGRAM(s) IV Push every 8 hours PRN Nausea and/or Vomiting    ALLERGIES/INTOLERANCES:  Allergies  No Known Allergies    Intolerances    VITALS & EXAMINATION:  Vital Signs Last 24 Hrs  T(C): 37 (28 Nov 2023 08:00), Max: 37.1 (28 Nov 2023 04:02)  T(F): 98.6 (28 Nov 2023 08:00), Max: 98.8 (28 Nov 2023 04:02)  HR: 61 (28 Nov 2023 09:00) (48 - 80)  BP: 122/62 (28 Nov 2023 09:00) (107/86 - 166/71)  BP(mean): 74 (28 Nov 2023 09:00) (74 - 118)  RR: 18 (28 Nov 2023 09:00) (11 - 21)  SpO2: 99% (28 Nov 2023 09:00) (95% - 100%)    Parameters below as of 28 Nov 2023 08:00  Patient On (Oxygen Delivery Method): room air        General:      General: NAD,     Detailed Neurologic Exam:    Mental status: The patient is awake and alert and has normal attention span.  Alert and oriented x3, occasionally disoriented to situation. The patient is able to name objects, follow commands, repeat sentences,    Cranial nerves: Pupils equal and react symmetrically to light. There is no visual field deficit to confrontation. Extraocular motion is full with no nystagmus. There is no ptosis.  Motor: There is normal bulk and tone.  There is no tremor.  Strength is 5/5 in the right arm and leg   Strength is 5/5 in the left arm and leg.    Sensation: Intact to light touch and pin in 4 extremities    Reflexes: deferred    Cerebellar: There is slight Right dysmetria on finger to nose testing.    Gait : deferred    Groin-  no induration, discoloration or swelling      LABORATORY:  CBC                       9.3    4.76  )-----------( 179      ( 28 Nov 2023 02:29 )             26.9     Chem 11-28    140  |  109<H>  |  25.3<H>  ----------------------------<  97  4.1   |  21.0<L>  |  1.92<H>    Ca    8.5      28 Nov 2023 02:29  Phos  4.0     11-28  Mg     1.9     11-28      LFTs   Coagulopathy   Lipid Panel 11-25 Chol 159 LDL -- HDL 44 Trig 112  A1c   Cardiac enzymes     U/A Urinalysis Basic - ( 28 Nov 2023 02:29 )    Color: x / Appearance: x / SG: x / pH: x  Gluc: 97 mg/dL / Ketone: x  / Bili: x / Urobili: x   Blood: x / Protein: x / Nitrite: x   Leuk Esterase: x / RBC: x / WBC x   Sq Epi: x / Non Sq Epi: x / Bacteria: x        Radiology (XR, CT, MR, U/S, TTE/JONN):    MR Head No Cont (11.25.23 @ 08:29)   IMPRESSION:   No acute infarct. There are small chronic infarcts in the   right high posterior parietal lobe, right posterior temporal lobe, and   bilateral cerebelli. Moderate periventricular and subcortical white   matter chronic microvascular ischemic changes.    (11.24.23 @ 11:54)   IMPRESSION:  HEAD CT: No evidence of an acute intracranial hemorrhage, midline shift or   hydrocephalus. Multiple chronic small infarcts  NECK CTA: Severe stenosis right carotid bulb/proximal internal carotid   artery. Moderate stenosis left carotid bulb.  BRAIN CTA: No proximal large vessel occlusion. Regions of atherosclerotic   narrowing as described.      US Duplex Carotid Arteries Complete, Bilateral (11.24.23 @ 22:49)   IMPRESSION:  1.  There is a moderate stenosis in the right carotid bulb measuring   50-69% using NASCET and a tandem severe stenosis in the mid right ICA   measuring greater than 70% using NASCET criteria.  There is downstream   tardus parvus waveform in the distal right internal carotid artery.  2.  There is a moderate stenosis in the distal left internal carotid   artery measuring 50-69% using NASCET criteria.  3.  There is a severe stenosis in the proximal right external carotid   artery.  4.  There is a mild to moderate stenosis in the proximal left external   carotid artery.  Measurement of carotid stenosis is based on velocity parameters that   correlate the residual internal carotid diameter with that of the more   distal vessel in accordance with a method such as the North American   Symptomatic Carotid Endarterectomy Trial (NASCET).      Xray Chest 1 View AP/PA (11.24.23 @ 12:12)   IMPRESSION: Mild central CHF at this time.

## 2023-11-28 NOTE — PROGRESS NOTE ADULT - NS ATTEND AMEND GEN_ALL_CORE FT
Seen and examined with the ACP team. Plan discussed with ACPs.    I agree with assessment and plan  as written with modifications made above.    will follow with you    Hany Franco MD PhD   [id number]
Seen and examined with the ACP team. Plan discussed with ACPs.    I agree with assessment and plan  as written with modifications made above.    will follow with you    Hany Franco MD PhD   [id number]
Seen and examined with the ACP team. Plan discussed with ACPs.    I agree with assessment and plan  as written with modifications made above.    will follow with you    Hany Franco MD PhD   [id number]

## 2023-12-05 ENCOUNTER — APPOINTMENT (OUTPATIENT)
Dept: NEUROLOGY | Facility: CLINIC | Age: 78
End: 2023-12-05
Payer: MEDICARE

## 2023-12-05 ENCOUNTER — NON-APPOINTMENT (OUTPATIENT)
Age: 78
End: 2023-12-05

## 2023-12-05 VITALS
SYSTOLIC BLOOD PRESSURE: 123 MMHG | HEART RATE: 53 BPM | HEIGHT: 65 IN | BODY MASS INDEX: 29.99 KG/M2 | WEIGHT: 180 LBS | DIASTOLIC BLOOD PRESSURE: 52 MMHG | OXYGEN SATURATION: 99 % | TEMPERATURE: 97.9 F

## 2023-12-05 PROCEDURE — 99024 POSTOP FOLLOW-UP VISIT: CPT

## 2024-03-05 ENCOUNTER — NON-APPOINTMENT (OUTPATIENT)
Age: 79
End: 2024-03-05

## 2024-03-05 ENCOUNTER — APPOINTMENT (OUTPATIENT)
Dept: NEUROLOGY | Facility: CLINIC | Age: 79
End: 2024-03-05
Payer: MEDICARE

## 2024-03-05 VITALS
WEIGHT: 178 LBS | OXYGEN SATURATION: 98 % | DIASTOLIC BLOOD PRESSURE: 68 MMHG | HEIGHT: 65 IN | SYSTOLIC BLOOD PRESSURE: 130 MMHG | BODY MASS INDEX: 29.66 KG/M2 | HEART RATE: 55 BPM

## 2024-03-05 DIAGNOSIS — I10 ESSENTIAL (PRIMARY) HYPERTENSION: ICD-10-CM

## 2024-03-05 DIAGNOSIS — I25.10 ATHEROSCLEROTIC HEART DISEASE OF NATIVE CORONARY ARTERY W/OUT ANGINA PECTORIS: ICD-10-CM

## 2024-03-05 DIAGNOSIS — E11.9 TYPE 2 DIABETES MELLITUS W/OUT COMPLICATIONS: ICD-10-CM

## 2024-03-05 DIAGNOSIS — Z78.9 OTHER SPECIFIED HEALTH STATUS: ICD-10-CM

## 2024-03-05 DIAGNOSIS — E78.5 HYPERLIPIDEMIA, UNSPECIFIED: ICD-10-CM

## 2024-03-05 DIAGNOSIS — N40.0 BENIGN PROSTATIC HYPERPLASIA WITHOUT LOWER URINARY TRACT SYMPMS: ICD-10-CM

## 2024-03-05 PROCEDURE — 99214 OFFICE O/P EST MOD 30 MIN: CPT

## 2024-03-05 PROCEDURE — G2211 COMPLEX E/M VISIT ADD ON: CPT

## 2024-03-05 RX ORDER — CLOPIDOGREL BISULFATE 75 MG/1
75 TABLET, FILM COATED ORAL
Refills: 0 | Status: ACTIVE | COMMUNITY

## 2024-03-05 RX ORDER — ATORVASTATIN CALCIUM 40 MG/1
40 TABLET, FILM COATED ORAL
Refills: 0 | Status: ACTIVE | COMMUNITY

## 2024-03-05 RX ORDER — METOPROLOL SUCCINATE 50 MG/1
50 TABLET, EXTENDED RELEASE ORAL
Refills: 0 | Status: ACTIVE | COMMUNITY

## 2024-03-05 RX ORDER — MULTIVIT-MINS/IRON/FOLIC/LYCOP 8-200-600
TABLET ORAL
Refills: 0 | Status: ACTIVE | COMMUNITY

## 2024-03-05 RX ORDER — ASPIRIN 81 MG
81 TABLET, DELAYED RELEASE (ENTERIC COATED) ORAL
Refills: 0 | Status: ACTIVE | COMMUNITY

## 2024-03-05 RX ORDER — LOSARTAN POTASSIUM 25 MG/1
25 TABLET, FILM COATED ORAL
Refills: 0 | Status: ACTIVE | COMMUNITY

## 2024-03-05 RX ORDER — FAMOTIDINE 20 MG/1
20 TABLET, FILM COATED ORAL
Refills: 0 | Status: ACTIVE | COMMUNITY

## 2024-03-05 RX ORDER — TAMSULOSIN HYDROCHLORIDE 0.4 MG/1
0.4 CAPSULE ORAL
Refills: 0 | Status: ACTIVE | COMMUNITY

## 2024-03-05 NOTE — PHYSICAL EXAM
[FreeTextEntry1] :  GENERAL APPEARANCE: Well developed, well-nourished man in no acute distress.  CARDIOVASCULAR: regular rate and rhythm. Soft left carotid bruit.    NEUROLOGIC EXAM:  MENTAL STATUS: Alert and Oriented to person, place and time. Speech is fluent, without aphasia or dysarthria Behavior and affect appropriate to situation.                         CRANIAL NERVES: CN 2:    Visual fields are full to confrontation. CN 3, 4, 6: Extraocular movements are intact. No nystagmus or ophthalmoplegia is evident. Pupils are equally round and reactive to light. CN 5:     Facial sensation is intact to light touch in all 3 divisions CN 7:     Facial excursion is full and symmetric bilaterally.  MOTOR: RUE 5/5 LUE 5/5 RLE 4+/5 hip flexion, 5/5 knee ext, dorsiflex LLE 4+/5 hip flexion, 5/5 knee ext, dorsiflex   SENSORY: Intact to light touch in all four extremities without extinction to double simultaneous stimulation  COORD: Finger to nose testing without dysmetria bilaterally.  GAIT: Normal station. Ambulates with a mild right-sided limp. Decreased arm swing on the right.

## 2024-03-05 NOTE — DISCUSSION/SUMMARY
[FreeTextEntry1] : 78 year old man with CAD s/p CABG, CVA, HTN, HLD, DM2, BPH, who suffered right hemispheric TIA 11/24/2023 (left face/arm/leg weakness) found to have severe symptomatic stenosis of cervical TU s/p angioplasty and stenting of TU at [facility] 11/27/2023.  Currently on DAPT and statin, doing well.   Post procedure US: TU STENT 174   He underwent carotid US 3 months post angioplasty at [facility] cardiologist's (Dr. Terrell, [place]) office. Will call the office to obtain for our records and review.   Plan to continue DAPT (indefinitely as he was on DAPT for CAD).   I did  the patient about the low but real risk of potentially fatal or disabling intracranial or gastrointestinal hemorrhage while on dual antiplatelet therapy. The patient agrees to continue dual antiplatelet therapy for now and conveys good understanding.   PLAN: 1. Follow up US carotid in 6 months.  2. continue aspirin 81mg daily. 3. Continue clopidogrel 75m g daily. 4. Continue statin, goal LDL <70 5. continue home physical exercise.    Patient was educated about signs and symptoms of stroke and was counseled to contact 911 upon emergence of strokelike symptoms. Intravenous thrombolysis and mechanical thrombectomy was also counseled and educated to the patient today.    Follow up 3 months.

## 2024-03-05 NOTE — HISTORY OF PRESENT ILLNESS
[FreeTextEntry1] : Mr. Archuleta is a 77-year-old male, former smoker, with history of CAD s/p CABG, CVA, HTN, HLD, DM2, BPH who presents today for follow up for carotid stent. Since last visit, he feels well overall. He continues to have episodes of blurry vision bilaterally but is planning to make an appointment with ophthalmology. He does not have home care anymore and finished 3 weeks of home PT. He reports walking 2 miles yesterday. He continues on DAPT, tolerating well. He states that he was taken off his diabetes medication as per his PCP. No falls. Denies significant signs of bleeding. Denies interval stroke/TIA symptoms. He reports he had his carotid US done at his cardiologist's office at [place], Dr. Terrell.

## 2024-03-08 ENCOUNTER — APPOINTMENT (OUTPATIENT)
Dept: ULTRASOUND IMAGING | Facility: CLINIC | Age: 79
End: 2024-03-08
Payer: MEDICARE

## 2024-03-08 ENCOUNTER — OUTPATIENT (OUTPATIENT)
Dept: OUTPATIENT SERVICES | Facility: HOSPITAL | Age: 79
LOS: 1 days | End: 2024-03-08
Payer: MEDICARE

## 2024-03-08 DIAGNOSIS — I65.21 OCCLUSION AND STENOSIS OF RIGHT CAROTID ARTERY: ICD-10-CM

## 2024-03-08 PROCEDURE — 93880 EXTRACRANIAL BILAT STUDY: CPT

## 2024-03-08 PROCEDURE — 93880 EXTRACRANIAL BILAT STUDY: CPT | Mod: 26

## 2024-03-12 ENCOUNTER — NON-APPOINTMENT (OUTPATIENT)
Age: 79
End: 2024-03-12

## 2024-04-12 ENCOUNTER — APPOINTMENT (OUTPATIENT)
Dept: ULTRASOUND IMAGING | Facility: CLINIC | Age: 79
End: 2024-04-12
Payer: MEDICARE

## 2024-04-12 ENCOUNTER — OUTPATIENT (OUTPATIENT)
Dept: OUTPATIENT SERVICES | Facility: HOSPITAL | Age: 79
LOS: 1 days | End: 2024-04-12
Payer: MEDICARE

## 2024-04-12 DIAGNOSIS — I65.21 OCCLUSION AND STENOSIS OF RIGHT CAROTID ARTERY: ICD-10-CM

## 2024-04-12 PROCEDURE — 93880 EXTRACRANIAL BILAT STUDY: CPT | Mod: 26

## 2024-04-12 PROCEDURE — 93880 EXTRACRANIAL BILAT STUDY: CPT

## 2024-04-24 NOTE — CONSULT NOTE ADULT - PROBLEM SELECTOR PROBLEM 1
Type 2 diabetes mellitus with other specified complication, without long-term current use of insulin This patient is a 70 year old man BIBA from shelter for suspected alcohol intoxication.  He admits to drinking and has no complaints.  Patient has no family or friends to take him into their care or to provide transportation back to shelter.

## 2024-05-06 NOTE — DISCHARGE NOTE PROVIDER - NSDCFUSCHEDAPPT_GEN_ALL_CORE_FT
,DirectAddress_Unknown,[email or web address],DirectAddress_Unknown
Brian Hwang Physician [facility]  NEUROLOGY [address] [place] [address]  Scheduled Appointment: 01/23/2024

## 2024-05-07 ENCOUNTER — NON-APPOINTMENT (OUTPATIENT)
Age: 79
End: 2024-05-07

## 2024-05-09 ENCOUNTER — APPOINTMENT (OUTPATIENT)
Dept: NEUROLOGY | Facility: CLINIC | Age: 79
End: 2024-05-09
Payer: MEDICARE

## 2024-05-09 VITALS
HEIGHT: 65 IN | OXYGEN SATURATION: 97 % | SYSTOLIC BLOOD PRESSURE: 135 MMHG | WEIGHT: 170 LBS | BODY MASS INDEX: 28.32 KG/M2 | HEART RATE: 54 BPM | DIASTOLIC BLOOD PRESSURE: 65 MMHG

## 2024-05-09 DIAGNOSIS — Z86.73 PERSONAL HISTORY OF TRANSIENT ISCHEMIC ATTACK (TIA), AND CEREBRAL INFARCTION W/OUT RESIDUAL DEFICITS: ICD-10-CM

## 2024-05-09 DIAGNOSIS — I65.21 OCCLUSION AND STENOSIS OF RIGHT CAROTID ARTERY: ICD-10-CM

## 2024-05-09 PROCEDURE — G2211 COMPLEX E/M VISIT ADD ON: CPT

## 2024-05-09 PROCEDURE — 99215 OFFICE O/P EST HI 40 MIN: CPT

## 2024-05-09 NOTE — HISTORY OF PRESENT ILLNESS
[FreeTextEntry1] : Doing well. No bleeding issues. He has some chronic balance issues, but does his routine daily and is mostly independent. He denies any unilateral weakness, numbness, speech difficulty or vision changes.

## 2024-05-09 NOTE — DISCUSSION/SUMMARY
[FreeTextEntry1] : 78 year old man with CAD s/p CABG, CVA, HTN, HLD, DM2, BPH, who suffered right hemispheric TIA 11/24/2023 (left face/arm/leg weakness) found to have severe symptomatic stenosis of cervical TU s/p angioplasty and stenting of TU at [facility] 11/27/2023.  Currently on DAPT and statin, doing well.  US Carotid: 4/12/2024 Mid TU: 378 US Carotid: 3/8/2024 MID  US Carotid: 11/28/2023   Most recent follow up Carotid US show moderately elevated velocities. He is currently asymptomatic. I recommended angiogram vs short term follow up US and he decides today for short term US follow up and if worsening velicities, then angiogram with angiplasty to be considered.   Cardiologist (Dr. Terrell, [place])  Plan to continue DAPT (indefinitely as he was on DAPT for CAD).  I did  the patient about the low but real risk of potentially fatal or disabling intracranial or gastrointestinal hemorrhage while on dual antiplatelet therapy. The patient agrees to continue dual antiplatelet therapy for now and conveys good understanding.   PLAN: 1. Follow up US carotid in 1 month. 2. continue aspirin 81mg daily. 3. Continue clopidogrel 75m g daily. 4. Continue statin, goal LDL <70 5. continue home physical exercise.     Follow up 6 months.

## 2024-06-10 ENCOUNTER — OUTPATIENT (OUTPATIENT)
Dept: OUTPATIENT SERVICES | Facility: HOSPITAL | Age: 79
LOS: 1 days | End: 2024-06-10
Payer: MEDICARE

## 2024-06-10 ENCOUNTER — APPOINTMENT (OUTPATIENT)
Dept: ULTRASOUND IMAGING | Facility: CLINIC | Age: 79
End: 2024-06-10
Payer: MEDICARE

## 2024-06-10 DIAGNOSIS — I65.21 OCCLUSION AND STENOSIS OF RIGHT CAROTID ARTERY: ICD-10-CM

## 2024-06-10 PROCEDURE — 93880 EXTRACRANIAL BILAT STUDY: CPT | Mod: 26

## 2024-06-10 PROCEDURE — 93880 EXTRACRANIAL BILAT STUDY: CPT

## 2024-06-11 ENCOUNTER — NON-APPOINTMENT (OUTPATIENT)
Age: 79
End: 2024-06-11

## 2024-09-09 ENCOUNTER — APPOINTMENT (OUTPATIENT)
Dept: ULTRASOUND IMAGING | Facility: CLINIC | Age: 79
End: 2024-09-09
Payer: MEDICARE

## 2024-09-09 ENCOUNTER — OUTPATIENT (OUTPATIENT)
Dept: OUTPATIENT SERVICES | Facility: HOSPITAL | Age: 79
LOS: 1 days | End: 2024-09-09
Payer: COMMERCIAL

## 2024-09-09 DIAGNOSIS — I65.21 OCCLUSION AND STENOSIS OF RIGHT CAROTID ARTERY: ICD-10-CM

## 2024-09-09 PROCEDURE — 93880 EXTRACRANIAL BILAT STUDY: CPT

## 2024-09-09 PROCEDURE — 93880 EXTRACRANIAL BILAT STUDY: CPT | Mod: 26

## 2024-09-10 ENCOUNTER — APPOINTMENT (OUTPATIENT)
Dept: NEUROLOGY | Facility: CLINIC | Age: 79
End: 2024-09-10
Payer: MEDICARE

## 2024-09-10 VITALS
HEIGHT: 65 IN | BODY MASS INDEX: 27.32 KG/M2 | WEIGHT: 164 LBS | HEART RATE: 64 BPM | DIASTOLIC BLOOD PRESSURE: 60 MMHG | SYSTOLIC BLOOD PRESSURE: 140 MMHG | OXYGEN SATURATION: 96 %

## 2024-09-10 DIAGNOSIS — I65.21 OCCLUSION AND STENOSIS OF RIGHT CAROTID ARTERY: ICD-10-CM

## 2024-09-10 DIAGNOSIS — Z86.73 PERSONAL HISTORY OF TRANSIENT ISCHEMIC ATTACK (TIA), AND CEREBRAL INFARCTION W/OUT RESIDUAL DEFICITS: ICD-10-CM

## 2024-09-10 PROCEDURE — G2211 COMPLEX E/M VISIT ADD ON: CPT

## 2024-09-10 PROCEDURE — 99215 OFFICE O/P EST HI 40 MIN: CPT

## 2024-09-10 NOTE — HISTORY OF PRESENT ILLNESS
[FreeTextEntry1] : Mr. Archuleta presents today for follow up. Carotid US 6/10/2024 revealed, again, high velocities seen within the right internal carotid artery stent measuring as high as 384 cm/s. This is similar to findings from April 2024. Repeat carotid US 9/9/2024 revealed progression of stenosis and even higher degree of stenosis.  He is doing well. Staying active, trying to walk around .5-1 mile each day. Complaint with DAPT. No bleeding issues. Denies transient vision loss, interval stroke/TIA symptoms.

## 2024-09-10 NOTE — DISCUSSION/SUMMARY
[FreeTextEntry1] : 78 year old man with CAD s/p CABG, CVA, HTN, HLD, DM2, BPH, who suffered right hemispheric TIA 11/24/2023 (left face/arm/leg weakness) found to have severe symptomatic stenosis of cervical TU s/p angioplasty and stenting of TU at [facility] 11/27/2023 now with asymptomatic but worsening and progressive severe in-stent restenosis.   Currently on DAPT and statin, tolerating well.  US Carotid: 9/9/2024: /204 (sys/rodger); LICA 109 US Carotid: 4/12/2024 Mid TU: 378 US Carotid: 3/8/2024 MID  US Carotid: 11/28/2023   Most recent follow up Carotid US shows worsening elevated velocities of the TU indicating severe restenosis of the TU. He is currently asymptomatic, however, given the instability and significant progression of disease with concern for potential for disabling or fatal ischemic stroke, I recommended cerebral angiogram with angioplasty and possible restenting (if needed). We will consider brachytherapy and or lithotripsy as well at the time of angiogram.   I discussed with the patient and family member the details of procedure. Benefits, alternatives and risks of cerebral angiography with possible angioplasty and stenting of the right carotid artery were explained in detail including debilitating ischemic stroke, arterial dissection, hemorrhage, contrast reaction, radiation exposure, and the possibility of death. No guarantees for the success of surgical procedure were made. Patient was in agreement with plan and conveyed good understanding.  Cardiologist (Dr. Terrell, [place])  Plan to continue DAPT (indefinitely as he was on DAPT for CAD). I recommended to increase his aspirin to 162mg daily for now until angiogram.   I did  the patient about the low but real risk of potentially fatal or disabling intracranial or gastrointestinal hemorrhage while on dual antiplatelet therapy. The patient agrees to continue dual antiplatelet therapy for now and conveys good understanding.   PLAN: 1. Cerebral angiogram with possible angioplasty and possible re-stenting of the right internal carotid artery 10/3/2024. (May consider brachytherapy and/or intravascular lithotripsy at time of angiogram).  2. increased aspirin to 162mg daily. 3. Continue clopidogrel 75m g daily. 4. Continue statin, goal LDL <70 5. Avoid hypotension/dehydration.     2 months.

## 2024-09-10 NOTE — PHYSICAL EXAM
[FreeTextEntry1] : GENERAL APPEARANCE: Well developed, well-nourished man in no acute distress.  CARDIOVASCULAR: regular rate and rhythm. Soft left carotid bruit.  NEUROLOGIC EXAM:  MENTAL STATUS: Alert and Oriented to person, place and time. Speech is fluent, without aphasia or dysarthria Behavior and affect appropriate to situation.  CRANIAL NERVES: CN 2: Visual fields are full to confrontation. CN 3, 4, 6: Extraocular movements are intact. No nystagmus or ophthalmoplegia is evident. Pupils are equally round and reactive to light. CN 5: Facial sensation is intact to light touch in all 3 divisions CN 7: Facial excursion is full and symmetric bilaterally.  MOTOR: RUE 5/5 LUE 5/5 RLE 4+/5 hip flexion, 5/5 knee ext, dorsiflex LLE 5/5 hip flexion, 5/5 knee ext, dorsiflex  SENSORY: Intact to light touch in all four extremities without extinction to double simultaneous stimulation  COORD: Finger to nose testing without dysmetria bilaterally.  GAIT: Normal station. Ambulates with a mild right-sided limp. Decreased arm swing on the right.

## 2024-09-19 NOTE — CONSULT NOTE ADULT - SUBJECTIVE AND OBJECTIVE BOX
DATE: 24    PATIENT: Saul Diaz  MRN:  [id number]  :  1955    REASON FOR VISIT:  Saul Diaz is an established patient her today for   Chief Complaint   Patient presents with    Office Visit    Medicare Wellness Visit         HISTORY OF PRESENT ILLNESS:  GENERAL:  Mr. Diaz is a very nice 68 year old gentleman with past medical history of hypertension, chronic kidney disease, cerebrovascular accident with right-sided weakness, diabetes mellitus type 2, myocardial infarction, and peripheral arterial disease.  The patient is in the office today for his scheduled Medicare Wellness Visit    HYPERTENSION:  The patient has a history of hypertension, currently on carvedilol 25 mg twice a day, and hydralazine 100 mg twice a day.  The patient reported good blood pressure readings at home    DIABETES MELLITUS  The patient has a history of type 2 diabetes mellitus with hypoglycemia.  He is currently on glipizide 5 mg every morning, ran out of Farxiga and Ozempic  Most the recent blood work showed  Hemoglobin A1C (%)   Date Value   2024 9.3 (A)     CVA:  The patient had a history of cerebrovascular accident with right-sided weakness and contracture of right lower extremity on 2014.  He is ambulating with a cane.    CKD:  The patient has a history of hypertensive kidney disease with stage IV kidney failure, under the care of nephrology service.  Most recent blood work showed:  Lab Results   Component Value Date    GFRESTIMATE 24 (L) 2024     Lab Results   Component Value Date    HGB 9.9 (L) 2024     BPH:  The patient has a history of benign prostatic hyperplasia with elevated PSA, status post prostate biopsy on May 12, 2017.  He is under the care of of urology service, had an MRI of the prostate on October 15, 2018  Lab Results   Component Value Date    PSA 3.73 2024     PAD:  The patient has a history of peripheral arterial disease.  Arterial Doppler 2022  showed severe stenosis in the right popliteal artery    Patient Care Team:  Rene Aquino MD as PCP - General (Internal Medicine)  Bryon Silva DPM (Podiatry - Foot & Ankle Surgery)  Micah Matson MD (Nephrology)  Ana Rayo DO (Ophthalmology)  Palla, Jyothsna, MD (Internal Medicine - Endocrinology,Diabetes,Metabolism)    OTHER PROVIDERS:      SCREENINGS:  ADL:   Activities of Daily Living (ADLs):  Difficulty bathing: No  Difficulty dressing: No  Difficulty feeding oneself: No  Difficulty going to the bathroom: No  Difficulty with mobility: No  Anxiety which interferes with social activities: No  Urinary loss of control: No  Able to drive: Yes  Independant Activities of Daily Living (IADLs):   Needs help in meal preparation: No   Transportation help needed: No  Help needed in doing housework: No  Help needed in doing laundry: No  Help needed in managing medications: No   Help needed in managing money: No  Help needed in using the phone: No  Help needed in doing shopping: No    Depression Screen:    Review Flowsheet  More data exists         9/19/2024   PHQ 2/9 Score   Adult PHQ 2 Score 0   Adult PHQ 2 Interpretation No further screening needed   Little interest or pleasure in activity? Not at all   Feeling down, depressed or hopeless? Not at all      Details                    Fall Risk Screen:   Age over 65: Yes 68 year old  Have you fallen in the past 3 month: No  Secondary diagnosis, 3 or more: Yes  Ambulatory aid use: No  Abnormal or impaired gait: No  Incontinence: No  Visual impairment: No  Polypharmacy: Yes  Pain affecting mobility: No  Cognitive impairment: No    Home Safety:  Grab Bars in Bathroom and Shower: Yes  Rails on Stairs:Yes  A score of 4 or more is considered at high risk for falling    Hearing Screen:   Hearing Screens: Finger rub, or whisper test passed: Yes    MINI-MENTAL STATUS EXAM (MMSE):   Date / time: 5/5           What is the ( year, season, month, date,  INTERVAL HPI/OVERNIGHT EVENTS: No events overnight.         MEDICATIONS  (STANDING):  aspirin enteric coated 81 milliGRAM(s) Oral daily  atorvastatin 80 milliGRAM(s) Oral at bedtime  clopidogrel Tablet 75 milliGRAM(s) Oral daily  dextrose 5%. 1000 milliLiter(s) (50 mL/Hr) IV Continuous <Continuous>  dextrose 5%. 1000 milliLiter(s) (100 mL/Hr) IV Continuous <Continuous>  dextrose 50% Injectable 25 Gram(s) IV Push once  dextrose 50% Injectable 25 Gram(s) IV Push once  dextrose 50% Injectable 12.5 Gram(s) IV Push once  famotidine    Tablet 20 milliGRAM(s) Oral daily  glucagon  Injectable 1 milliGRAM(s) IntraMuscular once  heparin   Injectable 5000 Unit(s) SubCutaneous every 8 hours  insulin lispro (ADMELOG) corrective regimen sliding scale   SubCutaneous three times a day before meals  insulin lispro (ADMELOG) corrective regimen sliding scale   SubCutaneous Before meals and at bedtime  losartan 25 milliGRAM(s) Oral daily  metoprolol succinate ER 50 milliGRAM(s) Oral daily  tamsulosin 0.4 milliGRAM(s) Oral at bedtime    MEDICATIONS  (PRN):  acetaminophen     Tablet .. 650 milliGRAM(s) Oral every 6 hours PRN Temp greater or equal to 38C (100.4F), Mild Pain (1 - 3)  aluminum hydroxide/magnesium hydroxide/simethicone Suspension 30 milliLiter(s) Oral every 4 hours PRN Dyspepsia  dextrose Oral Gel 15 Gram(s) Oral once PRN Blood Glucose LESS THAN 70 milliGRAM(s)/deciliter  melatonin 3 milliGRAM(s) Oral at bedtime PRN Insomnia  ondansetron Injectable 4 milliGRAM(s) IV Push every 8 hours PRN Nausea and/or Vomiting      Vital Signs Last 24 Hrs  T(C): 36.8 (25 Nov 2023 11:54), Max: 36.8 (24 Nov 2023 20:24)  T(F): 98.2 (25 Nov 2023 11:54), Max: 98.2 (24 Nov 2023 20:24)  HR: 63 (25 Nov 2023 11:54) (49 - 74)  BP: 182/72 (25 Nov 2023 11:54) (144/66 - 189/50)  BP(mean): --  RR: 19 (25 Nov 2023 11:54) (16 - 19)  SpO2: 98% (25 Nov 2023 11:54) (95% - 99%)    Parameters below as of 25 Nov 2023 11:54  Patient On (Oxygen Delivery Method): room air        Physical Exam:    Neurological:  No sensory/motor deficits during exam    HEENT: PERRLA, EOMI, no drainage or redness    Neck: No bruits; no thyromegaly or nodules,  No JVD    Back: Normal spine flexure, No CVA tenderness, No deformity or limitation of movement    Respiratory: Breath Sounds equal & clear to auscultation, no accessory muscle use    Cardiovascular: Regular rate & rhythm, normal S1, S2; no murmurs, gallops or rubs    Gastrointestinal: Soft, non-tender, normal bowel sounds    Extremities: No peripheral edema, No cyanosis, clubbing     Vascular: Equal and normal pulses: 2+ peripheral pulses throughout    Musculoskeletal: No joint pain, swelling or deformity; no limitation of movement    Skin: No rashes      I&O's Detail      LABS:                        12.3   5.33  )-----------( 237      ( 25 Nov 2023 09:39 )             36.5     11-25    140  |  102  |  27.0<H>  ----------------------------<  105<H>  4.3   |  22.0  |  1.67<H>    Ca    9.6      25 Nov 2023 09:39  Phos  3.4     11-25  Mg     2.0     11-25    TPro  7.5  /  Alb  4.4  /  TBili  0.6  /  DBili  x   /  AST  27  /  ALT  20  /  AlkPhos  78  11-25    PT/INR - ( 24 Nov 2023 11:26 )   PT: 11.0 sec;   INR: 0.98 ratio         PTT - ( 24 Nov 2023 11:26 )  PTT:33.1 sec  Urinalysis Basic - ( 25 Nov 2023 09:39 )    Color: x / Appearance: x / SG: x / pH: x  Gluc: 105 mg/dL / Ketone: x  / Bili: x / Urobili: x   Blood: x / Protein: x / Nitrite: x   Leuk Esterase: x / RBC: x / WBC x   Sq Epi: x / Non Sq Epi: x / Bacteria: x       day)  Place: 5/5         What is the ( state, county, town, address, hospital)  Registration: 3/3                         Repeat ( Apple, Kim, and Table)  Attention: 5/5                                Spell world backward: D L R O W  Recall: 1/3                                           Recall ( Apple, Kim, Table)  Naming two objects: 2/2                                  Name  ( Pen, Watch)  Repeating a sentence: 1/1                             ( No ifs, ands, or buts)  3-stage Verbal Command: 3/3   Read and Follow Command: 1/1                    Close your eyes  Write sentence: 1/1  Copy design:0/1    Total score: 27 /30    Pain Screen:   Pain Scale: 0/10  Location: None    Urinary Incontinence Screen:   Frequent Urination: No  Leakage related to feeling of urgency: No  Leakage related to physical activity, coughing or sneezing: No  Difficulty emptying bladder: No    Vision:  R Eye: 20/20   L Eye: 20/25  Wears eyeglasses: No    Speech: Speech is normal: Yes    Barriers to care plan: No    Compliance to treatment plan: Yes    Medication adherence:  Patient reports adherence to dosing schedules: Yes  Side effects of medication: No    Advance Directives:  discussed and advised the patient to complete one , full code, and power of  for healthcare is activated     Frequent ED Visits: No     Immunization History   Administered Date(s) Administered    COVID Pfizer 12Y+ 05/02/2022    COVID Pfizer 12Y+ (Requires Dilution) 02/03/2021, 02/24/2021, 12/17/2021    COVID Pfizer Bivalent 12Y+ 11/23/2022    COVID Pfizer/Comirnaty 12+ 10/31/2023, 09/13/2024    Influenza, high dose, quadrivalent, PF 10/07/2021, 10/27/2022, 10/30/2023    Influenza, high-dose, trivalent, PF 11/22/2016, 10/15/2018, 09/19/2024    Influenza, split virus, quadrivalent, PF 11/22/2016, 10/06/2017, 09/16/2019, 01/01/2021    Influenza, split virus, trivalent 11/07/2018    Influenza, split virus, trivalent, PF 10/02/2015    Pneumococcal  Polysaccharide PPV23 06/27/2016    Pneumococcal conjugate PCV20 04/25/2022       REVIEW OF SYSTEM:  Const: No fever, no chills, no fatigue feeling and no anorexia.   Allergy & Immunology: No angioedema and no urticaria.   Eyes: No eye pain, no red eyes, no itching of the eyes, no blurred vision and no change in vision.   ENT: No nasal passage blockage, no nasal discharge, no earache, no discharge from the ears, no sore throat, no hoarsens, and no dysphagia.  CV: No chest pain, no palpitations, no lightheadedness, no dyspnea on exertion, and no orthopnea.   Resp: No cough, no short of breath and no wheezing.   GI: No abdominal pain, no nausea, no vomiting, no diarrhea, no constipation, no bright red blood per rectum, no melena and no bowel habit changes.   : No change in urinary frequency, no feelings of urinary urgency, no dysuria, no hematuria, no nocturia, and no discharge.   Endo: No generalized decrease in strength, no polyuria, no polydipsia and no intolerance to cold.   Heme/Lymph: No anemia, no tendency for easy bleeding and no tendency for easy bruising.   Musc: No joint pain, no joint swelling, no joint stiffness, no back pain, no muscle aches and no muscle swelling.   Neuro: Positive for: Baseline weakness on the right side  No confusion, no dizziness, no fainting, no headache, no memory lapses or loss, no numbness and no tremors.   Psych: No anxiety, no depression and no insomnia.   Skin: No bruising, no pruritus, no skin lesions and no rash.      ALLERGIES:  No Known Allergies    Current Outpatient Medications   Medication Sig Dispense Refill    amLODIPine (NORVASC) 2.5 MG tablet Take 2 tablets by mouth daily.      carvedilol (COREG) 25 MG tablet Take 0.5 tablets by mouth in the morning and 0.5 tablets in the evening. Take with meals.      hydrALAZINE (APRESOLINE) 100 MG tablet Take 1 tablet by mouth in the morning and 1 tablet in the evening.      dapagliflozin (Farxiga) 5 MG tablet Take 1 tablet by  mouth daily (with breakfast). 90 tablet 3    glipiZIDE (GLUCOTROL) 5 MG tablet Take 1 tablet by mouth in the morning and 1 tablet in the evening. Take before meals. 180 tablet 3    semaglutide,0.25 or 0.5 mg/DOSE, (Ozempic) (1.34 mg/ml) injection Inject 0.5 mg into the skin every 7 days. Indications: Type 2 Diabetes 19.03 mL 0    tamsulosin (FLOMAX) 0.4 MG Cap TAKE 1 CAPSULE BY MOUTH EVERY DAY AFTER A MEAL 90 capsule 0    finasteride (PROSCAR) 5 MG tablet TAKE 1 TABLET BY MOUTH EVERY DAY 90 tablet 1    atorvastatin (LIPITOR) 80 MG tablet TAKE 1 TABLET BY MOUTH EVERY DAY 90 tablet 0    pantoprazole (PROTONIX) 40 MG tablet TAKE 1 TABLET BY MOUTH EVERY DAY BEFORE BREAKFAST 90 tablet 0    torsemide (DEMADEX) 10 MG tablet Take 1 tablet by mouth daily. 90 tablet 3    Blood Glucose Monitoring Suppl (ONE TOUCH ULTRA MINI) w/Device Kit Test once a day Dx. E11.9 1 kit 0    Lancet Devices (ONE [employer] DELICA LANCING DEV) Misc Use daily to test blood sugars 1 each 0    blood glucose (OneTouch Ultra) test strip Test blood sugar 3 times daily. 300 strip 3    ferrous sulfate 325 (65 FE) MG tablet Take 1 tablet by mouth daily (with breakfast). 90 tablet 1    metOLazone (ZAROXOLYN) 5 MG tablet Take 1 tablet by mouth daily.      escitalopram (LEXAPRO) 10 MG tablet Take 1 tablet by mouth daily.      calcitRIOL (ROCALTROL) 0.25 MCG capsule Take 1 capsule by mouth daily.      Safety Lancets 28G Misc Check sugars 3 times per day. Type 2 DM on insulin. 300 each 3    aspirin 81 MG tablet Take 81 mg by mouth daily.       No current facility-administered medications for this visit.     PAST MEDICAL HISTORY:  Benign essential hypertension  Chronic kidney disease, stage IV  Cerebrovascular accident with right-sided weakness.  September 16, 2014  Diabetes mellitus type 2.  Pure Hypercholesterolemia.  Peripheral arterial disease    PAST SURGICAL HISTORY:  Tonsillectomy  Colonoscopy: 2015  Prostate biopsy: May 12, 2017  Bilateral cataract surgery:  2018    FAMILY HISTORY:  Father: , Age 55, Unknown  Mother: , Age 65, Kidney Failure  Brother: Alive, Healthy  Sister: Alive, Healthy    SOCIAL HISTORY:  Tobacco: Former smoker, quit   Alcohol: No alcohol consumption  Caffeine: No caffeine consumption  Drugs: Does not use illicit drugs  Marital status:   Children: Has 2 sons  Work: Retired  Residence: As independently with spouse  Exercise: Limited by physical condition with  Assistive device: Cane  Durable Power of : Active, Wife Lian Diaz    Visit Vitals  /78   Pulse (!) 48   Temp 97.8 °F (36.6 °C) (Temporal)   Resp 15   Ht 5' 8\" (1.727 m)   Wt 91.3 kg (201 lb 4.5 oz)   SpO2 100%   BMI 30.60 kg/m²     PHYSICAL EXAM:  Constitutional: Alert and in no acute distress.   Head and Face: Atraumatic, normocephalic.   Eyes: No discharge, normal conjunctiva, no eyelid swelling, sclerae were normal, and extraocular movements were intact.   ENT:  Normal appearing nose. no nasal discharge. Normal appearing outer ear,normal lips. oral mucosa pink and moist, no oral lesions, normal appearing pharynx, normal appearing tongue.   Neck: Normal appearing neck and supple neck. thyroid not enlarged.   Lymphatic: No lymphadenopathy.   Chest: Normal chest appearance.   Pulmonary: No respiratory distress. breath sounds clear to auscultation bilaterally, without rales or wheezing.  Cardiovascular: Bradycardia, and regular rhythm. No murmurs were heard  No carotid bruit bilaterally  Right posterior tibialis Doppler+ right dorsalis pedis Doppler+   Left posterior tibialis Doppler + left dorsalis pedis Doppler+   Edema was not present in the lower extremities.   Abdomen: Soft, nontender, nondistended, normal bowel sounds and no abdominal mass. no hepatomegaly and no splenomegaly.   Musculoskeletal: Flexion contracture of right knee.   Neurologic: Mild weakness over the right side 3-4/5 mild difficulty walking  No sensory deficits noted. no  coordination deficits.   Psychiatric: Alert and awake, interactive and mood/affect were appropriate.   Dermatology: Normal skin color and pigmentation. no skin lesions.       ASSESSMENT AND PLAN:  I reviewed and discussed current active medical problems, findings of physical examination, explained treatment options, and plan of care. The patient was given the opportunity to ask questions.  The following diagnoses were addressed during this visit:    1. Encounter for Medicare annual wellness exam  Health Risk Assessment was completed by the patient, reviewed in details, and feedback and recommendations were provided accordingly including, but not limited to the followin- Diet, Exercise, and Cognition were reviewed, and appropriate                  recommendations were provided.  2- Alcohol and Tobacco use were assessed and their risks were fully explained.   3- Preventive Screening Tests and Immunization Schedule were reviewed and age-appropriated recommendations were provided.  4- Exercise Programs for Strength, Flexibility, Resistance, Range of Motion,      Balance, Gait, and Endurance training recommendations were provided.   5- Appropriate Referrals given.  6- I assessed the need for referral to care management and behavioral health.  7- I assessed patient's history of emergency department visits   8- I assessed patient's need for transportation assistance.  9- The patient was evaluated for any respiratory need.  10-I reviewed list of medications, and assessed patient's need for any assistance.  11-Discussed the benefit of Advance Care Planning, Advance Directive, and Power of   12-Appropriate printed educational materials were provided  13- HRA was performed, and documents were provided to the patient, and scanned to our EMR  14-The following Health Maintenance gaps were addressed with the patient   Health Maintenance Due   Topic Date Due    DTaP/Tdap/Td Vaccine (1 - Tdap) Never done    Shingles  Vaccine (1 of 2) Never done       2. Type 2 diabetes mellitus with hyperglycemia, with long-term current use of insulin  (CMD)  Diabetes is uncontrolled with unfortunate rise in A1c when compared to March 2024.  Adjustments were made on his diabetic management outlined later in my assessment and plan    3. Sinus bradycardia  Likely secondary to carvedilol.  - Electrocardiogram 12-Lead showed sinus bradycardia, and T wave abnormality in the lateral leads  -Check thyroid Stimulating Hormone; Future  - Electrocardiogram 12-Lead    4. Benign hypertension with chronic kidney disease, stage IV  (CMD)  Blood pressure is uncontrolled  -Increase amLODIPine (NORVASC) 2.5 MG tablet; Take 2 tablets by mouth daily.  -Decrease carvedilol (COREG) 25 MG tablet; Take 0.5 tablets by mouth in the morning and 0.5 tablets in the evening. Take with meals.  -Continue hydrALAZINE (APRESOLINE) 100 MG tablet; Take 1 tablet by mouth in the morning and 1 tablet in the evening.    5. Type 2 diabetes mellitus with stage 4 chronic kidney disease, without long-term current use of insulin  (CMD)  Diabetes is uncontrolled  - POCT Glycohemoglobin Analyzer 9.3%  -Resume dapagliflozin (Farxiga) 5 MG tablet; Take 1 tablet by mouth daily (with breakfast).  Dispense: 90 tablet; Refill: 3  -Increase glipiZIDE (GLUCOTROL) 5 MG tablet; Take 1 tablet by mouth in the morning and 1 tablet in the evening. Take before meals.  Dispense: 180 tablet; Refill: 3  -Resume semaglutide,0.25 or 0.5 mg/DOSE, (Ozempic) (1.34 mg/ml) injection; Inject 0.5 mg into the skin every 7 days. Indications: Type 2 Diabetes  Dispense: 19.03 mL; Refill: 0    6. Chronic kidney disease (CKD), stage IV (severe)  (CMD)  Kidney function is fairly stable.  Continue to follow-up with nephrology service    7. Anemia of chronic kidney failure, stage 4 (severe)  (CMD)  Will continue to monitor H&H    8. Hyperparathyroidism due to renal insufficiency  (CMD)  Calcium level is within normal  limits.  Continue Rocaltrol 0.25 mg daily    9. Mild major depression (CMD)  Clinically stable.  Continue escitalopram 10 mg daily    10. Need for influenza vaccination  - INFLUENZA (FLUZONE) HIGH DOSE      Return in about 2 weeks (around 10/3/2024) for future routine visits, and any urgent health needs.    Rene Aquino MD

## 2024-09-24 ENCOUNTER — OUTPATIENT (OUTPATIENT)
Dept: OUTPATIENT SERVICES | Facility: HOSPITAL | Age: 79
LOS: 1 days | End: 2024-09-24
Payer: COMMERCIAL

## 2024-09-24 ENCOUNTER — RESULT REVIEW (OUTPATIENT)
Age: 79
End: 2024-09-24

## 2024-09-24 VITALS
OXYGEN SATURATION: 99 % | SYSTOLIC BLOOD PRESSURE: 130 MMHG | HEART RATE: 62 BPM | TEMPERATURE: 98 F | WEIGHT: 160.94 LBS | DIASTOLIC BLOOD PRESSURE: 64 MMHG | RESPIRATION RATE: 18 BRPM | HEIGHT: 65 IN

## 2024-09-24 DIAGNOSIS — Z95.1 PRESENCE OF AORTOCORONARY BYPASS GRAFT: Chronic | ICD-10-CM

## 2024-09-24 DIAGNOSIS — I65.21 OCCLUSION AND STENOSIS OF RIGHT CAROTID ARTERY: ICD-10-CM

## 2024-09-24 DIAGNOSIS — I10 ESSENTIAL (PRIMARY) HYPERTENSION: ICD-10-CM

## 2024-09-24 DIAGNOSIS — Z95.2 PRESENCE OF PROSTHETIC HEART VALVE: Chronic | ICD-10-CM

## 2024-09-24 DIAGNOSIS — Z01.818 ENCOUNTER FOR OTHER PREPROCEDURAL EXAMINATION: ICD-10-CM

## 2024-09-24 DIAGNOSIS — Z29.9 ENCOUNTER FOR PROPHYLACTIC MEASURES, UNSPECIFIED: ICD-10-CM

## 2024-09-24 DIAGNOSIS — Z96.611 PRESENCE OF RIGHT ARTIFICIAL SHOULDER JOINT: Chronic | ICD-10-CM

## 2024-09-24 LAB
A1C WITH ESTIMATED AVERAGE GLUCOSE RESULT: 5.8 % — HIGH (ref 4–5.6)
ALBUMIN SERPL ELPH-MCNC: 4.1 G/DL — SIGNIFICANT CHANGE UP (ref 3.3–5.2)
ALP SERPL-CCNC: 76 U/L — SIGNIFICANT CHANGE UP (ref 40–120)
ALT FLD-CCNC: 17 U/L — SIGNIFICANT CHANGE UP
ANION GAP SERPL CALC-SCNC: 11 MMOL/L — SIGNIFICANT CHANGE UP (ref 5–17)
APTT BLD: 32.2 SEC — SIGNIFICANT CHANGE UP (ref 24.5–35.6)
AST SERPL-CCNC: 25 U/L — SIGNIFICANT CHANGE UP
BASOPHILS # BLD AUTO: 0.01 K/UL — SIGNIFICANT CHANGE UP (ref 0–0.2)
BASOPHILS NFR BLD AUTO: 0.1 % — SIGNIFICANT CHANGE UP (ref 0–2)
BILIRUB SERPL-MCNC: 0.3 MG/DL — LOW (ref 0.4–2)
BLD GP AB SCN SERPL QL: SIGNIFICANT CHANGE UP
BUN SERPL-MCNC: 48.1 MG/DL — HIGH (ref 8–20)
CALCIUM SERPL-MCNC: 9.5 MG/DL — SIGNIFICANT CHANGE UP (ref 8.4–10.5)
CHLORIDE SERPL-SCNC: 105 MMOL/L — SIGNIFICANT CHANGE UP (ref 96–108)
CO2 SERPL-SCNC: 25 MMOL/L — SIGNIFICANT CHANGE UP (ref 22–29)
CREAT SERPL-MCNC: 2.1 MG/DL — HIGH (ref 0.5–1.3)
EGFR: 31 ML/MIN/1.73M2 — LOW
EOSINOPHIL # BLD AUTO: 0.04 K/UL — SIGNIFICANT CHANGE UP (ref 0–0.5)
EOSINOPHIL NFR BLD AUTO: 0.5 % — SIGNIFICANT CHANGE UP (ref 0–6)
ESTIMATED AVERAGE GLUCOSE: 120 MG/DL — HIGH (ref 68–114)
GLUCOSE SERPL-MCNC: 125 MG/DL — HIGH (ref 70–99)
HCT VFR BLD CALC: 35.4 % — LOW (ref 39–50)
HGB BLD-MCNC: 12 G/DL — LOW (ref 13–17)
IMM GRANULOCYTES NFR BLD AUTO: 0.4 % — SIGNIFICANT CHANGE UP (ref 0–0.9)
INR BLD: 1.02 RATIO — SIGNIFICANT CHANGE UP (ref 0.85–1.16)
LYMPHOCYTES # BLD AUTO: 1.42 K/UL — SIGNIFICANT CHANGE UP (ref 1–3.3)
LYMPHOCYTES # BLD AUTO: 19.2 % — SIGNIFICANT CHANGE UP (ref 13–44)
MCHC RBC-ENTMCNC: 31.9 PG — SIGNIFICANT CHANGE UP (ref 27–34)
MCHC RBC-ENTMCNC: 33.9 GM/DL — SIGNIFICANT CHANGE UP (ref 32–36)
MCV RBC AUTO: 94.1 FL — SIGNIFICANT CHANGE UP (ref 80–100)
MONOCYTES # BLD AUTO: 0.62 K/UL — SIGNIFICANT CHANGE UP (ref 0–0.9)
MONOCYTES NFR BLD AUTO: 8.4 % — SIGNIFICANT CHANGE UP (ref 2–14)
MRSA PCR RESULT.: SIGNIFICANT CHANGE UP
NEUTROPHILS # BLD AUTO: 5.27 K/UL — SIGNIFICANT CHANGE UP (ref 1.8–7.4)
NEUTROPHILS NFR BLD AUTO: 71.4 % — SIGNIFICANT CHANGE UP (ref 43–77)
PLATELET # BLD AUTO: 214 K/UL — SIGNIFICANT CHANGE UP (ref 150–400)
POTASSIUM SERPL-MCNC: 5.3 MMOL/L — SIGNIFICANT CHANGE UP (ref 3.5–5.3)
POTASSIUM SERPL-SCNC: 5.3 MMOL/L — SIGNIFICANT CHANGE UP (ref 3.5–5.3)
PROT SERPL-MCNC: 6.8 G/DL — SIGNIFICANT CHANGE UP (ref 6.6–8.7)
PROTHROM AB SERPL-ACNC: 11.5 SEC — SIGNIFICANT CHANGE UP (ref 9.9–13.4)
RBC # BLD: 3.76 M/UL — LOW (ref 4.2–5.8)
RBC # FLD: 12.8 % — SIGNIFICANT CHANGE UP (ref 10.3–14.5)
S AUREUS DNA NOSE QL NAA+PROBE: SIGNIFICANT CHANGE UP
SODIUM SERPL-SCNC: 140 MMOL/L — SIGNIFICANT CHANGE UP (ref 135–145)
WBC # BLD: 7.39 K/UL — SIGNIFICANT CHANGE UP (ref 3.8–10.5)
WBC # FLD AUTO: 7.39 K/UL — SIGNIFICANT CHANGE UP (ref 3.8–10.5)

## 2024-09-24 PROCEDURE — 93010 ELECTROCARDIOGRAM REPORT: CPT

## 2024-09-24 PROCEDURE — 83036 HEMOGLOBIN GLYCOSYLATED A1C: CPT

## 2024-09-24 PROCEDURE — 71046 X-RAY EXAM CHEST 2 VIEWS: CPT

## 2024-09-24 PROCEDURE — 87640 STAPH A DNA AMP PROBE: CPT

## 2024-09-24 PROCEDURE — 80053 COMPREHEN METABOLIC PANEL: CPT

## 2024-09-24 PROCEDURE — 86900 BLOOD TYPING SEROLOGIC ABO: CPT

## 2024-09-24 PROCEDURE — 93005 ELECTROCARDIOGRAM TRACING: CPT

## 2024-09-24 PROCEDURE — 71046 X-RAY EXAM CHEST 2 VIEWS: CPT | Mod: 26

## 2024-09-24 PROCEDURE — 87641 MR-STAPH DNA AMP PROBE: CPT

## 2024-09-24 PROCEDURE — 85610 PROTHROMBIN TIME: CPT

## 2024-09-24 PROCEDURE — 86850 RBC ANTIBODY SCREEN: CPT

## 2024-09-24 PROCEDURE — 36415 COLL VENOUS BLD VENIPUNCTURE: CPT

## 2024-09-24 PROCEDURE — G0463: CPT

## 2024-09-24 PROCEDURE — 85025 COMPLETE CBC W/AUTO DIFF WBC: CPT

## 2024-09-24 PROCEDURE — 86901 BLOOD TYPING SEROLOGIC RH(D): CPT

## 2024-09-24 PROCEDURE — 85730 THROMBOPLASTIN TIME PARTIAL: CPT

## 2024-09-24 RX ORDER — SODIUM CHLORIDE 0.9 % (FLUSH) 0.9 %
3 SYRINGE (ML) INJECTION EVERY 8 HOURS
Refills: 0 | Status: DISCONTINUED | OUTPATIENT
Start: 2024-10-03 | End: 2024-10-04

## 2024-09-24 NOTE — H&P PST ADULT - NSICDXPASTSURGICALHX_GEN_ALL_CORE_FT
PAST SURGICAL HISTORY:  Heart valve replaced     History of right shoulder replacement     S/P CABG x 4

## 2024-09-24 NOTE — H&P PST ADULT - NSICDXPASTMEDICALHX_GEN_ALL_CORE_FT
PAST MEDICAL HISTORY:  BPH (benign prostatic hyperplasia)     CAD (coronary artery disease)     Diabetes mellitus     Hypertension     Occlusion and stenosis of right carotid artery     TIA (transient ischemic attack)      PAST MEDICAL HISTORY:  BPH (benign prostatic hyperplasia)     CAD (coronary artery disease)     Chronic kidney disease (CKD)     Diabetes mellitus     Hypertension     Occlusion and stenosis of right carotid artery     TIA (transient ischemic attack)

## 2024-09-24 NOTE — H&P PST ADULT - HISTORY OF PRESENT ILLNESS
Pt is a 78 years old male seen today pre-op for Cerebral angiogram and Carotid stent. For------------------Pt  Carotid US 6/10/2024 revealed, high velocities seen within the right internal carotid artery stent measuring as high as 384 cm/s. This is similar to findings from April 2024. Repeat carotid US 9/9/2024 revealed progression of stenosis and even higher degree of stenosis. Pt reports he is doing well. Staying active, trying to walk around .5-1 mile each day. Complaint wi No bleeding issues. Denies transient vision loss, interval stroke/TIA symptoms Pt is a 78 years old male seen today pre-op for Cerebral angiogram and Carotid stent. For occlusion and stenosis of right carotid artery. Pt  Carotid US 6/10/2024 revealed, high velocities seen within the right internal carotid artery stent measuring as high as 384 cm/s. This is similar to findings from April 2024. Repeat carotid US 9/9/2024 revealed progression of stenosis and even higher degree of stenosis. Pt reports he is doing well. Staying active, trying to walk around .5-1 mile each day. Complaint wi No bleeding issues. Denies transient vision loss, interval stroke/TIA symptoms  Pt s/p TU  angioplasty  stenting of TU 11/27/24   Peripheral edema        Pt is a 78 years old male seen today pre-op for Cerebral angiogram and Carotid stent. For occlusion and stenosis of right carotid artery.  Pt s/p TU  angioplasty  stenting of TU 11/27/23, now with worsening and progressive severe in -stent restenosis.  Carotid US 6/10/2024 revealed high velocities seen within the right internal carotid artery stent measuring as high as 384 cm/s.  Repeat carotid US 9/9/2024 revealed progression of stenosis,  Pt reports he is doing well, active, but easily fall asleep now,  easily get tired and Peripheral edema.   Pt denies  transient vision loss, interval stroke, TIA symptoms, syncope, chest pain, SOB, MARCH, Fever/chills  and any other related issues. Pt medical hx includes HTN, HLD, CVA, DM2(Not on any medication), BPH. Pt Currently on  mg q daily and Plavix 75mg PO Q daily. Pt  scheduled for this surgery on 10/3/24 with Dr. Hwang            Pt is a 78 years old male seen today pre-op for Cerebral angiogram and Carotid stent. For occlusion and stenosis of right carotid artery.  Pt s/p TU  angioplasty  stenting of TU 11/27/23, now with worsening and progressive severe in -stent restenosis.  Carotid US 6/10/2024 revealed high velocities seen within the right internal carotid artery stent measuring as high as 384 cm/s.  Repeat carotid US 9/9/2024 revealed progression of stenosis,  Pt reports he is doing well, active, but easily fall asleep now,  easily get tired and Peripheral edema.   Pt denies  transient vision loss, interval stroke, TIA symptoms, syncope, chest pain, SOB, MARCH, Fever/chills  and any other related issues. Pt medical hx includes HTN, HLD, CVA, DM2(Not on any medication), BPH, CKD.  Pt Currently on  mg q daily and Plavix 75mg PO Q daily. Pt  scheduled for this surgery on 10/3/24 with Dr. Hwang

## 2024-09-24 NOTE — H&P PST ADULT - OTHER CARE PROVIDERS
Dr. Milagros May [phone] Dr. Milagros May [phone], cardiologist Dr. Maria Isabel Flower [phone]

## 2024-09-24 NOTE — H&P PST ADULT - ASSESSMENT
Pt is a 78 years old male seen today pre-op for Cerebral angiogram and Carotid stent. For occlusion and stenosis of right carotid artery.  Pt s/p TU  angioplasty  stenting of TU 23,  Pt  scheduled for this surgery on 10/3/24 with Dr. Hwang. Surgery protocol reviewed with Pt today. Pt to follow up with cardiologist for evaluation and clearance prior to this surgery  as per Dr. Hwang's office instruction. Pt instructed to continue ASA and Plavix as instructed by Dr. Hwang       Patient instructed on NPO protocol   Patient instructed to stop NSAID, all vitamins supplement, Fish oil, COQ 10,  herbals 5 days before this surgery.   Pt okay to take Tylenol as needed for pain   Patient will continue to take all his medications as prescribed    Patient instructed on infection prevention   Chlorhexidine scrub instructions provided    CAPRINI VTE 2.0 SCORE [CLOT updated 2019]    AGE RELATED RISK FACTORS                                                       MOBILITY RELATED FACTORS  [ ] Age 41-60 years                                            (1 Point)                    [ ] Bed rest                                                        (1 Point)  [ ] Age: 61-74 years                                           (2 Points)                  [ ] Plaster cast                                                   (2 Points)  [x ] Age= 75 years                                              (3 Points)                    [ ] Bed bound for more than 72 hours                 (2 Points)    DISEASE RELATED RISK FACTORS                                               GENDER SPECIFIC FACTORS  [x ] Edema in the lower extremities                       (1 Point)              [ ] Pregnancy                                                     (1 Point)  [ ] Varicose veins                                               (1 Point)                     [ ] Post-partum < 6 weeks                                   (1 Point)             [ x] BMI > 25 Kg/m2                                            (1 Point)                     [ ] Hormonal therapy  or oral contraception          (1 Point)                 [ ] Sepsis (in the previous month)                        (1 Point)               [ ] History of pregnancy complications                 (1 point)  [ ] Pneumonia or serious lung disease                                               [ ] Unexplained or recurrent                     (1 Point)           (in the previous month)                               (1 Point)  [ ] Abnormal pulmonary function test                     (1 Point)                 SURGERY RELATED RISK FACTORS  [ ] Acute myocardial infarction                              (1 Point)               [ ]  Section                                             (1 Point)  [ ] Congestive heart failure (in the previous month)  (1 Point)      [ ] Minor surgery                                                  (1 Point)   [ ] Inflammatory bowel disease                             (1 Point)               [ ] Arthroscopic surgery                                        (2 Points)  [ ] Central venous access                                      (2 Points)                x[ ] General surgery lasting more than 45 minutes (2 points)  [ ] Malignancy- Present or previous                   (2 Points)                [ ] Elective arthroplasty                                         (5 points)    [ ] Stroke (in the previous month)                          (5 Points)                                                                                                                                                           HEMATOLOGY RELATED FACTORS                                                 TRAUMA RELATED RISK FACTORS  [ ] Prior episodes of VTE                                     (3 Points)                [ ] Fracture of the hip, pelvis, or leg                       (5 Points)  [ ] Positive family history for VTE                         (3 Points)             [ ] Acute spinal cord injury (in the previous month)  (5 Points)  [ ] Prothrombin 19073 A                                     (3 Points)               [ ] Paralysis  (less than 1 month)                             (5 Points)  [ ] Factor V Leiden                                             (3 Points)                  [ ] Multiple Trauma within 1 month                        (5 Points)  [ ] Lupus anticoagulants                                     (3 Points)                                                           [ ] Anticardiolipin antibodies                               (3 Points)                                                       [ ] High homocysteine in the blood                      (3 Points)                                             [ ] Other congenital or acquired thrombophilia      (3 Points)                                                [ ] Heparin induced thrombocytopenia                  (3 Points)                                     Total Score [   7       ]  OPIOID RISK TOOL    PHILLY EACH BOX THAT APPLIES AND ADD TOTALS AT THE END    FAMILY HISTORY OF SUBSTANCE ABUSE                 FEMALE         MALE                                                Alcohol                             [  ]1 pt          [  ]3pts                                               Illegal Durgs                     [  ]2 pts        [  ]3pts                                               Rx Drugs                           [  ]4 pts        [  ]4 pts    PERSONAL HISTORY OF SUBSTANCE ABUSE                                                                                          Alcohol                             [  ]3 pts       [  ]3 pts                                               Illegal Drugs                     [  ]4 pts        [  ]4 pts                                               Rx Drugs                           [  ]5 pts        [  ]5 pts    AGE BETWEEN 16-45 YEARS                                      [  ]1 pt         [  ]1 pt    HISTORY OF PREADOLESCENT   SEXUAL ABUSE                                                             [  ]3 pts        [  ]0pts    PSYCHOLOGICAL DISEASE                     ADD, OCD, Bipolar, Schizophrenia        [  ]2 pts         [  ]2 pts                      Depression                                               [  ]1 pt           [  ]1 pt           SCORING TOTAL   (add numbers and type here)              (*0**)                                     A score of 3 or lower indicated LOW risk for future opioid abuse  A score of 4 to 7 indicated moderate risk for future opioid abuse  A score of 8 or higher indicates a high risk for opioid abuse

## 2024-09-24 NOTE — H&P PST ADULT - MUSCULOSKELETAL
details… left ankle swelling/decreased ROM/decreased ROM due to pain/joint swelling/decreased strength/abnormal gait

## 2024-09-24 NOTE — H&P PST ADULT - NSICDXFAMILYHX_GEN_ALL_CORE_FT
FAMILY HISTORY:  Father  Still living? No  FH: MI (myocardial infarction), Age at diagnosis: Age Unknown    Sibling  Still living? No  FH: MI (myocardial infarction), Age at diagnosis: Age Unknown

## 2024-10-03 ENCOUNTER — INPATIENT (INPATIENT)
Facility: HOSPITAL | Age: 79
LOS: 0 days | Discharge: HOME HEALTH | DRG: 68 | End: 2024-10-04
Attending: PSYCHIATRY & NEUROLOGY | Admitting: PSYCHIATRY & NEUROLOGY
Payer: COMMERCIAL

## 2024-10-03 ENCOUNTER — TRANSCRIPTION ENCOUNTER (OUTPATIENT)
Age: 79
End: 2024-10-03

## 2024-10-03 ENCOUNTER — APPOINTMENT (OUTPATIENT)
Dept: NEUROLOGY | Facility: HOSPITAL | Age: 79
End: 2024-10-03

## 2024-10-03 VITALS
DIASTOLIC BLOOD PRESSURE: 87 MMHG | SYSTOLIC BLOOD PRESSURE: 163 MMHG | RESPIRATION RATE: 16 BRPM | OXYGEN SATURATION: 100 % | TEMPERATURE: 98 F | HEART RATE: 65 BPM

## 2024-10-03 DIAGNOSIS — Z96.611 PRESENCE OF RIGHT ARTIFICIAL SHOULDER JOINT: Chronic | ICD-10-CM

## 2024-10-03 DIAGNOSIS — Z95.1 PRESENCE OF AORTOCORONARY BYPASS GRAFT: Chronic | ICD-10-CM

## 2024-10-03 DIAGNOSIS — Z95.2 PRESENCE OF PROSTHETIC HEART VALVE: Chronic | ICD-10-CM

## 2024-10-03 DIAGNOSIS — I65.21 OCCLUSION AND STENOSIS OF RIGHT CAROTID ARTERY: ICD-10-CM

## 2024-10-03 LAB
ANION GAP SERPL CALC-SCNC: 12 MMOL/L — SIGNIFICANT CHANGE UP (ref 5–17)
BUN SERPL-MCNC: 49.3 MG/DL — HIGH (ref 8–20)
CALCIUM SERPL-MCNC: 9.2 MG/DL — SIGNIFICANT CHANGE UP (ref 8.4–10.5)
CHLORIDE SERPL-SCNC: 107 MMOL/L — SIGNIFICANT CHANGE UP (ref 96–108)
CO2 SERPL-SCNC: 21 MMOL/L — LOW (ref 22–29)
CREAT SERPL-MCNC: 1.99 MG/DL — HIGH (ref 0.5–1.3)
EGFR: 34 ML/MIN/1.73M2 — LOW
GLUCOSE SERPL-MCNC: 113 MG/DL — HIGH (ref 70–99)
PA ADP PRP-ACNC: 155 PRU — LOW (ref 182–335)
PLATELET RESPONSE ASPIRIN RESULT: 520 ARU — SIGNIFICANT CHANGE UP
POTASSIUM SERPL-MCNC: 4.7 MMOL/L — SIGNIFICANT CHANGE UP (ref 3.5–5.3)
POTASSIUM SERPL-SCNC: 4.7 MMOL/L — SIGNIFICANT CHANGE UP (ref 3.5–5.3)
SODIUM SERPL-SCNC: 140 MMOL/L — SIGNIFICANT CHANGE UP (ref 135–145)

## 2024-10-03 PROCEDURE — 37215 TRANSCATH STENT CCA W/EPS: CPT | Mod: RT

## 2024-10-03 PROCEDURE — 93010 ELECTROCARDIOGRAM REPORT: CPT

## 2024-10-03 PROCEDURE — 99221 1ST HOSP IP/OBS SF/LOW 40: CPT

## 2024-10-03 PROCEDURE — 93880 EXTRACRANIAL BILAT STUDY: CPT | Mod: 26

## 2024-10-03 RX ORDER — ONDANSETRON HCL/PF 4 MG/2 ML
4 VIAL (ML) INJECTION EVERY 6 HOURS
Refills: 0 | Status: DISCONTINUED | OUTPATIENT
Start: 2024-10-03 | End: 2024-10-04

## 2024-10-03 RX ORDER — SENNOSIDES 8.6 MG
2 TABLET ORAL AT BEDTIME
Refills: 0 | Status: DISCONTINUED | OUTPATIENT
Start: 2024-10-03 | End: 2024-10-04

## 2024-10-03 RX ORDER — TAMSULOSIN HCL 0.4 MG
0.4 CAPSULE ORAL AT BEDTIME
Refills: 0 | Status: DISCONTINUED | OUTPATIENT
Start: 2024-10-03 | End: 2024-10-04

## 2024-10-03 RX ORDER — ACETAMINOPHEN 325 MG
650 TABLET ORAL EVERY 6 HOURS
Refills: 0 | Status: DISCONTINUED | OUTPATIENT
Start: 2024-10-03 | End: 2024-10-04

## 2024-10-03 RX ORDER — HYDRALAZINE HYDROCHLORIDE 100 MG/1
10 TABLET ORAL
Refills: 0 | Status: DISCONTINUED | OUTPATIENT
Start: 2024-10-03 | End: 2024-10-04

## 2024-10-03 RX ORDER — ATORVASTATIN CALCIUM 10 MG/1
40 TABLET, FILM COATED ORAL AT BEDTIME
Refills: 0 | Status: DISCONTINUED | OUTPATIENT
Start: 2024-10-03 | End: 2024-10-04

## 2024-10-03 RX ORDER — METOPROLOL TARTRATE 50 MG
50 TABLET ORAL DAILY
Refills: 0 | Status: DISCONTINUED | OUTPATIENT
Start: 2024-10-03 | End: 2024-10-04

## 2024-10-03 RX ORDER — FAMOTIDINE 40 MG
20 TABLET ORAL EVERY 12 HOURS
Refills: 0 | Status: DISCONTINUED | OUTPATIENT
Start: 2024-10-04 | End: 2024-10-04

## 2024-10-03 RX ORDER — LABETALOL HYDROCHLORIDE 200 MG/1
10 TABLET, FILM COATED ORAL
Refills: 0 | Status: DISCONTINUED | OUTPATIENT
Start: 2024-10-03 | End: 2024-10-04

## 2024-10-03 RX ORDER — LOSARTAN POTASSIUM 100 MG/1
25 TABLET, FILM COATED ORAL DAILY
Refills: 0 | Status: DISCONTINUED | OUTPATIENT
Start: 2024-10-04 | End: 2024-10-04

## 2024-10-03 RX ORDER — ASPIRIN 325 MG
81 TABLET ORAL DAILY
Refills: 0 | Status: DISCONTINUED | OUTPATIENT
Start: 2024-10-03 | End: 2024-10-04

## 2024-10-03 RX ORDER — SODIUM CHLORIDE 0.9 % (FLUSH) 0.9 %
1000 SYRINGE (ML) INJECTION
Refills: 0 | Status: DISCONTINUED | OUTPATIENT
Start: 2024-10-03 | End: 2024-10-04

## 2024-10-03 RX ADMIN — Medication 3 MILLILITER(S): at 21:19

## 2024-10-03 RX ADMIN — Medication 17 GRAM(S): at 11:54

## 2024-10-03 RX ADMIN — Medication 75 MILLIGRAM(S): at 11:54

## 2024-10-03 RX ADMIN — Medication 3 MILLILITER(S): at 13:51

## 2024-10-03 RX ADMIN — Medication 2 TABLET(S): at 21:16

## 2024-10-03 RX ADMIN — Medication 81 MILLIGRAM(S): at 11:53

## 2024-10-03 RX ADMIN — Medication 0.4 MILLIGRAM(S): at 21:16

## 2024-10-03 RX ADMIN — ATORVASTATIN CALCIUM 40 MILLIGRAM(S): 10 TABLET, FILM COATED ORAL at 21:15

## 2024-10-03 RX ADMIN — HYDRALAZINE HYDROCHLORIDE 10 MILLIGRAM(S): 100 TABLET ORAL at 16:33

## 2024-10-03 NOTE — CHART NOTE - NSCHARTNOTEFT_GEN_A_CORE
Neurointerventional Surgery Post Procedure Note    Procedure: Selective Cerebral Angiography     Pre- Procedure Diagnosis: R ICA instent stenosis with high velocities    Post- Procedure Diagnosis: s/p cerebral angiogram with R ICA angioplasty with 50% residual.     : Dr. Jaiden MD  Nurse Practitioner: Thuy Gutierrez NP   Nurse: JUANA NINA  Anesthesiologist: Dr. Dee                                          Radiology Tech: Faith ANG   Fellow: Cedric Seo DO and Ranjit Blunt MD     Sheath:  8 Lebanese Sheath    I/Os: estimated blood loss less than 30cc,  IV fluids 500cc, Urine output 0 cc, Contrast: Visipaque 240 56  cc,  ANcef 2 gm     Vitals:  /83  HR 76  Spo2 100 %    Preliminary Report:  Under a 6 Lebanese short sheath via the right groin under MAC sedation via right internal carotid artery, a selective cerebral angiography was performed and reveals R ICA instent stenosis, s/p R ICA angioplasty with 50 % residual. ( Official note to follow).    Patient tolerated procedure well.  Patient remains hemodynamically stable, no change in neurological status compared to baseline.  Results were discussed with patient, patient's family and Neurosurgery.  Right groin sheath was discontinued at 1041 with angioseal closure. Hemostasis was obtained with approximately 14 minutes of manual compression.     No active bleeding, no hematoma, no ecchymosis.   Quick clot and safeguard balloon dressing applied at 1055  Patient transferred to NSICU in stable condition.

## 2024-10-03 NOTE — CHART NOTE - NSCHARTNOTEFT_GEN_A_CORE
Neurointerventional Surgery  Pre-Procedure Note     This is a 78y ____ hand dominant Male    HPI: Mr. Archuleta is a 77-year-old male with history of CAD s/p CABG, CVA, HTN, HLD, DM2, BPH, who presented to   [facility] on 11/24/2023 for transient left sided facial droop and left-sided weakness, he was transferred to [facility] and then to [facility] ED. On admission, CTA H/N revealed severe stenosis of the right carotid bulb/proximal internal carotid artery now s/p cerebral angiogram with right ICA angioplasty and stent placement on 11/27/23. Continued follow up with carotid US on 6/10/2024 revealed, again, high velocities seen within the right internal carotid artery stent measuring as high as 384 cm/s. This is similar to findings from April 2024. Repeat carotid US 9/9/2024 revealed progression of stenosis and even higher degree of stenosis. Patient on DAPT without any complications. Patient presents today for evaluation of R carotid artery stent with possible angioplasty with Dr. Hwang. Denies any headache, weakness, numbness/tingling, any stroke like symptoms.     Allergies: No Known Allergies    PMH/PSH:  PAST MEDICAL & SURGICAL HISTORY:  Hypertension  Diabetes mellitus  CAD (coronary artery disease)  BPH (benign prostatic hyperplasia)  Occlusion and stenosis of right carotid artery  TIA (transient ischemic attack)  Chronic kidney disease (CKD)  S/P CABG x 4  Heart valve replaced  History of right shoulder replacement    Social History:   Social History:    FAMILY HISTORY:  FH: MI (myocardial infarction) (Father, Sibling)    Current Medications:     Physical Exam:  Constitutional: NAD, lying in bed  Neuro  * Mental Status:  GCS 15: Awake, alert, oriented to conversation. No aphasia or difficulty speaking. No dysarthria. Able to name objects and their function.  * Cranial Nerves: Cnii-Cnxii grossly intact. PERRL, EOMI, tongue midline, no gaze deviation  * Motor: RUE 5/5, LUE 5/5, RLE 5/5, LLE 5/5, no drift or dysmetria  * Sensory: Sensation intact to light touch  * Reflexes: not assessed   Cardiovascular: Regular rate and rhythm.  Eyes: See neurologic examination with detailed examination of eyes.  ENT: No JVD, Trachea Midline  Respiratory: non labored breathing   Gastrointestinal: Soft, nontender, nondistended.  Genitourinary: [ ] Contreras, [ x ] No Contreras.   Musculoskeletal: No muscle wasting noted, (See neurologic assessment for full muscle strength assessment)   Skin:  no wounds, no redness, no abrasions noted  Hematologic / Lymph / Immunologic: No bleeding from IV sites or wounds    NIH SS:  DATE:  TIME:  1A: Level of consciousness (0-3): 0  1B: Questions (0-2): 0    1C: Commands (0-2): 0  2: Gaze (0-2): 0  3: Visual fields (0-3): 0  4: Facial palsy (0-3): 0  MOTOR:  5A: Left arm motor drift (0-4): 0  5B: Right arm motor drift (0-4): 0  6A: Left leg motor drift (0-4): 0  6B: Right leg motor drift (0-4): 0  7: Limb ataxia (0-2): 0  SENSORY:  8: Sensation (0-2): 0  SPEECH:  9: Language (0-3): 0  10: Dysarthria (0-2): 0  EXTINCTION:  11: Extinction/inattention (0-2): 0    TOTAL SCORE:     Labs:   9/24/24: WBC 7.39/ Hg 12/ Hct 35.4/ Plt 214   9/24/24: Ptt 32.2/ Pt 11.5/ INR 1. 02  9/24/24: Na 140/ K 5.3/ Cl 105/ Co 25/ BUN 48.1/ Cr 2.10     Assessment/Plan:   This is a 78y  year old dominant Male  presents with R carotid artery stenosis. Patient presents to neuro-IR for selective cerebral angiography with possible stenting/ angioplasty.     Procedure, goals, risks, benefits and alternatives  were discussed with patient. All questions were answered.  Risks include but are not limited to stroke, vessel injury, hemorrhage, and or groin hematoma.  Patient demonstrates understanding  of all risks involved with this procedure and wishes to continue.   Appropriate  consent was obtained from patient and consent is in the patient's chart. Neurointerventional Surgery  Pre-Procedure Note     This is a 78y ____ hand dominant Male    HPI: Mr. Archuleta is a 77-year-old male with history of CAD s/p CABG, CVA, HTN, HLD, DM2, BPH, who presented to   [facility] on 11/24/2023 for transient left sided facial droop and left-sided weakness, he was transferred to [facility] and then to [facility] ED. On admission, CTA H/N revealed severe stenosis of the right carotid bulb/proximal internal carotid artery now s/p cerebral angiogram with right ICA angioplasty and stent placement on 11/27/23. Continued follow up with carotid US on 6/10/2024 revealed, again, high velocities seen within the right internal carotid artery stent measuring as high as 384 cm/s. This is similar to findings from April 2024. Repeat carotid US 9/9/2024 revealed progression of stenosis and even higher degree of stenosis. Patient on DAPT without any complications. Patient presents today for evaluation of R carotid artery stent with possible stenting and or angioplasty with Dr. Hwang. Denies any headache, weakness, numbness/tingling, any stroke like symptoms.     Allergies: No Known Allergies    PMH/PSH:  PAST MEDICAL & SURGICAL HISTORY:  Hypertension  Diabetes mellitus  CAD (coronary artery disease)  BPH (benign prostatic hyperplasia)  Occlusion and stenosis of right carotid artery  TIA (transient ischemic attack)  Chronic kidney disease (CKD)  S/P CABG x 4  Heart valve replaced  History of right shoulder replacement    Social History:   Social History:    FAMILY HISTORY:  FH: MI (myocardial infarction) (Father, Sibling)    Current Medications:     Physical Exam:  Constitutional: NAD, lying in bed  Neuro  * Mental Status:  GCS 15: Awake, alert, oriented to conversation. No aphasia or difficulty speaking. No dysarthria. Able to name objects and their function.  * Cranial Nerves: Cnii-Cnxii grossly intact. PERRL, EOMI, tongue midline, no gaze deviation  * Motor: RUE 5/5, LUE 5/5, RLE 5/5, LLE 5/5, no drift or dysmetria  * Sensory: Sensation intact to light touch  * Reflexes: not assessed   Cardiovascular: Regular rate and rhythm.  Eyes: See neurologic examination with detailed examination of eyes.  ENT: No JVD, Trachea Midline  Respiratory: non labored breathing   Gastrointestinal: Soft, nontender, nondistended.  Genitourinary: [ ] Contreras, [ x ] No Contreras.   Musculoskeletal: No muscle wasting noted, (See neurologic assessment for full muscle strength assessment)   Skin:  no wounds, no redness, no abrasions noted  Hematologic / Lymph / Immunologic: No bleeding from IV sites or wounds    [facility] SS:  DATE: 10/3/24  TIME: 07:40  1A: Level of consciousness (0-3): 0  1B: Questions (0-2): 0    1C: Commands (0-2): 0  2: Gaze (0-2): 0  3: Visual fields (0-3): 0  4: Facial palsy (0-3): 0  MOTOR:  5A: Left arm motor drift (0-4): 0  5B: Right arm motor drift (0-4): 0  6A: Left leg motor drift (0-4): 0  6B: Right leg motor drift (0-4): 0  7: Limb ataxia (0-2): 0  SENSORY:  8: Sensation (0-2): 0  SPEECH:  9: Language (0-3): 0  10: Dysarthria (0-2): 0  EXTINCTION:  11: Extinction/inattention (0-2): 0    TOTAL SCORE: 0    Labs:   9/24/24: WBC 7.39/ Hg 12/ Hct 35.4/ Plt 214   9/24/24: Ptt 32.2/ Pt 11.5/ INR 1. 02  9/24/24: Na 140/ K 5.3/ Cl 105/ Co 25/ BUN 48.1/ Cr 2.10     Assessment/Plan:   This is a 78y  year old dominant Male  presents with R carotid artery stenosis. Patient presents to neuro-IR for selective cerebral angiography with possible stenting/ angioplasty.     Procedure, goals, risks, benefits and alternatives  were discussed with patient. All questions were answered.  Risks include but are not limited to stroke, vessel injury, hemorrhage, and or groin hematoma.  Patient demonstrates understanding  of all risks involved with this procedure and wishes to continue.   Appropriate  consent was obtained from patient and consent is in the patient's chart. Neurointerventional Surgery  Pre-Procedure Note     This is a 78y ____ hand dominant Male    HPI: Mr. Archuleta is a 77-year-old male with history of CAD s/p CABG, CVA, HTN, HLD, DM2, BPH, who presented to   [facility] on 11/24/2023 for transient left sided facial droop and left-sided weakness, he was transferred to [facility] and then to [facility] ED. On admission, CTA H/N revealed severe stenosis of the right carotid bulb/proximal internal carotid artery now s/p cerebral angiogram with right ICA angioplasty and stent placement on 11/27/23. Continued follow up with carotid US on 6/10/2024 revealed, again, high velocities seen within the right internal carotid artery stent measuring as high as 384 cm/s. This is similar to findings from April 2024. Repeat carotid US 9/9/2024 revealed progression of stenosis and even higher degree of stenosis. Patient on DAPT without any complications. Patient presents today for evaluation of R carotid artery stent with possible stenting and or angioplasty with Dr. Hwang. Denies any headache, weakness, numbness/tingling, any stroke like symptoms.     Allergies: No Known Allergies    PMH/PSH:  PAST MEDICAL & SURGICAL HISTORY:  Hypertension  Diabetes mellitus  CAD (coronary artery disease)  BPH (benign prostatic hyperplasia)  Occlusion and stenosis of right carotid artery  TIA (transient ischemic attack)  Chronic kidney disease (CKD)  S/P CABG x 4  Heart valve replaced  History of right shoulder replacement    Social History:   Social History:    FAMILY HISTORY:  FH: MI (myocardial infarction) (Father, Sibling)    Current Medications:     Physical Exam:  Constitutional: NAD, lying in bed  Neuro  * Mental Status:  GCS 15: Awake, alert, oriented to conversation. No aphasia or difficulty speaking. No dysarthria. Able to name objects and their function.  * Cranial Nerves: Cnii-Cnxii grossly intact. PERRL, EOMI, tongue midline, no gaze deviation  * Motor: RUE 5/5, LUE 5/5, RLE 5/5, LLE 5/5, no drift or dysmetria  * Sensory: Sensation intact to light touch  * Reflexes: not assessed   Cardiovascular: Regular rate and rhythm.  Eyes: See neurologic examination with detailed examination of eyes.  ENT: No JVD, Trachea Midline  Respiratory: non labored breathing   Gastrointestinal: Soft, nontender, nondistended.  Genitourinary: [ ] Contreras, [ x ] No Contreras.   Musculoskeletal: No muscle wasting noted, (See neurologic assessment for full muscle strength assessment)   Skin:  no wounds, no redness, no abrasions noted  Hematologic / Lymph / Immunologic: No bleeding from IV sites or wounds    [facility] SS:  DATE: 10/3/24  TIME: 07:40  1A: Level of consciousness (0-3): 0  1B: Questions (0-2): 0    1C: Commands (0-2): 0  2: Gaze (0-2): 0  3: Visual fields (0-3): 0  4: Facial palsy (0-3): 0  MOTOR:  5A: Left arm motor drift (0-4): 0  5B: Right arm motor drift (0-4): 0  6A: Left leg motor drift (0-4): 0  6B: Right leg motor drift (0-4): 0  7: Limb ataxia (0-2): 0  SENSORY:  8: Sensation (0-2): 0  SPEECH:  9: Language (0-3): 0  10: Dysarthria (0-2): 0  EXTINCTION:  11: Extinction/inattention (0-2): 0    TOTAL SCORE: 0    Labs:   9/24/24: WBC 7.39/ Hg 12/ Hct 35.4/ Plt 214   9/24/24: Ptt 32.2/ Pt 11.5/ INR 1. 02  9/24/24: Na 140/ K 5.3/ Cl 105/ Co 25/ BUN 48.1/ Cr 2.10     PRU: 155  ARU: 520  Creat: 1.99     Assessment/Plan:   This is a 78y  year old dominant Male  presents with R carotid artery stenosis. Patient presents to neuro-IR for selective cerebral angiography with possible stenting/ angioplasty.     Procedure, goals, risks, benefits and alternatives  were discussed with patient. All questions were answered.  Risks include but are not limited to stroke, vessel injury, hemorrhage, and or groin hematoma.  Patient demonstrates understanding  of all risks involved with this procedure and wishes to continue.   Appropriate  consent was obtained from patient and consent is in the patient's chart.

## 2024-10-03 NOTE — CONSULT NOTE ADULT - TIME BILLING
77-year-old male with R ICA in-stent stenosis, now s/p angioplasty with 50% residual on 10/03.  PMH of CAD s/p CABG, CVA, HTN, HLD, DM2, BPH.    Plan:  - neuro checks   - carotid sono  - cont DAPT  - maintain -160, PRN meds; cont home meds  - maintain Osats>92%, incentive spirometry  - diet as tolerated; bowel regimen  - monitor e-lytes, UOP, cont IV hydration for now, d/c later tonight  - DVT ppx: SCDs    Time spent - 40 min, non-critical, included review of relevant history, clinical examination, review of data and images, discussion of treatment with the multidisciplinary team and any consultants involved in this patient’s care.

## 2024-10-03 NOTE — CONSULT NOTE ADULT - ASSESSMENT
HPI:      Neuro:  - Q1 hour Neuro checks, Q1 hour Vitals  - HOB 30 degrees, Neck midline position  - Maintain normothermia, PO acetaminophen for temp>38 C or pain  - Neurosurgical Imaging Reviewed  - Carotid Dopplers   - Monitor wound  - Pain management: Tylenol PRN   - Turn and Position Q2  /  Activity ad nay, with assistance  	  CV:  - SBP Goal 100-140  - BP regimen:     Pulm:  - Supplemental O2 PRN to maintain Spo2>92%  - Chest PT, OOB, Pulmonary Toilet    GI:  - Nutrition: Advance diet as tolerated   - GI prophylaxis: Famotidine 20 daily   - Bowel regimen: Senna/Miralax   	  Gu:  - Voiding  - Fluids: NS @ 75 cc/hr   - I&O Q1 hour  - CKD 3: Monitor Electrolytes & Renal Function    Heme:  - Monitor H&H  - Continune Plavix 75 daily   -Continue ASA 81 daily   - Chemical DVT prophylaxis:  		* Chemical DVT prophylaxis is contraindicated due to risk of bleeding  - Mechanical DVT Prophylaxis: Maintain B/L LE sequential compression devices  	  ID:  - Monitor WBC and Temperature  	  Endo:  - Monitor BGL, maintain <180  - MISS  - A1C  - TSH    HPI: 77-year-old male with history of CAD s/p CABG, CVA, HTN, HLD, DM2, BPH, TIA s/p R ICA stent on 11/27/23 for severe stenosis of the right carotid bulb/proximal internal carotid artery. Pt had follow up imaging to showed instent stenosis with increased velocities.   -s/p Cerebral Angiogram with R ICA angioplasty for instent stenosis     Neuro:  - Q1 hour Neuro checks, Q1 hour Vitals  - HOB 30 degrees, Neck midline position  - Maintain normothermia, PO acetaminophen for temp>38 C or pain  - Neurosurgical Imaging Reviewed  - Carotid Dopplers   - Monitor wound  - Pain management: Tylenol PRN   - Turn and Position Q2  /  Activity ad nay, with assistance  	  CV:  - SBP Goal 120-160  - BP regimen: Hydralazine / Labetalol PRN     Pulm:  - Supplemental O2 PRN to maintain Spo2>92%  - Chest PT, OOB, Pulmonary Toilet    GI:  - Nutrition: Advance diet as tolerated   - GI prophylaxis: Famotidine 20 daily   - Bowel regimen: Senna/Miralax   	  Gu:  - Voiding  - Fluids: NS @ 75 cc/hr   - I&O Q1 hour  - CKD 3: Monitor Electrolytes & Renal Function    Heme:  - Monitor H&H  - Continune Plavix 75 daily   -Continue ASA 81 daily   - Chemical DVT prophylaxis:  		* Chemical DVT prophylaxis is contraindicated due to risk of bleeding  - Mechanical DVT Prophylaxis: Maintain B/L LE sequential compression devices  	  ID:  - Monitor WBC and Temperature  	  Endo:  - Monitor BGL, maintain <180  - A1C

## 2024-10-03 NOTE — PATIENT PROFILE ADULT - FALL HARM RISK - HARM RISK INTERVENTIONS
Assistance with ambulation/Assistance OOB with selected safe patient handling equipment/Communicate Risk of Fall with Harm to all staff/Discuss with provider need for PT consult/Monitor gait and stability/Provide patient with walking aids - walker, cane, crutches/Reinforce activity limits and safety measures with patient and family/Sit up slowly, dangle for a short time, stand at bedside before walking/Tailored Fall Risk Interventions/Use of alarms - bed, chair and/or voice tab/Visual Cue: Yellow wristband and red socks/Bed in lowest position, wheels locked, appropriate side rails in place/Call bell, personal items and telephone in reach/Instruct patient to call for assistance before getting out of bed or chair/Non-slip footwear when patient is out of bed/[place] to call system/Physically safe environment - no spills, clutter or unnecessary equipment/Purposeful Proactive Rounding/Room/bathroom lighting operational, light cord in reach

## 2024-10-03 NOTE — DISCHARGE NOTE NURSING/CASE MANAGEMENT/SOCIAL WORK - PATIENT PORTAL LINK FT
You can access the FollowMyHealth Patient Portal offered by [facility] by registering at the following website: [email or web address]. By joining [employer]’s FollowMyHealth portal, you will also be able to view your health information using other applications (apps) compatible with our system.

## 2024-10-03 NOTE — CONSULT NOTE ADULT - SUBJECTIVE AND OBJECTIVE BOX
Patient is a 78y old  Male who presents with a chief complaint of   HPI: Mr. Archuleta is a 77-year-old male with history of CAD s/p CABG, CVA, HTN, HLD, DM2, BPH, who presented to [facility] on 11/24/2023 for transient left sided facial droop and left-sided weakness, he was transferred to [facility] and then to [facility] ED. On admission, CTA H/N revealed severe stenosis of the right carotid bulb/proximal internal carotid artery now s/p cerebral angiogram with right ICA angioplasty and stent placement on 11/27/23. Continued follow up with carotid US on 6/10/2024 revealed, again, high velocities seen within the right internal carotid artery stent measuring as high as 384 cm/s. This is similar to findings from April 2024. Repeat carotid US 9/9/2024 revealed progression of stenosis and even higher degree of stenosis. Patient on DAPT without any complications. Patient presents today for evaluation of R carotid artery stent with possible stenting and or angioplasty with Dr. Hwang. Denies any headache, weakness, numbness/tingling, any stroke like symptoms.    Patient now s/p cerebral angiogram with R ICA angioplasty for instent stenosis. Now admitted to NSICU for further post-op care.       PAST MEDICAL & SURGICAL HISTORY:  Hypertension  Diabetes mellitus  CAD (coronary artery disease)  BPH (benign prostatic hyperplasia)  Occlusion and stenosis of right carotid artery  TIA (transient ischemic attack)  Chronic kidney disease (CKD)  S/P CABG x 4  Heart valve replaced  History of right shoulder replacement    FAMILY HISTORY:  FH: MI (myocardial infarction) (Father, Sibling)    Allergies    No Known Allergies    Intolerances    REVIEW OF SYSTEMS  Negative except as noted in HPI  CONSTITUTIONAL: No fever, weight loss, or fatigue  EYES: No eye pain, visual disturbances  ENMT:  No difficulty hearing, tinnitus, vertigo  NECK: No pain or stiffness  RESPIRATORY: No cough, wheezing, No shortness of breath  CARDIOVASCULAR: No chest pain, palpitations, dizziness, or leg swelling  GASTROINTESTINAL: No abdominal or epigastric pain. No nausea, vomiting  GENITOURINARY: No dysuria, frequency  NEUROLOGICAL: No headaches, memory loss, loss of strength, numbness, or tremors  MUSCULOSKELETAL: No joint pain or swelling; No muscle, back, or extremity pain    HOME MEDICATIONS:  Home Medications:  aspirin 81 mg oral delayed release tablet: 2 tab(s) orally once a day (03 Oct 2024 07:26)  Centrum Men&#x27;s oral tablet: 1 tab(s) orally once a day (03 Oct 2024 07:26)  ferrous sulfate 324 mg (65 mg elemental iron) oral delayed release tablet: 1 tab(s) orally (03 Oct 2024 07:26)  losartan 25 mg oral tablet: 0.5 tab(s) orally once a day (03 Oct 2024 07:26)  melatonin 5 mg oral tablet: 1 tab(s) orally (03 Oct 2024 07:26)  Vitamin D3 5000 intl units (125 mcg) oral tablet: 1 tab(s) orally once a day (03 Oct 2024 07:26)      MEDICATIONS:  Antibiotics:    Neuro:    Anticoagulation:    OTHER:    IVF:  sodium chloride 0.9% lock flush 3 milliLiter(s) IV Push every 8 hours  sodium chloride 0.9%. 1000 milliLiter(s) IV Continuous <Continuous>      Vital Signs Last 24 Hrs  T(C): 36.6 (03 Oct 2024 07:48), Max: 36.6 (03 Oct 2024 07:48)  T(F): 97.8 (03 Oct 2024 07:48), Max: 97.8 (03 Oct 2024 07:48)  HR: 65 (03 Oct 2024 07:48) (65 - 65)  BP: 163/87 (03 Oct 2024 07:48) (163/87 - 163/87)  BP(mean): --  RR: 16 (03 Oct 2024 07:48) (16 - 16)  SpO2: 100% (03 Oct 2024 07:48) (100% - 100%)    Parameters below as of 03 Oct 2024 07:48  Patient On (Oxygen Delivery Method): room air    Physical Exam:  Constitutional: NAD, lying in bed  Neuro  * Mental Status:  GCS 15: Awake, alert, oriented to conversation. No aphasia or difficulty speaking. No dysarthria.  * Cranial Nerves: Cnii-Cnxii grossly intact. PERRL, EOMI, tongue midline, no gaze deviation  * Motor: RUE 5/5, LUE 5/5, RLE 5/5, LLE 5/5, no drift or dysmetria  * Sensory: Sensation intact to light touch  * Reflexes: not assessed   Cardiovascular: Regular rate and rhythm.  Eyes: See neurologic examination with detailed examination of eyes.  ENT: No JVD, Trachea Midline  Respiratory: non labored breathing   Gastrointestinal: Soft, nontender, nondistended.  Genitourinary: [ ] Contreras, [ x ] No Contreras.   Musculoskeletal: No muscle wasting noted, (See neurologic assessment for full muscle strength assessment)  Skin:  no wounds, no redness, no abrasions noted  Hematologic / Lymph / Immunologic: No bleeding from IV sites or wounds    LABS:  9/24/24: WBC 7.39/ Hg 12/ Hct 35.4/ Plt 214   9/24/24: Ptt 32.2/ Pt 11.5/ INR 1. 02  9/24/24: Na 140/ K 5.3/ Cl 105/ Co 25/ BUN 48.1/ Cr 2.10     PRU:  ARU:    RADIOLOGY & ADDITIONAL STUDIES:  < from: US Duplex Carotid Arteries Complete, Bilateral (09.09.24 @ 12:51) >  EXAM: [id number] - US DPLX CAROTIDS COMPL BI  - ORDERED BY: CHANDLER HWANG      PROCEDURE DATE:  09/09/2024       INTERPRETATION:  CLINICAL INFORMATION: Prior examinations, dated 3/8   2024, 4/12/2024 and 6/10/2024 showed high grade stenoses in thestented   right carotid artery. The patient returns for further follow-up   examination.    TECHNIQUE: Grayscale, color and spectral Doppler examination of both   carotid arteries was performed.    FINDINGS:    There is an irregular heart rate.    There is a a right carotid artery stent, or stents, with the proximal   margin in the distal right common carotid artery in the internal carotid   artery.    The stents remain patent.    There is turbulent flow with elevated velocities through the stenotic,   stented right internal carotid artery.    In-stent velocities from proximal to distal measured 72, 73, 533/204,   415/170, 431/151, 405/143 cm/s.    Peak systolic velocities are as follows:    RIGHT:  PROX CCA = 55 cm/s  IN-STENT = 72, 73, 533/204, 415/170, 431/151, 405/143 cm/s  DIST ICA = 83 cm/s  ECA = 210 cm/s    LEFT:  PROX CCA = 100 cm/s  DIST CCA = 76 cm/s  PROX ICA = 69 cm/s  DIST ICA = 109 cm/s  ECA = 129 cm/s    Antegrade flow is noted within both vertebral arteries.    IMPRESSION: Severe in-stent stenosis of the right carotid artery is once   again demonstrated with in-stent velocities from proximal to distal   measuring 72, 73, 533/204, 415/170, 431/151, 405/143 cm/s      Measurement of carotid stenosis is based on updated recommendations for   carotid stenosis interpretation criteria from the Intersocietal   Accreditation Commission (IAC) Vascular Testing Board, modified from the   Society of Radiologists in Ultrasound (SRU) Consensus Conference Criteria   for Internal Carotid Artery Stenosis.    --- End of Report ---  SUMA GALEANO MD; Attending Interventional Radiologist   This document has been electronically signed. Sep  9 2024  1:40PM    < end of copied text >   Patient is a 78y old  Male who presents with a chief complaint of   HPI: Mr. Archuleta is a 77-year-old male with history of CAD s/p CABG, CVA, HTN, HLD, DM2, BPH, who presented to [facility] on 11/24/2023 for transient left sided facial droop and left-sided weakness, he was transferred to [facility] and then to [facility] ED. On admission, CTA H/N revealed severe stenosis of the right carotid bulb/proximal internal carotid artery now s/p cerebral angiogram with right ICA angioplasty and stent placement on 11/27/23. Continued follow up with carotid US on 6/10/2024 revealed, again, high velocities seen within the right internal carotid artery stent measuring as high as 384 cm/s. This is similar to findings from April 2024. Repeat carotid US 9/9/2024 revealed progression of stenosis and even higher degree of stenosis. Patient on DAPT without any complications. Patient presents today for evaluation of R carotid artery stent with possible stenting and or angioplasty with Dr. Hwang. Denies any headache, weakness, numbness/tingling, any stroke like symptoms.    Patient now s/p cerebral angiogram with R ICA angioplasty for instent stenosis. Now admitted to NSICU for further post-op care.       PAST MEDICAL & SURGICAL HISTORY:  Hypertension  Diabetes mellitus  CAD (coronary artery disease)  BPH (benign prostatic hyperplasia)  Occlusion and stenosis of right carotid artery  TIA (transient ischemic attack)  Chronic kidney disease (CKD)  S/P CABG x 4  Heart valve replaced  History of right shoulder replacement    FAMILY HISTORY:  FH: MI (myocardial infarction) (Father, Sibling)    Allergies    No Known Allergies    Intolerances    REVIEW OF SYSTEMS  Negative except as noted in HPI  CONSTITUTIONAL: No fever, weight loss, or fatigue  EYES: No eye pain, visual disturbances  ENMT:  No difficulty hearing, tinnitus, vertigo  NECK: No pain or stiffness  RESPIRATORY: No cough, wheezing, No shortness of breath  CARDIOVASCULAR: No chest pain, palpitations, dizziness, or leg swelling  GASTROINTESTINAL: No abdominal or epigastric pain. No nausea, vomiting  GENITOURINARY: No dysuria, frequency  NEUROLOGICAL: No headaches, memory loss, loss of strength, numbness, or tremors  MUSCULOSKELETAL: No joint pain or swelling; No muscle, back, or extremity pain    HOME MEDICATIONS:  Home Medications:  aspirin 81 mg oral delayed release tablet: 2 tab(s) orally once a day (03 Oct 2024 07:26)  Centrum Men&#x27;s oral tablet: 1 tab(s) orally once a day (03 Oct 2024 07:26)  ferrous sulfate 324 mg (65 mg elemental iron) oral delayed release tablet: 1 tab(s) orally (03 Oct 2024 07:26)  losartan 25 mg oral tablet: 0.5 tab(s) orally once a day (03 Oct 2024 07:26)  melatonin 5 mg oral tablet: 1 tab(s) orally (03 Oct 2024 07:26)  Vitamin D3 5000 intl units (125 mcg) oral tablet: 1 tab(s) orally once a day (03 Oct 2024 07:26)      MEDICATIONS:  Antibiotics:    Neuro:    Anticoagulation:    OTHER:    IVF:  sodium chloride 0.9% lock flush 3 milliLiter(s) IV Push every 8 hours  sodium chloride 0.9%. 1000 milliLiter(s) IV Continuous <Continuous>      Vital Signs Last 24 Hrs  T(C): 36.6 (03 Oct 2024 07:48), Max: 36.6 (03 Oct 2024 07:48)  T(F): 97.8 (03 Oct 2024 07:48), Max: 97.8 (03 Oct 2024 07:48)  HR: 65 (03 Oct 2024 07:48) (65 - 65)  BP: 163/87 (03 Oct 2024 07:48) (163/87 - 163/87)  BP(mean): --  RR: 16 (03 Oct 2024 07:48) (16 - 16)  SpO2: 100% (03 Oct 2024 07:48) (100% - 100%)    Parameters below as of 03 Oct 2024 07:48  Patient On (Oxygen Delivery Method): room air    Physical Exam:  Constitutional: NAD, lying in bed  Neuro  * Mental Status:  GCS 15: Awake, alert, oriented to conversation. No aphasia or difficulty speaking. No dysarthria.  * Cranial Nerves: Cnii-Cnxii grossly intact. PERRL, EOMI, tongue midline, no gaze deviation  * Motor: RUE 5/5, LUE 5/5, RLE 5/5, LLE 5/5, no drift or dysmetria  * Sensory: Sensation intact to light touch  * Reflexes: not assessed   Cardiovascular: Regular rate and rhythm.  Eyes: See neurologic examination with detailed examination of eyes.  ENT: No JVD, Trachea Midline  Respiratory: non labored breathing   Gastrointestinal: Soft, nontender, nondistended.  Genitourinary: [ ] Contreras, [ x ] No Contreras.   Musculoskeletal: No muscle wasting noted, (See neurologic assessment for full muscle strength assessment)  Skin:  no wounds, no redness, no abrasions noted  Hematologic / Lymph / Immunologic: No bleeding from IV sites or wounds    LABS:  9/24/24: WBC 7.39/ Hg 12/ Hct 35.4/ Plt 214   9/24/24: Ptt 32.2/ Pt 11.5/ INR 1. 02  9/24/24: Na 140/ K 5.3/ Cl 105/ Co 25/ BUN 48.1/ Cr 2.10     PRU: 155  ARU: 520    RADIOLOGY & ADDITIONAL STUDIES:  < from: US Duplex Carotid Arteries Complete, Bilateral (09.09.24 @ 12:51) >  EXAM: [id number] - US DPLX CAROTIDS COMPL BI  - ORDERED BY: CHANDLER HWANG      PROCEDURE DATE:  09/09/2024       INTERPRETATION:  CLINICAL INFORMATION: Prior examinations, dated 3/8   2024, 4/12/2024 and 6/10/2024 showed high grade stenoses in thestented   right carotid artery. The patient returns for further follow-up   examination.    TECHNIQUE: Grayscale, color and spectral Doppler examination of both   carotid arteries was performed.    FINDINGS:    There is an irregular heart rate.    There is a a right carotid artery stent, or stents, with the proximal   margin in the distal right common carotid artery in the internal carotid   artery.    The stents remain patent.    There is turbulent flow with elevated velocities through the stenotic,   stented right internal carotid artery.    In-stent velocities from proximal to distal measured 72, 73, 533/204,   415/170, 431/151, 405/143 cm/s.    Peak systolic velocities are as follows:    RIGHT:  PROX CCA = 55 cm/s  IN-STENT = 72, 73, 533/204, 415/170, 431/151, 405/143 cm/s  DIST ICA = 83 cm/s  ECA = 210 cm/s    LEFT:  PROX CCA = 100 cm/s  DIST CCA = 76 cm/s  PROX ICA = 69 cm/s  DIST ICA = 109 cm/s  ECA = 129 cm/s    Antegrade flow is noted within both vertebral arteries.    IMPRESSION: Severe in-stent stenosis of the right carotid artery is once   again demonstrated with in-stent velocities from proximal to distal   measuring 72, 73, 533/204, 415/170, 431/151, 405/143 cm/s      Measurement of carotid stenosis is based on updated recommendations for   carotid stenosis interpretation criteria from the Intersocietal   Accreditation Commission (IAC) Vascular Testing Board, modified from the   Society of Radiologists in Ultrasound (SRU) Consensus Conference Criteria   for Internal Carotid Artery Stenosis.    --- End of Report ---  SUMA GALEANO MD; Attending Interventional Radiologist   This document has been electronically signed. Sep  9 2024  1:40PM    < end of copied text >

## 2024-10-04 ENCOUNTER — TRANSCRIPTION ENCOUNTER (OUTPATIENT)
Age: 79
End: 2024-10-04

## 2024-10-04 VITALS — DIASTOLIC BLOOD PRESSURE: 87 MMHG | SYSTOLIC BLOOD PRESSURE: 132 MMHG | HEART RATE: 76 BPM | RESPIRATION RATE: 16 BRPM

## 2024-10-04 LAB
ANION GAP SERPL CALC-SCNC: 13 MMOL/L — SIGNIFICANT CHANGE UP (ref 5–17)
BUN SERPL-MCNC: 38.2 MG/DL — HIGH (ref 8–20)
CALCIUM SERPL-MCNC: 8.6 MG/DL — SIGNIFICANT CHANGE UP (ref 8.4–10.5)
CHLORIDE SERPL-SCNC: 111 MMOL/L — HIGH (ref 96–108)
CO2 SERPL-SCNC: 19 MMOL/L — LOW (ref 22–29)
CREAT SERPL-MCNC: 1.72 MG/DL — HIGH (ref 0.5–1.3)
EGFR: 40 ML/MIN/1.73M2 — LOW
GLUCOSE SERPL-MCNC: 130 MG/DL — HIGH (ref 70–99)
HCT VFR BLD CALC: 31.1 % — LOW (ref 39–50)
HGB BLD-MCNC: 10.7 G/DL — LOW (ref 13–17)
MAGNESIUM SERPL-MCNC: 1.9 MG/DL — SIGNIFICANT CHANGE UP (ref 1.6–2.6)
MCHC RBC-ENTMCNC: 32 PG — SIGNIFICANT CHANGE UP (ref 27–34)
MCHC RBC-ENTMCNC: 34.4 GM/DL — SIGNIFICANT CHANGE UP (ref 32–36)
MCV RBC AUTO: 93.1 FL — SIGNIFICANT CHANGE UP (ref 80–100)
PHOSPHATE SERPL-MCNC: 3.2 MG/DL — SIGNIFICANT CHANGE UP (ref 2.4–4.7)
PLATELET # BLD AUTO: 172 K/UL — SIGNIFICANT CHANGE UP (ref 150–400)
POTASSIUM SERPL-MCNC: 4.4 MMOL/L — SIGNIFICANT CHANGE UP (ref 3.5–5.3)
POTASSIUM SERPL-SCNC: 4.4 MMOL/L — SIGNIFICANT CHANGE UP (ref 3.5–5.3)
RBC # BLD: 3.34 M/UL — LOW (ref 4.2–5.8)
RBC # FLD: 13.4 % — SIGNIFICANT CHANGE UP (ref 10.3–14.5)
SODIUM SERPL-SCNC: 143 MMOL/L — SIGNIFICANT CHANGE UP (ref 135–145)
WBC # BLD: 7.27 K/UL — SIGNIFICANT CHANGE UP (ref 3.8–10.5)
WBC # FLD AUTO: 7.27 K/UL — SIGNIFICANT CHANGE UP (ref 3.8–10.5)

## 2024-10-04 PROCEDURE — 99232 SBSQ HOSP IP/OBS MODERATE 35: CPT

## 2024-10-04 PROCEDURE — 93010 ELECTROCARDIOGRAM REPORT: CPT

## 2024-10-04 RX ORDER — FAMOTIDINE 40 MG
20 TABLET ORAL DAILY
Refills: 0 | Status: DISCONTINUED | OUTPATIENT
Start: 2024-10-04 | End: 2024-10-04

## 2024-10-04 RX ORDER — FERROUS SULFATE 325(65) MG
1 TABLET ORAL
Refills: 0 | DISCHARGE

## 2024-10-04 RX ORDER — ACETAMINOPHEN 325 MG
2 TABLET ORAL
Qty: 0 | Refills: 0 | DISCHARGE
Start: 2024-10-04

## 2024-10-04 RX ORDER — 5-HYDROXYTRYPTOPHAN (5-HTP) 100 MG
1 TABLET,DISINTEGRATING ORAL
Refills: 0 | DISCHARGE

## 2024-10-04 RX ORDER — ATORVASTATIN CALCIUM 10 MG/1
1 TABLET, FILM COATED ORAL
Qty: 0 | Refills: 0 | DISCHARGE
Start: 2024-10-04

## 2024-10-04 RX ORDER — ASPIRIN 325 MG
2 TABLET ORAL
Refills: 0 | DISCHARGE

## 2024-10-04 RX ORDER — ASPIRIN 325 MG
81 TABLET ORAL ONCE
Refills: 0 | Status: COMPLETED | OUTPATIENT
Start: 2024-10-04 | End: 2024-10-04

## 2024-10-04 RX ORDER — TAMSULOSIN HCL 0.4 MG
1 CAPSULE ORAL
Qty: 0 | Refills: 0 | DISCHARGE
Start: 2024-10-04

## 2024-10-04 RX ORDER — METOPROLOL TARTRATE 50 MG
1 TABLET ORAL
Qty: 0 | Refills: 0 | DISCHARGE
Start: 2024-10-04

## 2024-10-04 RX ORDER — LOSARTAN POTASSIUM 100 MG/1
0.5 TABLET, FILM COATED ORAL
Refills: 0 | DISCHARGE

## 2024-10-04 RX ADMIN — Medication 81 MILLIGRAM(S): at 12:09

## 2024-10-04 RX ADMIN — Medication 3 MILLILITER(S): at 05:30

## 2024-10-04 RX ADMIN — Medication 17 GRAM(S): at 12:11

## 2024-10-04 RX ADMIN — HYDRALAZINE HYDROCHLORIDE 10 MILLIGRAM(S): 100 TABLET ORAL at 03:32

## 2024-10-04 RX ADMIN — Medication 50 MILLIGRAM(S): at 05:17

## 2024-10-04 RX ADMIN — Medication 20 MILLIGRAM(S): at 12:09

## 2024-10-04 RX ADMIN — LOSARTAN POTASSIUM 25 MILLIGRAM(S): 100 TABLET, FILM COATED ORAL at 05:17

## 2024-10-04 RX ADMIN — Medication 75 MILLIGRAM(S): at 12:09

## 2024-10-04 RX ADMIN — Medication 3 MILLILITER(S): at 13:54

## 2024-10-04 NOTE — PROGRESS NOTE ADULT - ASSESSMENT
Assessment: 77-year-old male with history of CAD s/p CABG, CVA, HTN, HLD, DM2, BPH, TIA s/p R ICA stent on 11/27/23 for severe stenosis of the right carotid bulb/proximal internal carotid artery. Pt had follow up imaging to showed instent stenosis with increased velocities.   -s/p Cerebral Angiogram with R ICA angioplasty for instent stenosis     Plan:   - Discussed w/ Dr. Hwang   -Q1 neuro checks  - -160   -Carotid Dopplers  -Continue ASA 81   -Continue Plavix 75   -Continue NS @ 75   -Repeat BMP in am, trending BUN/Creat, CKD 3   -Possible d/c in am pending clinical course  -Further care as per NSICU care     
77-year-old male with R ICA in-stent stenosis, now s/p angioplasty with 50% residual on 10/03.  PMH of CAD s/p CABG, CVA, HTN, HLD, DM2, BPH.    Plan:  - cont DAPT - Plavix 75 daily +  mg PO Daily  - cont home meds  - pt is being d/c'ed home, cleared by [facility] and PT, outpt f/up   - instructions on when to return to ER provided        
EKG completed

## 2024-10-04 NOTE — PHYSICAL THERAPY INITIAL EVALUATION ADULT - PERTINENT HX OF CURRENT PROBLEM, REHAB EVAL
77-year-old male with history of CAD s/p CABG, CVA, HTN, HLD, DM2, BPH, who presented to [facility] on 11/24/2023 for transient left sided facial droop and left-sided weakness, he was transferred to [facility] and then to [facility] ED. On admission, CTA H/N revealed severe stenosis of the right carotid bulb/proximal internal carotid artery now s/p cerebral angiogram with right ICA angioplasty and stent placement on 11/27/23. Continued follow up with carotid US on 6/10/2024 revealed, again, high velocities seen within the right internal carotid artery stent. Repeat carotid US 9/9/2024 revealed progression of stenosis and even higher degree of stenosis. now s/p cerebral angiogram with Right ICA angioplasty for in stent stenosis on 10/3. Now admitted to NSICU for further post-op care

## 2024-10-04 NOTE — DISCHARGE NOTE PROVIDER - NSDCFUADDINST_GEN_ALL_CORE_FT
ACTIVITY:     Do not drive or operate hazardous machinery for 24 hours.                        Limit your physical activities for 24 hours.                        Do not engage in in sports, heavy work or heavy lifting for 72 hours.    DIET:             You may resume your regular diet.                        Drinking extra fluids (water, juice) is encouraged.                        Abstain from alcohol for 24 hours.                 MEDICATIONS:   A mild pain at the puncture site is not unusual.                           You may take Tylenol 1-2 tabs every 4-6 hours as needed                           Please continue to take aspirin 81mg daily and plavix 75mg daily    SPECIAL INSTRUCTIONS:  Signs and symptoms to look out for:       1. Juvencio bleeding from the right groin puncture site is an emergency.            Put direct pressure on the site and go directly to your local Emergency Room for treatment.       2. Bleeding under the skin may also occur and a small "black and blue may be expected.         If there appears to be an expanding mass or swelling around the puncture site, apply manual compression and go          immediately to your local Emergency Room for treatment.       3. If your foot/leg or hand/arm (puncture site) becomes cool or blue and/or you are unable to move it, this must be treated as an emergency.         go directly to your local emergency room for treatment.       4. Excessive puncture site pain is abnormal and should be assessed.      5.  Look out for signs of infection in the puncture site: fever, red streaking of the leg, discharge, pain.      6.  Lack of adequate urine output, provided you are drinking enough fluids, may be cause for concern, notify us if you think this is the case.         ACTIVITY:     Do not drive or operate hazardous machinery for 24 hours.                      Limit your physical activities for 24 hours.                      Do not engage in sports, heavy work or heavy lifting for 72 hours.    DIET:             You may resume your regular diet.                        Drinking extra fluids (water, juice) is encouraged.                        Abstain from alcohol for 24 hours.                 MEDICATIONS:   A mild pain at the puncture site is not unusual.                           You may take Tylenol 1-2 tabs every 4-6 hours as needed                           Please continue to take aspirin 162mg daily and plavix 75mg daily    SPECIAL INSTRUCTIONS:  Signs and symptoms to look out for:       1. Juvencio bleeding from the right groin puncture site is an emergency.            Put direct pressure on the site and go directly to your local Emergency Room for treatment.       2. Bleeding under the skin may also occur and a small "black and blue may be expected.         If there appears to be an expanding mass or swelling around the puncture site, apply manual compression and go          immediately to your local Emergency Room for treatment.       3. If your foot/leg or hand/arm (puncture site) becomes cool or blue and/or you are unable to move it, this must be treated as an emergency.         go directly to your local emergency room for treatment.       4. Excessive puncture site pain is abnormal and should be assessed.      5.  Look out for signs of infection in the puncture site: fever, red streaking of the leg, discharge, pain.      6.  Lack of adequate urine output, provided you are drinking enough fluids, may be cause for concern, notify us if you think this is the case.

## 2024-10-04 NOTE — PHARMACOTHERAPY INTERVENTION NOTE - COMMENTS
Outpatient Medication Review updated using [facility] outpatient fill records and confirmed with patient at bedside. Of note, patient recently started taking 2 tabs of Aspirin daily prior to surgery and he cuts his losartan 25 mg tabs in half, takes 12.5 mg daily.    HOME MEDICATIONS:  aspirin 81 mg oral delayed release tablet: 2 tab(s) orally once a day (04 Oct 2024 09:11)  atorvastatin 40 mg oral tablet: 1 tab(s) orally once a day (at bedtime) (04 Oct 2024 09:11)  Centrum Men&#x27;s oral tablet: 1 tab(s) orally once a day (04 Oct 2024 09:11)  clopidogrel 75 mg oral tablet: 1 tab(s) orally once a day Use as directed MDD: 1 (04 Oct 2024 09:11)  ferrous sulfate 324 mg (65 mg elemental iron) oral delayed release tablet: 1 tab(s) orally (04 Oct 2024 09:11)  losartan 25 mg oral tablet: 0.5 tab(s) orally once a day (04 Oct 2024 09:11)  melatonin 5 mg oral tablet: 1 tab(s) orally (04 Oct 2024 09:11)  metoprolol succinate 50 mg oral tablet, extended release: 1 tab(s) orally once a day (04 Oct 2024 09:11)  tamsulosin 0.4 mg oral capsule: 1 cap(s) orally once a day (at bedtime) (04 Oct 2024 09:11)

## 2024-10-04 NOTE — OCCUPATIONAL THERAPY INITIAL EVALUATION ADULT - LEVEL OF INDEPENDENCE: TOILET, REHAB EVAL
standard toilet. Modified Independent, verbal cues needed initially for safety awareness, pt demonstrated good carryover/independent

## 2024-10-04 NOTE — DISCHARGE NOTE PROVIDER - NSDCCPTREATMENT_GEN_ALL_CORE_FT
PRINCIPAL PROCEDURE  Procedure: Carotid angioplasty  Findings and Treatment: cerebral angiogram s/p angioplasty R ICA stent

## 2024-10-04 NOTE — DISCHARGE NOTE PROVIDER - NSDCFUSCHEDAPPT_GEN_ALL_CORE_FT
Brian Hwang Physician [facility]  NEUROLOGY [address] [place] [address]  Scheduled Appointment: 11/12/2024

## 2024-10-04 NOTE — PROGRESS NOTE ADULT - TIME BILLING
included review of relevant history, clinical examination, review of data and images, discussion of treatment with the multidisciplinary team and any consultants involved in this patient’s care.

## 2024-10-04 NOTE — PHYSICAL THERAPY INITIAL EVALUATION ADULT - LEVEL OF INDEPENDENCE: STAND/SIT, REHAB EVAL
Modified Independent, verbal cues needed initially for safety awareness, pt demonstrated good carryover./independent

## 2024-10-04 NOTE — OCCUPATIONAL THERAPY INITIAL EVALUATION ADULT - GENERAL OBSERVATIONS, REHAB EVAL
Left pt as received OOB in chair, NAD, +IV lock, +Tele//BP on RA A&Ox4. Pre/post pain 0/10. Pt agreeable to OT evaluation

## 2024-10-04 NOTE — DISCHARGE NOTE PROVIDER - CARE PROVIDER_API CALL
Brian Hwang  Interventional Neuroradiology  [address] [place] [id number]  Phone: [phone]  Fax: [phone]  Follow Up Time:

## 2024-10-04 NOTE — DISCHARGE NOTE PROVIDER - NSDCCPCAREPLAN_GEN_ALL_CORE_FT
PRINCIPAL DISCHARGE DIAGNOSIS  Diagnosis: Stenosis of right carotid artery  Assessment and Plan of Treatment:

## 2024-10-04 NOTE — PHYSICAL THERAPY INITIAL EVALUATION ADULT - ADDITIONAL COMMENTS
Pt lives in a private home with his son (works days 7 days a week) 1 steps to enter without handrails, 6 steps inside to kitchen and living room with handrails and an additional 7 steps to bedroom and bathroom. Pt was independent PTA without an assist device. Pt owns a RW and shower chair, no Grab bars in his home. Stall shower with glass doors, right handed, wears reading glasses and does not drive.

## 2024-10-04 NOTE — OCCUPATIONAL THERAPY INITIAL EVALUATION ADULT - PERTINENT HX OF CURRENT PROBLEM, REHAB EVAL
with hx of CAD s/p CABG, CVA, HTN, HLD, DM2, BPH, who presented to [facility] on 11/24/2023 for transient L sided facial droop and L sided weakness, he was transferred to [facility] and then to [facility] ED. On admission, CTA H/N revealed severe stenosis of the R carotid bulb/proximal internal carotid artery now s/p cerebral angiogram with R ICA angioplasty and stent placement on 11/27/23. Continued f/u with carotid US on 6/10/2024 revealed, again, high velocities seen within the R internal carotid artery stent. Repeat carotid US 9/9/2024 revealed progression of stenosis and even higher degree of stenosis.

## 2024-10-04 NOTE — PHYSICAL THERAPY INITIAL EVALUATION ADULT - LEVEL OF INDEPENDENCE: STAIR NEGOTIATION, REHAB EVAL
LLE weakness, minor balance deficits, verbal cues needed initially for safety awareness, pt demonstrated good carryover of safety cues however, requires supervision 2*2 weakness, pt instructed to NOT perform stairs without his son or home PT present. Pt verbalized understanding/agreement/supervision

## 2024-10-04 NOTE — DISCHARGE NOTE PROVIDER - HOSPITAL COURSE
HPI:  78 years old male PMHx HTN, DM, CAD, BPH, CKD, CABGx4, h/o TU  angioplasty stenting of TU 11/27/23,  with worsening and progressive severe in-stent restenosis.  Carotid US 6/10/2024 revealed high velocities seen within the right internal carotid artery stent measuring as high as 384 cm/s. Repeat carotid US 9/9/2024 revealed progression of stenosis,  Pt Currently on ASA daily and Plavix daily.    Hospital Course:  10/4: POD1 s/p cerebral angiogram with R ICA angioplasty with 50% residual. Postoperative carotid dopplers revealing improvement in velocities within the right carotid artery since 9/9/2024. Patient with right jaw pain improving.    Patient is going home    Exam on day of discharge:  General: NAD, pt is comfortably sitting up in bed, on room air  Cardiovascular: RRR, normal S1 and S2   Respiratory: lungs CTAB, no wheezing, rhonchi, or crackles   GI: normoactive BS to auscultation, abd soft, NTND   Neuro: AAOx3, FC, speech fluent and clear  CNII-CXII: PERRL 3mm briskly reactive, EOMI b/l, face symmetric, tongue midline  Motor: EUGENE x4 spontaneously, 5/5 strength in all extremities throughout b/l  Sensation intact to light touch throughout  Extremities: distal pulses 2+ x4 symmetric  Wound/incision: R groin incision site C/D/I, soft, no hematoma   HPI:  78 years old male PMHx HTN, DM, CAD, BPH, CKD, CABGx4, h/o TU  angioplasty stenting of TU 11/27/23,  with worsening and progressive severe in-stent restenosis.  Carotid US 6/10/2024 revealed high velocities seen within the right internal carotid artery stent measuring as high as 384 cm/s. Repeat carotid US 9/9/2024 revealed progression of stenosis,  Pt Currently on ASA daily and Plavix daily.    Hospital Course:  10/4: POD1 s/p cerebral angiogram with R ICA angioplasty with 50% residual. Postoperative carotid dopplers revealing improvement in velocities within the right carotid artery since 9/9/2024. Patient with right jaw pain improving.    Patient is going home with home PT and OT, rolling walker    Exam on day of discharge:  General: NAD, pt is comfortably sitting up in bed, on room air  Cardiovascular: RRR, normal S1 and S2   Respiratory: lungs CTAB, no wheezing, rhonchi, or crackles   GI: normoactive BS to auscultation, abd soft, NTND   Neuro: AAOx3, FC, speech fluent and clear  CNII-CXII: PERRL 3mm briskly reactive, EOMI b/l, face symmetric, tongue midline  Motor: EUGENE x4 spontaneously, 5/5 strength in all extremities throughout b/l  Sensation intact to light touch throughout  Extremities: distal pulses 2+ x4 symmetric  Wound/incision: R groin incision site C/D/I, soft, no hematoma

## 2024-10-04 NOTE — OCCUPATIONAL THERAPY INITIAL EVALUATION ADULT - LIVES WITH, PROFILE
Pt lives in a private home with his son (works days 7 days a week) 1 [address] without handrails, 6 steps inside to kitchen and living room with handrails and an additional 7 steps to bedroom and bathroom with handrail./children

## 2024-10-04 NOTE — PROGRESS NOTE ADULT - SUBJECTIVE AND OBJECTIVE BOX
Patient is a 78y old  Male who presents with a chief complaint of   HPI: Mr. Archuleta is a 77-year-old male with history of CAD s/p CABG, CVA, HTN, HLD, DM2, BPH, who presented to [facility] on 11/24/2023 for transient left sided facial droop and left-sided weakness, he was transferred to [facility] and then to [facility] ED. On admission, CTA H/N revealed severe stenosis of the right carotid bulb/proximal internal carotid artery now s/p cerebral angiogram with right ICA angioplasty and stent placement on 11/27/23. Continued follow up with carotid US on 6/10/2024 revealed, again, high velocities seen within the right internal carotid artery stent measuring as high as 384 cm/s. This is similar to findings from April 2024. Repeat carotid US 9/9/2024 revealed progression of stenosis and even higher degree of stenosis. Patient on DAPT without any complications. Patient presents today for evaluation of R carotid artery stent with possible stenting and or angioplasty with Dr. Hwang. Denies any headache, weakness, numbness/tingling, any stroke like symptoms.    Patient now s/p cerebral angiogram with R ICA angioplasty for instent stenosis. Now admitted to NSICU for further post-op care.     Interval history: Pt seen and examined by neuro IR team, patient with right jaw pain improving, Carotid dopplers being performed at the bedside, no other complaints offered, Right groin site intact with safeguard.     Vital Signs Last 24 Hrs  T(C): 36.5 (03 Oct 2024 11:15), Max: 36.6 (03 Oct 2024 07:48)  T(F): 97.7 (03 Oct 2024 11:15), Max: 97.8 (03 Oct 2024 07:48)  HR: 56 (03 Oct 2024 14:00) (56 - 80)  BP: 165/60 (03 Oct 2024 14:00) (100/64 - 165/60)  BP(mean): 87 (03 Oct 2024 14:00) (72 - 107)  RR: 14 (03 Oct 2024 14:00) (14 - 19)  SpO2: 100% (03 Oct 2024 14:00) (98% - 100%)    Parameters below as of 03 Oct 2024 12:00  Patient On (Oxygen Delivery Method): room air    Physical Exam:  Constitutional: NAD, lying in bed  Neuro  * Mental Status:  GCS 15: Awake, alert, oriented to conversation. No aphasia or difficulty speaking. No dysarthria. Able to name objects and their function.  * Cranial Nerves: Cnii-Cnxii grossly intact. PERRL, EOMI, tongue midline, no gaze deviation  * Motor: RUE 5/5, LUE 5/5, RLE 5/5, LLE 5/5, no drift or dysmetria  * Sensory: Sensation intact to light touch  * Reflexes: not assessed   Cardiovascular: Regular rate and rhythm.  Eyes: See neurologic examination with detailed examination of eyes.  ENT: No JVD, Trachea Midline  Respiratory: non labored breathing   Gastrointestinal: Soft, nontender, nondistended.  Genitourinary: [ ] Contreras, [ x ] No Contreras.   Musculoskeletal: No muscle wasting noted, (See neurologic assessment for full muscle strength assessment)   Skin:  no wounds, no redness, no abrasions noted  Hematologic / Lymph / Immunologic: No bleeding from IV sites or wounds    LABS:    10-03    140  |  107  |  49.3[H]  ----------------------------<  113[H]  4.7   |  21.0[L]  |  1.99[H]    Ca    9.2      03 Oct 2024 07:35    Urinalysis Basic - ( 03 Oct 2024 07:35 )  Color: x / Appearance: x / SG: x / pH: x  Gluc: 113 mg/dL / Ketone: x  / Bili: x / Urobili: x   Blood: x / Protein: x / Nitrite: x   Leuk Esterase: x / RBC: x / WBC x   Sq Epi: x / Non Sq Epi: x / Bacteria: x    I&O:   I&O's Summary    03 Oct 2024 07:01  -  03 Oct 2024 15:20  --------------------------------------------------------  IN: 475 mL / OUT: 0 mL / NET: 475 mL    Medications:  MEDICATIONS  (STANDING):  aspirin  chewable 81 milliGRAM(s) Oral daily  atorvastatin 40 milliGRAM(s) Oral at bedtime  clopidogrel Tablet 75 milliGRAM(s) Oral daily  metoprolol succinate ER 50 milliGRAM(s) Oral daily  polyethylene glycol 3350 17 Gram(s) Oral daily  senna 2 Tablet(s) Oral at bedtime  sodium chloride 0.9% lock flush 3 milliLiter(s) IV Push every 8 hours  sodium chloride 0.9%. 1000 milliLiter(s) (75 mL/Hr) IV Continuous <Continuous>  tamsulosin 0.4 milliGRAM(s) Oral at bedtime    MEDICATIONS  (PRN):  acetaminophen     Tablet .. 650 milliGRAM(s) Oral every 6 hours PRN Mild Pain (1 - 3)  hydrALAZINE Injectable 10 milliGRAM(s) IV Push every 2 hours PRN SBP > 160  labetalol Injectable 10 milliGRAM(s) IV Push every 2 hours PRN SBP > 160  ondansetron Injectable 4 milliGRAM(s) IV Push every 6 hours PRN Nausea and/or Vomiting    RADIOLOGY & ADDITIONAL STUDIES:  awaiting carotid dopplers 
77-year-old male with history of CAD s/p CABG, CVA, HTN, HLD, DM2, BPH, who presented to [facility] on 11/24/2023 for transient left sided facial droop and left-sided weakness, he was transferred to [facility] and then to [facility] ED. On admission, CTA H/N revealed severe stenosis of the right carotid bulb/proximal internal carotid artery now s/p cerebral angiogram with right ICA angioplasty and stent placement on 11/27/23. Continued follow up with carotid US on 6/10/2024 revealed, again, high velocities seen within the right internal carotid artery stent measuring as high as 384 cm/s. This is similar to findings from April 2024. Repeat carotid US 9/9/2024 revealed progression of stenosis and even higher degree of stenosis. Patient on DAPT without any complications. Patient presents today for evaluation of R carotid artery stent with possible stenting and or angioplasty with Dr. Hwang. Denies any headache, weakness, numbness/tingling, any stroke like symptoms.    Patient now s/p cerebral angiogram with R ICA angioplasty for instent stenosis. Now admitted to NSICU for further post-op care. (10/04/2024)      O/n events: none reported.    ICU Vital Signs Last 24 Hrs  T(C): 36.8 (04 Oct 2024 12:00), Max: 36.9 (04 Oct 2024 04:06)  T(F): 98.3 (04 Oct 2024 12:00), Max: 98.5 (04 Oct 2024 04:06)  HR: 76 (04 Oct 2024 13:50) (68 - 85)  BP: 132/87 (04 Oct 2024 13:50) (111/64 - 172/80)  BP(mean): 104 (04 Oct 2024 13:50) (79 - 123)  ABP: --  ABP(mean): --  RR: 16 (04 Oct 2024 13:50) (12 - 21)  SpO2: 96% (04 Oct 2024 13:00) (96% - 100%)    O2 Parameters below as of 04 Oct 2024 12:00  Patient On (Oxygen Delivery Method): room air      Exam:  NAD, lying in bed.  Awake, alert, oriented to conversation. No aphasia or difficulty speaking. No dysarthria.  PERRL, EOMI, tongue midline, no gaze deviation.  Motor: RUE 5/5, LUE 5/5, RLE 5/5, LLE 5/5, no drift or dysmetria  Sensory: Sensation intact to light touch    Cardiovascular: S1S2 present.  Respiratory: CTAB.  ABd soft, NT, ND.  No peripheral swelling noted.

## 2024-10-04 NOTE — PHYSICAL THERAPY INITIAL EVALUATION ADULT - CRITERIA FOR SKILLED THERAPEUTIC INTERVENTIONS
Home with RW, Home PT, supervision for stairs (No stair navigation without assist from son or Home PT)/anticipated discharge recommendation

## 2024-10-04 NOTE — OCCUPATIONAL THERAPY INITIAL EVALUATION ADULT - ADDITIONAL COMMENTS
Pt has a shower stall with doors and no grab bars. Pt was independent PTA without an assist device. Pt owns a RW and shower chair. Pt is right handed and does not drive.

## 2024-10-04 NOTE — DISCHARGE NOTE PROVIDER - NSDCMRMEDTOKEN_GEN_ALL_CORE_FT
aspirin 81 mg oral delayed release tablet: 2 tab(s) orally once a day  atorvastatin 40 mg oral tablet: 1 tab(s) orally once a day (at bedtime)  Centrum Men&#x27;s oral tablet: 1 tab(s) orally once a day  clopidogrel 75 mg oral tablet: 1 tab(s) orally once a day Use as directed MDD: 1  famotidine 20 mg oral tablet: 1 tab(s) orally once a day  ferrous sulfate 324 mg (65 mg elemental iron) oral delayed release tablet: 1 tab(s) orally  losartan 25 mg oral tablet: 0.5 tab(s) orally once a day  melatonin 5 mg oral tablet: 1 tab(s) orally  metoprolol succinate 50 mg oral tablet, extended release: 1 tab(s) orally once a day  tamsulosin 0.4 mg oral capsule: 1 cap(s) orally once a day (at bedtime)  Vitamin D3 5000 intl units (125 mcg) oral tablet: 1 tab(s) orally once a day   acetaminophen 325 mg oral tablet: 2 tab(s) orally every 6 hours As needed Mild Pain (1 - 3)  aspirin 81 mg oral delayed release tablet: 2 tab(s) orally once a day  atorvastatin 40 mg oral tablet: 1 tab(s) orally once a day (at bedtime)  Centrum Men&#x27;s oral tablet: 1 tab(s) orally once a day  clopidogrel 75 mg oral tablet: 1 tab(s) orally once a day  ferrous sulfate 324 mg (65 mg elemental iron) oral delayed release tablet: 1 tab(s) orally  losartan 25 mg oral tablet: 0.5 tab(s) orally once a day  melatonin 5 mg oral tablet: 1 tab(s) orally  metoprolol succinate 50 mg oral tablet, extended release: 1 tab(s) orally once a day  tamsulosin 0.4 mg oral capsule: 1 cap(s) orally once a day (at bedtime)   acetaminophen 325 mg oral tablet: 2 tab(s) orally every 6 hours As needed Mild Pain (1 - 3)  aspirin 81 mg oral delayed release tablet: 2 tab(s) orally once a day  atorvastatin 40 mg oral tablet: 1 tab(s) orally once a day (at bedtime)  Centrum Men&#x27;s oral tablet: 1 tab(s) orally once a day  clopidogrel 75 mg oral tablet: 1 tab(s) orally once a day  ferrous sulfate 324 mg (65 mg elemental iron) oral delayed release tablet: 1 tab(s) orally  losartan 25 mg oral tablet: 0.5 tab(s) orally once a day  melatonin 5 mg oral tablet: 1 tab(s) orally  metoprolol succinate 50 mg oral tablet, extended release: 1 tab(s) orally once a day  Rolling Walker: s/p angiogram R ICA stent angioplasty  tamsulosin 0.4 mg oral capsule: 1 cap(s) orally once a day (at bedtime)

## 2024-11-12 ENCOUNTER — APPOINTMENT (OUTPATIENT)
Dept: NEUROLOGY | Facility: CLINIC | Age: 79
End: 2024-11-12

## 2024-11-12 VITALS
SYSTOLIC BLOOD PRESSURE: 150 MMHG | DIASTOLIC BLOOD PRESSURE: 72 MMHG | BODY MASS INDEX: 27.49 KG/M2 | HEIGHT: 65 IN | WEIGHT: 165 LBS | HEART RATE: 74 BPM | OXYGEN SATURATION: 96 %

## 2024-11-12 DIAGNOSIS — R29.898 OTHER SYMPTOMS AND SIGNS INVOLVING THE MUSCULOSKELETAL SYSTEM: ICD-10-CM

## 2024-11-12 DIAGNOSIS — M54.50 LOW BACK PAIN, UNSPECIFIED: ICD-10-CM

## 2024-11-12 DIAGNOSIS — I65.21 OCCLUSION AND STENOSIS OF RIGHT CAROTID ARTERY: ICD-10-CM

## 2024-11-12 DIAGNOSIS — Z86.73 PERSONAL HISTORY OF TRANSIENT ISCHEMIC ATTACK (TIA), AND CEREBRAL INFARCTION W/OUT RESIDUAL DEFICITS: ICD-10-CM

## 2024-11-12 PROBLEM — N18.9 CHRONIC KIDNEY DISEASE, UNSPECIFIED: Chronic | Status: ACTIVE | Noted: 2024-09-24

## 2024-11-12 PROCEDURE — 99215 OFFICE O/P EST HI 40 MIN: CPT | Mod: 24

## 2024-11-12 PROCEDURE — 99024 POSTOP FOLLOW-UP VISIT: CPT

## 2024-11-12 PROCEDURE — G2211 COMPLEX E/M VISIT ADD ON: CPT

## 2024-11-21 ENCOUNTER — APPOINTMENT (OUTPATIENT)
Dept: MRI IMAGING | Facility: CLINIC | Age: 79
End: 2024-11-21

## 2024-11-21 ENCOUNTER — OUTPATIENT (OUTPATIENT)
Dept: OUTPATIENT SERVICES | Facility: HOSPITAL | Age: 79
LOS: 1 days | End: 2024-11-21
Payer: COMMERCIAL

## 2024-11-21 DIAGNOSIS — R29.898 OTHER SYMPTOMS AND SIGNS INVOLVING THE MUSCULOSKELETAL SYSTEM: ICD-10-CM

## 2024-11-21 DIAGNOSIS — Z95.1 PRESENCE OF AORTOCORONARY BYPASS GRAFT: Chronic | ICD-10-CM

## 2024-11-21 DIAGNOSIS — M54.50 LOW BACK PAIN, UNSPECIFIED: ICD-10-CM

## 2024-11-21 DIAGNOSIS — Z95.2 PRESENCE OF PROSTHETIC HEART VALVE: Chronic | ICD-10-CM

## 2024-11-21 DIAGNOSIS — Z96.611 PRESENCE OF RIGHT ARTIFICIAL SHOULDER JOINT: Chronic | ICD-10-CM

## 2024-11-21 PROCEDURE — 72148 MRI LUMBAR SPINE W/O DYE: CPT

## 2024-11-21 PROCEDURE — 72148 MRI LUMBAR SPINE W/O DYE: CPT | Mod: 26

## 2024-11-26 PROCEDURE — 97163 PT EVAL HIGH COMPLEX 45 MIN: CPT

## 2024-11-26 PROCEDURE — 84100 ASSAY OF PHOSPHORUS: CPT

## 2024-11-26 PROCEDURE — C1769: CPT

## 2024-11-26 PROCEDURE — C1894: CPT

## 2024-11-26 PROCEDURE — 36415 COLL VENOUS BLD VENIPUNCTURE: CPT

## 2024-11-26 PROCEDURE — 93005 ELECTROCARDIOGRAM TRACING: CPT

## 2024-11-26 PROCEDURE — 93880 EXTRACRANIAL BILAT STUDY: CPT

## 2024-11-26 PROCEDURE — C1884: CPT

## 2024-11-26 PROCEDURE — 37215 TRANSCATH STENT CCA W/EPS: CPT

## 2024-11-26 PROCEDURE — C1760: CPT

## 2024-11-26 PROCEDURE — 85576 BLOOD PLATELET AGGREGATION: CPT

## 2024-11-26 PROCEDURE — C1725: CPT

## 2024-11-26 PROCEDURE — 83735 ASSAY OF MAGNESIUM: CPT

## 2024-11-26 PROCEDURE — 85027 COMPLETE CBC AUTOMATED: CPT

## 2024-11-26 PROCEDURE — C1887: CPT

## 2024-11-26 PROCEDURE — 80048 BASIC METABOLIC PNL TOTAL CA: CPT

## 2025-01-15 ENCOUNTER — OUTPATIENT (OUTPATIENT)
Dept: OUTPATIENT SERVICES | Facility: HOSPITAL | Age: 80
LOS: 1 days | End: 2025-01-15
Payer: MEDICARE

## 2025-01-15 ENCOUNTER — APPOINTMENT (OUTPATIENT)
Dept: ULTRASOUND IMAGING | Facility: CLINIC | Age: 80
End: 2025-01-15

## 2025-01-15 DIAGNOSIS — I65.21 OCCLUSION AND STENOSIS OF RIGHT CAROTID ARTERY: ICD-10-CM

## 2025-01-15 DIAGNOSIS — Z95.2 PRESENCE OF PROSTHETIC HEART VALVE: Chronic | ICD-10-CM

## 2025-01-15 DIAGNOSIS — Z96.611 PRESENCE OF RIGHT ARTIFICIAL SHOULDER JOINT: Chronic | ICD-10-CM

## 2025-01-15 DIAGNOSIS — Z95.1 PRESENCE OF AORTOCORONARY BYPASS GRAFT: Chronic | ICD-10-CM

## 2025-01-15 PROCEDURE — 93880 EXTRACRANIAL BILAT STUDY: CPT | Mod: 26

## 2025-01-15 PROCEDURE — 93880 EXTRACRANIAL BILAT STUDY: CPT

## 2025-01-23 ENCOUNTER — APPOINTMENT (OUTPATIENT)
Dept: NEUROLOGY | Facility: CLINIC | Age: 80
End: 2025-01-23
Payer: MEDICARE

## 2025-01-23 VITALS
WEIGHT: 165 LBS | SYSTOLIC BLOOD PRESSURE: 138 MMHG | BODY MASS INDEX: 27.49 KG/M2 | OXYGEN SATURATION: 96 % | DIASTOLIC BLOOD PRESSURE: 60 MMHG | HEIGHT: 65 IN | HEART RATE: 78 BPM

## 2025-01-23 DIAGNOSIS — I65.21 OCCLUSION AND STENOSIS OF RIGHT CAROTID ARTERY: ICD-10-CM

## 2025-01-23 DIAGNOSIS — Z86.73 PERSONAL HISTORY OF TRANSIENT ISCHEMIC ATTACK (TIA), AND CEREBRAL INFARCTION W/OUT RESIDUAL DEFICITS: ICD-10-CM

## 2025-01-23 DIAGNOSIS — M48.061 SPINAL STENOSIS, LUMBAR REGION WITHOUT NEUROGENIC CLAUDICATION: ICD-10-CM

## 2025-01-23 PROCEDURE — G2211 COMPLEX E/M VISIT ADD ON: CPT

## 2025-01-23 PROCEDURE — 99215 OFFICE O/P EST HI 40 MIN: CPT

## 2025-07-01 ENCOUNTER — APPOINTMENT (OUTPATIENT)
Dept: NEUROLOGY | Facility: CLINIC | Age: 80
End: 2025-07-01

## 2025-07-01 ENCOUNTER — NON-APPOINTMENT (OUTPATIENT)
Age: 80
End: 2025-07-01